# Patient Record
Sex: FEMALE | Race: WHITE | NOT HISPANIC OR LATINO | Employment: FULL TIME | URBAN - METROPOLITAN AREA
[De-identification: names, ages, dates, MRNs, and addresses within clinical notes are randomized per-mention and may not be internally consistent; named-entity substitution may affect disease eponyms.]

---

## 2018-05-31 ENCOUNTER — PRENATAL OFFICE VISIT (OUTPATIENT)
Dept: NURSING | Facility: CLINIC | Age: 18
End: 2018-05-31
Payer: COMMERCIAL

## 2018-05-31 VITALS
SYSTOLIC BLOOD PRESSURE: 112 MMHG | HEIGHT: 66 IN | WEIGHT: 177 LBS | BODY MASS INDEX: 28.45 KG/M2 | HEART RATE: 78 BPM | DIASTOLIC BLOOD PRESSURE: 68 MMHG

## 2018-05-31 DIAGNOSIS — O09.90 HIGH-RISK PREGNANCY, UNSPECIFIED TRIMESTER: Primary | ICD-10-CM

## 2018-05-31 DIAGNOSIS — N91.2 ABSENCE OF MENSTRUATION: ICD-10-CM

## 2018-05-31 LAB
ABO + RH BLD: NORMAL
ABO + RH BLD: NORMAL
ALBUMIN UR-MCNC: NEGATIVE MG/DL
APPEARANCE UR: CLEAR
BACTERIA #/AREA URNS HPF: ABNORMAL /HPF
BETA HCG QUAL IFA URINE: POSITIVE
BILIRUB UR QL STRIP: NEGATIVE
BLD GP AB SCN SERPL QL: NORMAL
BLOOD BANK CMNT PATIENT-IMP: NORMAL
COLOR UR AUTO: YELLOW
ERYTHROCYTE [DISTWIDTH] IN BLOOD BY AUTOMATED COUNT: 14.9 % (ref 10–15)
GLUCOSE UR STRIP-MCNC: NEGATIVE MG/DL
HCT VFR BLD AUTO: 37.3 % (ref 35–47)
HGB BLD-MCNC: 12.6 G/DL (ref 11.7–15.7)
HGB UR QL STRIP: NEGATIVE
KETONES UR STRIP-MCNC: 40 MG/DL
LEUKOCYTE ESTERASE UR QL STRIP: ABNORMAL
MCH RBC QN AUTO: 26 PG (ref 26.5–33)
MCHC RBC AUTO-ENTMCNC: 33.8 G/DL (ref 31.5–36.5)
MCV RBC AUTO: 77 FL (ref 78–100)
NITRATE UR QL: NEGATIVE
NON-SQ EPI CELLS #/AREA URNS LPF: ABNORMAL /LPF
PH UR STRIP: 6 PH (ref 5–7)
PLATELET # BLD AUTO: 220 10E9/L (ref 150–450)
RBC # BLD AUTO: 4.85 10E12/L (ref 3.8–5.2)
RBC #/AREA URNS AUTO: ABNORMAL /HPF
SOURCE: ABNORMAL
SP GR UR STRIP: 1.02 (ref 1–1.03)
SPECIMEN EXP DATE BLD: NORMAL
UROBILINOGEN UR STRIP-ACNC: 0.2 EU/DL (ref 0.2–1)
WBC # BLD AUTO: 7.1 10E9/L (ref 4–11)
WBC #/AREA URNS AUTO: ABNORMAL /HPF

## 2018-05-31 PROCEDURE — 87086 URINE CULTURE/COLONY COUNT: CPT | Performed by: OBSTETRICS & GYNECOLOGY

## 2018-05-31 PROCEDURE — 81001 URINALYSIS AUTO W/SCOPE: CPT | Performed by: OBSTETRICS & GYNECOLOGY

## 2018-05-31 PROCEDURE — 86780 TREPONEMA PALLIDUM: CPT | Performed by: OBSTETRICS & GYNECOLOGY

## 2018-05-31 PROCEDURE — 84703 CHORIONIC GONADOTROPIN ASSAY: CPT | Performed by: FAMILY MEDICINE

## 2018-05-31 PROCEDURE — 87186 SC STD MICRODIL/AGAR DIL: CPT | Performed by: OBSTETRICS & GYNECOLOGY

## 2018-05-31 PROCEDURE — 87389 HIV-1 AG W/HIV-1&-2 AB AG IA: CPT | Performed by: OBSTETRICS & GYNECOLOGY

## 2018-05-31 PROCEDURE — 87340 HEPATITIS B SURFACE AG IA: CPT | Performed by: OBSTETRICS & GYNECOLOGY

## 2018-05-31 PROCEDURE — 87088 URINE BACTERIA CULTURE: CPT | Performed by: OBSTETRICS & GYNECOLOGY

## 2018-05-31 PROCEDURE — 86787 VARICELLA-ZOSTER ANTIBODY: CPT | Performed by: OBSTETRICS & GYNECOLOGY

## 2018-05-31 PROCEDURE — 86901 BLOOD TYPING SEROLOGIC RH(D): CPT | Performed by: OBSTETRICS & GYNECOLOGY

## 2018-05-31 PROCEDURE — 36415 COLL VENOUS BLD VENIPUNCTURE: CPT | Performed by: OBSTETRICS & GYNECOLOGY

## 2018-05-31 PROCEDURE — 99207 ZZC NO CHARGE NURSE ONLY: CPT

## 2018-05-31 PROCEDURE — 85027 COMPLETE CBC AUTOMATED: CPT | Performed by: OBSTETRICS & GYNECOLOGY

## 2018-05-31 PROCEDURE — 86762 RUBELLA ANTIBODY: CPT | Performed by: OBSTETRICS & GYNECOLOGY

## 2018-05-31 PROCEDURE — 86900 BLOOD TYPING SEROLOGIC ABO: CPT | Performed by: OBSTETRICS & GYNECOLOGY

## 2018-05-31 PROCEDURE — 86850 RBC ANTIBODY SCREEN: CPT | Performed by: OBSTETRICS & GYNECOLOGY

## 2018-05-31 RX ORDER — PRENATAL VIT/IRON FUM/FOLIC AC 27MG-0.8MG
1 TABLET ORAL DAILY
COMMUNITY
End: 2022-01-19

## 2018-05-31 NOTE — PROGRESS NOTES
Prenatal OB Questionnaire  Past Medical History  Diabetes   No  Hypertension   No  Heart Disease, mitral valve prolapse, or rheumatic fever?   No  An autoimmune disorder such as Lupus or Rheumatoid Arthritis?   No  Kidney Disease or Urinary Tract Infection?   No  Epilepsy, seizures or spells?   No  Migraine headaches?   No  A stroke or loss of function or sensation?   No  Any other neurological problems?   No  Have you ever been treated for depression?  Yes off medication   Are you having problems with crying spells or loss of self-esteem?   No  Have you ever required psychiatric care?   No  Have you ever hepatitis, liver disease or jaundice?   as a baby new born jaundice  Have you ever been treated for blood clots in your veins, deep venous thrombosis, inflammation in the veins, thrombosis, phlebitis, pulmonary embolism or varicosities?   No  Have you had excessive bleeding after surgery or dental work?   No  Do you bleed more than other women after a cut or scratch?   No  Do you have a history of anemia?   Yes in HS was on iron  Have you ever been treated for thyroid problems or taken thyroid medication?  No  Do you have any other endocrine problems?  No  Have you ever been in a major accident or suffered serious trauma?   No  Within the last year, has anyone hit slapped, kicked or otherwise hurt you?  No  In the last year, has anyone forced you to have sex when you didn't want to?  No  Have you ever had a blood transfusion?   No  Would you refuse a blood transfusion if a doctor judged it to be medically necessary?   No  If you answered yes, would you rather die than have a blood transfusion?   No  If you answered yes, is this for Jehovah's witness reasons?   No  Does anyone in your home smoke?   Yes father of baby brothers and mother  Do you use tobacco products?  No  Do you drink beer, wine, hard liquor?  No  Do you use any of the following: marijuana, speed, cocaine, heroine, hallucinogens, or other drugs?  No  Is your  blood type Rh negative?   ?  Have you ever had abnormal antibodies in your blood?   No  Have you ever had asthma?   Yes  Not  On meds in a long time  Have you ever had tuberculosis?   No  Do you have any allergies to drugs or over-the-counter medications?   Yes    Allergies as of 5/31/2018:    Allergies as of 05/31/2018 - Jonathan as Reviewed 05/31/2018   Allergen Reaction Noted     Iodine  05/31/2018       Do you have any breast problems?   No  Have you ever ?   No  Have you had any gynecological surgical procedures such as cervical conization, a LEEP procedure, laser treatment, cryosurgery of the cervix, or a dilation and curettage, etc?  No  Have you had any other surgical procedures?  No  Have you been hospitalized for a nonsurgical reason excluding normal delivery?   No  Have you ever had any anesthetic complications?   No  Have you ever had an abnormal pap smear?   No  Do you have a history of abnormalities of the uterus?   No  Did it take you more than one year to become pregnant?   No  Have you ever been evaluated or treated for infertility?   No  Is there a history of medical problems in your family, which you feel might adversely affect your health or pregnancy?   No  Do you have any other problems we have not asked you about which you feel may be important to this pregnancy?  No    Symptoms since Last Menstrual Period  Do you have any of the following:    *abdominal pain  occasional center like a cramp  *blood in stool or urine  No  *chest pain  No  *shortness of breath  No  *coughing or vomiting up blood No  *heart racing or skipping beats  No  *nausea and vomiting  No  *pain with urination  No  *vaginal discharge or bleeding  No  Current medications are:  Current Outpatient Prescriptions   Medication Sig Dispense Refill     Prenatal Vit-Fe Fumarate-FA (PRENATAL MULTIVITAMIN PLUS IRON) 27-0.8 MG TABS per tablet Take 1 tablet by mouth daily       albuterol (ALBUTEROL) 108 (90 BASE) MCG/ACT inhaler  Inhale 2 puffs into the lungs every 6 hours       albuterol (PROAIR HFA, PROVENTIL HFA, VENTOLIN HFA) 108 (90 BASE) MCG/ACT inhaler Inhale 2 puffs into the lungs every 6 hours as needed for shortness of breath / dyspnea or wheezing (Patient not taking: Reported on 5/31/2018) 2 Inhaler 2     lisdexamfetamine (VYVANSE) 30 MG capsule Take 2 capsules by mouth every morning. (Patient not taking: Reported on 5/31/2018) 30 capsule      melatonin 3 MG tablet Take  by mouth nightly as needed.       predniSONE (DELTASONE) 20 MG tablet Take 1 tablet (20 mg) by mouth daily (Patient not taking: Reported on 5/31/2018) 5 tablet 0     sertraline (ZOLOFT) 25 MG tablet Take 25 mg by mouth daily.         Genetic Screening  At the time of birth, will you be 35 years old or older?  No  Has the patient, baby s father, or anyone in either family had:  Thalassemia (Italian, Greek, Mediterranean, or  background only) and an MCV result less than 80?  No  Neural tube defect such as meningomyelocele, spina bifida or anencephaly?  No  Congenital heart defect?  No  Down s syndrome?  No  Gavino-Sach s disease (Quaker, Cajun, French-Danish)?  No  Sickle cell disease or trait (Samira)?  No  Hemophilia or other inherited problems of blood coagulation? No  Muscular dystrophy?  No  Cystic Fibrosis?  No  Carly s chorea?  No  Mental retardation/autism? No   If yes, was the person tested for fragile X?  No  Any other inherited genetic or chromosomal disorder?  No  Maternal metabolic disorder (e.g. insulin-dependent diabetes, PKU)? No  A child with birth defects not listed above?  Fob has pulmonary stenosis as child  Recurrent pregnancy loss or a stillbirth?  No  Has the patient had any medications/street drugs/alcohol since her last menstrual period? No  Does the patient or baby s father have any other genetic risks?  No  Infection History  Do you object to being tested for Hepatitis B? No  Do you object to being tested for HIV? No  Do you feel  that you are at high risk for coming in contact with the AIDS virus?  No  Have you ever been treated for tuberculosis?  No  Have you ever received the BCG vaccine for tuberculosis?  No  Have you ever had a positive skin test for tuberculosis? No  Do you live with someone who has tuberculosis?  N/A  Have you ever been exposed to tuberculosis?  No  Do you have genital herpes?  No  Does your partner have genital herpes?  No  Have you had a rash or viral illness since your last period?  No  Have you ever had Gonorrhea, Chlamydia, Syphilis, venereal warts, trichomoniasis, pelvic inflammatory disease or any other sexually transmitted disease?  No  Do you know if you are a genital group B streptococcus carrier? No  You have not had chicken pox/varicella  No  Have you been vaccinated against chicken pox?  Yes  Have you had any other infectious disease? No        Early ultrasound screening tool:    Does patient have irregular periods?  No  Did patient use hormonal birth control in the three months prior to positive urine pregnancy test? No  Is the patient breastfeeding?  No  Is the patient 10 weeks or greater at time of education visit?  No    No ultrasound indicated    Patient presents to clinic today prenatal education. This is the patient's 1 pregnancy. She has had 0 miscarriage(s), 0 (s), 0 term birth(s) and 0  birth(s). She has 0 living child(karina).   This was a planned pregnancy.  Reviewed answers to patient's Prenatal OB Questionnaire.     Medical, surgical, family and ObGyn history updated as appropriate. Reviewed current medications and allergies. Patient {is taking prenatal vitamins.     Discussed all of the options within [place] for her prenatal care including Dr. Soto and Dr. Valenzuela at [facility], midwives at [facility] and OB/GYN-Dr. Galloway and Dr. Loza. Next visit scheduled with  Dr Galloway.     Reviewed and discussed OB 1st Trimester Plans/Education  and also  summarized in detail handouts and brochures included in OB Prenatal Folder that patient is able to keep and bring home with her.    Discussed all of the blood tests with pt who agrees to have all including HIV. Pt aware that results will be discussed at next office visit with MD unless abnl results are indicated and phone call will be made.    Anuja Noonan,Clinic Rn  [place]

## 2018-05-31 NOTE — MR AVS SNAPSHOT
"              After Visit Summary   5/31/2018    Kamala Sacnhez    MRN: [id number]           Patient Information     Date Of Birth          2000        Visit Information        Provider Department      5/31/2018 10:30 AM NE RN [facility]        Today's Diagnoses     High-risk pregnancy, unspecified trimester    -  1    Absence of menstruation           Follow-ups after your visit        Your next 10 appointments already scheduled     Jun 19, 2018  4:00 PM CDT   New Prenatal with Karla Galloway MD   [facility]    [address] [place] [id number] [phone]              Who to contact     If you have questions or need follow up information about today's clinic visit or your schedule please contact [facility] directly at [phone].  Normal or non-critical lab and imaging results will be communicated to you by MyChart, letter or phone within 4 business days after the clinic has received the results. If you do not hear from us within 7 days, please contact the clinic through MyChart or phone. If you have a critical or abnormal lab result, we will notify you by phone as soon as possible.  Submit refill requests through [employer] or call your pharmacy and they will forward the refill request to us. Please allow 3 business days for your refill to be completed.          Additional Information About Your Visit        MyChart Information     [employer] lets you send messages to your doctor, view your test results, renew your prescriptions, schedule appointments and more. To sign up, go to [email or web address] . Click on \"Log in\" on the left side of the screen, which will take you to the Welcome page. Then click on \"Sign up Now\" on the right side of the page.     You will be asked to enter the access code listed below, as well as some personal information. Please follow the directions to create your " "username and password.     Your access code is: [email or web address]  Expires: 2018 11:20 AM     Your access code will  in 90 days. If you need help or a new code, please call your [facility] or [phone].        Care EveryWhere ID     This is your Care EveryWhere ID. This could be used by other organizations to access your [place] medical records  [id number]        Your Vitals Were     Pulse Height Last Period BMI (Body Mass Index)          78 5' 5.55\" (1.665 m) 2018 28.96 kg/m2         Blood Pressure from Last 3 Encounters:   18 112/68   14 96/60   13 94/62    Weight from Last 3 Encounters:   18 177 lb (80.3 kg)   14 124 lb (56.2 kg) (59 %)*   13 123 lb (55.8 kg) (63 %)*     * Growth percentiles are based on CDC 2-20 Years data.              We Performed the Following     ABO/RH Type and Screen     Beta HCG Qual, Urine - [facility] and [place] ([id number])     CBC with Platelets     Hepatitis B surface antigen     HIV Antigen Antibody Combo     Rubella Antibody IgG Quantitative     Treponema Abs w Reflex to RPR and Titer     UA  Macroscopoic with Reflex to Micro     Urine Culture Aerobic Bacterial     Varicella Zoster Antibody IGM        Primary Care Provider Office Phone # Fax #    [facility] [phone]       [address] [facility] [address]        Equal Access to Services     NIKKI GUIDO AH: Hadii aad ku hadasho Soomaali, waaxda luqadaha, qaybta kaalmada adeegyada, waxay maurain hayallison bhardwaj. So [facility] [phone].    ATENCIÓN: Si habla michael, tiene a wolff disposición servicios gratuitos de asistencia lingüística. Llame al [phone].    We comply with applicable federal civil rights laws and [place] laws. We do not discriminate on the basis of race, color, national origin, age, disability, sex, sexual orientation, or gender identity.            Thank you!     Thank you for choosing [facility]  for your care. Our goal is always to provide you with excellent care. Hearing back from our patients is one way we can continue to improve our services. Please take a few minutes to complete the written survey that you may receive in the mail after your visit with us. Thank you!             Your Updated Medication List - Protect others around you: Learn how to safely use, store and throw away your medicines at www.disposemymeds.org.          This list is accurate as of 5/31/18 11:20 AM.  Always use your most recent med list.                   Brand Name Dispense Instructions for use Diagnosis    lisdexamfetamine 30 MG capsule    VYVANSE    30 capsule    Take 2 capsules by mouth every morning.    ADHD (attention deficit hyperactivity disorder)       melatonin 3 MG tablet      Take  by mouth nightly as needed.        predniSONE 20 MG tablet    DELTASONE    5 tablet    Take 1 tablet (20 mg) by mouth daily    Asthma exacerbation       prenatal multivitamin plus iron 27-0.8 MG Tabs per tablet      Take 1 tablet by mouth daily        * PROAIR  (90 Base) MCG/ACT Inhaler   Generic drug:  albuterol      Inhale 2 puffs into the lungs every 6 hours        * albuterol 108 (90 Base) MCG/ACT Inhaler    PROAIR HFA/PROVENTIL HFA/VENTOLIN HFA    2 Inhaler    Inhale 2 puffs into the lungs every 6 hours as needed for shortness of breath / dyspnea or wheezing    Intermittent asthma, Asthma exacerbation       sertraline 25 MG tablet    ZOLOFT     Take 25 mg by mouth daily.        * Notice:  This list has 2 medication(s) that are the same as other medications prescribed for you. Read the directions carefully, and ask your doctor or other care provider to review them with you.

## 2018-06-01 LAB
HBV SURFACE AG SERPL QL IA: NONREACTIVE
HIV 1+2 AB+HIV1 P24 AG SERPL QL IA: NONREACTIVE
RUBV IGG SERPL IA-ACNC: 24 IU/ML
T PALLIDUM AB SER QL: NONREACTIVE
VZV IGG SER QL IA: 0.8 AI (ref 0–0.8)

## 2018-06-02 LAB
BACTERIA SPEC CULT: ABNORMAL
SPECIMEN SOURCE: ABNORMAL

## 2018-06-13 DIAGNOSIS — R30.0 DYSURIA: Primary | ICD-10-CM

## 2018-06-13 RX ORDER — AMPICILLIN TRIHYDRATE 500 MG
500 CAPSULE ORAL 4 TIMES DAILY
Qty: 28 CAPSULE | Refills: 0 | Status: SHIPPED | OUTPATIENT
Start: 2018-06-13 | End: 2018-06-20

## 2018-06-15 ENCOUNTER — TELEPHONE (OUTPATIENT)
Dept: OBGYN | Facility: CLINIC | Age: 18
End: 2018-06-15

## 2018-06-15 NOTE — TELEPHONE ENCOUNTER
Reason for call:  Symptom   Symptom or request: nausea, vomiting    Duration (how long have symptoms been present): 3 days  Have you been treated for this before? No    Additional comments: n/a    Phone number to reach patient:  Cell number on file:    Telephone Information:   Mobile [phone]       Best Time:  n/a    Can we leave a detailed message on this number?  YES

## 2018-06-15 NOTE — TELEPHONE ENCOUNTER
Pt has gotten sick twice at work in the last few days. She thinks it's due to being really hot in her workplace.  Recommended drinking plenty of cool fluids throughout the day and taking breaks as needed.  She has prenatal visit 6/19 with Dr. Galloway and the problem can be addressed at that time.  Pt is agreeable to the plan. Fadia Mojica RN

## 2018-06-19 ENCOUNTER — PRENATAL OFFICE VISIT (OUTPATIENT)
Dept: OBGYN | Facility: CLINIC | Age: 18
End: 2018-06-19
Payer: COMMERCIAL

## 2018-06-19 VITALS
WEIGHT: 168.6 LBS | HEART RATE: 91 BPM | DIASTOLIC BLOOD PRESSURE: 64 MMHG | OXYGEN SATURATION: 99 % | TEMPERATURE: 99.4 F | SYSTOLIC BLOOD PRESSURE: 112 MMHG | HEIGHT: 66 IN | BODY MASS INDEX: 27.1 KG/M2

## 2018-06-19 DIAGNOSIS — Z11.3 SCREEN FOR STD (SEXUALLY TRANSMITTED DISEASE): ICD-10-CM

## 2018-06-19 DIAGNOSIS — N92.6 IRREGULAR MENSES: ICD-10-CM

## 2018-06-19 DIAGNOSIS — Z34.01 NORMAL FIRST PREGNANCY CONFIRMED, CURRENTLY IN FIRST TRIMESTER: Primary | ICD-10-CM

## 2018-06-19 PROCEDURE — 99207 ZZC FIRST OB VISIT: CPT | Performed by: OBSTETRICS & GYNECOLOGY

## 2018-06-19 PROCEDURE — 87591 N.GONORRHOEAE DNA AMP PROB: CPT | Performed by: OBSTETRICS & GYNECOLOGY

## 2018-06-19 PROCEDURE — 87491 CHLMYD TRACH DNA AMP PROBE: CPT | Performed by: OBSTETRICS & GYNECOLOGY

## 2018-06-19 NOTE — PROGRESS NOTES
"SUBJECTIVE:   Kamala Harris is a  18 year old female   @ 12w0d by LMP who presents for a new Ob visit.   Here with her , Chip.  Minimal symptoms.  No bleeding or spotting.   Treating with ampicillin for ecoli UTI at nurse OB visit.   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   Working conditions are poor at the [employer] and she has considered leaving.    Patient reports born with a hole in her heart.  Spontaneously resolved.    Has h/o depression no longer on antidepressant.  Feeling ok.    PHQ( and NERIS 7 were high today.  Patient feels related to unhappy at her job.         POBHx:  none    Patient Active Problem List   Diagnosis     Presbyopia     ADHD (attention deficit hyperactivity disorder)     Other ankle sprain and strain     Health Care Home     Mild major depression (H)     Intermittent asthma       No past medical history on file.    Past Surgical History:   Procedure Laterality Date     CARDIAC CATHERIZATION  4 years    Told by cardiology that tiny defect would close on own, no FU     CREATE EARDRUM OPENING,LOCAL ANESTH  4 years    PETs        Current Outpatient Prescriptions   Medication     ampicillin (PRINCIPEN) 500 MG capsule     Ferrous Sulfate (IRON SUPPLEMENT PO)     Prenatal Vit-Fe Fumarate-FA (PRENATAL MULTIVITAMIN PLUS IRON) 27-0.8 MG TABS per tablet     sertraline (ZOLOFT) 25 MG tablet     albuterol (PROAIR HFA, PROVENTIL HFA, VENTOLIN HFA) 108 (90 BASE) MCG/ACT inhaler     lisdexamfetamine (VYVANSE) 30 MG capsule     [DISCONTINUED] albuterol (ALBUTEROL) 108 (90 BASE) MCG/ACT inhaler     No current facility-administered medications for this visit.        Allergies   Allergen Reactions     Iodine      Rash itching           EXAM:  /64 (BP Location: Right arm, Patient Position: Sitting, Cuff Size: Adult Regular)  Pulse 91  Temp 99.4  F (37.4  C) (Oral)  Ht 5' 5.55\" (1.665 m)  Wt 168 lb 9.6 oz (76.5 kg)  LMP 2018  SpO2 99%  BMI 27.59 kg/m2   "   GENERAL: WDWN WF in NAD  HEENT: no abnormalities  NECK: without thyromegaly or adenopathy  CHEST: clear to auscultation  CV: RRR without murmur  BREASTS: no palpable masses or adenopathy, no asymmetry or skin dimpling  ABDOMEN: S, NT, no palpable masses or hepatosplenomegaly  MUSCULOSKELETAL: no obvious abnormalities.  NEUROLOGICAL: normal strength, sensation, mental status  PSYCH: normal affect, appropriate  PELVIC: EG - normal adult female.  BUS - within normal limits.  Vagina - well rugated, no discharge.  Cervix - no lesions, no CMT.  Uterus - 10-12 wk  size and nontender.  Adnexae - no masses or tenderness.  RV - deferred.    BEDSIDE ABDOMINAL U/S: done to confirm dates and viability.  There is a single live intrauterine pregnancy noted with normal gestational sac.  There is normal  cardiac activity and fetal movement noted.  CRL = unable to measure d/t fetal position            ASSESSMENT/ PLAN:  IUP @ 12w0d by LMP normal first trimester    UTI in first trimester      Discussed routine prenatal care and first trimester screen testing.    She declined  the first trimester screen.      Patient was counselled on healthy lifestyle habits and all questions answered.    New Ob labs reviewed today.    Return to Clinic in 4 weeks or sooner if problems arise.    Karla Galloway MD

## 2018-06-19 NOTE — PROGRESS NOTES
"Chief Complaint   Patient presents with     Prenatal Care     12+0.       Initial /64 (BP Location: Right arm, Patient Position: Sitting, Cuff Size: Adult Regular)  Pulse 91  Temp 99.4  F (37.4  C) (Oral)  Ht 5' 5.55\" (1.665 m)  Wt 168 lb 9.6 oz (76.5 kg)  LMP 2018  SpO2 99%  BMI 27.59 kg/m2 Estimated body mass index is 27.59 kg/(m^2) as calculated from the following:    Height as of this encounter: 5' 5.55\" (1.665 m).    Weight as of this encounter: 168 lb 9.6 oz (76.5 kg).  BP completed using cuff size: regular        The following  Due: Nessa ()      The following patient reported/Care Every where data was sent to:  P ABSTRACT QUALITY INITIATIVES [03422]  n/a      patient has appointment for today and orders have been placed              "

## 2018-06-19 NOTE — MR AVS SNAPSHOT
After Visit Summary   6/19/2018    Kamala Harris    MRN: [id number]           Patient Information     Date Of Birth          2000        Visit Information        Provider Department      6/19/2018 4:00 PM Karla Galloway MD [facility]        Today's Diagnoses     Normal first pregnancy confirmed, currently in first trimester    -  1    Screen for STD (sexually transmitted disease)        Irregular menses           Follow-ups after your visit        Your next 10 appointments already scheduled     Jul 17, 2018  3:30 PM CDT   ESTABLISHED PRENATAL with Karla Galloway MD   [facility]    [address] [place] [address] [phone]              Who to contact     If you have questions or need follow up information about today's clinic visit or your schedule please contact [facility] directly at [phone].  Normal or non-critical lab and imaging results will be communicated to you by MyChart, letter or phone within 4 business days after the clinic has received the results. If you do not hear from us within 7 days, please contact the clinic through Orbeushart or phone. If you have a critical or abnormal lab result, we will notify you by phone as soon as possible.  Submit refill requests through Moprise or call your pharmacy and they will forward the refill request to us. Please allow 3 business days for your refill to be completed.          Additional Information About Your Visit        Orbeushart Information     Moprise gives you secure access to your electronic health record. If you see a primary care provider, you can also send messages to your care team and make appointments. If you have questions, please call your primary care clinic.  If you do not have a primary care provider, please call [phone] and they will assist you.        Care EveryWhere ID     This is your  "Care EveryWhere ID. This could be used by other organizations to access your [facility] medical records  [id number]        Your Vitals Were     Pulse Temperature Height Last Period Pulse Oximetry BMI (Body Mass Index)    91 99.4  F (37.4  C) (Oral) 5' 5.55\" (1.665 m) 03/27/2018 99% 27.59 kg/m2       Blood Pressure from Last 3 Encounters:   06/19/18 112/64   05/31/18 112/68   04/01/14 96/60    Weight from Last 3 Encounters:   06/19/18 168 lb 9.6 oz (76.5 kg)   05/31/18 177 lb (80.3 kg)   04/01/14 124 lb (56.2 kg) (59 %)*     * Growth percentiles are based on [facility] 2-20 Years data.              We Performed the Following     CHLAMYDIA TRACHOMATIS PCR     NEISSERIA GONORRHOEA PCR     US OB Limited One Or More Fetuses          Today's Medication Changes          These changes are accurate as of 6/19/18  6:48 PM.  If you have any questions, ask your nurse or doctor.               These medicines have changed or have updated prescriptions.        Dose/Directions    albuterol 108 (90 Base) MCG/ACT Inhaler   Commonly known as:  PROAIR HFA/PROVENTIL HFA/VENTOLIN HFA   This may have changed:  Another medication with the same name was removed. Continue taking this medication, and follow the directions you see here.   Used for:  Intermittent asthma, Asthma exacerbation   Changed by:  Karla Galloway MD        Dose:  2 puff   Inhale 2 puffs into the lungs every 6 hours as needed for shortness of breath / dyspnea or wheezing   Quantity:  2 Inhaler   Refills:  2         Stop taking these medicines if you haven't already. Please contact your care team if you have questions.     melatonin 3 MG tablet   Stopped by:  Karla Galloway MD           predniSONE 20 MG tablet   Commonly known as:  DELTASONE   Stopped by:  Karla Galloway MD                    Primary Care Provider Office Phone # Fax #    [facility] [phone]       [address] [facility] [address]      "   Equal Access to Services     [facility] : Hadii aad ku hadnovaruchi Sandraali, waaxda luqadaha, qaybta kaalmastefano harmon, larissa bhardwaj. So [facility] [phone].    ATENCIÓN: Si habla español, tiene a wolff disposición servicios gratuitos de asistencia lingüística. Tanyaame al [phone].    We comply with applicable federal civil rights laws and [place] laws. We do not discriminate on the basis of race, color, national origin, age, disability, sex, sexual orientation, or gender identity.            Thank you!     Thank you for choosing [facility]  for your care. Our goal is always to provide you with excellent care. Hearing back from our patients is one way we can continue to improve our services. Please take a few minutes to complete the written survey that you may receive in the mail after your visit with us. Thank you!             Your Updated Medication List - Protect others around you: Learn how to safely use, store and throw away your medicines at www.disposemymeds.org.          This list is accurate as of 6/19/18  6:48 PM.  Always use your most recent med list.                   Brand Name Dispense Instructions for use Diagnosis    albuterol 108 (90 Base) MCG/ACT Inhaler    PROAIR HFA/PROVENTIL HFA/VENTOLIN HFA    2 Inhaler    Inhale 2 puffs into the lungs every 6 hours as needed for shortness of breath / dyspnea or wheezing    Intermittent asthma, Asthma exacerbation       ampicillin 500 MG capsule    PRINCIPEN    28 capsule    Take 1 capsule (500 mg) by mouth 4 times daily for 7 days    Dysuria       IRON SUPPLEMENT PO           lisdexamfetamine 30 MG capsule    VYVANSE    30 capsule    Take 2 capsules by mouth every morning.    ADHD (attention deficit hyperactivity disorder)       prenatal multivitamin plus iron 27-0.8 MG Tabs per tablet      Take 1 tablet by mouth daily        sertraline 25 MG tablet    ZOLOFT     Take 25 mg by mouth daily.

## 2018-06-19 NOTE — LETTER
[facility]  [address] [address]  [phone]      June 19, 2018      Kamala Harris  [address] [address]              To Whom It May Concern:    Kamala Harris is currently pregnant.  She is seeing me for her prenatal care.   Her due date is January 1, 2019.  Due to pregnancy, she will need to restrict lifting to no greater than 10 lbs.  She should also be allowed to sit every 2 hours as needed.         Sincerely,          Karla Galloway MD

## 2018-06-20 ASSESSMENT — ANXIETY QUESTIONNAIRES
6. BECOMING EASILY ANNOYED OR IRRITABLE: NEARLY EVERY DAY
1. FEELING NERVOUS, ANXIOUS, OR ON EDGE: SEVERAL DAYS
7. FEELING AFRAID AS IF SOMETHING AWFUL MIGHT HAPPEN: NOT AT ALL
3. WORRYING TOO MUCH ABOUT DIFFERENT THINGS: MORE THAN HALF THE DAYS
2. NOT BEING ABLE TO STOP OR CONTROL WORRYING: SEVERAL DAYS
GAD7 TOTAL SCORE: 12
5. BEING SO RESTLESS THAT IT IS HARD TO SIT STILL: MORE THAN HALF THE DAYS

## 2018-06-20 ASSESSMENT — PATIENT HEALTH QUESTIONNAIRE - PHQ9: 5. POOR APPETITE OR OVEREATING: NEARLY EVERY DAY

## 2018-06-21 LAB
C TRACH DNA SPEC QL NAA+PROBE: NEGATIVE
N GONORRHOEA DNA SPEC QL NAA+PROBE: NEGATIVE
SPECIMEN SOURCE: NORMAL
SPECIMEN SOURCE: NORMAL

## 2018-06-21 ASSESSMENT — ANXIETY QUESTIONNAIRES: GAD7 TOTAL SCORE: 12

## 2018-06-21 ASSESSMENT — PATIENT HEALTH QUESTIONNAIRE - PHQ9: SUM OF ALL RESPONSES TO PHQ QUESTIONS 1-9: 11

## 2018-07-17 ENCOUNTER — PRENATAL OFFICE VISIT (OUTPATIENT)
Dept: OBGYN | Facility: CLINIC | Age: 18
End: 2018-07-17
Payer: COMMERCIAL

## 2018-07-17 VITALS
BODY MASS INDEX: 26.74 KG/M2 | SYSTOLIC BLOOD PRESSURE: 92 MMHG | TEMPERATURE: 99 F | DIASTOLIC BLOOD PRESSURE: 64 MMHG | WEIGHT: 166.4 LBS | HEIGHT: 66 IN

## 2018-07-17 DIAGNOSIS — O99.412 MATERNAL CONGENITAL HEART DISEASE IN SECOND TRIMESTER, ANTEPARTUM: ICD-10-CM

## 2018-07-17 DIAGNOSIS — R30.0 DYSURIA: ICD-10-CM

## 2018-07-17 DIAGNOSIS — Q24.9 MATERNAL CONGENITAL HEART DISEASE IN SECOND TRIMESTER, ANTEPARTUM: ICD-10-CM

## 2018-07-17 DIAGNOSIS — Z34.02 NORMAL FIRST PREGNANCY CONFIRMED, CURRENTLY IN SECOND TRIMESTER: Primary | ICD-10-CM

## 2018-07-17 LAB
ALBUMIN UR-MCNC: NEGATIVE MG/DL
APPEARANCE UR: CLEAR
BILIRUB UR QL STRIP: NEGATIVE
COLOR UR AUTO: YELLOW
GLUCOSE UR STRIP-MCNC: NEGATIVE MG/DL
HGB UR QL STRIP: NEGATIVE
KETONES UR STRIP-MCNC: NEGATIVE MG/DL
LEUKOCYTE ESTERASE UR QL STRIP: NEGATIVE
NITRATE UR QL: NEGATIVE
PH UR STRIP: 7 PH (ref 5–7)
SOURCE: NORMAL
SP GR UR STRIP: 1.02 (ref 1–1.03)
UROBILINOGEN UR STRIP-ACNC: 0.2 EU/DL (ref 0.2–1)

## 2018-07-17 PROCEDURE — 99207 ZZC PRENATAL VISIT: CPT | Performed by: OBSTETRICS & GYNECOLOGY

## 2018-07-17 PROCEDURE — 81003 URINALYSIS AUTO W/O SCOPE: CPT | Performed by: OBSTETRICS & GYNECOLOGY

## 2018-07-17 NOTE — PROGRESS NOTES
16w0d  Feeling better. Started to feel FM.  More bloody noses. Reports that her Nausea is better but still very tired.  Reviewed her weight today.  Still at - 16 lbs.      Stopped working at the [employer] and now just at [employer].   About 5-7 hrs a day. Has flexibility there.  Mood is stable. She is off her ADHD medicine and Zoloft.  She feels like she is doing okay at this point in time.   discussed US survey and Quad screen.  She declined a quad screen today.  F/u in 4 wks.  RR

## 2018-07-17 NOTE — MR AVS SNAPSHOT
After Visit Summary   7/17/2018    Kamala Harris    MRN: [id number]           Patient Information     Date Of Birth          2000        Visit Information        Provider Department      7/17/2018 3:30 PM Birgit Montesinos MD [facility]        Today's Diagnoses     Normal first pregnancy confirmed, currently in second trimester    -  1    Maternal congenital heart disease in second trimester, antepartum        Dysuria           Follow-ups after your visit        Additional Services     MAT FETAL MED CTR REFERRAL-PREGNANCY       Body mass index is 27.23 kg/(m^2).    >> Patient may proceed with recommendations for further testing as directed by the Maternal Fetal Medicine Specialist >>    >> If requesting Fetal Echo: MFM will determine appropriate location for exam due to indication.    >> If requesting Lung Maturity Amnio:  If results indicate fetal lung maturity, induction or C/S is recommended within 36 hours.  Please schedule accordingly.     Dear Patient:   Please be aware that coverage of these services is subject to the terms and limitations of your health insurance plan.  Call member services at your health plan with any benefit or coverage questions.      Please bring the following to your appointment:    >>  Any x-rays, CTs or MRIs which have been performed.  Contact the facility where they were done to arrange for  prior to your scheduled appointment.  Any new CT, MRI or other procedures ordered by your specialist must be performed at a [facility] facility or coordinated by your clinic's referral office.  >>  List of current medications   >>  This referral request   >>  Any documents/labs given to you for this referral                  Your next 10 appointments already scheduled     Aug 03, 2018 11:00 AM CDT   Genetic Counseling with UR GEN COUNSELOR 1   [facility] Maternal Fetal Medicine [facility] ([facility])     [address]  [facility] [address]   [phone]            Aug 03, 2018 11:45 AM CDT   MFM US COMP with [id number]   [facility] Ultrasound - [place] ([facility])    [address]  [facility] [address] [phone]           Wear comfortable clothes and leave your valuables at home.            Aug 03, 2018 12:15 PM CDT   Radiology MD with UR MFM MD   [facility] Maternal Fetal [facility] - [facility])    [address]  [facility] [address]   [phone]           Please arrive at the time given for your first appointment. This visit is used internally to schedule the physician's time during your ultrasound.            Aug 14, 2018  9:30 AM CDT   ESTABLISHED PRENATAL with Birgit [facility]inos MD   [facility]    [address] [place] [address] [phone]              Future tests that were ordered for you today     Open Future Orders        Priority Expected Expires Ordered    [facility] Genetic Counseling Routine  7/19/2019 7/18/2018    [facility] US Comprehensive Single Routine  5/18/2019 7/18/2018            Who to contact     If you have questions or need follow up information about today's clinic visit or your schedule please contact [facility] directly at [phone].  Normal or non-critical lab and imaging results will be communicated to you by MyChart, letter or phone within 4 business days after the clinic has received the results. If you do not hear from us within 7 days, please contact the clinic through MyChart or phone. If you have a critical or abnormal lab result, we will notify you by phone as soon as possible.  Submit refill requests through Spling or call your pharmacy and they will forward the refill request to us. Please allow 3 business days for your refill to be completed.          Additional  "Information About Your Visit        MyChart Information     Coolio gives you secure access to your electronic health record. If you see a primary care provider, you can also send messages to your care team and make appointments. If you have questions, please call your primary care clinic.  If you do not have a primary care provider, please call [phone] and they will assist you.        Care EveryWhere ID     This is your Care EveryWhere ID. This could be used by other organizations to access your [place] medical records  [id number]        Your Vitals Were     Temperature Height Last Period BMI (Body Mass Index)          99  F (37.2  C) (Oral) 5' 5.55\" (1.665 m) 03/27/2018 27.23 kg/m2         Blood Pressure from Last 3 Encounters:   07/17/18 92/64   06/19/18 112/64   05/31/18 112/68    Weight from Last 3 Encounters:   07/17/18 166 lb 6.4 oz (75.5 kg)   06/19/18 168 lb 9.6 oz (76.5 kg)   05/31/18 177 lb (80.3 kg)              We Performed the Following     MAT FETAL MED CTR REFERRAL-PREGNANCY     UA reflex to Microscopic and Culture        Primary Care Provider Office Phone # Fax #    [facility] [phone]       [address] [place] [address]        Equal Access to Services     NIKKI GUIDO : Hadii madhu ku hadasho Soomaali, waaxda luqadaha, qaybta kaalmada adeegyada, larissa bhardwaj. So [facility] [phone].    ATENCIÓN: Si habla español, tiene a wolff disposición servicios gratuitos de asistencia lingüística. Llame al [phone].    We comply with applicable federal civil rights laws and [place] laws. We do not discriminate on the basis of race, color, national origin, age, disability, sex, sexual orientation, or gender identity.            Thank you!     Thank you for choosing [facility]  for your care. Our goal is always to provide you with excellent care. Hearing back from our patients is one way we can continue to " improve our services. Please take a few minutes to complete the written survey that you may receive in the mail after your visit with us. Thank you!             Your Updated Medication List - Protect others around you: Learn how to safely use, store and throw away your medicines at www.disposemymeds.org.          This list is accurate as of 7/17/18 11:59 PM.  Always use your most recent med list.                   Brand Name Dispense Instructions for use Diagnosis    albuterol 108 (90 Base) MCG/ACT Inhaler    PROAIR HFA/PROVENTIL HFA/VENTOLIN HFA    2 Inhaler    Inhale 2 puffs into the lungs every 6 hours as needed for shortness of breath / dyspnea or wheezing    Intermittent asthma, Asthma exacerbation       IRON SUPPLEMENT PO           lisdexamfetamine 30 MG capsule    VYVANSE    30 capsule    Take 2 capsules by mouth every morning.    ADHD (attention deficit hyperactivity disorder)       prenatal multivitamin plus iron 27-0.8 MG Tabs per tablet      Take 1 tablet by mouth daily        sertraline 25 MG tablet    ZOLOFT     Take 25 mg by mouth daily.

## 2018-08-01 ENCOUNTER — PRE VISIT (OUTPATIENT)
Dept: MATERNAL FETAL MEDICINE | Facility: CLINIC | Age: 18
End: 2018-08-01

## 2018-08-03 ENCOUNTER — HOSPITAL ENCOUNTER (OUTPATIENT)
Dept: ULTRASOUND IMAGING | Facility: CLINIC | Age: 18
Discharge: HOME | End: 2018-08-03
Attending: OBSTETRICS & GYNECOLOGY | Admitting: OBSTETRICS & GYNECOLOGY
Payer: COMMERCIAL

## 2018-08-03 ENCOUNTER — OFFICE VISIT (OUTPATIENT)
Dept: MATERNAL FETAL MEDICINE | Facility: CLINIC | Age: 18
End: 2018-08-03
Attending: OBSTETRICS & GYNECOLOGY
Payer: COMMERCIAL

## 2018-08-03 DIAGNOSIS — O26.90 PREGNANCY RELATED CONDITION, ANTEPARTUM: ICD-10-CM

## 2018-08-03 DIAGNOSIS — Z34.02 SUPERVISION OF NORMAL FIRST PREGNANCY IN SECOND TRIMESTER: ICD-10-CM

## 2018-08-03 DIAGNOSIS — Z82.79 FAMILY HISTORY OF CONGENITAL HEART DISEASE: Primary | ICD-10-CM

## 2018-08-03 DIAGNOSIS — Z82.79 FAMILY HISTORY OF CONGENITAL HEART DEFECT: Primary | ICD-10-CM

## 2018-08-03 PROCEDURE — 76805 OB US >/= 14 WKS SNGL FETUS: CPT

## 2018-08-03 PROCEDURE — 76811 OB US DETAILED SNGL FETUS: CPT

## 2018-08-03 PROCEDURE — 40000072 ZZH STATISTIC GENETIC COUNSELING, < 16 MIN: Mod: ZF | Performed by: GENETIC COUNSELOR, MS

## 2018-08-03 NOTE — MR AVS SNAPSHOT
After Visit Summary   8/3/2018    Kamala Harris    MRN: [id number]           Patient Information     Date Of Birth          2000        Visit Information        Provider Department      8/3/2018 12:15 PM Dionicio Herrera MD [facility] Maternal Fetal Medicine - [place]        Today's Diagnoses     Family history of congenital heart disease    -  1       Follow-ups after your visit        Your next 10 appointments already scheduled     Aug 14, 2018  9:30 AM CDT   ESTABLISHED PRENATAL with Birgit Montesinos MD   [facility]    [address] [place] [address]   [phone]            Aug 31, 2018 10:15 AM CDT   MFM READ SCREEN FETAL EHCO Li with [id number]   [facility] Maternal Fetal Medicine Ultrasound - [facility])    [address]  [facility] [address] [phone]            Aug 31, 2018 11:00 AM CDT   M US COMP with URMFMUSR1   [facility] Maternal Fetal Medicine Ultrasound - [facility])    [address]  [facility] [address] [phone]           Wear comfortable clothes and leave your valuables at home.            Aug 31, 2018 11:30 AM CDT   Radiology MD with UR CHARLIE PERSON   [facility] Maternal Fetal Medicine - [facility])    [address]  [facility] [address]   [phone]           Please arrive at the time given for your first appointment. This visit is used internally to schedule the physician's time during your ultrasound.              Future tests that were ordered for you today     Open Future Orders        Priority Expected Expires Ordered    MFM US Comprehensive Single Routine  6/3/2019 8/3/2018    MFM Read Screen Fetal Echo Single Routine  6/3/2019 8/3/2018    Maternal Fetal OB Complete 2/3 Tri Sngle Routine  5/18/2019 7/18/2018             Who to contact     If you have questions or need follow up information about today's clinic visit or your schedule please contact [facility] directly at [phone].  Normal or non-critical lab and imaging results will be communicated to you by MyChart, letter or phone within 4 business days after the clinic has received the results. If you do not hear from us within 7 days, please contact the clinic through MyChart or phone. If you have a critical or abnormal lab result, we will notify you by phone as soon as possible.  Submit refill requests through [facility] or call your pharmacy and they will forward the refill request to us. Please allow 3 business days for your refill to be completed.          Additional Information About Your Visit        [email or web address]-Go [email or web address] Information     [facility] gives you secure access to your electronic health record. If you see a primary care provider, you can also send messages to your care team and make appointments. If you have questions, please call your primary care clinic.  If you do not have a primary care provider, please call [phone] and they will assist you.        Care EveryWhere ID     This is your Care EveryWhere ID. This could be used by other organizations to access your [place] medical records  [id number]        Your Vitals Were     Last Period                   03/27/2018            Blood Pressure from Last 3 Encounters:   07/17/18 92/64   06/19/18 112/64   05/31/18 112/68    Weight from Last 3 Encounters:   07/17/18 75.5 kg (166 lb 6.4 oz)   06/19/18 76.5 kg (168 lb 9.6 oz)   05/31/18 80.3 kg (177 lb)               Primary Care Provider Office Phone # Fax #    [facility] [phone]       [address] [place] [address]        Equal Access to Services     NIKKI GUIDO : Paulino Baker, matt an, larissa presley. So  [facility] [phone].    ATENCIÓN: Si juan rodriguez, tiene a wolff disposición servicios gratuitos de asistencia lingüística. Kristen olsen [phone].    We comply with applicable federal civil rights laws and [place] laws. We do not discriminate on the basis of race, color, national origin, age, disability, sex, sexual orientation, or gender identity.            Thank you!     Thank you for choosing [facility]  for your care. Our goal is always to provide you with excellent care. Hearing back from our patients is one way we can continue to improve our services. Please take a few minutes to complete the written survey that you may receive in the mail after your visit with us. Thank you!             Your Updated Medication List - Protect others around you: Learn how to safely use, store and throw away your medicines at www.disposemymeds.org.          This list is accurate as of 8/3/18  1:19 PM.  Always use your most recent med list.                   Brand Name Dispense Instructions for use Diagnosis    albuterol 108 (90 Base) MCG/ACT Inhaler    PROAIR HFA/PROVENTIL HFA/VENTOLIN HFA    2 Inhaler    Inhale 2 puffs into the lungs every 6 hours as needed for shortness of breath / dyspnea or wheezing    Intermittent asthma, Asthma exacerbation       IRON SUPPLEMENT PO           lisdexamfetamine 30 MG capsule    VYVANSE    30 capsule    Take 2 capsules by mouth every morning.    ADHD (attention deficit hyperactivity disorder)       prenatal multivitamin plus iron 27-0.8 MG Tabs per tablet      Take 1 tablet by mouth daily        sertraline 25 MG tablet    ZOLOFT     Take 25 mg by mouth daily.

## 2018-08-03 NOTE — PROGRESS NOTES
"Please see \"Imaging\" tab under \"Chart Review\" for details of today's US at the [facility].    Dionicio Herrera MD  Maternal-Fetal Medicine      "

## 2018-08-03 NOTE — PROGRESS NOTES
"[facility]  Genetic Counseling Consult    Patient:  Kamala Harris YOB: 2000   Date of Service:  18      Kamala Harris was seen at the [facility] for genetic consultation as part of her appointment for comprehensive ultrasound.  The indication for genetic counseling is a family history of congenital cardiac defect. Kamala was accompanied by her partner, Chip, her father, and Chip's mother.        Impression/Plan:   1. Kamala has not had serum screening in this pregnancy.     2. Kamala had a comprehensive (level II) ultrasound today.  Please see the ultrasound report for further details.    3. The patient declines genetic amniocentesis and maternal serum screening today.    Pregnancy History:   /Parity:    Age at Delivery: 18 year old  RAISA: 2019, Alternate RAISA Entry  Gestational Age: 18w3d    No significant complications or exposures were reported in the current pregnancy.    Medical History:   Kamala s reported medical history is not expected to impact pregnancy management or risks to fetal development.       Family History:   A three-generation pedigree was obtained, and is scanned under the  Media  tab.   The following significant findings were reported by Kamala:    Kamala was born with a \"hole in the heart\" that spontaneously closed after birth    Kamala's paternal aunts and uncles have a significant history of skin melanoma       The father of the pregnancy, Chip, 20, has pulmonary stenosis. He also has anxiety and depression    Chip's full brother also has pulmonary stenosis, anxiety, and depression     Chip's mother had breast cancer at 41 and cervical cancer in her 30s. She had a total hysterectomy due to the cervical cancer.     Chip's maternal uncle  suddenly around 3 months of age. Chip's mother said the cause had been called SIDS     Chip's mother " "adopted her niece due to her sister having significant substance abuse    Chip's father  at 28 from suicide       We discussed that congenital heart defects are common, occurring in 5 to 10 per 1000 live births. One type of congenital heart defect commonly referred to as a   hole in the heart  is a patent foramen ovale, however there are many different abnormalities that can be referred to as a \"hole in the heart\". The specific recurrence risk for first degree relatives differs according to the type of congenital heart defect and if there is an associated genetic syndrome present. In general, studies show that the overall risk contributed by a positive family history of a congenital heart defect in 1st degree relatives for the same defect is 8.15% whereas the recurrence risk for a different heart defect is 2.68%. For pulmonary stenosis a 2% recurrence risk was provided for the chance of an affected parent having a child with the same heart defect. However, due to the addition of Chip's brother also having pulmonary stenosis the recurrence risk is likely higher for this pregnancy and could be up to 50% if there is an underlying genetic condition.     We discussed that recurrence risks for a hole in the heart that spontaneously closed is more difficult to estimate. We discussed the family history of heart defects for a while since Kamala voiced this as her main concern in the pregnancy. We discussed the likely recommendation of a fetal echocardiogram which is a specialized ultrasound to assess for heart structure and function. We discussed that the doctor would be reviewing this information and providing a recommendation when they meet after the ultrasound.     There is a significant family history of mental illness in both Kamala and Chip's family including anxiety and depression. Mental illness is inherited in a multifactorial fashion, meaning many factors are involved in the development of this " condition. These factors usually include both genetic and environmental aspects and a combination of these aspects lead to the condition. While difficult to provide specific numbers, it is more likely for individuals with a family history to also have mental illness, compared to the general population without a significant family history.     Otherwise, the reported family history is negative for multiple miscarriages, stillbirths, birth defects, mental retardation, known genetic conditions, and consanguinity.       Carrier Screening:   The patient reports that she and the father of the pregnancy have  ancestry:      Cystic fibrosis is an autosomal recessive genetic condition that occurs with increased frequency in individuals of  ancestry and carrier screening for this condition is available.  In addition,  screening in the [facility] includes cystic fibrosis.      Expanded carrier screening for mutations in a large panel of genes associated with autosomal recessive conditions including cystic fibrosis, spinal muscular atrophy, and others, is now available.      Carrier screening was not discussed today.       Risk Assessment for Chromosome Conditions:   We explained that the risk for fetal chromosome abnormalities increases with maternal age. We discussed specific features of common chromosome abnormalities, including Down syndrome, trisomy 13, trisomy 18, and sex chromosome trisomies.      - At age 18 at midtrimester, the risk to have a baby with Down syndrome is 1 in 1176.    - At age 18 at midtrimester, the risk to have a baby with any chromosome abnormality is 1 in 588.       Kamala did not have maternal serum screening earlier in pregnancy.         Testing Options:   We discussed the following options:   Quad screen:     Maternal plasma AFP, hCG, estriol, and inhibin measurement between 15-24 weeks gestation    Provides risk assessment for fetal Down syndrome, trisomy 18, and  neural tube defects     Genetic Amniocentesis    Invasive procedure typically performed in the second trimester by which amniotic fluid is obtained for the purpose of chromosome analysis and/or other prenatal genetic analysis    Diagnostic results; >99% sensitivity for fetal chromosome abnormalities    AFAFP measurement tests for open neural tube defects       Comprehensive (Level II) ultrasound: Detailed ultrasound performed between 18-22 weeks gestation to screen for major birth defects and markers for aneuploidy.      We reviewed the benefits and limitations of this testing.  Screening tests provide a risk assessment specific to the pregnancy for certain fetal chromosome abnormalities, but cannot definitively diagnose or exclude a fetal chromosome abnormality.  Follow-up genetic counseling and consideration of diagnostic testing is recommended with any abnormal screening result.     Diagnostic tests carry inherent risks- including risk of miscarriage- that require careful consideration.  These tests can detect fetal chromosome abnormalities with greater than 99% certainty.  Results can be compromised by maternal cell contamination or mosaicism, and are limited by the resolution of cytogenetic G-banding technology.  There is no screening nor diagnostic test that can detect all forms of birth defects or mental disability.    It was a pleasure to be involved with Kamala lezama care. Face-to-face time of the meeting was 45 minutes.    Marguerite Lugo MS   Genetic Counselor Intern  [facility]   [facility]  [email or web address]   Office: [phone]   Pager: [phone] Fax: [phone]      Patient seen, evaluated and discussed with the Genetic Counseling Intern. I have verified the content of the note, which accurately reflects my assessment of the patient and the plan of care.  Supervising Genetic Counselor  Amanda Colon MS, [facility]  Maternal Fetal  [facility]  Phone: [phone]  Email: [email or web address]

## 2018-08-03 NOTE — MR AVS SNAPSHOT
After Visit Summary   8/3/2018    Kamala Harris    MRN: [id number]           Patient Information     Date Of Birth          2000        Visit Information        Provider Department      8/3/2018 11:00 AM UR GEN COUNSELOR 1 eal Maternal Fetal Medicine [facility]        Today's Diagnoses     Family history of congenital heart defect    -  1    Pregnancy related condition, antepartum        Supervision of normal first pregnancy in second trimester           Follow-ups after your visit        Your next 10 appointments already scheduled     Aug 14, 2018  9:30 AM CDT   ESTABLISHED PREN[facility]    [address] [place] [id number]   [phone]            Aug 31, 2018 10:15 AM CDT   M READ SCREEN FETAL EHCO Li with [id number]   [facility])    [address]  [facility] [address] [phone]            Aug 31, 2018 11:00 AM CDT   [facility] US COMP with [id number]   e[facility] Maternal Fetal Medicine Ultrasound - [facility])    [address]  [facility] [id number] [phone]           Wear comfortable clothes and leave your valuables at home.            Aug 31, 2018 11:30 AM CDT   Radiology MD with UR CHARLIE PERSON   [facility] M[facility] Fetal Medicine - [facility])    [address]  [facility] [address]   [phone]           Please arrive at the time given for your first appointment. This visit is used internally to schedule the physician's time during your ultrasound.              Future tests that were ordered for you today     Open Future Orders        Priority Expected Expires Ordered    MFM US Comprehensive Single Routine  6/3/2019 8/3/2018    MFM Read Screen Fetal Echo  Single Routine  6/3/2019 8/3/2018    Maternal Fetal OB Complete 2/3 Tri Sngle Routine  5/18/2019 7/18/2018            Who to contact     If you have questions or need follow up information about today's clinic visit or your schedule please contact [facility] directly at [phone].  Normal or non-critical lab and imaging results will be communicated to you by MyChart, letter or phone within 4 business days after the clinic has received the results. If you do not hear from us within 7 days, please contact the clinic through [employer] or phone. If you have a critical or abnormal lab result, we will notify you by phone as soon as possible.  Submit refill requests through "[employer]" or call your pharmacy and they will forward the refill request to us. Please allow 3 business days for your refill to be completed.          Additional Information About Your Visit        [employer] Information     "[employer]" gives you secure access to your electronic health record. If you see a primary care provider, you can also send messages to your care team and make appointments. If you have questions, please call your primary care clinic.  If you do not have a primary care provider, please call [phone] and they will assist you.        Care EveryWhere ID     This is your Care EveryWhere ID. This could be used by other organizations to access your [place] medical records  [id number]        Your Vitals Were     Last Period                   03/27/2018            Blood Pressure from Last 3 Encounters:   07/17/18 92/64   06/19/18 112/64   05/31/18 112/68    Weight from Last 3 Encounters:   07/17/18 75.5 kg (166 lb 6.4 oz)   06/19/18 76.5 kg (168 lb 9.6 oz)   05/31/18 80.3 kg (177 lb)              We Performed the Following     [facility] Genetic Counseling        Primary Care Provider Office Phone # Fax #    [place] [facility] [phone] [address] [facility] [address]         Equal Access to Services     [facility] : Hadii aad ku hadnovaruchi Yulissaevy, wapatriciada tomloyha, qacecilemt riveraeduardolarissa luna. So [facility] [phone].    ATENCIÓN: Si habla español, tiene a wolff disposición servicios gratuitos de asistencia lingüística. Tanyaame al [phone].    We comply with applicable federal civil rights laws and [place] laws. We do not discriminate on the basis of race, color, national origin, age, disability, sex, sexual orientation, or gender identity.            Thank you!     Thank you for choosing [facility]  for your care. Our goal is always to provide you with excellent care. Hearing back from our patients is one way we can continue to improve our services. Please take a few minutes to complete the written survey that you may receive in the mail after your visit with us. Thank you!             Your Updated Medication List - Protect others around you: Learn how to safely use, store and throw away your medicines at www.disposemymeds.org.          This list is accurate as of 8/3/18  3:35 PM.  Always use your most recent med list.                   Brand Name Dispense Instructions for use Diagnosis    albuterol 108 (90 Base) MCG/ACT Inhaler    PROAIR HFA/PROVENTIL HFA/VENTOLIN HFA    2 Inhaler    Inhale 2 puffs into the lungs every 6 hours as needed for shortness of breath / dyspnea or wheezing    Intermittent asthma, Asthma exacerbation       IRON SUPPLEMENT PO           lisdexamfetamine 30 MG capsule    VYVANSE    30 capsule    Take 2 capsules by mouth every morning.    ADHD (attention deficit hyperactivity disorder)       prenatal multivitamin plus iron 27-0.8 MG Tabs per tablet      Take 1 tablet by mouth daily        sertraline 25 MG tablet    ZOLOFT     Take 25 mg by mouth daily.

## 2018-08-14 ENCOUNTER — PRENATAL OFFICE VISIT (OUTPATIENT)
Dept: OBGYN | Facility: CLINIC | Age: 18
End: 2018-08-14
Payer: COMMERCIAL

## 2018-08-14 VITALS
SYSTOLIC BLOOD PRESSURE: 103 MMHG | DIASTOLIC BLOOD PRESSURE: 67 MMHG | HEIGHT: 66 IN | OXYGEN SATURATION: 98 % | WEIGHT: 173.2 LBS | TEMPERATURE: 98.4 F | BODY MASS INDEX: 27.83 KG/M2 | HEART RATE: 92 BPM

## 2018-08-14 DIAGNOSIS — Z34.02 NORMAL FIRST PREGNANCY CONFIRMED, CURRENTLY IN SECOND TRIMESTER: Primary | ICD-10-CM

## 2018-08-14 PROCEDURE — 99207 ZZC PRENATAL VISIT: CPT | Performed by: OBSTETRICS & GYNECOLOGY

## 2018-08-14 NOTE — PROGRESS NOTES
18w5d  Tired but otherwise fine. Still mild nausea. Baby moving a lot.   Had MFM US which redated her pregnancy to 1/10/19, baby girl.    Has noted bilateral milky breast discharge.    Has f/u US for CPC and fetal ECHO on 8/31.    Declined quad screen.  RR

## 2018-08-14 NOTE — MR AVS SNAPSHOT
After Visit Summary   8/14/2018    Kamala Harris    MRN: [id number]           Patient Information     Date Of Birth          2000        Visit Information        Provider Department      8/14/2018 9:30 AM Birgit Montesinos MD [facility]        Today's Diagnoses     Normal first pregnancy confirmed, currently in second trimester    -  1       Follow-ups after your visit        Your next 10 appointments already scheduled     Aug 31, 2018 10:15 AM CDT   HIRAM READ SCREEN FETAL EHCO Li with [id number]   [facility])    [address]  [facility] [address] [phone]            Aug 31, 2018 11:00 AM CDT   CHARLIE US COMP with [id number]   [facility] Maternal Fetal Medicine Ultrasound - [facility])    [address]  [facility] [id number] [phone]           Wear comfortable clothes and leave your valuables at home.            Aug 31, 2018 11:30 AM CDT   Radiology MD with UR CHARLIE PERSON   [facility])    [address]  [facility] [address]   [phone]           Please arrive at the time given for your first appointment. This visit is used internally to schedule the physician's time during your ultrasound.              Who to contact     If you have questions or need follow up information about today's clinic visit or your schedule please contact [facility] directly at [phone].  Normal or non-critical lab and imaging results will be communicated to you by MyChart, letter or phone within 4 business days after the clinic has received the results. If you do not hear from us within 7 days, please contact the clinic through MyChart or phone. If you have a critical or abnormal lab result, we will notify you by  "phone as soon as possible.  Submit refill requests through [employer] or call your pharmacy and they will forward the refill request to us. Please allow 3 business days for your refill to be completed.          Additional Information About Your Visit        [employer] Information     [employer] gives you secure access to your electronic health record. If you see a primary care provider, you can also send messages to your care team and make appointments. If you have questions, please call your primary care clinic.  If you do not have a primary care provider, please call [phone] and they will assist you.        Care EveryWhere ID     This is your Care EveryWhere ID. This could be used by other organizations to access your [facility] medical records  [id number]        Your Vitals Were     Pulse Temperature Height Last Period Pulse Oximetry BMI (Body Mass Index)    92 98.4  F (36.9  C) (Oral) 5' 5.55\" (1.665 m) 03/27/2018 98% 28.34 kg/m2       Blood Pressure from Last 3 Encounters:   08/14/18 103/67   07/17/18 92/64   06/19/18 112/64    Weight from Last 3 Encounters:   08/14/18 173 lb 3.2 oz (78.6 kg)   07/17/18 166 lb 6.4 oz (75.5 kg)   06/19/18 168 lb 9.6 oz (76.5 kg)              Today, you had the following     No orders found for display       Primary Care Provider Office Phone # Fax #    [place] [facility] [phone]       [address] [place] [address]        Equal Access to Services     NIKKI GUIDO AH: Hadii aad ku hadasho Soomaali, waaxda luqadaha, qaybta kaalmada adeegyada, larissa bhardwaj. So [facility] [phone].    ATENCIÓN: Si habla español, tiene a wolff disposición servicios gratuitos de asistencia lingüística. Llame al [phone].    We comply with applicable federal civil rights laws and [place] laws. We do not discriminate on the basis of race, color, national origin, age, disability, sex, sexual orientation, or gender identity.          "   Thank you!     Thank you for choosing [facility]  for your care. Our goal is always to provide you with excellent care. Hearing back from our patients is one way we can continue to improve our services. Please take a few minutes to complete the written survey that you may receive in the mail after your visit with us. Thank you!             Your Updated Medication List - Protect others around you: Learn how to safely use, store and throw away your medicines at www.disposemymeds.org.          This list is accurate as of 8/14/18 10:07 AM.  Always use your most recent med list.                   Brand Name Dispense Instructions for use Diagnosis    albuterol 108 (90 Base) MCG/ACT inhaler    PROAIR HFA/PROVENTIL HFA/VENTOLIN HFA    2 Inhaler    Inhale 2 puffs into the lungs every 6 hours as needed for shortness of breath / dyspnea or wheezing    Intermittent asthma, Asthma exacerbation       IRON SUPPLEMENT PO           lisdexamfetamine 30 MG capsule    VYVANSE    30 capsule    Take 2 capsules by mouth every morning.    ADHD (attention deficit hyperactivity disorder)       prenatal multivitamin plus iron 27-0.8 MG Tabs per tablet      Take 1 tablet by mouth daily        sertraline 25 MG tablet    ZOLOFT     Take 25 mg by mouth daily.

## 2018-08-31 ENCOUNTER — HOSPITAL ENCOUNTER (OUTPATIENT)
Dept: ULTRASOUND IMAGING | Facility: CLINIC | Age: 18
End: 2018-08-31
Attending: OBSTETRICS & GYNECOLOGY
Payer: COMMERCIAL

## 2018-08-31 ENCOUNTER — OFFICE VISIT (OUTPATIENT)
Dept: MATERNAL FETAL MEDICINE | Facility: CLINIC | Age: 18
End: 2018-08-31
Attending: OBSTETRICS & GYNECOLOGY
Payer: COMMERCIAL

## 2018-08-31 ENCOUNTER — HOSPITAL ENCOUNTER (OUTPATIENT)
Dept: ULTRASOUND IMAGING | Facility: CLINIC | Age: 18
Discharge: HOME | End: 2018-08-31
Attending: OBSTETRICS & GYNECOLOGY | Admitting: OBSTETRICS & GYNECOLOGY
Payer: COMMERCIAL

## 2018-08-31 DIAGNOSIS — Z82.79 FAMILY HISTORY OF CONGENITAL HEART DISEASE: ICD-10-CM

## 2018-08-31 DIAGNOSIS — O35.DXX0 ECHOGENIC FOCUS OF BOWEL OF FETUS AFFECTING ANTEPARTUM CARE OF MOTHER, SINGLE OR UNSPECIFIED FETUS: Primary | ICD-10-CM

## 2018-08-31 PROCEDURE — 76827 ECHO EXAM OF FETAL HEART: CPT

## 2018-08-31 PROCEDURE — 76811 OB US DETAILED SNGL FETUS: CPT

## 2018-08-31 NOTE — MR AVS SNAPSHOT
After Visit Summary   8/31/2018    Kamala Harris    MRN: [id number]           Patient Information     Date Of Birth          2000        Visit Information        Provider Department      8/31/2018 11:30 AM Mindy Pereira DO [facility] Maternal Fetal Medicine - [place]        Today's Diagnoses     Echogenic focus of bowel of fetus affecting antepartum care of mother, single or unspecified fetus    -  1       Follow-ups after your visit        Your next 10 appointments already scheduled     Sep 12, 2018 11:30 AM CDT   ESTABLISHED PRENATAL with Birgit Montesinos MD   [facility] ([facility])    [address] [place] [address]   [phone]            Oct 19, 2018 11:00 AM CDT   MFM US COMPRE SINGLE F/U with [id number]   [facility] Maternal Fetal Medicine Ultrasound - [place] ([facility])    [address]  [facility] [address] [phone]           Wear comfortable clothes and leave your valuables at home.            Oct 19, 2018 11:30 AM CDT   Radiology MD with UR CHARLIE PERSON   [facility])    [address]  [facility] [id number] [phone]           Please arrive at the time given for your first appointment. This visit is used internally to schedule the physician's time during your ultrasound.              Who to contact     If you have questions or need follow up information about today's clinic visit or your schedule please contact [facility] MATERNAL FETAL MEDICINE - [place] directly at [phone].  Normal or non-critical lab and imaging results will be communicated to you by MyChart, letter or phone within 4 business days after the clinic has received the results. If you do not hear from us within 7 days, please contact the clinic through MyChart or phone. If you have a critical or abnormal  lab result, we will notify you by phone as soon as possible.  Submit refill requests through SiTime or call your pharmacy and they will forward the refill request to us. Please allow 3 business days for your refill to be completed.          Additional Information About Your Visit        PROLOR Biotechhart Information     SiTime gives you secure access to your electronic health record. If you see a primary care provider, you can also send messages to your care team and make appointments. If you have questions, please call your primary care clinic.  If you do not have a primary care provider, please call [phone] and they will assist you.        Care EveryWhere ID     This is your Care EveryWhere ID. This could be used by other organizations to access your [place] medical records  [id number]        Your Vitals Were     Last Period                   03/27/2018            Blood Pressure from Last 3 Encounters:   08/14/18 103/67   07/17/18 92/64   06/19/18 112/64    Weight from Last 3 Encounters:   08/14/18 78.6 kg (173 lb 3.2 oz)   07/17/18 75.5 kg (166 lb 6.4 oz)   06/19/18 76.5 kg (168 lb 9.6 oz)               Primary Care Provider Office Phone # Fax #    [facility] [phone]       [address] [place] [address]        Equal Access to Services     NIKKI GUIDO : Hadii madhu ku hadasho Soomaali, waaxda luqadaha, qaybta kaalmada adeegyada, waxsevero rodriguez hayallison bhardwaj. So [facility] [phone].    ATENCIÓN: Si habla español, tiene a wolff disposición servicios gratuitos de asistencia lingüística. Llame al [phone].    We comply with applicable federal civil rights laws and [place] laws. We do not discriminate on the basis of race, color, national origin, age, disability, sex, sexual orientation, or gender identity.            Thank you!     Thank you for choosing [facility]  for your care. Our goal is always to provide you with  excellent care. Hearing back from our patients is one way we can continue to improve our services. Please take a few minutes to complete the written survey that you may receive in the mail after your visit with us. Thank you!             Your Updated Medication List - Protect others around you: Learn how to safely use, store and throw away your medicines at www.disposemymeds.org.          This list is accurate as of 8/31/18 11:59 PM.  Always use your most recent med list.                   Brand Name Dispense Instructions for use Diagnosis    albuterol 108 (90 Base) MCG/ACT inhaler    PROAIR HFA/PROVENTIL HFA/VENTOLIN HFA    2 Inhaler    Inhale 2 puffs into the lungs every 6 hours as needed for shortness of breath / dyspnea or wheezing    Intermittent asthma, Asthma exacerbation       IRON SUPPLEMENT PO           lisdexamfetamine 30 MG capsule    VYVANSE    30 capsule    Take 2 capsules by mouth every morning.    ADHD (attention deficit hyperactivity disorder)       prenatal multivitamin plus iron 27-0.8 MG Tabs per tablet      Take 1 tablet by mouth daily        sertraline 25 MG tablet    ZOLOFT     Take 25 mg by mouth daily.

## 2018-09-04 NOTE — PROGRESS NOTES
"Please see \"Imaging\" tab under \"Chart Review\" for details of today's US.    Mindy Pereira, DO    "

## 2018-09-27 ENCOUNTER — PRENATAL OFFICE VISIT (OUTPATIENT)
Dept: OBGYN | Facility: CLINIC | Age: 18
End: 2018-09-27
Payer: COMMERCIAL

## 2018-09-27 VITALS
HEIGHT: 66 IN | TEMPERATURE: 98.1 F | SYSTOLIC BLOOD PRESSURE: 110 MMHG | WEIGHT: 182.4 LBS | BODY MASS INDEX: 29.32 KG/M2 | OXYGEN SATURATION: 100 % | DIASTOLIC BLOOD PRESSURE: 63 MMHG | HEART RATE: 108 BPM

## 2018-09-27 DIAGNOSIS — Z23 NEED FOR PROPHYLACTIC VACCINATION AND INOCULATION AGAINST INFLUENZA: Primary | ICD-10-CM

## 2018-09-27 DIAGNOSIS — Z34.02 NORMAL FIRST PREGNANCY CONFIRMED, CURRENTLY IN SECOND TRIMESTER: ICD-10-CM

## 2018-09-27 LAB — HGB BLD-MCNC: 9.7 G/DL (ref 11.7–15.7)

## 2018-09-27 PROCEDURE — 86780 TREPONEMA PALLIDUM: CPT | Performed by: OBSTETRICS & GYNECOLOGY

## 2018-09-27 PROCEDURE — 99207 ZZC PRENATAL VISIT: CPT | Performed by: OBSTETRICS & GYNECOLOGY

## 2018-09-27 PROCEDURE — 36415 COLL VENOUS BLD VENIPUNCTURE: CPT | Performed by: OBSTETRICS & GYNECOLOGY

## 2018-09-27 PROCEDURE — 90471 IMMUNIZATION ADMIN: CPT | Performed by: OBSTETRICS & GYNECOLOGY

## 2018-09-27 PROCEDURE — 82950 GLUCOSE TEST: CPT | Performed by: OBSTETRICS & GYNECOLOGY

## 2018-09-27 PROCEDURE — 00000218 ZZHCL STATISTIC OBHBG - HEMOGLOBIN: Performed by: OBSTETRICS & GYNECOLOGY

## 2018-09-27 PROCEDURE — 90686 IIV4 VACC NO PRSV 0.5 ML IM: CPT | Performed by: OBSTETRICS & GYNECOLOGY

## 2018-09-27 NOTE — PROGRESS NOTES
Doing well.  Born with a hole in her heart. States she had follow up as a child and the hole was closed. Fetal echo was normal.  Lots of fetal movement.  Flu shot today. TDaP next visit.  GCT today    Childbirth classes? NO  Plan on breastfeeding? Yes: will talk to insurance.  Birthcontrol? condoms  Sex on ultrasound? girl  Circumsion? NA  Peds doc? Maybe [place]

## 2018-09-27 NOTE — PROGRESS NOTES

## 2018-09-27 NOTE — MR AVS SNAPSHOT
After Visit Summary   9/27/2018    Kamala Harris    MRN: [id number]           Patient Information     Date Of Birth          2000        Visit Information        Provider Department      9/27/2018 11:45 AM Annabel Loza MD [facility]        Today's Diagnoses     Need for prophylactic vaccination and inoculation against influenza    -  1    Normal first pregnancy confirmed, currently in second trimester           Follow-ups after your visit        Your next 10 appointments already scheduled     Oct 19, 2018 11:00 AM CDT   MFM US COMPRE SINGLE F/U with [id number]   [facility] - [facility])    [address]  [facility] [address] [phone]           Wear comfortable clothes and leave your valuables at home.            Oct 19, 2018 11:30 AM CDT   Radiology MD with UR CHARLIE PERSON   [facility] - [facility])    [address]  [facility] [address]   [phone]           Please arrive at the time given for your first appointment. This visit is used internally to schedule the physician's time during your ultrasound.              Who to contact     If you have questions or need follow up information about today's clinic visit or your schedule please contact [facility] directly at [phone].  Normal or non-critical lab and imaging results will be communicated to you by MyChart, letter or phone within 4 business days after the clinic has received the results. If you do not hear from us within 7 days, please contact the clinic through MyChart or phone. If you have a critical or abnormal lab result, we will notify you by phone as soon as possible.  Submit refill requests through Anturis or call your pharmacy and they will forward the refill request to us. Please allow 3 business days for  "your refill to be completed.          Additional Information About Your Visit        MyChart Information     SelStor gives you secure access to your electronic health record. If you see a primary care provider, you can also send messages to your care team and make appointments. If you have questions, please call your primary care clinic.  If you do not have a primary care provider, please call [phone] and they will assist you.        Care EveryWhere ID     This is your Care EveryWhere ID. This could be used by other organizations to access your [place] medical records  [id number]        Your Vitals Were     Pulse Temperature Height Last Period Pulse Oximetry BMI (Body Mass Index)    108 98.1  F (36.7  C) (Oral) 5' 5.55\" (1.665 m) 03/27/2018 100% 29.85 kg/m2       Blood Pressure from Last 3 Encounters:   09/27/18 110/63   08/14/18 103/67   07/17/18 92/64    Weight from Last 3 Encounters:   09/27/18 182 lb 6.4 oz (82.7 kg)   08/14/18 173 lb 3.2 oz (78.6 kg)   07/17/18 166 lb 6.4 oz (75.5 kg)              We Performed the Following     FLU VACCINE, SPLIT VIRUS, IM (QUADRIVALENT) [32588]- >3 YRS     Glucose tolerance gest screen 1 hour     OB hemoglobin     Treponema Abs w Reflex to RPR and Titer     Vaccine Administration, Initial [62998]        Primary Care Provider Office Phone # Fax #    [facility] [phone]       [address] [place] [address]        Equal Access to Services     NIKKI GUIDO : Hadesme castellon Soevy, waaxda luqadaha, qaybta kaalmada larissa harmon idiamie bhardwaj. So [facility] [phone].    ATENCIÓN: Si habla español, tiene a owlff disposición servicios gratuitos de asistencia lingüística. Llame al [phone].    We comply with applicable federal civil rights laws and [place] laws. We do not discriminate on the basis of race, color, national origin, age, disability, sex, sexual orientation, or gender identity.       "      Thank you!     Thank you for choosing [facility]  for your care. Our goal is always to provide you with excellent care. Hearing back from our patients is one way we can continue to improve our services. Please take a few minutes to complete the written survey that you may receive in the mail after your visit with us. Thank you!             Your Updated Medication List - Protect others around you: Learn how to safely use, store and throw away your medicines at www.disposemymeds.org.          This list is accurate as of 9/27/18  1:14 PM.  Always use your most recent med list.                   Brand Name Dispense Instructions for use Diagnosis    albuterol 108 (90 Base) MCG/ACT inhaler    PROAIR HFA/PROVENTIL HFA/VENTOLIN HFA    2 Inhaler    Inhale 2 puffs into the lungs every 6 hours as needed for shortness of breath / dyspnea or wheezing    Intermittent asthma, Asthma exacerbation       IRON SUPPLEMENT PO           lisdexamfetamine 30 MG capsule    VYVANSE    30 capsule    Take 2 capsules by mouth every morning.    ADHD (attention deficit hyperactivity disorder)       prenatal multivitamin plus iron 27-0.8 MG Tabs per tablet      Take 1 tablet by mouth daily        sertraline 25 MG tablet    ZOLOFT     Take 25 mg by mouth daily.

## 2018-09-28 LAB — GLUCOSE 1H P 50 G GLC PO SERPL-MCNC: 89 MG/DL (ref 60–129)

## 2018-09-28 ASSESSMENT — PATIENT HEALTH QUESTIONNAIRE - PHQ9: SUM OF ALL RESPONSES TO PHQ QUESTIONS 1-9: 9

## 2018-09-29 LAB — T PALLIDUM AB SER QL: NONREACTIVE

## 2018-10-19 ENCOUNTER — OFFICE VISIT (OUTPATIENT)
Dept: MATERNAL FETAL MEDICINE | Facility: CLINIC | Age: 18
End: 2018-10-19
Attending: OBSTETRICS & GYNECOLOGY
Payer: COMMERCIAL

## 2018-10-19 ENCOUNTER — HOSPITAL ENCOUNTER (OUTPATIENT)
Dept: ULTRASOUND IMAGING | Facility: CLINIC | Age: 18
Discharge: HOME | End: 2018-10-19
Attending: OBSTETRICS & GYNECOLOGY | Admitting: OBSTETRICS & GYNECOLOGY
Payer: COMMERCIAL

## 2018-10-19 DIAGNOSIS — O35.DXX0 ECHOGENIC FOCUS OF BOWEL OF FETUS AFFECTING ANTEPARTUM CARE OF MOTHER, SINGLE OR UNSPECIFIED FETUS: Primary | ICD-10-CM

## 2018-10-19 DIAGNOSIS — O35.DXX0 ECHOGENIC FOCUS OF BOWEL OF FETUS AFFECTING ANTEPARTUM CARE OF MOTHER, SINGLE OR UNSPECIFIED FETUS: ICD-10-CM

## 2018-10-19 PROCEDURE — 76816 OB US FOLLOW-UP PER FETUS: CPT

## 2018-10-19 NOTE — PROGRESS NOTES
"Please see \"Imaging\" tab under \"Chart Review\" for details of today's US at the [facility].    Dionicio Herrera MD  Maternal-Fetal Medicine      "

## 2018-10-19 NOTE — MR AVS SNAPSHOT
After Visit Summary   10/19/2018    Kamala Harris    MRN: [id number]           Patient Information     Date Of Birth          2000        Visit Information        Provider Department      10/19/2018 11:30 AM Dionicio Herrera MD [place] Maternal Fetal Medicine [facility]        Today's Diagnoses     Echogenic focus of bowel of fetus affecting antepartum care of mother, single or unspecified fetus    -  1       Follow-ups after your visit        Your next 10 appointments already scheduled     Oct 25, 2018 11:30 AM CDT   ESTABLISHED PRENATAL with Birgit Montesinos MD   [facility] ([facility])    [address] [place] [address] [phone]              Who to contact     If you have questions or need follow up information about today's clinic visit or your schedule please contact [facility] MATERNAL FETAL MEDICINE [facility] directly at [phone].  Normal or non-critical lab and imaging results will be communicated to you by Playspacehart, letter or phone within 4 business days after the clinic has received the results. If you do not hear from us within 7 days, please contact the clinic through Playspacehart or phone. If you have a critical or abnormal lab result, we will notify you by phone as soon as possible.  Submit refill requests through Evercam or call your pharmacy and they will forward the refill request to us. Please allow 3 business days for your refill to be completed.          Additional Information About Your Visit        Playspacehart Information     Evercam gives you secure access to your electronic health record. If you see a primary care provider, you can also send messages to your care team and make appointments. If you have questions, please call your primary care clinic.  If you do not have a primary care provider, please call [phone] and they will assist you.        Care EveryWhere ID     This is your Care EveryWhere ID.  This could be used by other organizations to access your [place] medical records  [id number]        Your Vitals Were     Last Period                   03/27/2018            Blood Pressure from Last 3 Encounters:   09/27/18 110/63   08/14/18 103/67   07/17/18 92/64    Weight from Last 3 Encounters:   09/27/18 82.7 kg (182 lb 6.4 oz)   08/14/18 78.6 kg (173 lb 3.2 oz)   07/17/18 75.5 kg (166 lb 6.4 oz)              Today, you had the following     No orders found for display       Primary Care Provider Office Phone # Fax #    [facility] [phone]       [address] [facility] [address]        Equal Access to Services     NIKKI GUIDO : Paulino castellon Soevy, waaxda luqadaha, qaybta kaalmada adeegyada, larissa bhardwaj. So [facility] [place] [phone].    ATENCIÓN: Si habla español, tiene a wolff disposición servicios gratuitos de asistencia lingüística. Llame al [phone].    We comply with applicable federal civil rights laws and [place] laws. We do not discriminate on the basis of race, color, national origin, age, disability, sex, sexual orientation, or gender identity.            Thank you!     Thank you for choosing [facility]  for your care. Our goal is always to provide you with excellent care. Hearing back from our patients is one way we can continue to improve our services. Please take a few minutes to complete the written survey that you may receive in the mail after your visit with us. Thank you!             Your Updated Medication List - Protect others around you: Learn how to safely use, store and throw away your medicines at www.disposemymeds.org.          This list is accurate as of 10/19/18 11:34 AM.  Always use your most recent med list.                   Brand Name Dispense Instructions for use Diagnosis    albuterol 108 (90 Base) MCG/ACT inhaler    PROAIR HFA/PROVENTIL HFA/VENTOLIN HFA    2  Inhaler    Inhale 2 puffs into the lungs every 6 hours as needed for shortness of breath / dyspnea or wheezing    Intermittent asthma, Asthma exacerbation       IRON SUPPLEMENT PO           lisdexamfetamine 30 MG capsule    VYVANSE    30 capsule    Take 2 capsules by mouth every morning.    ADHD (attention deficit hyperactivity disorder)       prenatal multivitamin plus iron 27-0.8 MG Tabs per tablet      Take 1 tablet by mouth daily        sertraline 25 MG tablet    ZOLOFT     Take 25 mg by mouth daily.

## 2018-10-25 ENCOUNTER — PRENATAL OFFICE VISIT (OUTPATIENT)
Dept: OBGYN | Facility: CLINIC | Age: 18
End: 2018-10-25
Payer: COMMERCIAL

## 2018-10-25 VITALS
OXYGEN SATURATION: 100 % | BODY MASS INDEX: 29.8 KG/M2 | HEIGHT: 66 IN | DIASTOLIC BLOOD PRESSURE: 72 MMHG | SYSTOLIC BLOOD PRESSURE: 113 MMHG | WEIGHT: 185.4 LBS | HEART RATE: 108 BPM

## 2018-10-25 DIAGNOSIS — Z34.03 NORMAL FIRST PREGNANCY IN THIRD TRIMESTER: Primary | ICD-10-CM

## 2018-10-25 PROCEDURE — 99207 ZZC PRENATAL VISIT: CPT | Performed by: OBSTETRICS & GYNECOLOGY

## 2018-10-25 NOTE — MR AVS SNAPSHOT
"              After Visit Summary   10/25/2018    Kamala Harris    MRN: [id number]           Patient Information     Date Of Birth          2000        Visit Information        Provider Department      10/25/2018 11:30 AM Birgit Montesinos MD [facility]        Today's Diagnoses     Normal first pregnancy in third trimester    -  1       Follow-ups after your visit        Who to contact     If you have questions or need follow up information about today's clinic visit or your schedule please contact [facility] directly at [phone].  Normal or non-critical lab and imaging results will be communicated to you by Stimulus Technologieshart, letter or phone within 4 business days after the clinic has received the results. If you do not hear from us within 7 days, please contact the clinic through CTS Mediat or phone. If you have a critical or abnormal lab result, we will notify you by phone as soon as possible.  Submit refill requests through Verteego ([employer]) or call your pharmacy and they will forward the refill request to us. Please allow 3 business days for your refill to be completed.          Additional Information About Your Visit        MyChart Information     Verteego (Emer[employer]) gives you secure access to your electronic health record. If you see a primary care provider, you can also send messages to your care team and make appointments. If you have questions, please call your primary care clinic.  If you do not have a primary care provider, please call [phone] and they will assist you.        Care EveryWhere ID     This is your Care EveryWhere ID. This could be used by other organizations to access your [place] medical records  [id number]        Your Vitals Were     Pulse Height Last Period Pulse Oximetry BMI (Body Mass Index)       108 5' 5.55\" (1.665 m) 03/27/2018 100% 30.34 kg/m2        Blood Pressure from Last 3 Encounters:   10/25/18 113/72   09/27/18 110/63   08/14/18 103/67    Weight " from Last 3 Encounters:   10/25/18 185 lb 6.4 oz (84.1 kg)   09/27/18 182 lb 6.4 oz (82.7 kg)   08/14/18 173 lb 3.2 oz (78.6 kg)              Today, you had the following     No orders found for display       Primary Care Provider Office Phone # Fax #    [facility] [phone]       [address] [facility] [address]        Equal Access to Services     NIKKI GUIDO : Hadii aad ku hadasho Soomaali, waaxda luqadaha, qaybta kaalmada adeegyada, waxay idiin hayaan adeeg maggy bhardwaj. So [facility] [phone].    ATENCIÓN: Si habla español, tiene a wolff disposición servicios gratuitos de asistencia lingüística. [facility] [phone].    We comply with applicable federal civil rights laws and [place] laws. We do not discriminate on the basis of race, color, national origin, age, disability, sex, sexual orientation, or gender identity.            Thank you!     Thank you for choosing [facility]  for your care. Our goal is always to provide you with excellent care. Hearing back from our patients is one way we can continue to improve our services. Please take a few minutes to complete the written survey that you may receive in the mail after your visit with us. Thank you!             Your Updated Medication List - Protect others around you: Learn how to safely use, store and throw away your medicines at www.disposemymeds.org.          This list is accurate as of 10/25/18 11:57 AM.  Always use your most recent med list.                   Brand Name Dispense Instructions for use Diagnosis    albuterol 108 (90 Base) MCG/ACT inhaler    PROAIR HFA/PROVENTIL HFA/VENTOLIN HFA    2 Inhaler    Inhale 2 puffs into the lungs every 6 hours as needed for shortness of breath / dyspnea or wheezing    Intermittent asthma, Asthma exacerbation       IRON SUPPLEMENT PO      Take 28 mg by mouth        lisdexamfetamine 30 MG capsule    VYVANSE    30 capsule    Take 2 capsules by mouth  every morning.    ADHD (attention deficit hyperactivity disorder)       prenatal multivitamin plus iron 27-0.8 MG Tabs per tablet      Take 1 tablet by mouth daily        sertraline 25 MG tablet    ZOLOFT     Take 25 mg by mouth daily.

## 2018-10-25 NOTE — PROGRESS NOTES
29w0d  No complaints. Baby is active.  Reviewed weight gain.   Eating healthy.    Plans to hospital tour soon. discussed breast feeding and she will need to get rx from clinic for her breast pump.    Declined tdap until she is done working next month.  RR

## 2018-11-06 ENCOUNTER — PRENATAL OFFICE VISIT (OUTPATIENT)
Dept: OBGYN | Facility: CLINIC | Age: 18
End: 2018-11-06
Payer: COMMERCIAL

## 2018-11-06 VITALS
OXYGEN SATURATION: 96 % | BODY MASS INDEX: 31.12 KG/M2 | WEIGHT: 193.6 LBS | SYSTOLIC BLOOD PRESSURE: 94 MMHG | DIASTOLIC BLOOD PRESSURE: 64 MMHG | HEIGHT: 66 IN | HEART RATE: 121 BPM

## 2018-11-06 DIAGNOSIS — Z34.03 NORMAL FIRST PREGNANCY CONFIRMED, CURRENTLY IN THIRD TRIMESTER: Primary | ICD-10-CM

## 2018-11-06 PROCEDURE — 99207 ZZC PRENATAL VISIT: CPT | Performed by: OBSTETRICS & GYNECOLOGY

## 2018-11-06 RX ORDER — CHOLECALCIFEROL (VITAMIN D3) 50 MCG
2000 TABLET ORAL DAILY
Qty: 100 TABLET | Refills: 3 | Status: SHIPPED | OUTPATIENT
Start: 2018-11-06 | End: 2022-01-19

## 2018-11-06 NOTE — PROGRESS NOTES
30w5d   No complaints.  Baby is moving well.   Has tour next month. Reviewed weight gain today, went up a lot.   discussed diet.   discussed baby doc, birth control.  RR

## 2018-11-06 NOTE — MR AVS SNAPSHOT
After Visit Summary   11/6/2018    Kamala Harris    MRN: [id number]           Patient Information     Date Of Birth          2000        Visit Information        Provider Department      11/6/2018 1:00 PM Birgit Montesinos MD [facility]        Today's Diagnoses     Normal first pregnancy confirmed, currently in third trimester    -  1       Follow-ups after your visit        Your next 10 appointments already scheduled     Nov 20, 2018 10:15 AM CST   ESTABLISHED PRENATAL with Birgit Montesinos MD   [facility] ([address] [address]   [phone]            Dec 04, 2018 10:15 AM CST   ESTABLISHED PRENATAL with Birgit Montesinos MD   [facility] ([address] [address] [phone]            Dec 19, 2018 10:45 AM CST   ESTABLISHED PRENATAL with Birgit Montesinos MD   [facility] ([address] [id number] [phone]              Who to contact     If you have questions or need follow up information about today's clinic visit or your schedule please contact [facility] directly at [phone].  Normal or non-critical lab and imaging results will be communicated to you by MyChart, letter or phone within 4 business days after the clinic has received the results. If you do not hear from us within 7 days, please contact the clinic through MyChart or phone. If you have a critical or abnormal lab result, we will notify you by phone as soon as possible.  Submit refill requests through [employer] or call your pharmacy and they will forward the refill request to us. Please allow 3 business days for your refill to be completed.          Additional Information About Your Visit        Telderihart Information     [employer] gives you  "secure access to your electronic health record. If you see a primary care provider, you can also send messages to your care team and make appointments. If you have questions, please call your primary care clinic.  If you do not have a primary care provider, please call [phone] and they will assist you.        Care EveryWhere ID     This is your Care EveryWhere ID. This could be used by other organizations to access your [place] medical records  [id number]        Your Vitals Were     Pulse Height Last Period Pulse Oximetry BMI (Body Mass Index)       121 5' 5.55\" (1.665 m) 03/27/2018 96% 31.68 kg/m2        Blood Pressure from Last 3 Encounters:   11/06/18 94/64   10/25/18 113/72   09/27/18 110/63    Weight from Last 3 Encounters:   11/06/18 193 lb 9.6 oz (87.8 kg)   10/25/18 185 lb 6.4 oz (84.1 kg)   09/27/18 182 lb 6.4 oz (82.7 kg)              Today, you had the following     No orders found for display         Today's Medication Changes          These changes are accurate as of 11/6/18  1:52 PM.  If you have any questions, ask your nurse or doctor.               Start taking these medicines.        Dose/Directions    vitamin D 2000 units tablet   Used for:  Normal first pregnancy confirmed, currently in third trimester   Started by:  Birgit Montesinos MD        Dose:  2000 Units   Take 2,000 Units by mouth daily   Quantity:  100 tablet   Refills:  3            Where to get your medicines      These medications were sent to [facility]/pharmacy #[id number] - [address] COR[address] OF [address], [facility] [address]     Phone:  [phone]     vitamin D 2000 units tablet                Primary Care Provider Office Phone # Fax #    [facility] [phone]       [address] [facility] [address]        Equal Access to Services     NIKKI GUIDO AH: Paulino Baker, matt an, larissa presley " michael cornejoblack bolaños'aan ah. So [facility] [phone].    ATENCIÓN: Si mandyla michael, tiene a wolff disposición servicios gratuitos de asistencia lingüística. Kristen al [phone].    We comply with applicable federal civil rights laws and [place] laws. We do not discriminate on the basis of race, color, national origin, age, disability, sex, sexual orientation, or gender identity.            Thank you!     Thank you for choosing [facility]  for your care. Our goal is always to provide you with excellent care. Hearing back from our patients is one way we can continue to improve our services. Please take a few minutes to complete the written survey that you may receive in the mail after your visit with us. Thank you!             Your Updated Medication List - Protect others around you: Learn how to safely use, store and throw away your medicines at www.disposemymeds.org.          This list is accurate as of 11/6/18  1:52 PM.  Always use your most recent med list.                   Brand Name Dispense Instructions for use Diagnosis    albuterol 108 (90 Base) MCG/ACT inhaler    PROAIR HFA/PROVENTIL HFA/VENTOLIN HFA    2 Inhaler    Inhale 2 puffs into the lungs every 6 hours as needed for shortness of breath / dyspnea or wheezing    Intermittent asthma, Asthma exacerbation       IRON SUPPLEMENT PO      Take 28 mg by mouth        lisdexamfetamine 30 MG capsule    VYVANSE    30 capsule    Take 2 capsules by mouth every morning.    ADHD (attention deficit hyperactivity disorder)       prenatal multivitamin plus iron 27-0.8 MG Tabs per tablet      Take 1 tablet by mouth daily        sertraline 25 MG tablet    ZOLOFT     Take 25 mg by mouth daily.        vitamin D 2000 units tablet     100 tablet    Take 2,000 Units by mouth daily    Normal first pregnancy confirmed, currently in third trimester

## 2018-11-06 NOTE — Clinical Note
Patient is wondering if you have her pregnancy confirmation paper work that I signed yesterday,. Please just mail it to her and call  Her so she knows its coming   thanks!!

## 2018-11-20 ENCOUNTER — PRENATAL OFFICE VISIT (OUTPATIENT)
Dept: OBGYN | Facility: CLINIC | Age: 18
End: 2018-11-20
Payer: COMMERCIAL

## 2018-11-20 VITALS
WEIGHT: 193 LBS | HEART RATE: 120 BPM | BODY MASS INDEX: 31.02 KG/M2 | TEMPERATURE: 98.2 F | OXYGEN SATURATION: 99 % | SYSTOLIC BLOOD PRESSURE: 101 MMHG | DIASTOLIC BLOOD PRESSURE: 68 MMHG | HEIGHT: 66 IN

## 2018-11-20 DIAGNOSIS — Z34.03 NORMAL FIRST PREGNANCY CONFIRMED, CURRENTLY IN THIRD TRIMESTER: Primary | ICD-10-CM

## 2018-11-20 PROCEDURE — 99207 ZZC PRENATAL VISIT: CPT | Performed by: OBSTETRICS & GYNECOLOGY

## 2018-11-20 NOTE — MR AVS SNAPSHOT
After Visit Summary   11/20/2018    Kamala Harris    MRN: [id number]           Patient Information     Date Of Birth          2000        Visit Information        Provider Department      11/20/2018 10:15 AM Birgit Montesinos MD [facility]        Today's Diagnoses     Normal first pregnancy confirmed, currently in third trimester    -  1       Follow-ups after your visit        Your next 10 appointments already scheduled     Dec 04, 2018 10:15 AM CST   ESTABLISHED PRENATAL with Birgit Montesinos MD   [facility] ([address] [address] [phone]            Dec 19, 2018 10:45 AM CST   ESTABLISHED PRENATAL with Birgit Montesinos MD   [facility] ([address] [phone]              Who to contact     If you have questions or need follow up information about today's clinic visit or your schedule please contact [facility] directly at [phone].  Normal or non-critical lab and imaging results will be communicated to you by Apptimate[employer], letter or phone within 4 business days after the clinic has received the results. If you do not hear from us within 7 days, please contact the clinic through [employer] (formerly [employer]t or phone. If you have a critical or abnormal lab result, we will notify you by phone as soon as possible.  Submit refill requests through [employer] or call your pharmacy and they will forward the refill request to us. Please allow 3 business days for your refill to be completed.          Additional Information About Your Visit        Apptimatehart Information     [employer] gives you secure access to your electronic health record. If you see a primary care provider, you can also send messages to your care team and make appointments. If you have questions, please call your primary care clinic.  If  "you do not have a primary care provider, please call [phone] and they will assist you.        Care EveryWhere ID     This is your Care EveryWhere ID. This could be used by other organizations to access your [place] medical records  [id number]        Your Vitals Were     Pulse Temperature Height Last Period Pulse Oximetry BMI (Body Mass Index)    120 98.2  F (36.8  C) (Oral) 5' 5.55\" (1.665 m) 03/27/2018 99% 31.58 kg/m2       Blood Pressure from Last 3 Encounters:   11/20/18 101/68   11/06/18 94/64   10/25/18 113/72    Weight from Last 3 Encounters:   11/20/18 193 lb (87.5 kg)   11/06/18 193 lb 9.6 oz (87.8 kg)   10/25/18 185 lb 6.4 oz (84.1 kg)              Today, you had the following     No orders found for display       Primary Care Provider Office Phone # Fax #    [facility] [phone]       [address] [place] [address]        Equal Access to Services     NIKKI GUIDO AH: Hadii madhu castellon Soevy, wapatriciada luqadaha, qaybta kaalmada faustino, larissa bhardwaj. So [facility] [phone].    ATENCIÓN: Si habla español, tiene a wolff disposición servicios gratuitos de asistencia lingüística. Llame al [phone].    We comply with applicable federal civil rights laws and [place] laws. We do not discriminate on the basis of race, color, national origin, age, disability, sex, sexual orientation, or gender identity.            Thank you!     Thank you for choosing [facility]  for your care. Our goal is always to provide you with excellent care. Hearing back from our patients is one way we can continue to improve our services. Please take a few minutes to complete the written survey that you may receive in the mail after your visit with us. Thank you!             Your Updated Medication List - Protect others around you: Learn how to safely use, store and throw away your medicines at www.disposemymeds.org.          This list " is accurate as of 11/20/18 11:08 AM.  Always use your most recent med list.                   Brand Name Dispense Instructions for use Diagnosis    albuterol 108 (90 Base) MCG/ACT inhaler    PROAIR HFA/PROVENTIL HFA/VENTOLIN HFA    2 Inhaler    Inhale 2 puffs into the lungs every 6 hours as needed for shortness of breath / dyspnea or wheezing    Intermittent asthma, Asthma exacerbation       IRON SUPPLEMENT PO      Take 28 mg by mouth        lisdexamfetamine 30 MG capsule    VYVANSE    30 capsule    Take 2 capsules by mouth every morning.    ADHD (attention deficit hyperactivity disorder)       prenatal multivitamin plus iron 27-0.8 MG Tabs per tablet      Take 1 tablet by mouth daily        sertraline 25 MG tablet    ZOLOFT     Take 25 mg by mouth daily.        vitamin D3 2000 units tablet    CHOLECALCIFEROL    100 tablet    Take 2,000 Units by mouth daily    Normal first pregnancy confirmed, currently in third trimester

## 2018-11-20 NOTE — PROGRESS NOTES
32w5d  No complaints.  Just getting tired.  Will stop working end of this month.    Baby is moving a lot. Reviewed weight gain today.   discussed pain meds in labor.   Plans to breast feed but will not be taking any CB classes.   discussed benefits of breastfeeding and online support options.   Plans tdap next visit when she is done working.  RR

## 2018-12-04 ENCOUNTER — PRENATAL OFFICE VISIT (OUTPATIENT)
Dept: OBGYN | Facility: CLINIC | Age: 18
End: 2018-12-04
Payer: COMMERCIAL

## 2018-12-04 VITALS
SYSTOLIC BLOOD PRESSURE: 104 MMHG | BODY MASS INDEX: 31.5 KG/M2 | HEIGHT: 66 IN | OXYGEN SATURATION: 98 % | DIASTOLIC BLOOD PRESSURE: 68 MMHG | WEIGHT: 196 LBS | HEART RATE: 118 BPM

## 2018-12-04 DIAGNOSIS — Z23 NEED FOR TDAP VACCINATION: ICD-10-CM

## 2018-12-04 DIAGNOSIS — L98.9 SKIN LESION: ICD-10-CM

## 2018-12-04 DIAGNOSIS — R12 HEARTBURN: ICD-10-CM

## 2018-12-04 DIAGNOSIS — Z34.03 NORMAL FIRST PREGNANCY CONFIRMED, CURRENTLY IN THIRD TRIMESTER: Primary | ICD-10-CM

## 2018-12-04 PROCEDURE — 90471 IMMUNIZATION ADMIN: CPT | Performed by: OBSTETRICS & GYNECOLOGY

## 2018-12-04 PROCEDURE — 90715 TDAP VACCINE 7 YRS/> IM: CPT | Performed by: OBSTETRICS & GYNECOLOGY

## 2018-12-04 PROCEDURE — 99207 ZZC PRENATAL VISIT: CPT | Performed by: OBSTETRICS & GYNECOLOGY

## 2018-12-04 NOTE — PROGRESS NOTES
34w5d  Baby is moving well.  Feeling intermittent ctx's.    Had a lot of nausea yesterday. Feeling a little bit better today.  Her mother in law was also sick yesterday.   rx for breast pump today.  Planning condoms.  Baby is breech today. RR

## 2018-12-19 ENCOUNTER — PRENATAL OFFICE VISIT (OUTPATIENT)
Dept: OBGYN | Facility: CLINIC | Age: 18
End: 2018-12-19
Payer: COMMERCIAL

## 2018-12-19 VITALS
SYSTOLIC BLOOD PRESSURE: 103 MMHG | WEIGHT: 200 LBS | HEIGHT: 66 IN | OXYGEN SATURATION: 95 % | BODY MASS INDEX: 32.14 KG/M2 | HEART RATE: 130 BPM | DIASTOLIC BLOOD PRESSURE: 76 MMHG

## 2018-12-19 DIAGNOSIS — Z34.93 PRENATAL CARE IN THIRD TRIMESTER: Primary | ICD-10-CM

## 2018-12-19 DIAGNOSIS — O99.019 ANEMIA IN PREGNANCY, UNSPECIFIED TRIMESTER: ICD-10-CM

## 2018-12-19 LAB — HGB BLD-MCNC: 9.7 G/DL (ref 11.7–15.7)

## 2018-12-19 PROCEDURE — 99207 ZZC PRENATAL VISIT: CPT | Performed by: OBSTETRICS & GYNECOLOGY

## 2018-12-19 PROCEDURE — 00000218 ZZHCL STATISTIC OBHBG - HEMOGLOBIN: Performed by: OBSTETRICS & GYNECOLOGY

## 2018-12-19 PROCEDURE — 36415 COLL VENOUS BLD VENIPUNCTURE: CPT | Performed by: OBSTETRICS & GYNECOLOGY

## 2018-12-19 PROCEDURE — 82728 ASSAY OF FERRITIN: CPT | Performed by: OBSTETRICS & GYNECOLOGY

## 2018-12-19 PROCEDURE — 87653 STREP B DNA AMP PROBE: CPT | Performed by: OBSTETRICS & GYNECOLOGY

## 2018-12-19 ASSESSMENT — ANXIETY QUESTIONNAIRES
1. FEELING NERVOUS, ANXIOUS, OR ON EDGE: NOT AT ALL
2. NOT BEING ABLE TO STOP OR CONTROL WORRYING: SEVERAL DAYS
3. WORRYING TOO MUCH ABOUT DIFFERENT THINGS: SEVERAL DAYS
6. BECOMING EASILY ANNOYED OR IRRITABLE: SEVERAL DAYS
5. BEING SO RESTLESS THAT IT IS HARD TO SIT STILL: SEVERAL DAYS
GAD7 TOTAL SCORE: 6
IF YOU CHECKED OFF ANY PROBLEMS ON THIS QUESTIONNAIRE, HOW DIFFICULT HAVE THESE PROBLEMS MADE IT FOR YOU TO DO YOUR WORK, TAKE CARE OF THINGS AT HOME, OR GET ALONG WITH OTHER PEOPLE: SOMEWHAT DIFFICULT
7. FEELING AFRAID AS IF SOMETHING AWFUL MIGHT HAPPEN: NOT AT ALL

## 2018-12-19 ASSESSMENT — PATIENT HEALTH QUESTIONNAIRE - PHQ9
5. POOR APPETITE OR OVEREATING: MORE THAN HALF THE DAYS
SUM OF ALL RESPONSES TO PHQ QUESTIONS 1-9: 14

## 2018-12-19 ASSESSMENT — MIFFLIN-ST. JEOR: SCORE: 1686

## 2018-12-19 NOTE — PROGRESS NOTES
"36w6d  feeling uncomfortable and very tired.   Baby was moving a lot yesterday. On bedside US baby is cephalic!!  GBS and hgb done today.  Will add in ferritin.  Cervix favorable.   Patient planning epidural.    PHQ9  14 -- feels tired of \"everyone telling me how to parent\"  RR    "

## 2018-12-20 LAB
FERRITIN SERPL-MCNC: 4 NG/ML (ref 12–150)
GP B STREP DNA SPEC QL NAA+PROBE: NEGATIVE
SPECIMEN SOURCE: NORMAL

## 2018-12-20 ASSESSMENT — ANXIETY QUESTIONNAIRES: GAD7 TOTAL SCORE: 6

## 2018-12-27 ENCOUNTER — PRENATAL OFFICE VISIT (OUTPATIENT)
Dept: OBGYN | Facility: CLINIC | Age: 18
End: 2018-12-27
Payer: COMMERCIAL

## 2018-12-27 VITALS
OXYGEN SATURATION: 98 % | BODY MASS INDEX: 33.04 KG/M2 | DIASTOLIC BLOOD PRESSURE: 63 MMHG | HEIGHT: 66 IN | HEART RATE: 107 BPM | TEMPERATURE: 98.3 F | SYSTOLIC BLOOD PRESSURE: 98 MMHG | WEIGHT: 205.6 LBS

## 2018-12-27 DIAGNOSIS — Z34.02 NORMAL FIRST PREGNANCY CONFIRMED, CURRENTLY IN SECOND TRIMESTER: Primary | ICD-10-CM

## 2018-12-27 DIAGNOSIS — Z23 NEED FOR TDAP VACCINATION: ICD-10-CM

## 2018-12-27 PROCEDURE — 99207 ZZC PRENATAL VISIT: CPT | Performed by: OBSTETRICS & GYNECOLOGY

## 2018-12-27 RX ORDER — ACETAMINOPHEN 325 MG/1
650 TABLET ORAL EVERY 4 HOURS PRN
Status: CANCELLED | OUTPATIENT
Start: 2018-12-27

## 2018-12-27 RX ORDER — OXYTOCIN/0.9 % SODIUM CHLORIDE 30/500 ML
100-340 PLASTIC BAG, INJECTION (ML) INTRAVENOUS CONTINUOUS PRN
Status: CANCELLED | OUTPATIENT
Start: 2018-12-27

## 2018-12-27 RX ORDER — IBUPROFEN 200 MG
800 TABLET ORAL
Status: CANCELLED | OUTPATIENT
Start: 2018-12-27

## 2018-12-27 RX ORDER — SODIUM CHLORIDE, SODIUM LACTATE, POTASSIUM CHLORIDE, CALCIUM CHLORIDE 600; 310; 30; 20 MG/100ML; MG/100ML; MG/100ML; MG/100ML
INJECTION, SOLUTION INTRAVENOUS CONTINUOUS
Status: CANCELLED | OUTPATIENT
Start: 2018-12-27

## 2018-12-27 RX ORDER — METHYLERGONOVINE MALEATE 0.2 MG/ML
200 INJECTION INTRAVENOUS
Status: CANCELLED | OUTPATIENT
Start: 2018-12-27

## 2018-12-27 RX ORDER — NALOXONE HYDROCHLORIDE 0.4 MG/ML
.1-.4 INJECTION, SOLUTION INTRAMUSCULAR; INTRAVENOUS; SUBCUTANEOUS
Status: CANCELLED | OUTPATIENT
Start: 2018-12-27

## 2018-12-27 RX ORDER — OXYTOCIN 10 [USP'U]/ML
10 INJECTION, SOLUTION INTRAMUSCULAR; INTRAVENOUS
Status: CANCELLED | OUTPATIENT
Start: 2018-12-27

## 2018-12-27 RX ORDER — ONDANSETRON 2 MG/ML
4 INJECTION INTRAMUSCULAR; INTRAVENOUS EVERY 6 HOURS PRN
Status: CANCELLED | OUTPATIENT
Start: 2018-12-27

## 2018-12-27 RX ORDER — OXYCODONE AND ACETAMINOPHEN 5; 325 MG/1; MG/1
1 TABLET ORAL
Status: CANCELLED | OUTPATIENT
Start: 2018-12-27

## 2018-12-27 RX ORDER — CARBOPROST TROMETHAMINE 250 UG/ML
250 INJECTION, SOLUTION INTRAMUSCULAR
Status: CANCELLED | OUTPATIENT
Start: 2018-12-27

## 2018-12-27 ASSESSMENT — MIFFLIN-ST. JEOR: SCORE: 1711.41

## 2018-12-27 NOTE — PROGRESS NOTES
38w0d  No complaints. Baby is moving ok.  Feeling a lot of ctx's.    Saw derm for scalp lesion, considering having it removed after delivery.   GBS: Negative  Hemoglobin   Date Value Ref Range Status   12/19/2018 9.7 (L) 11.7 - 15.7 g/dL Final   ]    Breast pump rx: script done   Labor orders: signed and held  Birth plan: discussed--- will just go with the flow  Length of stay: discussed  Disability paperwork: NA  Resident involvement: discussed and agrees.  Discharge meds done : NO    .

## 2019-01-02 ENCOUNTER — PRENATAL OFFICE VISIT (OUTPATIENT)
Dept: OBGYN | Facility: CLINIC | Age: 19
End: 2019-01-02
Payer: COMMERCIAL

## 2019-01-02 VITALS
TEMPERATURE: 97.5 F | BODY MASS INDEX: 33.37 KG/M2 | OXYGEN SATURATION: 98 % | WEIGHT: 207.6 LBS | HEIGHT: 66 IN | DIASTOLIC BLOOD PRESSURE: 72 MMHG | SYSTOLIC BLOOD PRESSURE: 114 MMHG | HEART RATE: 127 BPM

## 2019-01-02 DIAGNOSIS — Z34.03 ENCOUNTER FOR SUPERVISION OF NORMAL FIRST PREGNANCY, THIRD TRIMESTER: Primary | ICD-10-CM

## 2019-01-02 PROCEDURE — 99207 ZZC PRENATAL VISIT: CPT | Performed by: OBSTETRICS & GYNECOLOGY

## 2019-01-02 ASSESSMENT — MIFFLIN-ST. JEOR: SCORE: 1720.48

## 2019-01-02 NOTE — PROGRESS NOTES
38w6d  Tired and swollen.  Baby is moving ok.   occ ctx's.   Cervix favorable.  Reviewed when to call.  RR

## 2019-01-08 ENCOUNTER — PRENATAL OFFICE VISIT (OUTPATIENT)
Dept: OBGYN | Facility: CLINIC | Age: 19
End: 2019-01-08
Payer: COMMERCIAL

## 2019-01-08 VITALS
HEART RATE: 133 BPM | DIASTOLIC BLOOD PRESSURE: 68 MMHG | TEMPERATURE: 98.3 F | SYSTOLIC BLOOD PRESSURE: 117 MMHG | BODY MASS INDEX: 34.09 KG/M2 | WEIGHT: 208 LBS

## 2019-01-08 DIAGNOSIS — Z34.03 ENCOUNTER FOR SUPERVISION OF NORMAL FIRST PREGNANCY, THIRD TRIMESTER: Primary | ICD-10-CM

## 2019-01-08 PROCEDURE — 99207 ZZC PRENATAL VISIT: CPT | Performed by: OBSTETRICS & GYNECOLOGY

## 2019-01-08 RX ORDER — FENTANYL CITRATE 50 UG/ML
50-100 INJECTION, SOLUTION INTRAMUSCULAR; INTRAVENOUS
Status: CANCELLED | OUTPATIENT
Start: 2019-01-08

## 2019-01-08 RX ORDER — NALOXONE HYDROCHLORIDE 0.4 MG/ML
.1-.4 INJECTION, SOLUTION INTRAMUSCULAR; INTRAVENOUS; SUBCUTANEOUS
Status: CANCELLED | OUTPATIENT
Start: 2019-01-08

## 2019-01-08 RX ORDER — OXYTOCIN 10 [USP'U]/ML
10 INJECTION, SOLUTION INTRAMUSCULAR; INTRAVENOUS
Status: CANCELLED | OUTPATIENT
Start: 2019-01-08

## 2019-01-08 RX ORDER — ONDANSETRON 2 MG/ML
4 INJECTION INTRAMUSCULAR; INTRAVENOUS EVERY 6 HOURS PRN
Status: CANCELLED | OUTPATIENT
Start: 2019-01-08

## 2019-01-08 RX ORDER — IBUPROFEN 200 MG
800 TABLET ORAL
Status: CANCELLED | OUTPATIENT
Start: 2019-01-08

## 2019-01-08 RX ORDER — METHYLERGONOVINE MALEATE 0.2 MG/ML
200 INJECTION INTRAVENOUS
Status: CANCELLED | OUTPATIENT
Start: 2019-01-08

## 2019-01-08 RX ORDER — ACETAMINOPHEN 325 MG/1
650 TABLET ORAL EVERY 4 HOURS PRN
Status: CANCELLED | OUTPATIENT
Start: 2019-01-08

## 2019-01-08 RX ORDER — OXYCODONE AND ACETAMINOPHEN 5; 325 MG/1; MG/1
1 TABLET ORAL
Status: CANCELLED | OUTPATIENT
Start: 2019-01-08

## 2019-01-08 RX ORDER — CARBOPROST TROMETHAMINE 250 UG/ML
250 INJECTION, SOLUTION INTRAMUSCULAR
Status: CANCELLED | OUTPATIENT
Start: 2019-01-08

## 2019-01-08 RX ORDER — SODIUM CHLORIDE, SODIUM LACTATE, POTASSIUM CHLORIDE, CALCIUM CHLORIDE 600; 310; 30; 20 MG/100ML; MG/100ML; MG/100ML; MG/100ML
INJECTION, SOLUTION INTRAVENOUS CONTINUOUS
Status: CANCELLED | OUTPATIENT
Start: 2019-01-08

## 2019-01-08 RX ORDER — OXYTOCIN/0.9 % SODIUM CHLORIDE 30/500 ML
100-340 PLASTIC BAG, INJECTION (ML) INTRAVENOUS CONTINUOUS PRN
Status: CANCELLED | OUTPATIENT
Start: 2019-01-08

## 2019-01-08 NOTE — PROGRESS NOTES
39w5d  No complaints. Intermittent moderate ctx's.  Normal FM.  No LOF, bloody show or HA.   discussed when to come to labor and delivery. RR

## 2019-01-14 ENCOUNTER — PRENATAL OFFICE VISIT (OUTPATIENT)
Dept: OBGYN | Facility: CLINIC | Age: 19
End: 2019-01-14
Payer: COMMERCIAL

## 2019-01-14 VITALS
BODY MASS INDEX: 33.04 KG/M2 | WEIGHT: 205.6 LBS | HEART RATE: 126 BPM | SYSTOLIC BLOOD PRESSURE: 123 MMHG | DIASTOLIC BLOOD PRESSURE: 74 MMHG | OXYGEN SATURATION: 98 % | HEIGHT: 66 IN

## 2019-01-14 DIAGNOSIS — Z34.03 ENCOUNTER FOR SUPERVISION OF NORMAL FIRST PREGNANCY, THIRD TRIMESTER: Primary | ICD-10-CM

## 2019-01-14 PROCEDURE — 99207 ZZC PRENATAL VISIT: CPT | Performed by: OBSTETRICS & GYNECOLOGY

## 2019-01-14 ASSESSMENT — MIFFLIN-ST. JEOR: SCORE: 1706.41

## 2019-01-15 NOTE — PROGRESS NOTES
40w5d  complains of intermittent ctx's.  Baby is moving ok. Very uncomfortable.   Cervix favorable with yao's score of 9.  Patient requesting induction.   Mazariegos schedule for 1/16/19 at 40w6d. R/B/A discussed.    RR

## 2019-01-16 ENCOUNTER — ANESTHESIA (OUTPATIENT)
Dept: OBGYN | Facility: CLINIC | Age: 19
End: 2019-01-16
Payer: COMMERCIAL

## 2019-01-16 ENCOUNTER — HOSPITAL ENCOUNTER (INPATIENT)
Facility: CLINIC | Age: 19
LOS: 3 days | Discharge: HOME HEALTH | End: 2019-01-19
Attending: OBSTETRICS & GYNECOLOGY | Admitting: OBSTETRICS & GYNECOLOGY
Payer: COMMERCIAL

## 2019-01-16 ENCOUNTER — ANESTHESIA EVENT (OUTPATIENT)
Dept: OBGYN | Facility: CLINIC | Age: 19
End: 2019-01-16
Payer: COMMERCIAL

## 2019-01-16 PROBLEM — Z34.90 PREGNANCY: Status: ACTIVE | Noted: 2019-01-16

## 2019-01-16 LAB
ABO + RH BLD: NORMAL
ABO + RH BLD: NORMAL
BASOPHILS # BLD AUTO: 0 10E9/L (ref 0–0.2)
BASOPHILS NFR BLD AUTO: 0.2 %
BLD GP AB SCN SERPL QL: NORMAL
BLOOD BANK CMNT PATIENT-IMP: NORMAL
DIFFERENTIAL METHOD BLD: ABNORMAL
EOSINOPHIL # BLD AUTO: 0.1 10E9/L (ref 0–0.7)
EOSINOPHIL NFR BLD AUTO: 0.5 %
ERYTHROCYTE [DISTWIDTH] IN BLOOD BY AUTOMATED COUNT: 16.7 % (ref 10–15)
HCT VFR BLD AUTO: 30.3 % (ref 35–47)
HGB BLD-MCNC: 9.3 G/DL (ref 11.7–15.7)
IMM GRANULOCYTES # BLD: 0.3 10E9/L (ref 0–0.4)
IMM GRANULOCYTES NFR BLD: 3 %
LYMPHOCYTES # BLD AUTO: 2.4 10E9/L (ref 0.8–5.3)
LYMPHOCYTES NFR BLD AUTO: 22.8 %
MCH RBC QN AUTO: 21.9 PG (ref 26.5–33)
MCHC RBC AUTO-ENTMCNC: 30.7 G/DL (ref 31.5–36.5)
MCV RBC AUTO: 72 FL (ref 78–100)
MONOCYTES # BLD AUTO: 0.9 10E9/L (ref 0–1.3)
MONOCYTES NFR BLD AUTO: 8.8 %
NEUTROPHILS # BLD AUTO: 6.9 10E9/L (ref 1.6–8.3)
NEUTROPHILS NFR BLD AUTO: 64.7 %
NRBC # BLD AUTO: 0.1 10*3/UL
NRBC BLD AUTO-RTO: 1 /100
PLATELET # BLD AUTO: 183 10E9/L (ref 150–450)
RBC # BLD AUTO: 4.24 10E12/L (ref 3.8–5.2)
SPECIMEN EXP DATE BLD: NORMAL
T PALLIDUM AB SER QL: NONREACTIVE
WBC # BLD AUTO: 10.7 10E9/L (ref 4–11)

## 2019-01-16 PROCEDURE — 25000125 ZZHC RX 250: Performed by: STUDENT IN AN ORGANIZED HEALTH CARE EDUCATION/TRAINING PROGRAM

## 2019-01-16 PROCEDURE — 25000128 H RX IP 250 OP 636: Performed by: OBSTETRICS & GYNECOLOGY

## 2019-01-16 PROCEDURE — 36415 COLL VENOUS BLD VENIPUNCTURE: CPT | Performed by: OBSTETRICS & GYNECOLOGY

## 2019-01-16 PROCEDURE — 59020 FETAL CONTRACT STRESS TEST: CPT | Mod: 26 | Performed by: OBSTETRICS & GYNECOLOGY

## 2019-01-16 PROCEDURE — 59025 FETAL NON-STRESS TEST: CPT | Mod: 26 | Performed by: OBSTETRICS & GYNECOLOGY

## 2019-01-16 PROCEDURE — 86780 TREPONEMA PALLIDUM: CPT | Performed by: OBSTETRICS & GYNECOLOGY

## 2019-01-16 PROCEDURE — 86850 RBC ANTIBODY SCREEN: CPT | Performed by: OBSTETRICS & GYNECOLOGY

## 2019-01-16 PROCEDURE — 86900 BLOOD TYPING SEROLOGIC ABO: CPT | Performed by: OBSTETRICS & GYNECOLOGY

## 2019-01-16 PROCEDURE — 85025 COMPLETE CBC W/AUTO DIFF WBC: CPT | Performed by: OBSTETRICS & GYNECOLOGY

## 2019-01-16 PROCEDURE — 86901 BLOOD TYPING SEROLOGIC RH(D): CPT | Performed by: OBSTETRICS & GYNECOLOGY

## 2019-01-16 PROCEDURE — 12000001 ZZH R&B MED SURG/OB UMMC

## 2019-01-16 PROCEDURE — 25000132 ZZH RX MED GY IP 250 OP 250 PS 637: Performed by: STUDENT IN AN ORGANIZED HEALTH CARE EDUCATION/TRAINING PROGRAM

## 2019-01-16 PROCEDURE — 40000671 ZZH STATISTIC ANESTHESIA CASE

## 2019-01-16 RX ORDER — ONDANSETRON 2 MG/ML
4 INJECTION INTRAMUSCULAR; INTRAVENOUS EVERY 6 HOURS PRN
Status: DISCONTINUED | OUTPATIENT
Start: 2019-01-16 | End: 2019-01-17

## 2019-01-16 RX ORDER — NALOXONE HYDROCHLORIDE 0.4 MG/ML
.1-.4 INJECTION, SOLUTION INTRAMUSCULAR; INTRAVENOUS; SUBCUTANEOUS
Status: DISCONTINUED | OUTPATIENT
Start: 2019-01-16 | End: 2019-01-17

## 2019-01-16 RX ORDER — SODIUM CHLORIDE, SODIUM LACTATE, POTASSIUM CHLORIDE, CALCIUM CHLORIDE 600; 310; 30; 20 MG/100ML; MG/100ML; MG/100ML; MG/100ML
INJECTION, SOLUTION INTRAVENOUS CONTINUOUS
Status: DISCONTINUED | OUTPATIENT
Start: 2019-01-16 | End: 2019-01-17

## 2019-01-16 RX ORDER — OXYCODONE AND ACETAMINOPHEN 5; 325 MG/1; MG/1
1 TABLET ORAL
Status: DISCONTINUED | OUTPATIENT
Start: 2019-01-16 | End: 2019-01-17

## 2019-01-16 RX ORDER — METHYLERGONOVINE MALEATE 0.2 MG/ML
200 INJECTION INTRAVENOUS
Status: COMPLETED | OUTPATIENT
Start: 2019-01-16 | End: 2019-01-17

## 2019-01-16 RX ORDER — OXYTOCIN/0.9 % SODIUM CHLORIDE 30/500 ML
100-340 PLASTIC BAG, INJECTION (ML) INTRAVENOUS CONTINUOUS PRN
Status: DISCONTINUED | OUTPATIENT
Start: 2019-01-16 | End: 2019-01-17

## 2019-01-16 RX ORDER — OXYTOCIN/0.9 % SODIUM CHLORIDE 30/500 ML
1-24 PLASTIC BAG, INJECTION (ML) INTRAVENOUS CONTINUOUS
Status: DISCONTINUED | OUTPATIENT
Start: 2019-01-16 | End: 2019-01-17

## 2019-01-16 RX ORDER — NALBUPHINE HYDROCHLORIDE 10 MG/ML
2.5-5 INJECTION, SOLUTION INTRAMUSCULAR; INTRAVENOUS; SUBCUTANEOUS EVERY 6 HOURS PRN
Status: DISCONTINUED | OUTPATIENT
Start: 2019-01-16 | End: 2019-01-17

## 2019-01-16 RX ORDER — ACETAMINOPHEN 325 MG/1
650 TABLET ORAL EVERY 4 HOURS PRN
Status: DISCONTINUED | OUTPATIENT
Start: 2019-01-16 | End: 2019-01-17

## 2019-01-16 RX ORDER — CARBOPROST TROMETHAMINE 250 UG/ML
250 INJECTION, SOLUTION INTRAMUSCULAR
Status: DISCONTINUED | OUTPATIENT
Start: 2019-01-16 | End: 2019-01-17

## 2019-01-16 RX ORDER — OXYTOCIN 10 [USP'U]/ML
10 INJECTION, SOLUTION INTRAMUSCULAR; INTRAVENOUS
Status: DISCONTINUED | OUTPATIENT
Start: 2019-01-16 | End: 2019-01-17

## 2019-01-16 RX ORDER — LIDOCAINE 40 MG/G
CREAM TOPICAL
Status: DISCONTINUED | OUTPATIENT
Start: 2019-01-16 | End: 2019-01-17

## 2019-01-16 RX ORDER — TERBUTALINE SULFATE 1 MG/ML
0.25 INJECTION, SOLUTION SUBCUTANEOUS
Status: DISCONTINUED | OUTPATIENT
Start: 2019-01-16 | End: 2019-01-17

## 2019-01-16 RX ORDER — EPHEDRINE SULFATE 50 MG/ML
5 INJECTION, SOLUTION INTRAMUSCULAR; INTRAVENOUS; SUBCUTANEOUS
Status: DISCONTINUED | OUTPATIENT
Start: 2019-01-16 | End: 2019-01-17

## 2019-01-16 RX ORDER — SERTRALINE HYDROCHLORIDE 25 MG/1
25 TABLET, FILM COATED ORAL DAILY
Status: DISCONTINUED | OUTPATIENT
Start: 2019-01-16 | End: 2019-01-16

## 2019-01-16 RX ORDER — MISOPROSTOL 200 UG/1
TABLET ORAL
Status: COMPLETED
Start: 2019-01-16 | End: 2019-01-17

## 2019-01-16 RX ORDER — IBUPROFEN 800 MG/1
800 TABLET, FILM COATED ORAL
Status: COMPLETED | OUTPATIENT
Start: 2019-01-16 | End: 2019-01-17

## 2019-01-16 RX ORDER — OXYTOCIN/0.9 % SODIUM CHLORIDE 30/500 ML
100-340 PLASTIC BAG, INJECTION (ML) INTRAVENOUS CONTINUOUS PRN
Status: COMPLETED | OUTPATIENT
Start: 2019-01-16 | End: 2019-01-17

## 2019-01-16 RX ORDER — METHYLERGONOVINE MALEATE 0.2 MG/ML
200 INJECTION INTRAVENOUS
Status: DISCONTINUED | OUTPATIENT
Start: 2019-01-16 | End: 2019-01-17

## 2019-01-16 RX ORDER — IBUPROFEN 800 MG/1
800 TABLET, FILM COATED ORAL
Status: DISCONTINUED | OUTPATIENT
Start: 2019-01-16 | End: 2019-01-17

## 2019-01-16 RX ORDER — FENTANYL CITRATE 50 UG/ML
50-100 INJECTION, SOLUTION INTRAMUSCULAR; INTRAVENOUS
Status: DISCONTINUED | OUTPATIENT
Start: 2019-01-16 | End: 2019-01-17

## 2019-01-16 RX ADMIN — OXYTOCIN-SODIUM CHLORIDE 0.9% IV SOLN 30 UNIT/500ML 1 MILLI-UNITS/MIN: 30-0.9/5 SOLUTION at 09:38

## 2019-01-16 RX ADMIN — FENTANYL CITRATE 100 MCG: 50 INJECTION, SOLUTION INTRAMUSCULAR; INTRAVENOUS at 22:48

## 2019-01-16 RX ADMIN — SODIUM CHLORIDE, POTASSIUM CHLORIDE, SODIUM LACTATE AND CALCIUM CHLORIDE: 600; 310; 30; 20 INJECTION, SOLUTION INTRAVENOUS at 17:04

## 2019-01-16 RX ADMIN — SODIUM CHLORIDE, POTASSIUM CHLORIDE, SODIUM LACTATE AND CALCIUM CHLORIDE: 600; 310; 30; 20 INJECTION, SOLUTION INTRAVENOUS at 09:21

## 2019-01-16 RX ADMIN — RANITIDINE 150 MG: 150 TABLET ORAL at 10:19

## 2019-01-16 NOTE — PROGRESS NOTES
FHT Review    Baseline 145, moderate variability, + acels, no decels  [place]: 4 contractions in 10 minutes.     Plan:    IOL:  - continue pitocin, titrate as able currently running at 4  - GBS negative, no PNC indicated  - Rh positive  - desires an epidurl  - anticipate     Depression:  - no medications  - social work postpartum  - 1 week mood check    Fetal wellbeing:  - category I, reactive    PNC:  - Rh positive, GBS negative, rubella immune, placenta is posterior without previa    Rachelle Lutz MD PhD  Ob/Gyn PGY-3  2019 1:18 PM

## 2019-01-16 NOTE — PROGRESS NOTES
[facility] Labor and Delivery Progress Note    Kamala Harris MRN# [id number]   Age: 19 year old YOB: 2000           Subjective:     No complaints, other than frustrated with mother in law.  Patient only wants  in the room at time of birth and MIL is offended.  offered support to patient and .   No painful ctx's.           Objective:     Patient Vitals for the past 24 hrs:   BP Temp Temp src Pulse Resp   01/16/19 1315 121/56 98.1  F (36.7  C) Oral 120 18   01/16/19 1147 114/62 -- -- 116 18   01/16/19 1140 110/73 98.3  F (36.8  C) Oral 141 18   01/16/19 0938 116/69 -- -- 105 16   01/16/19 0846 121/68 -- -- 111 18   01/16/19 0823 128/75 98.2  F (36.8  C) Oral -- 18        Cervical Exam: 3 / 80% / -2 ,   Bulgy bag noted    Membranes: Intact     Fetal Heart Rate:    Monitor: external US    Variability: minimal (detectable, amplitude less than or equal to 5 bpm)    Baseline Rate: normal range    Fetal Heart Rate Tracing: reassuring, negative NST    toco ctx's q 2-5 min,   Pitocin at 8 mu/min          Assessment/Plan:   Niko on pitocin  Will augment as needed  Fetal status reassuring  AROM when further dilated    Birgit Montesinos MD

## 2019-01-16 NOTE — H&P
L&D History and Physical   2019  Kamala Harris  [id number]      HPI: Kamala Harris is a 19 year old  at 40w6d by 17w1d US who presented for inductions of labor for late term pregnancy and favorable cervix.    Feeling nervous, but feeling a lot of fetal moving. Is having intermittent, irregular contraction, some much stronger than others. Denies vaginal bleeding and leaking of fluid.    Endorses some mild swelling in her legs and heartburn, for which she takes Ranitidine.    She denies headache, vision changes, chest pain, shortness of breath, fever, chills, nausea, vomiting or other systemic complaints.     Her pregnancy has been complicated by:  - Fetal echogenic bowel, resolved  - Maternal congenital heart defect, s/p Fetal echo, wnl  - Depression, stable on no meds  - Intermittent asthma    OBHX:   Obstetric History       T0      L0     SAB0   TAB0   Ectopic0   Multiple0   Live Births0       # Outcome Date GA Lbr Wolf/2nd Weight Sex Delivery Anes PTL Lv   1 Current                   Medical history:  Denies any problems outside of pregnancy except anxiety  Surgical History:  Past Surgical History:   Procedure Laterality Date     CARDIAC CATHERIZATION  4 years    Told by cardiology that tiny defect would close on own, no FU     CARDIAC SURGERY       CREATE EARDRUM OPENING,LOCAL ANESTH  4 years    PETs   no abdominal surgeries    Medications:     No current facility-administered medications on file prior to encounter.   Current Outpatient Medications on File Prior to Encounter:  Cholecalciferol (VITAMIN D) 2000 units tablet Take 2,000 Units by mouth daily   Ferrous Sulfate (IRON SUPPLEMENT PO) Take 28 mg by mouth    Prenatal Vit-Fe Fumarate-FA (PRENATAL MULTIVITAMIN PLUS IRON) 27-0.8 MG TABS per tablet Take 1 tablet by mouth daily   ranitidine (ZANTAC) 150 MG tablet Take 1 tablet (150 mg) by mouth 2 times daily   sertraline (ZOLOFT) 25 MG tablet Take 25 mg by mouth  daily.   not taking zoloft. Taking prenatal, iron and zantac    Allergies   Allergen Reactions     Iodine      Rash itching     no known drug allergy apart from iodine, which causes burning of her skin and irritation    Family History   Problem Relation Age of Onset     Cerebrovascular Disease Maternal Grandfather      Myocardial Infarction Paternal Grandmother      Myocardial Infarction Paternal Grandfather        SocialHX:   Social History     Tobacco Use     Smoking status: Never Smoker     Smokeless tobacco: Never Used   Substance Use Topics     Alcohol use: No     Drug use: No       ROS: 10-point ROS negative except as indicated in HPI.    Physical Exam:  Vitals:    19 0823   BP: 128/75   Resp: 18   Temp: 98.2  F (36.8  C)   TempSrc: Oral     General: alert, oriented female, resting in bed in NAD  CV: regular rate and rhythm  Lungs: nonlabored breathing  Abdomen: soft, gravid, non-tender, EFW 7.5lb.  Extremities: bilateral lower extremities non-tender with trace edema    SVE: 390/-2  Membranes: intact    Presentation: ceph by bsus    FHT: baseline 140, moderate variability, positive accelerations, no decelerations  [place]: q3-4min contractions on external monitoring    Prenatal Labs:   Lab Results   Component Value Date    ABO O 2018    RH Pos 2018    AS Neg 2018    HEPBANG Nonreactive 2018    CHPCRT Negative 2018    GCPCRT Negative 2018    HGB 9.7 (L) 2018       GBS Status:   Lab Results   Component Value Date    GBS Negative 2018       No results found for: PAP    Labs:  CBC, Type and Screen, RPR    Assessment: 19 year old  at 40w6d by 17w1d US, here for IOL for late term pregnancy and favorable cervix. postioer placenta. BMI 33. EFW 8.5.     Pregnancy also complicated by: intermittent asthma and depression    Plan:    Pregnancy  - Admit to labor and delivery for IOL. SVE with favorable Fisher, will start with Pitocin for induction, will start at  1mU. Patient is raul on the monitor, however is not feeling most of them. Will reassess for AROM with good contraction pattern  - GBS neg; antibiotics not indicated  - Rh pos- rhogam not indicated; Rubella immune  - Pt would like epidural further into labor    Fetal well-being  - Category 1 FHT    Patient seen and care plan discussed under supervision of Dr. Montesinos.    Pinky Tamez MD  Obstetrics and Gyncology, PGY-2  January 16, 2019 , 8:21 AM      Physician Attestation   I, Birgit Montesinos, personally examined and evaluated this patient.  I discussed the patient with the resident and care team, and agree with the assessment and plan of care as documented in the note above.      I personally reviewed vital signs, medications, labs and fetal heart tones and toco.    Key findings: patient here with favorable cervix, yao's score of 9 in clinic 2 days ago. Patient very uncomfortable and she is raul spontaneously q 3 min already. Suspect fetal weight ~ 8.9 lbs  or more. FHT's with reactive NST and negative CST.  Will augment her ctx's with pitocin prn.   Birgit Montesinos MD  Date of Service (when I saw the patient): 1/16/19

## 2019-01-16 NOTE — ANESTHESIA PREPROCEDURE EVALUATION
Anesthesia Pre-Procedure Evaluation    Patient: Kamala Harris   MRN:     [id number] Gender:   female   Age:    19 year old :      2000        Preoperative Diagnosis: * No surgery found *        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      Sprain of right wrist     right side muliple times      Uncomplicated asthma     exercise induced      Past Surgical History:   Procedure Laterality Date     CARDIAC CATHERIZATION  4 years    Told by cardiology that tiny defect would close on own, no FU     CARDIAC SURGERY       CREATE EARDRUM OPENING,LOCAL ANESTH  4 years    PETs          Anesthesia Evaluation       history and physical reviewed .      No history of anesthetic complications          ROS/MED HX    ENT/Pulmonary:     (+)asthma , . .    Neurologic:  - neg neurologic ROS     Cardiovascular: Comment: H/o congenital heart defect (unknown kind) was told it resolved         METS/Exercise Tolerance:     Hematologic:         Musculoskeletal:         GI/Hepatic:  - neg GI/hepatic ROS       Renal/Genitourinary:         Endo:         Psychiatric:         Infectious Disease:         Malignancy:         Other:                     JZG FV AN PHYSICAL EXAM    Lab Results   Component Value Date    WBC 10.7 2019    HGB 9.3 (L) 2019    HCT 30.3 (L) 2019     2019    TSH 0.84 2011    T4 0.85 2011    T3 115 2009       Preop Vitals  BP Readings from Last 3 Encounters:   19 121/56   19 123/74   19 117/68    Pulse Readings from Last 3 Encounters:   19 120   19 126   19 133      Resp Readings from Last 3 Encounters:   19 18   13 20   12 16    SpO2 Readings from Last 3 Encounters:   19 98%   19 98%   18 98%      Temp Readings from Last 1 Encounters:   19 36.7  C (98.1  F) (Oral)    Ht Readings from  "Last 1 Encounters:   01/14/19 1.664 m (5' 5.5\")      Wt Readings from Last 1 Encounters:   01/14/19 93.3 kg (205 lb 9.6 oz)    Estimated body mass index is 33.69 kg/m  as calculated from the following:    Height as of 1/14/19: 1.664 m (5' 5.5\").    Weight as of 1/14/19: 93.3 kg (205 lb 9.6 oz).     LDA:  Peripheral IV 01/16/19 Right Hand (Active)   Site Assessment WDL 1/16/2019  9:38 AM   Line Status Infusing 1/16/2019  9:38 AM   Phlebitis Scale 0-->no symptoms 1/16/2019  9:38 AM   Infiltration Scale 0 1/16/2019  9:38 AM   Number of days: 0            JZG FV AN PLAN NO PONV RULE    neg OB ROS                   Adonay Diandra, DO  "

## 2019-01-16 NOTE — PROVIDER NOTIFICATION
"   01/16/19 1341   Provider Notification   Provider Name/Title Dr Montesinos   Method of Notification In Department   Notification Reason Labor Status;Maternal Vital Sign Change   pt is comfortable .  Oxytocin has been increased to 4 mu.   with periods of minimum and moderate variability.  accels present.  x1 variable with immediate return to baseline.  ctxs q 2-3\".  Palpate soft.  Pt is comfortable.  Up ab francisco j.  Maternal tachycardia.  Eating and drinking.  Family is present.    "

## 2019-01-17 PROCEDURE — 25000132 ZZH RX MED GY IP 250 OP 250 PS 637: Performed by: STUDENT IN AN ORGANIZED HEALTH CARE EDUCATION/TRAINING PROGRAM

## 2019-01-17 PROCEDURE — 25000132 ZZH RX MED GY IP 250 OP 250 PS 637

## 2019-01-17 PROCEDURE — 10D17Z9 MANUAL EXTRACTION OF PRODUCTS OF CONCEPTION, RETAINED, VIA NATURAL OR ARTIFICIAL OPENING: ICD-10-PCS | Performed by: OBSTETRICS & GYNECOLOGY

## 2019-01-17 PROCEDURE — 12000001 ZZH R&B MED SURG/OB UMMC

## 2019-01-17 PROCEDURE — 10907ZC DRAINAGE OF AMNIOTIC FLUID, THERAPEUTIC FROM PRODUCTS OF CONCEPTION, VIA NATURAL OR ARTIFICIAL OPENING: ICD-10-PCS | Performed by: OBSTETRICS & GYNECOLOGY

## 2019-01-17 PROCEDURE — 00HU33Z INSERTION OF INFUSION DEVICE INTO SPINAL CANAL, PERCUTANEOUS APPROACH: ICD-10-PCS | Performed by: ANESTHESIOLOGY

## 2019-01-17 PROCEDURE — 25000128 H RX IP 250 OP 636: Performed by: STUDENT IN AN ORGANIZED HEALTH CARE EDUCATION/TRAINING PROGRAM

## 2019-01-17 PROCEDURE — 3E0R3BZ INTRODUCTION OF ANESTHETIC AGENT INTO SPINAL CANAL, PERCUTANEOUS APPROACH: ICD-10-PCS | Performed by: ANESTHESIOLOGY

## 2019-01-17 PROCEDURE — 25000125 ZZHC RX 250: Performed by: OBSTETRICS & GYNECOLOGY

## 2019-01-17 PROCEDURE — 72200001 ZZH LABOR CARE VAGINAL DELIVERY SINGLE

## 2019-01-17 PROCEDURE — 59400 OBSTETRICAL CARE: CPT | Mod: GC | Performed by: OBSTETRICS & GYNECOLOGY

## 2019-01-17 PROCEDURE — 25000132 ZZH RX MED GY IP 250 OP 250 PS 637: Performed by: OBSTETRICS & GYNECOLOGY

## 2019-01-17 PROCEDURE — 25000128 H RX IP 250 OP 636: Performed by: OBSTETRICS & GYNECOLOGY

## 2019-01-17 RX ORDER — MISOPROSTOL 200 UG/1
800 TABLET ORAL
Status: DISCONTINUED | OUTPATIENT
Start: 2019-01-17 | End: 2019-01-19 | Stop reason: HOSPADM

## 2019-01-17 RX ORDER — CARBOPROST TROMETHAMINE 250 UG/ML
250 INJECTION, SOLUTION INTRAMUSCULAR
Status: DISCONTINUED | OUTPATIENT
Start: 2019-01-17 | End: 2019-01-19 | Stop reason: HOSPADM

## 2019-01-17 RX ORDER — OXYTOCIN/0.9 % SODIUM CHLORIDE 30/500 ML
340 PLASTIC BAG, INJECTION (ML) INTRAVENOUS CONTINUOUS PRN
Status: DISCONTINUED | OUTPATIENT
Start: 2019-01-17 | End: 2019-01-19 | Stop reason: HOSPADM

## 2019-01-17 RX ORDER — LANOLIN 100 %
OINTMENT (GRAM) TOPICAL
Status: DISCONTINUED | OUTPATIENT
Start: 2019-01-17 | End: 2019-01-19 | Stop reason: HOSPADM

## 2019-01-17 RX ORDER — ACETAMINOPHEN 325 MG/1
650 TABLET ORAL EVERY 4 HOURS PRN
Status: DISCONTINUED | OUTPATIENT
Start: 2019-01-17 | End: 2019-01-19 | Stop reason: HOSPADM

## 2019-01-17 RX ORDER — CEFAZOLIN SODIUM 1 G/3ML
1 INJECTION, POWDER, FOR SOLUTION INTRAMUSCULAR; INTRAVENOUS ONCE
Status: COMPLETED | OUTPATIENT
Start: 2019-01-17 | End: 2019-01-17

## 2019-01-17 RX ORDER — AMOXICILLIN 250 MG
1 CAPSULE ORAL 2 TIMES DAILY
Status: DISCONTINUED | OUTPATIENT
Start: 2019-01-17 | End: 2019-01-19 | Stop reason: HOSPADM

## 2019-01-17 RX ORDER — IBUPROFEN 800 MG/1
800 TABLET, FILM COATED ORAL EVERY 6 HOURS PRN
Status: DISCONTINUED | OUTPATIENT
Start: 2019-01-17 | End: 2019-01-19 | Stop reason: HOSPADM

## 2019-01-17 RX ORDER — OXYTOCIN/0.9 % SODIUM CHLORIDE 30/500 ML
100 PLASTIC BAG, INJECTION (ML) INTRAVENOUS CONTINUOUS
Status: DISCONTINUED | OUTPATIENT
Start: 2019-01-17 | End: 2019-01-19 | Stop reason: HOSPADM

## 2019-01-17 RX ORDER — BUPIVACAINE HYDROCHLORIDE 2.5 MG/ML
INJECTION, SOLUTION INFILTRATION; PERINEURAL PRN
Status: DISCONTINUED | OUTPATIENT
Start: 2019-01-17 | End: 2019-01-17

## 2019-01-17 RX ORDER — BISACODYL 10 MG
10 SUPPOSITORY, RECTAL RECTAL DAILY PRN
Status: DISCONTINUED | OUTPATIENT
Start: 2019-01-19 | End: 2019-01-19 | Stop reason: HOSPADM

## 2019-01-17 RX ORDER — AMOXICILLIN 250 MG
2 CAPSULE ORAL 2 TIMES DAILY
Status: DISCONTINUED | OUTPATIENT
Start: 2019-01-17 | End: 2019-01-19 | Stop reason: HOSPADM

## 2019-01-17 RX ORDER — NALOXONE HYDROCHLORIDE 0.4 MG/ML
.1-.4 INJECTION, SOLUTION INTRAMUSCULAR; INTRAVENOUS; SUBCUTANEOUS
Status: DISCONTINUED | OUTPATIENT
Start: 2019-01-17 | End: 2019-01-19 | Stop reason: HOSPADM

## 2019-01-17 RX ORDER — HYDROCORTISONE 2.5 %
CREAM (GRAM) TOPICAL 3 TIMES DAILY PRN
Status: DISCONTINUED | OUTPATIENT
Start: 2019-01-17 | End: 2019-01-19 | Stop reason: HOSPADM

## 2019-01-17 RX ORDER — METHYLERGONOVINE MALEATE 0.2 MG/ML
200 INJECTION INTRAVENOUS
Status: DISCONTINUED | OUTPATIENT
Start: 2019-01-17 | End: 2019-01-19 | Stop reason: HOSPADM

## 2019-01-17 RX ORDER — OXYTOCIN 10 [USP'U]/ML
10 INJECTION, SOLUTION INTRAMUSCULAR; INTRAVENOUS
Status: DISCONTINUED | OUTPATIENT
Start: 2019-01-17 | End: 2019-01-19 | Stop reason: HOSPADM

## 2019-01-17 RX ADMIN — RANITIDINE 150 MG: 150 TABLET ORAL at 23:51

## 2019-01-17 RX ADMIN — MISOPROSTOL 400 MCG: 200 TABLET ORAL at 07:20

## 2019-01-17 RX ADMIN — ACETAMINOPHEN 650 MG: 325 TABLET, FILM COATED ORAL at 19:27

## 2019-01-17 RX ADMIN — OXYTOCIN-SODIUM CHLORIDE 0.9% IV SOLN 30 UNIT/500ML 100 ML/HR: 30-0.9/5 SOLUTION at 08:31

## 2019-01-17 RX ADMIN — CEFAZOLIN 1 G: 1 INJECTION, POWDER, FOR SOLUTION INTRAMUSCULAR; INTRAVENOUS at 08:27

## 2019-01-17 RX ADMIN — METHYLERGONOVINE MALEATE 200 MCG: 0.2 INJECTION INTRAVENOUS at 07:20

## 2019-01-17 RX ADMIN — SENNOSIDES AND DOCUSATE SODIUM 1 TABLET: 8.6; 5 TABLET ORAL at 21:31

## 2019-01-17 RX ADMIN — IBUPROFEN 800 MG: 800 TABLET, FILM COATED ORAL at 14:29

## 2019-01-17 RX ADMIN — SODIUM CHLORIDE, POTASSIUM CHLORIDE, SODIUM LACTATE AND CALCIUM CHLORIDE 1000 ML: 600; 310; 30; 20 INJECTION, SOLUTION INTRAVENOUS at 00:25

## 2019-01-17 RX ADMIN — ONDANSETRON 4 MG: 2 INJECTION INTRAMUSCULAR; INTRAVENOUS at 04:55

## 2019-01-17 RX ADMIN — BUPIVACAINE HYDROCHLORIDE 8 ML: 2.5 INJECTION, SOLUTION INFILTRATION; PERINEURAL at 00:45

## 2019-01-17 RX ADMIN — RANITIDINE 150 MG: 150 TABLET ORAL at 04:49

## 2019-01-17 RX ADMIN — ACETAMINOPHEN 650 MG: 325 TABLET, FILM COATED ORAL at 14:28

## 2019-01-17 NOTE — PLAN OF CARE
Labor Shift Note  Data: Niko Q2-4 minutes, palpate moderate. Fetal assessment AGA - see flowsheet .   Vitals:    19 1506 19 1547 19 1827 19 2230   BP: 111/56 120/75 114/60 118/67   Pulse: 113      Resp: 18 16 18 16   Temp: 98.2  F (36.8  C) 98.4  F (36.9  C) 98.2  F (36.8  C) 98  F (36.7  C)   TempSrc: Oral Oral Oral Oral   . I/O this shift:  In: 1893.83 [P.O.:690; I.V.:1203.83]  Out: 200 [Urine:200].  AROM at 2245, moderate amount of clear fluid.  Signs and symptoms of infections absent.  Blood pressures WNL. Signs and symptoms of pre-eclampsia: absent.  Support person Chip and many family members present.  Interventions: Continue uterine/fetal assessment, Vital Signs, and pitocin per order set. Comfort measures include tub bath, birthing ball, and ambulation.  Medicated with Fentanyl x1, pt planning on epidural in active labor.   Plan: Anticipate . Provide labor/coping assistance as needed by patient and support person.  Observe for and notify care provider of indications of progressing labor, need for pain medications,  or signs of fetal/maternal compromise.

## 2019-01-17 NOTE — PROGRESS NOTES
"[facility] Labor and Delivery Progress Note    Kamala Harris MRN# [id number]   Age: 19 year old YOB: 2000           Subjective:      ctx's more painful.  \"I just want to get up and walk around\"          Objective:     Patient Vitals for the past 24 hrs:   BP Temp Temp src Pulse Resp   19 1827 114/60 98.2  F (36.8  C) Oral -- 18   19 1547 120/75 98.4  F (36.9  C) Oral -- 16   19 1506 111/56 98.2  F (36.8  C) Oral 113 18   19 1315 121/56 98.1  F (36.7  C) Oral 120 18   19 1147 114/62 -- -- 116 18   19 1140 110/73 98.3  F (36.8  C) Oral 141 18   19 0938 116/69 -- -- 105 16   19 0846 121/68 -- -- 111 18   19 0823 128/75 98.2  F (36.8  C) Oral -- 18        Cervical Exam: 4 / 90% / -1      Position: Mid    Membranes: Intact     Fetal Heart Rate:    Monitor: external US    Variability: moderate (amplitude range 6 to 25 bpm)    Baseline Rate: normal range    Fetal Heart Rate Tracing: reassuring    [place]: ctx's q 2-4 min   Pitocin at 15 mu/min          Assessment/Plan:    at 40w6d raul with cervical change.   Membranes intact  Continue pitocin  Possible AROM with next check.      Birgit Montesinos MD     "

## 2019-01-17 NOTE — DISCHARGE SUMMARY
[facility] Discharge Summary    Kamala Harris MRN# [id number]   Age: 19 year old YOB: 2000     Date of Admission:  2019  Date of Discharge::  2019  Admitting Physician:  Birgit Montesinos MD  Discharge Physician:  Kaylan Naik MD          Admission Diagnoses:   -IUP at 41w0d  -Fetal echogenic bowel, resolved  -Maternal congenital heart defect, s/p Fetal echo, wnl  -Depression, stable on no meds  -Intermittent asthma          Discharge Diagnosis:   -IUP at 41w0d, now delivered  -Shoulder dystocia          Procedures:   Procedure(s): -  -Epidural anesthesia            Medications Prior to Admission:     Medications Prior to Admission   Medication Sig Dispense Refill Last Dose     Cholecalciferol (VITAMIN D) 2000 units tablet Take 2,000 Units by mouth daily 100 tablet 3 Past Month at Unknown time     Ferrous Sulfate (IRON SUPPLEMENT PO) Take 28 mg by mouth    Past Month at Unknown time     Prenatal Vit-Fe Fumarate-FA (PRENATAL MULTIVITAMIN PLUS IRON) 27-0.8 MG TABS per tablet Take 1 tablet by mouth daily   Past Month at Unknown time     sertraline (ZOLOFT) 25 MG tablet Take 25 mg by mouth daily.   More than a month at Unknown time     [DISCONTINUED] ranitidine (ZANTAC) 150 MG tablet Take 1 tablet (150 mg) by mouth 2 times daily 60 tablet 1 1/15/2019 at Unknown time             Discharge Medications:        Review of your medicines      START taking      Dose / Directions   acetaminophen 325 MG tablet  Commonly known as:  TYLENOL      Dose:  650 mg  Take 2 tablets (650 mg) by mouth every 4 hours as needed for mild pain or fever (greater than or equal to 38  C /100.4  F (oral) or 38.5  C/ 101.4  F (core).)  Quantity:  100 tablet  Refills:  0     ibuprofen 200 MG capsule  Commonly known as:  ADVIL/MOTRIN      Dose:  200-600 mg  Take 1-3 capsules (200-600 mg) by mouth every 4 hours as needed for fever  Quantity:  50 capsule  Refills:  0     SENNA-docusate sodium 8.6-50 MG  tablet  Commonly known as:  SENNA S      Dose:  1 tablet  Take 1 tablet by mouth At Bedtime  Quantity:  30 tablet  Refills:  0        CONTINUE these medicines which have NOT CHANGED      Dose / Directions   IRON SUPPLEMENT PO      Dose:  28 mg  Take 28 mg by mouth  Refills:  0     prenatal multivitamin w/iron 27-0.8 MG tablet      Dose:  1 tablet  Take 1 tablet by mouth daily  Refills:  0     sertraline 25 MG tablet  Commonly known as:  ZOLOFT      Dose:  25 mg  Take 25 mg by mouth daily.  Refills:  0     vitamin D3 2000 units tablet  Commonly known as:  CHOLECALCIFEROL  Used for:  Normal first pregnancy confirmed, currently in third trimester      Dose:  2000 Units  Take 2,000 Units by mouth daily  Quantity:  100 tablet  Refills:  3        STOP taking    ranitidine 150 MG tablet  Commonly known as:  ZANTAC              Where to get your medicines      These medications were sent to [facility] [place], [place] - [address]  [address] [facility] [address], [facility] [address]    Phone:  [phone]     ibuprofen 200 MG capsule    SENNA-docusate sodium 8.6-50 MG tablet     Some of these will need a paper prescription and others can be bought over the counter. Ask your nurse if you have questions.    Bring a paper prescription for each of these medications    acetaminophen 325 MG tablet                 Consultations:   Anesthesia          Brief Admission History   Kamala Harrsi is a 19 year old  at 40w6d by 17w1d US who presented for inductions of labor for late term pregnancy and favorable cervix. Feeling nervous, but feeling a lot of fetal moving. Is having intermittent, irregular contraction, some much stronger than others. Denies vaginal bleeding and leaking of fluid. Endorses some mild swelling in her legs and heartburn, for which she takes Ranitidine.  She denies headache, vision changes, chest pain, shortness of breath, fever, chills, nausea, vomiting or other systemic complaints.             Brief Intrapartum Course:   Kamala Harris is a 19 year old  at 40w06d who presented to L&D for elective IOL at late term. Pregnancy was complicated by: h/o maternal congenital cardiac defect and depression. GBS negaitive.     Patient's SVE on admission was 3/90/-2. She was started on Pitocin followed by AROM at 2245 for labor augmentation. The patient progressed to complete and pushed for approximately 90 minutes. FHT was Cat I for the labor course. The patient was noted to have a prolonged crown to the chin. The labia were reduced around the head. Head was noted to be in the direct OA position. Head delivery was at 0708 Head was restituted to the SABINO position. The shoulders did not deliver with downward traction. Shoulder dystocia was diagnosed and announced to the room. Extra help was called for as well as the NICU. Patient was placed in Miguel position and suprapubic pressure was applied. The shoulders did not deliver with downward traction with these maneuvers. Attempt was subsequently made to grasp the posterior arm (which was the right arm), however was unable to be delivered. Dr. Montesinos then attempted to deliver the posterior arm, again without success. At this time, Dr. Gusman entered the room and cut a superficial, 2 cm median episiotomy. Attempt was then made to rotate the anterior shoulder clockwise, then counterclockwise without movement. The anterior shoulder was then able to be rotated in a clockwise fashion approximately 90 degrees. Along with Miguel, suprapubic pressure, and downward traction after rotation, the anterior shoulder delivered followed by the posterior shoulder and the rest of the infant's body. Viable female infant delivered at 0711 on 19. Total shoulder dystocia time was 3.5 minutes. No nuchal cord noted. Cord was immediately cut and clamped and infant was handed off to awaiting NICU staff. Apgars were 3, 5, 6, and 8 at 1, 5, 10 and 15 minutes.  Cord gases were collected and arterial pH 7.23, base excess 5. Weight 5000g. Routine cord blood was obtained. IV pitocin was started for active management of the third stage. Attempt was made to deliver the placenta with gentle downward traction without success. Due to brisk uterine bleeding, decision was made to manually deliver the placenta. Trailing membranes were removed with gentle traction using ring forceps. Due to continued bleeding and concern for retained membranes, manual sweep x2 was performed with moderate amount of clot, but no membranes or placenta noted. Patient was given 400 mcg buccal misoprostol and 0.2 mg IM Methergine for bleeding.  Fundus was subsequently noted to be firm on fundal massage. Placenta was examined and noted to be intact with a three vessel cord. Patient sustained no perineal lacerations. The superficial episiotomy was repaired in the standard fashion using 3-0 Vicryl.   Upon final inspection, there was good hemostasis. Instrument and sharp count was correct. EBL: 1000 mL           Hospital Course:   The patient's hospital course was unremarkable.  On discharge, her pain was well controlled. Vaginal bleeding is similar to peak menstrual flow.  Voiding without difficulty.  Ambulating well and tolerating a normal diet.  No fever.  Breastfeeding well.  Infant is stable.  Had a BM prior to discharge. She was discharged on post-partum day #2.    Post-partum hemoglobin: 7.6, discharged with PO iron  Contraception: condoms  Rh positive, Rhogam not indicated  Rubella immune, MMR not indicated          Discharge Instructions and Follow-Up:   Discharge diet: Regular   Discharge activity: Pelvic rest for 6 weeks including no sexual intercourse, tampons, or douching.    Discharge follow-up: Follow up with your primary OB for a routine postpartum visit in 6 weeks           Discharge Disposition:   Discharged to home in stable condition      # Discharge Pain Plan:   - Patient currently has NO  PAIN and is not being prescribed pain medications on discharge.      Pedro Aleman MD, MPH  OBGYN PGY-1  1/19/2019 8:42 AM    I, Kaylan Naik MD, personally saw and evaluated this patient.  I discussed the patient with the resident and care team, and agree with the assessment and plan of care as documented in the resident's note of 01/19/19.  I personally reviewed vital signs, medications, lab, and exam.     Key Findings:  Meeting goals for discharge.  Abdomen non-tender, fundus firm.       PLAN:  Discharge home.     Kaylan Naik MD   Date of Service (when I saw the patient) 01/19/19   '

## 2019-01-17 NOTE — PROGRESS NOTES
Anesthesia Brief Note    Patient reports improvement in length and severity of contractions following epidural placement, satisfied with pain relief.    Tim Graham MD  CA-2/PGY-3

## 2019-01-17 NOTE — PROGRESS NOTES
Vaginal Delivery Note   of viable Female with Cynthia Goddard, Yamel, and Naseem in attendance. NICU team and Nursery RN Lacie Escobar RN present.  Head delivered at 0708, shoulder dystocia diagnosed at 0709. Called for assistance and NICU at 0709. Dystocia management in progress, see MD delivery note for details on specific sequence of events and maneuvers.  APGAR at 1 minute:  3, APGAR at 5 minutes:  5, APGAR at 10 minutes:  6 and APGAR at 15 minutes:  8.  Placenta delivered manually followed by manual sweep x2. Pitocin 340 mu/min. Cytotec, and methergine given for hemostasis. 2cm left mediolateral episiotomy with repair, pacheco cares provided. EBL 1000mL per Dr. Montesinos and Dr. Goddard. Mother in stable condition, infant taken to NICU.

## 2019-01-17 NOTE — PLAN OF CARE
Data: Kamala Harris transferred to [id number] via wheelchair at 1030. Baby in NICU .  Action: Receiving unit notified of transfer: Yes. Patient and family notified of room change. Report given to RN at 1030. Belongings sent to receiving unit. Accompanied by Registered Nurse. Oriented patient to surroundings. Call light within reach. I  Response: Patient tolerated transfer and is stable after a brief visit to NICU.

## 2019-01-17 NOTE — PROGRESS NOTES
Labor Progress Note    Subjective:  Patient feeling more pressure, requesting to be checked    Objective:  Vitals:    19 1547 19 1827 19 2230 19 2330   BP: 120/75 114/60 118/67    Pulse:       Resp:     Temp: 98.4  F (36.9  C) 98.2  F (36.8  C) 98  F (36.7  C) 98.1  F (36.7  C)   TempSrc: Oral Oral Oral Oral      SVE: 4.5/100/0, continuing to leak clear fluid    FHT: Baseline 140, moderate variability, accels present, no decels  [place]: q2 min ctx    A/P:  Kamala Harris is a 19 year old  at 40w6d admitted for elective IOL.    Labor:   - Currently receiving Pitocin augmentation, currently at 20 nerissa-units/min. S/p AROM for clear fluid. GBS negative. Requesting epidural now.     FWB:   Category I, reactive. Continue EFM and toco. Cephalic BSUS. EFW 7.5#    PNC:   Rh pos, rubella immune, placenta posterior    Kristyn Goddard MD  Ob/Gyn PGY-2

## 2019-01-17 NOTE — PLAN OF CARE
Problem: Goal Outcome Summary   Data: Kamala Wheeler transferred to 71 via wheelchair at 1030.  Action: Report received at bedside. Patient and family oriented to surroundings. Call light within reach.  Response: Patient tolerated transfer and is stable.

## 2019-01-17 NOTE — ANESTHESIA PROCEDURE NOTES
Epidural Procedure Note    Staff:     Anesthesiologist:  Jadiel Mariscal DO    Resident/CRNA:  Tim Graham MD    Procedure performed by resident/CRNA in the presence of a teaching physician    Location: OB     Procedure start time:  1/17/2019 12:35 AM     Procedure end time:  1/17/2019 12:45 AM   Pre-procedure checklist:   patient identified, IV checked, site marked, risks and benefits discussed, informed consent, monitors and equipment checked, pre-op evaluation, at physician/surgeon's request and post-op pain management      Correct Patient: Yes      Correct Position: Yes      Correct Site: Yes      Correct Procedure: Yes      Correct Laterality:  Yes    Site Marked:  Yes  Procedure:     Procedure:  Epidural catheter    Position:  Sitting    Sterile Prep: chloraprep, mask, sterile gloves and patient draped      Insertion site:  L3-4    Local skin infiltration:  1% lidocaine    amount (mL):  3    Approach:  Midline    Needle gauge (G):  17    Needle Length (in):  3.5    Block Needle Type:  Touhy    Injection Technique:  LORT saline    GINGER at (cm):  5    Attempts:  1    Redirects:  0    Catheter gauge (G):  19    Catheter threaded easily: Yes      Threaded to cm at skin:  9    Threaded in epidural space (cm):  4    Paresthesias:  No    Aspiration negative for Heme or CSF: Yes

## 2019-01-17 NOTE — PROGRESS NOTES
Patient complete at 0530 and pushing.   Pitocin at 20 mu/min.  FHT's reassuring.   Expect .    Birgit Montesinos MD

## 2019-01-17 NOTE — PROGRESS NOTES
Labor Progress Note    Subjective:  Patient just got out of tub, coping well with ctx. Requesting Fentanyl to help her rest.     Objective:  Vitals:    19 1315 19 1506 19 1547 19 1827   BP: 121/56 111/56 120/75 114/60   Pulse: 120 113     Resp: 18 18 16 18   Temp: 98.1  F (36.7  C) 98.2  F (36.8  C) 98.4  F (36.9  C) 98.2  F (36.8  C)   TempSrc: Oral Oral Oral Oral      SVE: /-1, AROM performed for clear fluid    FHT: Baseline 150, moderate variability, accels present, no decels  [place]: q2-4 min ctx    A/P:  Kamala Harris is a 19 year old  at 40w6d admitted for elective IOL.    Labor:   - Currently receiving Pitocin augmentation, currently at 20 nerissa-units/min. AROM performed for clear fluid. GBS negative. Desires fentanyl for pain now.     FWB:   Category I, reactive. Continue EFM and toco. Cephalic BSUS. EFW 7.5#    PNC:   Rh pos, rubella immune, placenta posterior    Kristyn Goddard MD  Ob/Gyn PGY-2  19 10:48 PM

## 2019-01-17 NOTE — PLAN OF CARE
Epidural requested by patient. Fluid bolus started and Dr. Graham notified. Consent completed prior to procedure. Patient positioned and time out performed at 0041. Following placement, patient reports good relief.

## 2019-01-17 NOTE — L&D DELIVERY NOTE
OB Vaginal Delivery Note    Kamala Harris MRN# [id number]   Age: 19 year old YOB: 2000       GA: 41w0d  GP:   Labor Complications: None   EBL: 1000 mL  Delivery Type: Vaginal, Spontaneous   ROM to Delivery Time: 8h 26m  [place] Weight:      1 Minute 5 Minute 10 Minute   Apgar Totals: 3    5    6            Delivery Details:  Delivery Note:  Kamala Harris is a 19 year old  at 40w06d who presented to L&D for elective IOL at late term. Patient was raul spontaneously q 3 min on arrival.  Pregnancy was complicated by: h/o maternal congenital cardiac defect and depression. GBS negaitive.    Patient's SVE on admission was 3/90/-2. She was started on Pitocin followed by AROM at 2245 for labor augmentation. The patient progressed along a normal labor curve to complete and pushed for approximately 90 minutes. FHT was Cat I for the labor course. The patient was noted to have a prolonged crown to the chin. The labia were reduced around the head until delivery to the level of the neck.  No further descent occurred after head delivered, despite continued maternal pushing efforts.  Head was noted to be in the direct OA position. Head delivery was at 0708.  Head was restituted to the SABINO position. The shoulders did not deliver with downward traction. Shoulder dystocia was diagnosed with immediate call for NICU and additional nurses.  Patient was placed in Miguel position and suprapubic pressure was applied. The shoulders did not deliver with downward traction with these maneuvers. Attempt was subsequently made to grasp the posterior arm (which was the right arm), however was unable to be delivered as it was fully extended. Dr. Montesinos then attempted to deliver the posterior arm, again without success. At this time, Dr. Gusman entered the room and cut a superficial, 2 cm median episiotomy. Attempt was then made to rotate the anterior shoulder clockwise, then counterclockwise  without movement. Ultimately the anterior shoulder was rotated in a clockwise fashion approximately 90 degrees. Along with Miguel, suprapubic pressure, and downward traction after rotation, the anterior shoulder delivered followed by the posterior shoulder and the rest of the infant's body. Viable female infant delivered at 0711 on 01/17/19. Total shoulder dystocia time was 3.5 minutes. No nuchal cord noted. Cord was immediately cut and clamped and infant was handed off to awaiting NICU staff.   Apgars were 3, 5, 6, and 8 at 1, 5, 10 and 15 minutes. Cord gases were collected with arterial pH 7.23 with base excess of 5. Weight 5000g. Routine cord blood was obtained. IV pitocin was started for active management of the third stage. Attempt was made to deliver the placenta with gentle downward traction without success. Due to brisk uterine bleeding, decision was made to manually deliver the placenta. Trailing membranes were removed with gentle traction using ring forceps. Due to continued bleeding and concern for retained membranes, manual sweep x2 was performed with moderate amount of clot, but no membranes or placenta noted. Patient was given 400 mcg buccal misoprostol and 0.2 mg IM Methergine for bleeding.  Fundus was subsequently noted to be firm on fundal massage. Placenta was examined and noted to be intact with a three vessel cord. Patient sustained no perineal lacerations. The superficial episiotomy was repaired in the standard fashion using 3-0 Vicryl.   Upon final inspection, there was good hemostasis. Instrument and sharp count was correct. EBL: 1000 mL    Dr. Montesinos and Dr. Gusman were present for delivery      Physician Attestation   I was present for this vaginal delivery and repair, which was complicated by macrosomia and shoulder dystocia.  I have reviewed and edited the above note and agree with the details as described above.    Mom is stable.  Baby is in NICU and also stable, pink and moving  all extremities at 1.5 hours post delivery.     Birgit Montesinos MD   2019          Labor Event Times    Labor onset date:  19 Onset time:  10:45 PM   Dilation complete date:  19 Complete time:   5:28 AM   Start pushing date/time:  2019 0533      Labor Length    1st Stage (hrs):  6 (min):  43   2nd Stage (hrs):  1 (min):  43   3rd Stage (hrs):  0 (min):  10      Labor Events     labor?:  No   steroids:  None  Labor Type:  Induction  Predominate monitoring during 1st stage:  continuous electronic fetal monitoring     Antibiotics received during labor?:  No     Rupture date/time: 19   Rupture type:  Artificial Rupture of Membranes  Fluid color:  Clear  Fluid odor:  Normal     Induction:  Oxytocin  Induction date/time:     Cervical ripening date/time:     Indications for induction:  Post-term Gestation     1:1 continuous labor support provided by?:  none Labor partogram used?:  no      Delivery/Placenta Date and Time    Delivery Date:  19 Delivery Time:   7:11 AM   Placenta Date/Time:  2019  7:21 AM  Oxytocin given at the time of delivery:  after delivery of baby     Vaginal Counts     Initial count performed by 2 team members:   Two Team Members   CHRIS Montesinos MD       Needles Suture [place] Sponges Instruments   Initial counts 2 0 5    Added to count 0 1 0    Final counts 2 1 5    Placed during labor Accounted for at the end of labor   No NA   No NA   No NA    Final count performed by 2 team members:   Two Team Members   CHRIS Montesinos MD      Final count correct?:  Yes     Apgars    Living status:  Living   1 Minute 5 Minute 10 Minute 15 Minute 20 Minute   Skin color: 0  0  1  1     Heart rate: 2  2  2  2     Reflex irritability: 0  2  2  2     Muscle tone: 0  0  0  1     Respiratory effort: 1  1  1  2     Total: 3  5  6  8     Apgars assigned by:  ASYA HEBERT NNP-BC     [place] Resuscitation    Methods:  [place] Puff, Oxygen,  NCPAP, Oximetry  [place] Care at Delivery:  Called STAT to this vaginal delivery for shoulder dystocia on this term infant. Infant was delivered without tone or grimace. Umbilical cord was clamped and cut immediately. Infant was placed on pre-warmed warmer, dried and stimulated. Mask PPV (PIP 25, PEEP 5, FiO2 30%) started immediately. Infant with intermittent respiratory effort by 1 minute of age. Continued PPV until 2 minutes of age and transitioned to mask CPAP +5. FiO2 incrementally decreased to 21%. Mask CPAP continued until 10 minutes of age. Infant continued to have hypotonia. Continued to observe infant on room air. Tachypnea noted with intermittent mild retractions. Decision to transfer infant to the NICU due to decreased perfusion and tone. Parents updated. Infant briefly placed in mother's arms. Infant transferred to the NICU for critical care management. Gross physical exam notable for LGA infant and bruising throughout face and torso.     ASYA See, NNP-BC 2019 8:13 AM           Measurements    Weight:  11 lb 0.4 oz       Labor Events and Shoulder Dystocia    Fetal Tracing Prior to Delivery:  Category 1  Shoulder dystocia present?:  Pos  Anterior shoulder:  left Time body delivered:  0711   Time head delivered:  0708 Help called by:  Amelia   Time recognized:  2019 0708    Time help called:  2019 0708    NICU arrived:  2019 0710    Additional staff arrived:  2019 0709    Gentle attempt at traction, assisted by maternal expulsive forces?:  Yes       First Maneuver:  Miguel maneuver, Suprapubic pressure    Time performed:  2019 0708    Performed by:  Kristyn Goddard       Second Maneuver:    Second maneuver:  Delivery of the posterior arm  Time performed:  2019 0709    Performed by:  Birgit Harris       Third Maneuver  Third maneuver:  Galindo maneuver   Time performed:  2019 0710    Performed by:  Kelley Lilly  (Maternal) (Provider to Complete) ([id number])    Episiotomy:  Median  Indications for episiotomy:  Shoulder Dystocia  Perineal lacerations:  None    Vaginal laceration?:  No    Cervical laceration?:  No       Blood Loss  Mother: Kamala Harris #[id number]   Start of Mother's Information    IO Blood Loss  01/16/19 2245 - 01/17/19 0820    None           End of Mother's Information  Mother: Kamala Harris #[id number]         Delivery - Provider to Complete ([id number])    Delivering clinician:  Birgit Montesinos MD  Attempted Delivery Types (Choose all that apply):  Spontaneous Vaginal Delivery  Delivery Type (Choose the 1 that will go to the Birth History):  Vaginal, Spontaneous   Other personnel:   Provider Role   Kristyn Goddard MD Resident         Placenta    Immediate Cord Clamping:  Done  Date/Time:  1/17/2019  7:21 AM  Removal:  Manual Removal  Disposition:  Hospital disposal     Anesthesia    Method:  Epidural  Cervical dilation at placement:  4-7          Presentation and Position    Presentation:  Vertex  Position:  Right Occiput Anterior           Kristyn Goddard MD

## 2019-01-17 NOTE — PROVIDER NOTIFICATION
01/17/19 0240   Provider Notification   Provider Name/Title Dr. Goddard   Method of Notification At Bedside   Dr. Goddard at bedside for SVE and to evaluate FHT variability. 5.5/100/+1. Upon repositioning, variability moderate and accelerations present. Per Dr. Goddard, ok to increase pitocin if contractions continue to space out.

## 2019-01-17 NOTE — PROVIDER NOTIFICATION
01/17/19 0215   Provider Notification   Provider Name/Title Dr. Goddard   Method of Notification Phone   Dr. Goddard called to discuss uterine tachysystole. Bolus given and repositioned. Orders received to decrease pitocin to 16.

## 2019-01-18 LAB — HGB BLD-MCNC: 7.6 G/DL (ref 11.7–15.7)

## 2019-01-18 PROCEDURE — 25000132 ZZH RX MED GY IP 250 OP 250 PS 637: Performed by: STUDENT IN AN ORGANIZED HEALTH CARE EDUCATION/TRAINING PROGRAM

## 2019-01-18 PROCEDURE — 36415 COLL VENOUS BLD VENIPUNCTURE: CPT | Performed by: STUDENT IN AN ORGANIZED HEALTH CARE EDUCATION/TRAINING PROGRAM

## 2019-01-18 PROCEDURE — 85018 HEMOGLOBIN: CPT | Performed by: STUDENT IN AN ORGANIZED HEALTH CARE EDUCATION/TRAINING PROGRAM

## 2019-01-18 PROCEDURE — 12000001 ZZH R&B MED SURG/OB UMMC

## 2019-01-18 RX ADMIN — SENNOSIDES AND DOCUSATE SODIUM 2 TABLET: 8.6; 5 TABLET ORAL at 20:53

## 2019-01-18 RX ADMIN — RANITIDINE 150 MG: 150 TABLET ORAL at 20:53

## 2019-01-18 RX ADMIN — IBUPROFEN 800 MG: 800 TABLET, FILM COATED ORAL at 10:48

## 2019-01-18 RX ADMIN — IBUPROFEN 800 MG: 800 TABLET, FILM COATED ORAL at 20:53

## 2019-01-18 RX ADMIN — RANITIDINE 150 MG: 150 TABLET ORAL at 10:48

## 2019-01-18 RX ADMIN — SENNOSIDES AND DOCUSATE SODIUM 1 TABLET: 8.6; 5 TABLET ORAL at 10:48

## 2019-01-18 NOTE — PLAN OF CARE
Pt stable this shift with postpartum checks. Pt is breastfeeding with full assist and encouragement to continue the feeding. Pt was very tired tonight and after latch was iiated she would become somewhat distracted and need redirection. Baby was requiring assistance to stat awake for feedings. After baby was assisted with latch Baby would sustain latch. Pt did have a good amount of colostrum that was great in bringing baby's glucoses up. Pt is having good pain relief and tokk ibuprofen only once during night. Will continue to monitor for changes and assist as needed.

## 2019-01-18 NOTE — PROVIDER NOTIFICATION
Notified Dr Goddard that Pt wanted her Zantac reordered.     01/17/19 8355   Provider Notification   Provider Name/Title Dr Goddard   Method of Notification Electronic Page   Request Evaluate-Remote   Notification Reason Medication Request  (pt wanted her zantac reordered.)

## 2019-01-18 NOTE — PROGRESS NOTES
Post Partum Progress Note  PPD#1    Subjective:  She is resting comfortably in bed this morning.  No complaints. Pain is minimal in her perineum. She is tolerating PO intake. Lochia present and about the same as menses.  She is voiding without difficulty. She has passed flatus and has not had a BM. She is ambulating without dizziness or difficulty though only short distances.  She denies headache, changes in vision, nausea/vomiting, chest pain, shortness of breath, RUQ pain, or worsening edema.  Breastfeeding is going well. She is planning to use condoms for contraception postpartum.     Objective:  Vitals:    19 0900 19 1000 19 1100 19 1545   BP: 138/71  120/88 133/80   Pulse:   77 66   Resp:   16 16   Temp:  100.6  F (38.1  C) 99.1  F (37.3  C) 98.5  F (36.9  C)   TempSrc:  Oral Oral Oral   SpO2: 99%          General: NAD, resting comfortably  CV: Regular rate, well perfused.   Pulm: Normal respiratory effort.  Abd: Soft, non-tender, non-distended. Fundus is firm and at the umbilicus.    Ext: 1+ lower extremity edema bilaterally. No calf tenderness.    Assessment/Plan:  Kamala Harris is a 19 year old  female who is PPD#1 s/p  complicated by 3.5minute shoulder dystocia.    - PNC: Rh positive. Rubella immune. No intervention indicated.  - Pain: controlled on oral medications  - Heme: Hgb 9.3>EBL 1000ml>AM Hgb pending.  Patient has acute blood loss anemia, asymptomatic . Will discharge home with iron.  - GI: continue anti-emetics and stool softeners as needed.  - : Voiding spontaneously.  - Mood: History of depression, not on meds. Mood is stable.  - Infant: Stable in mom's room  - Feeding: Plans on breastfeeding.  - BC: Plans on condoms    Discharge to home on PPD#2    Marielle Mann MD  PGY-1  Ob/Gyn    Patient seen and examined by me, agree with above resident note. Overall doing ok.  Still struggling a little with breastfeeding, baby falling asleep easily and hard  to get to latch for long periods.  Discussed.  Plan to work with LC today.  Cont routine cares.  discharge later today or in am    TANNER LUNA MD

## 2019-01-18 NOTE — PLAN OF CARE
VSS.Afebrile. Up ad francisco j. Voiding and had a BM this morning. C/o some pain and medicated with relief. Needs assistance in breast feeding and is reluctant to at times, wanting to hand express and finger feed baby instead. Baby is hard to console at times and will go from calm to several minutes of crying. Latching can be hard during these times. Also baby does bite down often until she gets a smooth rhythm and pt states this is painful. Will continue to help with breast feeding. Attentive to baby's needs. Lots of family/friends visiting. FOB present as well. Will continue to monitor.

## 2019-01-18 NOTE — PLAN OF CARE
VSS and assessments WDL. Pain well managed with oral meds. Up ad francisco j, voiding freely. Independent with self and infant cares. Pumping every 2-3 hours, supplementing infant with 7-10 ml of breastmilk after feedings. No concerns, continue with current POC.

## 2019-01-18 NOTE — LACTATION NOTE
This note was copied from a baby's chart.  Consult for LGA infant, help with latch and positioning. Vaginal delivery @ 41 weeks with 1000 mL EBL and shoulder dystocia, infant to NICU for initial observation, then transfer back to [facility].     Maternal history of ADHD, depression (no meds currently), intermittent asthma. Kamala reports bilateral breast growth during pregnancy (had to get new bras) and breasts leaking milk for weeks.     Breast exam soft, symmetrical, areola larger but easily compress, nipples intact and everted bilaterally.   Oral exam of infant: normal arch and very strong suck. Initially she kept tongue behind gumline, disorganized attempts to suck, all biting. Unable to visualize under tongue but palpate limited length beyond lingual frenulum & sides of tongue lift slightly with cry, rest stays flat against bottom of mouth. Kamala reports feeling mostly gumline, biting when Amelia is sucking at breast. MD arrived at end of LC visit, discussed signs consistent with ankyloglossia & will continue to monitor.    Infant quick to cry, takes time to console. Moving both arms normally, bruising present along face and torso, crying less if keep hands off of head while latching but still fussy. Kamala reports most success in football hold on right side, encourage try cross cradle or laid back on left for similar positioning. Took several attempts and comforting but did achieve sustained feeding for about 7 minutes on left and about 5 on right, laid back worked on both sides. Assisted Kamala to pump afterwards, show hands on pumping and she elicit about 4 mL which grandma spoon fed to Amelia.     Education provided about anatomy of breast and infant mouth, importance of and ways to get and maintain deep latch, positioning and using pillows/blankets for support, feeding on cue, supply and demand, benefits of skin to skin. Reviewed feeding log, how to tell if getting enough, breastfeeding resource list and  add in [employer] (encourage this in [facility]!) and [employer]. Gave support and encouragement, answered questions.       Plan: feed on cue, no longer than 3 hours between feedings (LGA) with goal of 8 to 12 times in 24 hours. Hand express after each feeding and pump as able, at least 4-6 times per day until supply established. Kamala has PHN from [facility] that comes to her house for continued support postpartum.

## 2019-01-19 VITALS
DIASTOLIC BLOOD PRESSURE: 63 MMHG | OXYGEN SATURATION: 99 % | TEMPERATURE: 98.3 F | SYSTOLIC BLOOD PRESSURE: 121 MMHG | HEART RATE: 97 BPM | RESPIRATION RATE: 16 BRPM

## 2019-01-19 PROCEDURE — 25000132 ZZH RX MED GY IP 250 OP 250 PS 637: Performed by: STUDENT IN AN ORGANIZED HEALTH CARE EDUCATION/TRAINING PROGRAM

## 2019-01-19 RX ORDER — SENNA AND DOCUSATE SODIUM 50; 8.6 MG/1; MG/1
1 TABLET, FILM COATED ORAL AT BEDTIME
Qty: 30 TABLET | Refills: 0 | Status: SHIPPED | OUTPATIENT
Start: 2019-01-19 | End: 2019-03-05

## 2019-01-19 RX ORDER — OMEGA-3 FATTY ACIDS/FISH OIL 300-1000MG
200-600 CAPSULE ORAL EVERY 4 HOURS PRN
Qty: 50 CAPSULE | Refills: 0 | Status: SHIPPED | OUTPATIENT
Start: 2019-01-19 | End: 2019-03-05

## 2019-01-19 RX ORDER — ACETAMINOPHEN 325 MG/1
650 TABLET ORAL EVERY 4 HOURS PRN
Qty: 100 TABLET | Refills: 0 | Status: SHIPPED | OUTPATIENT
Start: 2019-01-19 | End: 2019-02-18

## 2019-01-19 RX ADMIN — IBUPROFEN 800 MG: 800 TABLET, FILM COATED ORAL at 10:41

## 2019-01-19 RX ADMIN — SENNOSIDES AND DOCUSATE SODIUM 1 TABLET: 8.6; 5 TABLET ORAL at 10:40

## 2019-01-19 RX ADMIN — IBUPROFEN 800 MG: 800 TABLET, FILM COATED ORAL at 03:04

## 2019-01-19 RX ADMIN — RANITIDINE 150 MG: 150 TABLET ORAL at 10:41

## 2019-01-19 NOTE — PLAN OF CARE
VSS. Afebrile. Up ad francisco j. Voiding and had BM yesterday. Breast feeding independently today. Pumping and hand expressing a good amount and supplementing for baby. C/o minimal pain and medicated with relief. Attentive to baby's needs. FOB here and supportive. Discharge instructions and meds reviewed and given. Breast pump given as well. Abdominal binder on. Pt discharged but will board with baby as baby has high bili and will need to stay another day.

## 2019-01-19 NOTE — PROGRESS NOTES
Post Partum Progress Note  PPD#2    Subjective:  She is resting comfortably in bed this morning.  No complaints. She is tolerating PO intake. Lochia present and about the same as menses.  She is voiding without difficulty. She has passed flatus and has had a BM. She is ambulating without dizziness or difficulty though only short distances.  She denies headache, changes in vision, nausea/vomiting, chest pain, shortness of breath, RUQ pain, or worsening edema.  Breastfeeding is going well. She is planning to use condoms for contraception postpartum.     Objective:  Vitals:    19 0928 19 1500 19 2300 19 0755   BP: 101/62 116/61 105/62 121/63   Pulse: 84 94 77 97   Resp: 16 16 17 16   Temp: 98  F (36.7  C) 98.2  F (36.8  C) 98.2  F (36.8  C) 98.3  F (36.8  C)   TempSrc: Oral Oral Oral Oral   SpO2:           General: NAD, resting comfortably  CV: Regular rate, well perfused.   Pulm: Normal respiratory effort.  Abd: Soft, non-tender, non-distended. Fundus is firm and at the umbilicus.    Ext: 1+ lower extremity edema bilaterally. No calf tenderness.    Assessment/Plan:  Kamala Harris is a 19 year old  female who is PPD#2 s/p  complicated by 3.5 minute shoulder dystocia.    - PNC: Rh positive. Rubella immune. No intervention indicated.  - Pain: controlled on oral medications  - Heme: Hgb 9.3>EBL 1000ml (pit, miso, meth, sweep w/Ancef)>7.6.  Patient has acute blood loss anemia, asymptomatic . Will discharge home with iron.  - GI: continue anti-emetics and stool softeners as needed.  - : Voiding spontaneously.  - Mood: History of depression, not on meds. Mood is stable.  - Infant: Stable in mom's room  - Feeding: Plans on breastfeeding.  - BC: Plans on condoms    Discharge to home on PPD#2    Pedro Aleman MD, MPH  OBGYN PGY-1  2019 6:36 AM    I, Kaylan Naik MD, personally saw and evaluated this patient.  I discussed the patient with the resident and care team, and agree with  the assessment and plan of care as documented in the resident's note of 01/19/19.  I personally reviewed vital signs, medications, lab, and exam.     Key Findings:  Meeting goals for discharge.  Abdomen non-tender, fundus firm.  Hgb 7.6.      PLAN:  Discharge home.  Continue PNVs, high iron foods.      Kaylan Naik MD   Date of Service (when I saw the patient) 01/19/19   '

## 2019-01-19 NOTE — LACTATION NOTE
This note was copied from a baby's chart.  Follow up visit, mom to discharge today but baby waiting for bili recheck at 1600 to decide whether home today or not. Bilirubin in high intermediate risk zone for 3 checks between 39 and 50 hours. 11 pounds at birth, weight loss 4.2% on first day, 6.8% at 53 hours of age, now 10# 4.4 oz. Wet diapers beyond expected for age, no stool on second day of life but several on first and already two this morning, improving overall.  Per mom and RN, infant breastfeeding well. Mom reports no pain with latch but sometimes if she goes a long time it starts to get a little sore.     Encouraged using both pillows and rolled blankets for support under Kamala's arms to help keep Amelia from sliding off as she eats, also to use chair more than bed to practice for home and may get better arm support there to help hold her up. Remind mom that if pain despite good latch, if any nipple damage or Amelia losing weight more than expected to have her frenulum evaluated again for ankyloglossia, possible need for intervention.     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 Talked about medication history, Kamala reports she has not taken ADHD meds in awhile and doesn't plan to going forward, at least not while breastfeeding. Encouraged her to ask provider if she did want to start taking them, to discuss options considered safe while breastfeeding.     Due to bili levels and LGA infant, encouraged Kamala to continue pumping after most feedings and hand express at minimum when she can't pump, keep giving Amelia all she expresses. Reinforce evidence of success with diapers and weight loss overall WNL, infant more satisfied between. Family plans follow up at [facility], will use [place] or [facility] options for  LC as needed.

## 2019-01-19 NOTE — PLAN OF CARE
Data: Vital signs and postpartum checks WDL  Patient eating and drinking normally. Patient able to empty bladder independently and is up ambulating.  Patient performing self cares and is able to care for infant. Breastfeeding on demand.  Action: Patient medicated during the shift for pain with  Ibuprofen with relief after 1 hour. Patient education done see education record.  Response: Positive attachment behaviors observed with infant. Support persons  present.    Plan: Continue with the plan of care

## 2019-01-19 NOTE — PROVIDER NOTIFICATION
HGB 7.6 at noon from 9.3. Pt denies dizziness and about to ambulate. Dr. Lutz text paged and made aware. Called back, no orders at this time.

## 2019-01-20 ENCOUNTER — DOCUMENTATION ONLY (OUTPATIENT)
Dept: CARE COORDINATION | Facility: CLINIC | Age: 19
End: 2019-01-20

## 2019-01-20 NOTE — PROGRESS NOTES
[facility] will be sharing updates with you on Maternal Child Health Referral requests for home care services.  This is for care coordination purposes and alert you to referral status.  We received the referral for  Kamala Harris; MRN [id number] and want to update you:    [facility] is unable to see patient for postpartum/  assessment and education due to patient insurance not contracted with [facility] for this service.  BCBS OUT STATE.  Patient advised to contact their insurance provider to determine if service is covered through another homecare agency.  Offered option of private pay nurse assessment and education for mom or baby at service rate of 150.00 per visit or 180.00 for both.  Provided call back information if private pay visit is requested.  LEFT VOICEMAIL AND TEXT.    Sincerely JESSICA Peña  [phone]

## 2019-02-06 DIAGNOSIS — R12 HEARTBURN: ICD-10-CM

## 2019-02-06 NOTE — TELEPHONE ENCOUNTER
Pending Prescriptions:                       Disp   Refills    ranitidine (ZANTAC) 150 MG tablet         60 tab*1            Sig: Take 1 tablet (150 mg) by mouth 2 times daily    Rx sent.  Shwetha Yo

## 2019-03-05 ENCOUNTER — PRENATAL OFFICE VISIT (OUTPATIENT)
Dept: OBGYN | Facility: CLINIC | Age: 19
End: 2019-03-05
Payer: COMMERCIAL

## 2019-03-05 VITALS
TEMPERATURE: 98.4 F | SYSTOLIC BLOOD PRESSURE: 110 MMHG | DIASTOLIC BLOOD PRESSURE: 69 MMHG | OXYGEN SATURATION: 98 % | HEART RATE: 97 BPM | BODY MASS INDEX: 27.89 KG/M2 | WEIGHT: 170.2 LBS

## 2019-03-05 DIAGNOSIS — Z12.4 SCREENING FOR CERVICAL CANCER: ICD-10-CM

## 2019-03-05 DIAGNOSIS — Z30.017 NEXPLANON INSERTION: ICD-10-CM

## 2019-03-05 PROBLEM — Z34.03 ENCOUNTER FOR SUPERVISION OF NORMAL FIRST PREGNANCY, THIRD TRIMESTER: Status: RESOLVED | Noted: 2018-07-17 | Resolved: 2019-03-05

## 2019-03-05 PROBLEM — Z23 NEED FOR TDAP VACCINATION: Status: RESOLVED | Noted: 2018-12-04 | Resolved: 2019-03-05

## 2019-03-05 PROBLEM — Z34.90 PREGNANCY: Status: RESOLVED | Noted: 2019-01-16 | Resolved: 2019-03-05

## 2019-03-05 LAB — HCG UR QL: NEGATIVE

## 2019-03-05 PROCEDURE — 11981 INSERTION DRUG DLVR IMPLANT: CPT | Performed by: OBSTETRICS & GYNECOLOGY

## 2019-03-05 PROCEDURE — G0145 SCR C/V CYTO,THINLAYER,RESCR: HCPCS | Performed by: OBSTETRICS & GYNECOLOGY

## 2019-03-05 PROCEDURE — 81025 URINE PREGNANCY TEST: CPT | Performed by: OBSTETRICS & GYNECOLOGY

## 2019-03-05 PROCEDURE — 99207 ZZC POST PARTUM EXAM: CPT | Performed by: OBSTETRICS & GYNECOLOGY

## 2019-03-05 ASSESSMENT — MIFFLIN-ST. JEOR: SCORE: 1545.83

## 2019-03-05 ASSESSMENT — PATIENT HEALTH QUESTIONNAIRE - PHQ9: SUM OF ALL RESPONSES TO PHQ QUESTIONS 1-9: 8

## 2019-03-05 NOTE — PATIENT INSTRUCTIONS
What Nexplanon Users May Expect    For appropiate patients, Nexplanon is well tolerated and has a low early-removal rate.    Insertion site complications, such as prolonged pain or infection, are rare. Removal is occasionally difficult, and rarely requires a surgical procedure in the operating room.    Menstrual changes are common with Nexplanon. Bleeding may become more or less frequent or heavy, or absent. The bleeding pattern after the first three months is predictive of future bleeding, but the pattern may change at any time. Average bleeding is 18 days over 3 months. Over 50% of women experience rare over absent bleeding over the two year period, while 25% experience frequent or prolonged bleeding.    In clinical studies, users gained 3.7 pounds over two years. It is unknown what portion of this weight gain is related to Nexplanon    Women with a history of depressed mood may have worsening on Nexplanon, and may need to have the device removed.    Return to baseline ovulation patterns is seen 7-14 days after removal of Nexplanon.    Rarely, headaches and acne have also let to device removal.    Nexplanon may be less effective in women weight more than 130% of their ideal body weight.    Nexplanon does not protect against HIV or STDs.    Please call [facility] at [phone] if you have questions or concerns.    For more complete information:  [email or web address]

## 2019-03-05 NOTE — PROGRESS NOTES
"Chief Complaint   Patient presents with     Postpartum Care       Initial /69 (BP Location: Right arm, Patient Position: Sitting, Cuff Size: Adult Regular)   Pulse 97   Temp 98.4  F (36.9  C) (Oral)   Ht (P) 1.664 m (5' 5.5\")   Wt 77.2 kg (170 lb 3.2 oz)   LMP 2018   SpO2 98%   Breastfeeding? Yes   BMI (P) 27.89 kg/m   Estimated body mass index is 27.89 kg/m  (pended) as calculated from the following:    Height as of this encounter: (P) 1.664 m (5' 5.5\").    Weight as of this encounter: 77.2 kg (170 lb 3.2 oz).  BP completed using cuff size: large    Questioned patient about current smoking habits.  Pt. has never smoked.          The following HM Due: pap smear      The following patient reported/Care Every where data was sent to:  P ABSTRACT QUALITY INITIATIVES [41874]  n/a      patient has appointment for today and orders have been placed        NEXPLANON   LOT: [id number]   EXP: 2021   NDC: 3848-6659-61    SUBJECTIVE:  Kamala Harris is a 19 year old female  here for a postpartum visit.  She had a  on 19  delivering a healthy baby girl weighing 11 lbs 4 oz at term.    Here with baby, FOB, mom and sister    delivery complications:  Yes, shoulder dystocia, no long term sequelae   breast feeding:  Yes, exclusively  bladder problems:  No  bowel problems/hemorrhoids:  No  episiotomy/laceration/incision healed? Yes  vaginal flow:  None  [place]:  No  contraception:  nexplanon  emotional adjustment:  doing well, happy and tired  back to work:  No       OBJECTIVE:  Blood pressure 110/69, pulse 97, temperature 98.4  F (36.9  C), temperature source Oral, height (P) 1.664 m (5' 5.5\"), weight 77.2 kg (170 lb 3.2 oz), last menstrual period 2018, SpO2 98 %, currently breastfeeding.   General - pleasant female in no acute distress.  Breast - no nodularity, asymmetry or nipple discharge bilaterally.  Abdomen - soft, nontender, nondistended, no hepatosplenomegaly.  Pelvic " - EG: normal adult female, BUS: within normal limits, Vagina: well rugated, no discharge, Cervix: no lesions or CMT, Uterus: firm, normal sized and nontender, Adnexae: no masses or tenderness.  Rectovaginal - deferred.    ASSESSMENT:  normal postpartum exam  Encounter Diagnoses   Name Primary?     Routine postpartum follow-up Yes     Screening for cervical cancer      Nexplanon insertion         PLAN:  Discussed normal exam today   May resume normal activities without restrictions  Pap smear was  done today  Reviewed normal expectations for breast feeding and seconday amenorrhea.    The patient will use nexplanon for birth control. Full counseling was provided, and all questions answered.  Return to clinic in one year for an annual, when due for a pap smear or PRN.      CC: Consultation for contraceptive advice and management     Currently considering nexplanon for reliable non estrogen reversible contraception.     Currently  and just delivered first baby.   Patient's last menstrual period was Patient's last menstrual period was 03/27/2018.     right  is her dominant hand         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      Sprain of right wrist     right side muliple times      Uncomplicated asthma     exercise induced        Past Surgical History:   Procedure Laterality Date     CARDIAC CATHERIZATION  2 years    Told by cardiology that tiny defect would close on own, no FU     CARDIAC SURGERY       CREATE EARDRUM OPENING,LOCAL ANESTH  4 years    PETs       Social History     Tobacco Use     Smoking status: Never Smoker     Smokeless tobacco: Never Used   Substance Use Topics     Alcohol use: No     Drug use: No       Current Outpatient Medications   Medication Sig Dispense Refill     Cholecalciferol (VITAMIN D) 2000 units tablet Take 2,000 Units by mouth daily 100 tablet 3     Ferrous Sulfate (IRON  "SUPPLEMENT PO) Take 28 mg by mouth        ibuprofen (ADVIL/MOTRIN) 200 MG capsule Take 1-3 capsules (200-600 mg) by mouth every 4 hours as needed for fever 50 capsule 0     Prenatal Vit-Fe Fumarate-FA (PRENATAL MULTIVITAMIN PLUS IRON) 27-0.8 MG TABS per tablet Take 1 tablet by mouth daily       sertraline (ZOLOFT) 25 MG tablet Take 25 mg by mouth daily.          Allergies   Allergen Reactions     Iodine      Rash itching             EXAM:    /69 (BP Location: Right arm, Patient Position: Sitting, Cuff Size: Adult Regular)   Pulse 97   Temp 98.4  F (36.9  C) (Oral)   Ht (P) 1.664 m (5' 5.5\")   Wt 77.2 kg (170 lb 3.2 oz)   LMP 03/27/2018   SpO2 98%   Breastfeeding? Yes   BMI (P) 27.89 kg/m      General - pleasant female in no acute distress.  Neurological - alert and oriented X 3  Psychiatric - normal mood and affect  Right inner arm normal.    ASSESSMENT:  Contraceptive advice    PLAN:   Desires nexplanon.  we spent over 10 min of today's visit discussing contraceptive options and specific R/B/SE of nexplanon.  We specifically reviewed the risk of bleeding.   A complete discussion of the risks and benefits of nexplanon use and the details of the insertion procedure was held with the patient. All questions were answered. A consent form was signed.           PROCEDURE:    Preprocedure medications: 1% plain lidocaine, 4 ml  Nexplanon Lot # see nursing notes  The patient was placed in the supine position with her left (non-dominant) arm flexed at the elbow, externally rotated, and placed with her wrist parallel to her ear.  The insertion site was marked with a sterile marker. The area was cleaned with alcohol swabs and anesthetized with 4 ml of 1% lidocaine without epinephrine along the planned insertion tunnel. Betadine swabs were used to prep the area of insertion.  The Nexplanon applicator was removed from its blister. The needle shield was removed, maintaining the applicator upright. The white implant " was visualized in the needle tip. Counter-traction was applied to the skin at the needle insertion site.  The tip of the needle was inserted at the site, beveled side up, at a 30-degree angle. The applicator was then lowered to a horizontal position. While lifting the skin with the tip of the needle, the needle was inserted to its full length. The slider was unlocked and moved fully back to the stop.   The 4 cm lise was palpated under the skin. The patient also palpated the lise.  Steri strips were placed    A bandage was applied with sterile gauze. The patient was instructed to remove the bangage in 24 hours and replace with a band-aid.  The patient tolerated the procedures well and there were no complications.     The user card was filled out and given to the patient to keep.  The Patient Chart Label was completed and sent for scanning.       A/P:  Contraception management   successful nexplanon insertion   We discussed signs/symptoms of infection and when to call.  We again reviewed potential side effects including irregular bleeding.  She is to call with any questions.  Otherwise, RTC prn.      FOLLOW-UP:  She was asked to follow up for any problems.   The patient was asked to contact the clinic for any fever/chills/insertion site pain or heavy bleeding.      Birgit Montesinos MD

## 2019-03-08 LAB
COPATH REPORT: NORMAL
PAP: NORMAL

## 2019-04-01 ENCOUNTER — TELEPHONE (OUTPATIENT)
Dept: OBGYN | Facility: CLINIC | Age: 19
End: 2019-04-01

## 2019-04-01 NOTE — TELEPHONE ENCOUNTER
This could be her first period, even though she is still breast feeding. If it has only been a few days, she should wait it out and see if it stops after a week.  We can't do regular oral contraceptive pills if she is breast feeding.  Would be limited to oral progesterone.    So pls find out how long she has been bleeding.   Thanks.

## 2019-04-01 NOTE — TELEPHONE ENCOUNTER
Patient calling regarding vaginal bleeding on Nexplanon. Having to change pad every 3-4 hours - not soaking. Is breast feeding. Having some cramping. Did discuss abnormal bleeding can happen after Nexplanon insertion. Patient stated that bleeding is bothersome to her, especially at work. Would it be okay for her to so an OCP in addition to Nexplanon to help with bleeding? Pharmacy teed up. Please send something if able.   Shwetha Yo

## 2019-04-02 NOTE — TELEPHONE ENCOUNTER
Patient stated that she had bleeding since 3/18 - and consistently been changing 3-4 pads per day since then. Had not gotten better since yesterday. Please advise.  Shwetha Yo

## 2019-04-08 NOTE — TELEPHONE ENCOUNTER
I have not had a chance to call her. Please call her back and if she is still bleeding then will need to make an appt. Might need to come to RD to get in sooner. RR

## 2019-04-08 NOTE — TELEPHONE ENCOUNTER
Pt called back. Pt is not bleeding any more. Pt stopped bleeding 4/5, but bled for 19 days continuous.  Discussed with patient to schedule appt if bleeding does start again. Pt stated understanding, but wanted to know if she needed to do any f/u in the meantime. Routing to RR. Please advise. Thanks.   Carli Morales, RN-BSN

## 2019-04-12 NOTE — TELEPHONE ENCOUNTER
Patient stopped by clinic on 4/9 to get an appt that day but schedule was overbooked.  She made an appt for May.  She is no longer bleeding. RR

## 2019-05-14 ENCOUNTER — OFFICE VISIT (OUTPATIENT)
Dept: OBGYN | Facility: CLINIC | Age: 19
End: 2019-05-14
Payer: COMMERCIAL

## 2019-05-14 VITALS
DIASTOLIC BLOOD PRESSURE: 64 MMHG | SYSTOLIC BLOOD PRESSURE: 113 MMHG | HEART RATE: 96 BPM | TEMPERATURE: 98.3 F | HEIGHT: 66 IN | WEIGHT: 176 LBS | OXYGEN SATURATION: 97 % | BODY MASS INDEX: 28.28 KG/M2

## 2019-05-14 DIAGNOSIS — Z97.5 BREAKTHROUGH BLEEDING ON NEXPLANON: Primary | ICD-10-CM

## 2019-05-14 DIAGNOSIS — N92.1 BREAKTHROUGH BLEEDING ON NEXPLANON: Primary | ICD-10-CM

## 2019-05-14 PROCEDURE — 99213 OFFICE O/P EST LOW 20 MIN: CPT | Performed by: OBSTETRICS & GYNECOLOGY

## 2019-05-14 ASSESSMENT — MIFFLIN-ST. JEOR: SCORE: 1572.14

## 2019-07-24 ENCOUNTER — OFFICE VISIT (OUTPATIENT)
Dept: FAMILY MEDICINE | Facility: CLINIC | Age: 19
End: 2019-07-24
Payer: COMMERCIAL

## 2019-07-24 VITALS
RESPIRATION RATE: 22 BRPM | SYSTOLIC BLOOD PRESSURE: 110 MMHG | TEMPERATURE: 98.5 F | BODY MASS INDEX: 30.28 KG/M2 | DIASTOLIC BLOOD PRESSURE: 68 MMHG | OXYGEN SATURATION: 99 % | WEIGHT: 184.8 LBS | HEART RATE: 98 BPM

## 2019-07-24 DIAGNOSIS — F90.9 ATTENTION DEFICIT HYPERACTIVITY DISORDER (ADHD), UNSPECIFIED ADHD TYPE: Primary | ICD-10-CM

## 2019-07-24 PROCEDURE — 99202 OFFICE O/P NEW SF 15 MIN: CPT | Performed by: FAMILY MEDICINE

## 2019-07-24 PROCEDURE — 93000 ELECTROCARDIOGRAM COMPLETE: CPT | Performed by: FAMILY MEDICINE

## 2019-07-24 RX ORDER — HYDROXYZINE HYDROCHLORIDE 10 MG/1
10 TABLET, FILM COATED ORAL
COMMUNITY
End: 2022-01-19

## 2019-07-24 RX ORDER — ESCITALOPRAM OXALATE 10 MG/1
10 TABLET ORAL DAILY
COMMUNITY
End: 2020-05-12

## 2019-07-24 ASSESSMENT — PAIN SCALES - GENERAL: PAINLEVEL: NO PAIN (0)

## 2019-07-24 NOTE — PROGRESS NOTES
Subjective     Kamala Harris is a 19 year old female who presents to clinic today for the following health issues:    HPI   Patient was referred to have EKG done before starting a medication.  Patient goes to [facility] and is considering starting stimulants for ADHD.  She was previously established at this clinic, but has not been seen by a PCP here since  so required an OV for the EKG to be done.      Patient Active Problem List   Diagnosis     Presbyopia     ADHD (attention deficit hyperactivity disorder)     Other ankle sprain and strain     Health Care Home     Mild major depression (H)     Intermittent asthma     Maternal congenital heart disease in second trimester, antepartum      (normal spontaneous vaginal delivery)     Childhood hyperkinetic syndrome     Past Surgical History:   Procedure Laterality Date     CARDIAC CATHERIZATION  2 years    Told by cardiology that tiny defect would close on own, no FU     CARDIAC SURGERY       CREATE EARDRUM OPENING,LOCAL ANESTH  2 years    PETs       Social History     Tobacco Use     Smoking status: Never Smoker     Smokeless tobacco: Never Used   Substance Use Topics     Alcohol use: No     Family History   Problem Relation Age of Onset     Cerebrovascular Disease Maternal Grandfather      Myocardial Infarction Paternal Grandmother      Myocardial Infarction Paternal Grandfather            Reviewed and updated as needed this visit by Provider         Review of Systems   ROS COMP: Constitutional, HEENT, cardiovascular, pulmonary, gi and gu systems are negative, except as otherwise noted.      Objective    /68 (BP Location: Right arm, Patient Position: Chair, Cuff Size: Adult Regular)   Pulse 98   Temp 98.5  F (36.9  C) (Oral)   Resp 22   Wt 83.8 kg (184 lb 12.8 oz)   SpO2 99%   BMI 30.28 kg/m    Body mass index is 30.28 kg/m .  Physical Exam   GENERAL: healthy, alert and no distress    Diagnostic Test Results:  EKG - unremarkable         Assessment & Plan     1. Attention deficit hyperactivity disorder (ADHD), unspecified ADHD type  - EKG WNL.  Copy sent home with pt to give to her psychiatrist   - OK for RN visit for EKG in the future if ordered by her psychiatrist  - EKG 12-lead complete w/read - Clinics       Radha Mcleod,   [facility]

## 2019-10-02 ENCOUNTER — HEALTH MAINTENANCE LETTER (OUTPATIENT)
Age: 19
End: 2019-10-02

## 2019-10-11 ENCOUNTER — OFFICE VISIT (OUTPATIENT)
Dept: FAMILY MEDICINE | Facility: CLINIC | Age: 19
End: 2019-10-11
Payer: COMMERCIAL

## 2019-10-11 VITALS
SYSTOLIC BLOOD PRESSURE: 111 MMHG | HEART RATE: 100 BPM | HEIGHT: 65 IN | DIASTOLIC BLOOD PRESSURE: 76 MMHG | WEIGHT: 186 LBS | BODY MASS INDEX: 30.99 KG/M2 | OXYGEN SATURATION: 100 % | TEMPERATURE: 98 F

## 2019-10-11 DIAGNOSIS — J45.20 MILD INTERMITTENT ASTHMA WITHOUT COMPLICATION: ICD-10-CM

## 2019-10-11 DIAGNOSIS — F90.9 ATTENTION DEFICIT HYPERACTIVITY DISORDER (ADHD), UNSPECIFIED ADHD TYPE: ICD-10-CM

## 2019-10-11 DIAGNOSIS — Z00.00 ROUTINE GENERAL MEDICAL EXAMINATION AT A HEALTH CARE FACILITY: Primary | ICD-10-CM

## 2019-10-11 DIAGNOSIS — Z23 NEED FOR PROPHYLACTIC VACCINATION AND INOCULATION AGAINST INFLUENZA: ICD-10-CM

## 2019-10-11 DIAGNOSIS — R11.0 NAUSEA: ICD-10-CM

## 2019-10-11 DIAGNOSIS — F32.0 MILD MAJOR DEPRESSION (H): ICD-10-CM

## 2019-10-11 DIAGNOSIS — Z11.3 SCREEN FOR STD (SEXUALLY TRANSMITTED DISEASE): ICD-10-CM

## 2019-10-11 LAB
HCG UR QL: NEGATIVE
HGB BLD-MCNC: 12.3 G/DL (ref 11.7–15.7)

## 2019-10-11 PROCEDURE — 90686 IIV4 VACC NO PRSV 0.5 ML IM: CPT | Performed by: NURSE PRACTITIONER

## 2019-10-11 PROCEDURE — 36415 COLL VENOUS BLD VENIPUNCTURE: CPT | Performed by: NURSE PRACTITIONER

## 2019-10-11 PROCEDURE — 87591 N.GONORRHOEAE DNA AMP PROB: CPT | Performed by: NURSE PRACTITIONER

## 2019-10-11 PROCEDURE — 81025 URINE PREGNANCY TEST: CPT | Performed by: NURSE PRACTITIONER

## 2019-10-11 PROCEDURE — 90471 IMMUNIZATION ADMIN: CPT | Performed by: NURSE PRACTITIONER

## 2019-10-11 PROCEDURE — 99213 OFFICE O/P EST LOW 20 MIN: CPT | Mod: 25 | Performed by: NURSE PRACTITIONER

## 2019-10-11 PROCEDURE — 85018 HEMOGLOBIN: CPT | Performed by: NURSE PRACTITIONER

## 2019-10-11 PROCEDURE — 87491 CHLMYD TRACH DNA AMP PROBE: CPT | Performed by: NURSE PRACTITIONER

## 2019-10-11 PROCEDURE — 99395 PREV VISIT EST AGE 18-39: CPT | Mod: 25 | Performed by: NURSE PRACTITIONER

## 2019-10-11 ASSESSMENT — ENCOUNTER SYMPTOMS
FEVER: 0
FREQUENCY: 0
PARESTHESIAS: 0
NERVOUS/ANXIOUS: 0
HEARTBURN: 0
HEMATOCHEZIA: 0
HEADACHES: 0
DIZZINESS: 0
PALPITATIONS: 0
EYE PAIN: 0
CONSTIPATION: 0
WEAKNESS: 0
JOINT SWELLING: 0
ARTHRALGIAS: 0
DYSURIA: 0
DIARRHEA: 1
COUGH: 0
BREAST MASS: 0
SORE THROAT: 0
CHILLS: 0
NAUSEA: 1
SHORTNESS OF BREATH: 0
HEMATURIA: 0
MYALGIAS: 0
ABDOMINAL PAIN: 0

## 2019-10-11 ASSESSMENT — ANXIETY QUESTIONNAIRES
6. BECOMING EASILY ANNOYED OR IRRITABLE: MORE THAN HALF THE DAYS
2. NOT BEING ABLE TO STOP OR CONTROL WORRYING: NEARLY EVERY DAY
5. BEING SO RESTLESS THAT IT IS HARD TO SIT STILL: MORE THAN HALF THE DAYS
GAD7 TOTAL SCORE: 16
IF YOU CHECKED OFF ANY PROBLEMS ON THIS QUESTIONNAIRE, HOW DIFFICULT HAVE THESE PROBLEMS MADE IT FOR YOU TO DO YOUR WORK, TAKE CARE OF THINGS AT HOME, OR GET ALONG WITH OTHER PEOPLE: SOMEWHAT DIFFICULT
3. WORRYING TOO MUCH ABOUT DIFFERENT THINGS: NEARLY EVERY DAY
1. FEELING NERVOUS, ANXIOUS, OR ON EDGE: MORE THAN HALF THE DAYS
7. FEELING AFRAID AS IF SOMETHING AWFUL MIGHT HAPPEN: SEVERAL DAYS

## 2019-10-11 ASSESSMENT — PATIENT HEALTH QUESTIONNAIRE - PHQ9
SUM OF ALL RESPONSES TO PHQ QUESTIONS 1-9: 12
5. POOR APPETITE OR OVEREATING: NEARLY EVERY DAY

## 2019-10-11 ASSESSMENT — MIFFLIN-ST. JEOR: SCORE: 1609.57

## 2019-10-11 NOTE — PROGRESS NOTES
SUBJECTIVE:   CC: Kamala Harris is an 19 year old woman who presents for preventive health visit.     Healthy Habits:     Getting at least 3 servings of Calcium per day:  NO    Bi-annual eye exam:  Yes    Dental care twice a year:  NO    Sleep apnea or symptoms of sleep apnea:  None    Diet:  Regular (no restrictions)    Frequency of exercise:  1 day/week    Duration of exercise:  30-45 minutes    Taking medications regularly:  Yes    Medication side effects:  Other    PHQ-2 Total Score: 2    Additional concerns today:  Yes      Has the nexplanon -has been feeling nausea for about 1 week with diarrhea/loose stools up to 3 daily. She is no longer BF. No other symptoms.   Has been getting periods on nexplanon- sometimes she bleeds for 2 weeks straight. LMP- 09/09/2019 and she hasn't gotten her period.   Says she is 9 months postpartum, she kind of wishes she is pregnant because she would like to have another baby.    Followed by psychiatry for ADHD and depression.  She is on Lexapro 10 mg and hydroxyzine 10 mg at bedtime.    History of iron deficiency anemia takes iron supplements on occasion.    Asthma Follow-Up    Was ACT completed today?    Yes    ACT Total Scores 4/1/2014   ACT TOTAL SCORE 16   ASTHMA ER VISITS 0 = None   ASTHMA HOSPITALIZATIONS 0 = None       How many days per week do you miss taking your asthma controller medication?  I do not have an asthma controller medication    Please describe any recent triggers for your asthma: .being more active with her child     Have you had any Emergency Room Visits, Urgent Care Visits, or Hospital Admissions since your last office visit?  No        Today's PHQ-2 Score:   PHQ-2 ( 1999 Pfizer) 10/11/2019   Q1: Little interest or pleasure in doing things 1   Q2: Feeling down, depressed or hopeless 1   PHQ-2 Score 2   Q1: Little interest or pleasure in doing things Several days   Q2: Feeling down, depressed or hopeless Several days   PHQ-2 Score 2       Abuse:  Current or Past(Physical, Sexual or Emotional)- No  Do you feel safe in your environment? Yes    Social History     Tobacco Use     Smoking status: Never Smoker     Smokeless tobacco: Never Used   Substance Use Topics     Alcohol use: No     If you drink alcohol do you typically have >3 drinks per day or >7 drinks per week? No    Alcohol Use 10/11/2019   Prescreen: >3 drinks/day or >7 drinks/week? No   No flowsheet data found.    Reviewed orders with patient.  Reviewed health maintenance and updated orders accordingly - Yes  Lab work is in process    Mammogram not appropriate for this patient based on age.    Pertinent mammograms are reviewed under the imaging tab.  History of abnormal Pap smear: NO - age 21-29 PAP every 3 years recommended  PAP / HPV 3/5/2019   PAP NIL     Reviewed and updated as needed this visit by clinical staff  Tobacco  Allergies  Meds         Reviewed and updated as needed this visit by Provider            Review of Systems   Constitutional: Negative for chills and fever.   HENT: Negative for congestion, ear pain, hearing loss and sore throat.    Eyes: Negative for pain and visual disturbance.   Respiratory: Negative for cough and shortness of breath.    Cardiovascular: Negative for chest pain, palpitations and peripheral edema.   Gastrointestinal: Positive for diarrhea and nausea. Negative for abdominal pain, constipation, heartburn and hematochezia.   Breasts:  Negative for tenderness, breast mass and discharge.   Genitourinary: Positive for pelvic pain and vaginal discharge. Negative for dysuria, frequency, genital sores, hematuria, urgency and vaginal bleeding.   Musculoskeletal: Negative for arthralgias, joint swelling and myalgias.   Skin: Negative for rash.   Neurological: Negative for dizziness, weakness, headaches and paresthesias.   Psychiatric/Behavioral: Negative for mood changes. The patient is not nervous/anxious.           OBJECTIVE:   /76   Pulse 100   Temp 98  F  "(36.7  C) (Oral)   Ht 1.651 m (5' 5\")   Wt 84.4 kg (186 lb)   SpO2 100%   Breastfeeding? No   BMI 30.95 kg/m    Physical Exam  GENERAL: healthy, alert and no distress  EYES: Eyes grossly normal to inspection, PERRL and conjunctivae and sclerae normal  HENT: ear canals and TM's normal, nose and mouth without ulcers or lesions  NECK: no adenopathy, no asymmetry, masses, or scars and thyroid normal to palpation  RESP: lungs clear to auscultation - no rales, rhonchi or wheezes  BREAST: normal without masses, tenderness or nipple discharge and no palpable axillary masses or adenopathy  CV: regular rate and rhythm, normal S1 S2, no S3 or S4, no murmur, click or rub, no peripheral edema and peripheral pulses strong  ABDOMEN: soft, nontender, no hepatosplenomegaly, no masses and bowel sounds normal   (female): normal female external genitalia, normal urethral meatus, vaginal mucosa pink, moist, well rugated, and normal cervix/adnexa/uterus without masses or discharge  MS: no gross musculoskeletal defects noted, no edema  SKIN: no suspicious lesions or rashes  NEURO: Normal strength and tone, mentation intact and speech normal  PSYCH: mentation appears normal, affect normal/bright    Diagnostic Test Results:  Labs reviewed in Epic  No results found for this or any previous visit (from the past 24 hour(s)).    ASSESSMENT/PLAN:   1. Routine general medical examination at a health care facility  Preventive visit today with acute and chronic conditions addressed below  - CHLAMYDIA TRACHOMATIS PCR  - Hemoglobin    2. Nausea  With missed. And diarrhea need to rule out pregnancy hCG is negative she does have Nexplanon in place.  Likely viral in origin discussed bland diet and hydration rest  - HCG Qual, Urine ([id number])  - OFFICE/OUTPT VISIT,EST,LEVL IV    3. Need for prophylactic vaccination and inoculation against influenza  Today  - INFLUENZA VACCINE IM > 6 MONTHS VALENT IIV4 [54905]  - Vaccine Administration, Initial " "[[id number]]    4. Mild major depression (H)  Stable followed by psychiatry  - OFFICE/OUTPT VISIT,JANIYA MIJARES IV    5. Attention deficit hyperactivity disorder (ADHD), unspecified ADHD type  Uncontrolled will be going on stimulants ordered by psychiatry  - OFFICE/OUTPT VISIT,EST,LEVL RIVERA    6. Mild intermittent asthma without complication  Stable act today 23  - OFFICE/OUTPT VISIT,EST,LEVL IV    COUNSELING:  Reviewed preventive health counseling, as reflected in patient instructions       Regular exercise       Healthy diet/nutrition       Vision screening       Contraception    Estimated body mass index is 30.95 kg/m  as calculated from the following:    Height as of this encounter: 1.651 m (5' 5\").    Weight as of this encounter: 84.4 kg (186 lb).    Weight management plan: Discussed healthy diet and exercise guidelines     reports that she has never smoked. She has never used smokeless tobacco.      Counseling Resources:  ATP IV Guidelines  Pooled Cohorts Equation Calculator  Breast Cancer Risk Calculator  FRAX Risk Assessment  ICSI Preventive Guidelines  Dietary Guidelines for Americans, 2010  USDA's MyPlate  ASA Prophylaxis  Lung CA Screening    ASYA Murray [facility]  "

## 2019-10-12 ASSESSMENT — ANXIETY QUESTIONNAIRES: GAD7 TOTAL SCORE: 16

## 2019-10-12 ASSESSMENT — ASTHMA QUESTIONNAIRES: ACT_TOTALSCORE: 23

## 2020-03-20 ENCOUNTER — HOSPITAL ENCOUNTER (EMERGENCY)
Facility: CLINIC | Age: 20
Discharge: HOME | End: 2020-03-20
Attending: EMERGENCY MEDICINE | Admitting: EMERGENCY MEDICINE
Payer: COMMERCIAL

## 2020-03-20 ENCOUNTER — APPOINTMENT (OUTPATIENT)
Dept: GENERAL RADIOLOGY | Facility: CLINIC | Age: 20
End: 2020-03-20
Attending: EMERGENCY MEDICINE
Payer: COMMERCIAL

## 2020-03-20 ENCOUNTER — NURSE TRIAGE (OUTPATIENT)
Dept: NURSING | Facility: CLINIC | Age: 20
End: 2020-03-20

## 2020-03-20 VITALS
SYSTOLIC BLOOD PRESSURE: 116 MMHG | RESPIRATION RATE: 18 BRPM | DIASTOLIC BLOOD PRESSURE: 76 MMHG | TEMPERATURE: 99.2 F | OXYGEN SATURATION: 100 % | HEART RATE: 110 BPM

## 2020-03-20 DIAGNOSIS — R07.81 PLEURITIC CHEST PAIN: ICD-10-CM

## 2020-03-20 DIAGNOSIS — R05.9 COUGH: ICD-10-CM

## 2020-03-20 LAB
ANION GAP SERPL CALCULATED.3IONS-SCNC: 6 MMOL/L (ref 3–14)
B-HCG SERPL-ACNC: <1 IU/L (ref 0–5)
BASOPHILS # BLD AUTO: 0 10E9/L (ref 0–0.2)
BASOPHILS NFR BLD AUTO: 0.3 %
BUN SERPL-MCNC: 12 MG/DL (ref 7–30)
CALCIUM SERPL-MCNC: 9.2 MG/DL (ref 8.5–10.1)
CHLORIDE SERPL-SCNC: 108 MMOL/L (ref 94–109)
CO2 SERPL-SCNC: 26 MMOL/L (ref 20–32)
CREAT SERPL-MCNC: 0.66 MG/DL (ref 0.52–1.04)
D DIMER PPP FEU-MCNC: 0.3 UG/ML FEU (ref 0–0.5)
DIFFERENTIAL METHOD BLD: ABNORMAL
EOSINOPHIL # BLD AUTO: 0.1 10E9/L (ref 0–0.7)
EOSINOPHIL NFR BLD AUTO: 1 %
ERYTHROCYTE [DISTWIDTH] IN BLOOD BY AUTOMATED COUNT: 14.9 % (ref 10–15)
GFR SERPL CREATININE-BSD FRML MDRD: >90 ML/MIN/{1.73_M2}
GLUCOSE SERPL-MCNC: 106 MG/DL (ref 70–99)
HCT VFR BLD AUTO: 42.1 % (ref 35–47)
HGB BLD-MCNC: 13.7 G/DL (ref 11.7–15.7)
IMM GRANULOCYTES # BLD: 0 10E9/L (ref 0–0.4)
IMM GRANULOCYTES NFR BLD: 0.4 %
LYMPHOCYTES # BLD AUTO: 2.5 10E9/L (ref 0.8–5.3)
LYMPHOCYTES NFR BLD AUTO: 36.4 %
MCH RBC QN AUTO: 24.6 PG (ref 26.5–33)
MCHC RBC AUTO-ENTMCNC: 32.5 G/DL (ref 31.5–36.5)
MCV RBC AUTO: 75 FL (ref 78–100)
MONOCYTES # BLD AUTO: 0.4 10E9/L (ref 0–1.3)
MONOCYTES NFR BLD AUTO: 5.8 %
NEUTROPHILS # BLD AUTO: 3.9 10E9/L (ref 1.6–8.3)
NEUTROPHILS NFR BLD AUTO: 56.1 %
NRBC # BLD AUTO: 0 10*3/UL
NRBC BLD AUTO-RTO: 0 /100
PLATELET # BLD AUTO: 234 10E9/L (ref 150–450)
POTASSIUM SERPL-SCNC: 4.1 MMOL/L (ref 3.4–5.3)
RBC # BLD AUTO: 5.58 10E12/L (ref 3.8–5.2)
SODIUM SERPL-SCNC: 140 MMOL/L (ref 133–144)
WBC # BLD AUTO: 6.9 10E9/L (ref 4–11)

## 2020-03-20 PROCEDURE — 84702 CHORIONIC GONADOTROPIN TEST: CPT | Performed by: EMERGENCY MEDICINE

## 2020-03-20 PROCEDURE — 85025 COMPLETE CBC W/AUTO DIFF WBC: CPT | Performed by: EMERGENCY MEDICINE

## 2020-03-20 PROCEDURE — 85379 FIBRIN DEGRADATION QUANT: CPT | Performed by: EMERGENCY MEDICINE

## 2020-03-20 PROCEDURE — 99284 EMERGENCY DEPT VISIT MOD MDM: CPT | Mod: 25 | Performed by: EMERGENCY MEDICINE

## 2020-03-20 PROCEDURE — 71045 X-RAY EXAM CHEST 1 VIEW: CPT

## 2020-03-20 PROCEDURE — 80048 BASIC METABOLIC PNL TOTAL CA: CPT | Performed by: EMERGENCY MEDICINE

## 2020-03-20 PROCEDURE — 99283 EMERGENCY DEPT VISIT LOW MDM: CPT | Mod: Z6 | Performed by: EMERGENCY MEDICINE

## 2020-03-20 ASSESSMENT — ENCOUNTER SYMPTOMS
CONFUSION: 0
ABDOMINAL PAIN: 0
NECK STIFFNESS: 0
DIFFICULTY URINATING: 0
HEADACHES: 0
COLOR CHANGE: 0
SHORTNESS OF BREATH: 1
ARTHRALGIAS: 0
FEVER: 0
EYE REDNESS: 0
COUGH: 1

## 2020-03-20 NOTE — LETTER
March 20, 2020      To Whom It May Concern:      Kamala Harris was seen in our Emergency Department today, 03/20/20.   She may return to work on 3/23/20.    Sincerely,        SUNSHINE KEATING MD, MD

## 2020-03-20 NOTE — TELEPHONE ENCOUNTER
She isn't sure which ER they will go to so I advised they notify the staffs, on arrival, that she has shortness of breath and cough. She should then be given a mask and be placed in an area where she won't be around others. She has a baby at home. She works at a [employer] facility where she is around a lot of other people.  Micheline Alexander RN-[facility]      Reason for Disposition    [1] MODERATE difficulty breathing (e.g., speaks in phrases, SOB even at rest, pulse 100-120) AND [2] NEW-onset or WORSE than normal    Additional Information    Negative: [1] Breathing stopped AND [2] hasn't returned    Negative: Choking on something    Negative: Severe difficulty breathing (e.g., struggling for each breath, speaks in single words)    Negative: Bluish (or gray) lips or face now    Negative: Difficult to awaken or acting confused (e.g., disoriented, slurred speech)    Negative: Passed out (i.e., lost consciousness, collapsed and was not responding)    Negative: Wheezing started suddenly after medicine, an allergic food or bee sting    Negative: Stridor    Negative: Slow, shallow and weak breathing    Negative: Sounds like a life-threatening emergency to the triager    Negative: Chest pain    Negative: [1] Wheezing (high pitched whistling sound) AND [2] previous asthma attacks or use of asthma medicines    Negative: [1] Difficulty breathing AND [2] only present when coughing    Negative: [1] Difficulty breathing AND [2] only from stuffy or runny nose    Protocols used: BREATHING DIFFICULTY-A-AH

## 2020-03-20 NOTE — ED TRIAGE NOTES
"Patient has not coughed while in triage. States she does not cough much, but when she does \"it's a lot\"  "

## 2020-03-20 NOTE — ED PROVIDER NOTES
ED Provider Note  [facility]      History     Chief Complaint   Patient presents with     Cough     Shortness of Breath     HPI  Kamala Harris is a 20 year old female who presents to the emergency department for evaluation of cough and pleuritic chest pain.  The patient states that 8:00 last evening, she developed a nonproductive cough.  She also states that she has central chest pain when she takes a deep breath.  She reports some mild dyspnea.  No fever.  No leg pain or swelling.  She is on birth control.  Patient denies any rhinorrhea, sore throat, or other URI symptoms.  No abdominal pain.  No nausea or vomiting.    Past Medical History  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      Sprain of right wrist     right side muliple times      Uncomplicated asthma     exercise induced     Past Surgical History:   Procedure Laterality Date     CARDIAC CATHERIZATION  2 years    Told by cardiology that tiny defect would close on own, no FU     CARDIAC SURGERY       CREATE EARDRUM OPENING,LOCAL ANESTH  4 years    PETs     Cholecalciferol (VITAMIN D) 2000 units tablet  escitalopram (LEXAPRO) 10 MG tablet  Ferrous Sulfate (IRON SUPPLEMENT PO)  hydrOXYzine (ATARAX) 10 MG tablet  Prenatal Vit-Fe Fumarate-FA (PRENATAL MULTIVITAMIN PLUS IRON) 27-0.8 MG TABS per tablet  sertraline (ZOLOFT) 25 MG tablet      Allergies   Allergen Reactions     Iodine      Rash itching       Past medical history, past surgical history, medications, and allergies were reviewed with the patient. Additional pertinent items: None    Family History  Family History   Problem Relation Age of Onset     Cerebrovascular Disease Maternal Grandfather      Myocardial Infarction Paternal Grandmother      Myocardial Infarction Paternal Grandfather      Family history was reviewed with the patient. Additional pertinent items:  None    Social History  Social History     Tobacco Use     Smoking status: Never Smoker     Smokeless tobacco: Never Used   Substance Use Topics     Alcohol use: No     Drug use: No      Social history was reviewed with the patient. Additional pertinent items: None    Review of Systems   Constitutional: Negative for fever.   HENT: Negative for congestion.    Eyes: Negative for redness.   Respiratory: Positive for cough and shortness of breath.    Cardiovascular: Positive for chest pain.   Gastrointestinal: Negative for abdominal pain.   Genitourinary: Negative for difficulty urinating.   Musculoskeletal: Negative for arthralgias and neck stiffness.   Skin: Negative for color change.   Neurological: Negative for headaches.   Psychiatric/Behavioral: Negative for confusion.   All other systems reviewed and are negative.        Physical Exam   BP: 116/76  Pulse: 110  Temp: 99.2  F (37.3  C)  Resp: 18  SpO2: 100 %  Physical Exam  Vitals signs and nursing note reviewed.   Constitutional:       General: She is not in acute distress.     Appearance: She is well-developed. She is not diaphoretic.   HENT:      Head: Normocephalic and atraumatic.      Mouth/Throat:      Pharynx: No oropharyngeal exudate.   Eyes:      General: No scleral icterus.     Pupils: Pupils are equal, round, and reactive to light.   Cardiovascular:      Rate and Rhythm: Tachycardia present.   Pulmonary:      Effort: No respiratory distress.      Breath sounds: Normal breath sounds.   Abdominal:      Tenderness: There is no abdominal tenderness.   Musculoskeletal:         General: No tenderness.      Right lower leg: No edema.      Left lower leg: No edema.   Skin:     General: Skin is warm.      Findings: No rash.   Neurological:      General: No focal deficit present.      Mental Status: She is alert.         ED Course      Procedures          Results for orders placed or performed during the hospital encounter of 03/20/20   XR Chest Port 1 View      Status: None    Narrative    XR CHEST PORT 1 VW 3/20/2020 8:16 AM    HISTORY: cough    COMPARISON: None.      Impression    IMPRESSION: No focal consolidation. No pleural effusion. No  pneumothorax. The cardiac mediastinal silhouette is within normal  limits. The visualized osseous structures are intact.    ULYSSES BAIN MD   CBC with platelets differential     Status: Abnormal   Result Value Ref Range    WBC 6.9 4.0 - 11.0 10e9/L    RBC Count 5.58 (H) 3.8 - 5.2 10e12/L    Hemoglobin 13.7 11.7 - 15.7 g/dL    Hematocrit 42.1 35.0 - 47.0 %    MCV 75 (L) 78 - 100 fl    MCH 24.6 (L) 26.5 - 33.0 pg    MCHC 32.5 31.5 - 36.5 g/dL    RDW 14.9 10.0 - 15.0 %    Platelet Count 234 150 - 450 10e9/L    Diff Method Automated Method     % Neutrophils 56.1 %    % Lymphocytes 36.4 %    % Monocytes 5.8 %    % Eosinophils 1.0 %    % Basophils 0.3 %    % Immature Granulocytes 0.4 %    Nucleated RBCs 0 0 /100    Absolute Neutrophil 3.9 1.6 - 8.3 10e9/L    Absolute Lymphocytes 2.5 0.8 - 5.3 10e9/L    Absolute Monocytes 0.4 0.0 - 1.3 10e9/L    Absolute Eosinophils 0.1 0.0 - 0.7 10e9/L    Absolute Basophils 0.0 0.0 - 0.2 10e9/L    Abs Immature Granulocytes 0.0 0 - 0.4 10e9/L    Absolute Nucleated RBC 0.0    Basic metabolic panel     Status: Abnormal   Result Value Ref Range    Sodium 140 133 - 144 mmol/L    Potassium 4.1 3.4 - 5.3 mmol/L    Chloride 108 94 - 109 mmol/L    Carbon Dioxide 26 20 - 32 mmol/L    Anion Gap 6 3 - 14 mmol/L    Glucose 106 (H) 70 - 99 mg/dL    Urea Nitrogen 12 7 - 30 mg/dL    Creatinine 0.66 0.52 - 1.04 mg/dL    GFR Estimate >90 >60 mL/min/[1.73_m2]    GFR Estimate If Black >90 >60 mL/min/[1.73_m2]    Calcium 9.2 8.5 - 10.1 mg/dL   D dimer quantitative     Status: None   Result Value Ref Range    D Dimer 0.3 0.0 - 0.50 ug/ml FEU   HCG quantitative pregnancy     Status: None   Result Value Ref Range    HCG Quantitative Serum <1 0 - 5 IU/L     Medications - No data to display            Assessments & Plan (with  Medical Decision Making)   20 year old female to the emergency department with 12 hours of cough, mild shortness of breath, and pleuritic chest pain.  She is mildly tachycardic on presentation but otherwise has normal vital signs.  She has not been febrile and is not febrile here in the emergency department.  Cough is been nonproductive.  She has a normal chest radiograph.  D-dimer is negative so do not suspect pulmonary embolism.  Remainder of labs also normal.  Patient appears clinically well and appropriate for discharge to home and outpatient clinical observation.  Will provide her with Covid instructions as a precaution but clinical suspicion is low at this time due to lack of fever.  Symptomatic treatment recommended.  Return if worsening symptoms or other concerns.  Otherwise, primary care follow-up in 1 week as needed.    I have reviewed the nursing notes. I have reviewed the findings, diagnosis, plan and need for follow up with the patient.    Discharge Medication List as of 3/20/2020  8:58 AM          Final diagnoses:   Cough   Pleuritic chest pain       --  SINAN KEATING MD, MD   Emergency Medicine   [facility], [place], EMERGENCY DEPARTMENT  3/20/2020     Sinan Keating MD  03/20/20 [id number]

## 2020-03-20 NOTE — DISCHARGE INSTRUCTIONS
Please use the information at the end of this document to sign up for [facility] [employer] where you can get your results and a message about those results sent to you through the [employer] application. If you do not have mychart we will call you with your results but it may take longer.    Regardless of if you have been tested or not:  Patient who have symptoms (cough, fever, or shortness of breath), need to isolate for 7 days from when symptoms started OR 72 hours after fever resolves (without fever reducing medications) AND improvement of respiratory symptoms (whichever is longer).    Isolate yourself at home (in own room/own bathroom if possible)  Do Not allow any visitors  Do Not go to work or school  Do Not go to [employer],  centers, shopping, or other public places.  Do Not shake hands.  Avoid close and intimate contact with others (hugging, kissing).  Follow CDC recommendations for household cleaning of frequently touched services.     After the initial 7 days, continue to isolate yourself from household members as much as possible. To continue decrease the risk of community spread and exposure, you and any members of your household should limit activities in public for 14 days after starting home isolation.     You can reference the following CDC link for helpful home isolation/care tips:  https://www.cdc.gov/coronavirus/2019-ncov/downloads/10Things.pdf    Protect Others:  Cover Your Mouth and Nose with a mask, disposable tissue or wash cloth to avoid spreading germs to others.  Wash your hands and face frequently with soap and water    Call Back If: Breathing difficulty develops or you become worse.    For more information about COVID19 and options for caring for yourself at home, please visit the CDC website at https://www.cdc.gov/coronavirus/2019-ncov/about/steps-when-sick.html  For more options for care at [facility], please visit our website at  [email or web address]

## 2020-03-20 NOTE — ED AVS SNAPSHOT
[facility], [place], Emergency Department  [address]  [facility] [address]  Phone:  [phone]  Fax:  [phone]                                    Kamala Harris   MRN: [id number]    Department:  [facility], Emergency Department   Date of Visit:  3/20/2020           After Visit Summary Signature Page    I have received my discharge instructions, and my questions have been answered. I have discussed any challenges I see with this plan with the nurse or doctor.    ..........................................................................................................................................  Patient/Patient Representative Signature      ..........................................................................................................................................  Patient Representative Print Name and Relationship to Patient    ..................................................               ................................................  Date                                   Time    ..........................................................................................................................................  Reviewed by Signature/Title    ...................................................              ..............................................  Date                                               Time          22EPIC Rev 08/18

## 2020-03-24 ENCOUNTER — VIRTUAL VISIT (OUTPATIENT)
Dept: FAMILY MEDICINE | Facility: CLINIC | Age: 20
End: 2020-03-24
Payer: COMMERCIAL

## 2020-03-24 DIAGNOSIS — J45.21 MILD INTERMITTENT ASTHMA WITH EXACERBATION: Primary | ICD-10-CM

## 2020-03-24 PROCEDURE — 99441 ZZC PHYSICIAN TELEPHONE EVALUATION 5-10 MIN: CPT | Performed by: FAMILY MEDICINE

## 2020-03-24 RX ORDER — ALBUTEROL SULFATE 90 UG/1
2 AEROSOL, METERED RESPIRATORY (INHALATION) EVERY 4 HOURS PRN
Qty: 1 INHALER | Refills: 11 | Status: SHIPPED | OUTPATIENT
Start: 2020-03-24 | End: 2021-11-18

## 2020-03-24 RX ORDER — PREDNISONE 20 MG/1
40 TABLET ORAL DAILY
Qty: 10 TABLET | Refills: 0 | Status: SHIPPED | OUTPATIENT
Start: 2020-03-24 | End: 2020-05-12

## 2020-03-24 NOTE — PROGRESS NOTES
"Kamala Harris is a 20 year old female who is being evaluated via a billable telephone visit.      The patient has been notified of following:     \"This telephone visit will be conducted via a call between you and your physician/provider. We have found that certain health care needs can be provided without the need for a physical exam.  This service lets us provide the care you need with a short phone conversation.  If a prescription is necessary we can send it directly to your pharmacy.  If lab work is needed we can place an order for that and you can then stop by our lab to have the test done at a later time.    If during the course of the call the physician/provider feels a telephone visit is not appropriate, you will not be charged for this service.\"     Kamala Harris complains of    Chief Complaint   Patient presents with     Cough     went away 3 days after her ER visit     ER F/U     patient did get home today after being at work for 30min.     Asthma     I have reviewed and updated the patient's Past Medical History, Social History, Family History and Medication List.    ALLERGIES  Iodine    Asthma Follow-Up    Was ACT completed today?  No      Do you have a cough?  No    Are you experiencing any wheezing in your chest?  No    Do you have any shortness of breath?  YES     How often are you using a short-acting (rescue) inhaler or nebulizer, such as Albuterol?  Never, doesn't have one    How many days per week do you miss taking your asthma controller medication?  I do not have an asthma controller medication    Please describe any recent triggers for your asthma: upper respiratory infections    Have you had any Emergency Room Visits, Urgent Care Visits, or Hospital Admissions since your last office visit?  Yes.  ER visit on 3/20/20 for cough and pleuritic chest pain.  CXR was WNL.  Labs normal including d-dimer.     Patient notes that her trouble breathing has improved, however she feels like " "she continues to have SOB with exertion.  She states she feels like she's \"wearing a bra that's too tight\" and it hurts to breathe.  She feels like it's hard to breathe in completely.  Her cough went away completely.  She did use a nebulizer that was  in  and felt it was helpful.      Assessment/Plan:  1. Mild intermittent asthma with exacerbation  - Symptoms do not sound consistent with COVID-19  - CXR and labs from the ER were normal  - Patient has a history of mild persistent asthma that normally doesn't bother her much.  This recent increase in SOB sounds like an asthma exacerbation to me  - Will treat with prednisone and albuterol   - albuterol (PROAIR HFA/PROVENTIL HFA/VENTOLIN HFA) 108 (90 Base) MCG/ACT inhaler; Inhale 2 puffs into the lungs every 4 hours as needed for shortness of breath / dyspnea or wheezing  Dispense: 1 Inhaler; Refill: 11  - predniSONE (DELTASONE) 20 MG tablet; Take 2 tablets (40 mg) by mouth daily for 5 days  Dispense: 10 tablet; Refill: 0    Advised pt to call if not improving within 2-3 days     Phone call duration:  8 minutes    Radha Mcleod, DO      "

## 2020-05-12 ENCOUNTER — VIRTUAL VISIT (OUTPATIENT)
Dept: FAMILY MEDICINE | Facility: CLINIC | Age: 20
End: 2020-05-12
Payer: COMMERCIAL

## 2020-05-12 DIAGNOSIS — R04.0 EPISTAXIS: Primary | ICD-10-CM

## 2020-05-12 DIAGNOSIS — J34.89 SINUS PRESSURE: ICD-10-CM

## 2020-05-12 PROCEDURE — 99213 OFFICE O/P EST LOW 20 MIN: CPT | Mod: 95 | Performed by: NURSE PRACTITIONER

## 2020-05-12 NOTE — PROGRESS NOTES
"Kamala Harris is a 20 year old female who is being evaluated via a billable video visit.      The patient has been notified of following:     \"This video visit will be conducted via a call between you and your physician/provider. We have found that certain health care needs can be provided without the need for an in-person physical exam.  This service lets us provide the care you need with a video conversation.  If a prescription is necessary we can send it directly to your pharmacy.  If lab work is needed we can place an order for that and you can then stop by our lab to have the test done at a later time.    Video visits are billed at different rates depending on your insurance coverage.  Please reach out to your insurance provider with any questions.    If during the course of the call the physician/provider feels a video visit is not appropriate, you will not be charged for this service.\"    Patient has given verbal consent for Video visit? Yes    How would you like to obtain your AVS? Kalyani    Patient would like the video invitation sent by: Text to cell phone: [phone]    Will anyone else be joining your video visit? No        Subjective     Kamala Harris is a 20 year old female who presents today via video visit for the following health issues:    HPI  ED/UC Followup:    Facility:  [facility]  Date of visit: 5/9/2020  Reason for visit: bloody nose, bilateral nostrils. Lasted for almost 1/2 hour. Has had nose bleeds in the past but never lasting that long. The nostrils got clogged and then she couldn't breath well so went into the ED.  Current Status: pressure in sinuses   No more bleeding since visit.   Afrin was given for when nosebleeds occur.      Says she has had nosebleeds since she was younger.  Does not have h/o allergies.  She was told her blood counts showed changes consistent with low iron, although her hemoglobin was in the low normal range.    Video Start " Time: 7:46 am    Patient Active Problem List   Diagnosis     Presbyopia     ADHD (attention deficit hyperactivity disorder)     Health Care Home     Mild major depression (H)     Intermittent asthma     Childhood hyperkinetic syndrome     Past Surgical History:   Procedure Laterality Date     CARDIAC CATHERIZATION  2 years    Told by cardiology that tiny defect would close on own, no FU     CARDIAC SURGERY       CREATE EARDRUM OPENING,LOCAL ANESTH  2 years    PETs       Social History     Tobacco Use     Smoking status: Never Smoker     Smokeless tobacco: Never Used   Substance Use Topics     Alcohol use: No     Family History   Problem Relation Age of Onset     Cerebrovascular Disease Maternal Grandfather      Myocardial Infarction Paternal Grandmother      Myocardial Infarction Paternal Grandfather          Current Outpatient Medications   Medication Sig Dispense Refill     Ferrous Sulfate (IRON SUPPLEMENT PO) Take 28 mg by mouth        albuterol (PROAIR HFA/PROVENTIL HFA/VENTOLIN HFA) 108 (90 Base) MCG/ACT inhaler Inhale 2 puffs into the lungs every 4 hours as needed for shortness of breath / dyspnea or wheezing (Patient not taking: Reported on 5/12/2020) 1 Inhaler 11     Cholecalciferol (VITAMIN D) 2000 units tablet Take 2,000 Units by mouth daily (Patient not taking: Reported on 5/12/2020) 100 tablet 3     hydrOXYzine (ATARAX) 10 MG tablet Take 10 mg by mouth nightly as needed for itching       Prenatal Vit-Fe Fumarate-FA (PRENATAL MULTIVITAMIN PLUS IRON) 27-0.8 MG TABS per tablet Take 1 tablet by mouth daily       Allergies   Allergen Reactions     Iodine      Rash itching       BP Readings from Last 3 Encounters:   03/20/20 116/76   10/11/19 111/76   07/24/19 110/68    Wt Readings from Last 3 Encounters:   10/11/19 84.4 kg (186 lb)   07/24/19 83.8 kg (184 lb 12.8 oz)   05/14/19 79.8 kg (176 lb)                    Reviewed and updated as needed this visit by Provider  Tobacco  Allergies  Meds  Problems   "Med Hx  Surg Hx  Fam Hx         Review of Systems   Constitutional, HEENT, cardiovascular, pulmonary, gi and gu systems are negative, except as otherwise noted.      Objective    There were no vitals taken for this visit.  Estimated body mass index is 30.95 kg/m  as calculated from the following:    Height as of 10/11/19: 1.651 m (5' 5\").    Weight as of 10/11/19: 84.4 kg (186 lb).  Physical Exam     GENERAL: Healthy, alert and no distress  PSYCH: Mentation appears normal, affect normal/bright, judgement and insight intact, normal speech and appearance well-groomed.      Diagnostic Test Results:  Labs reviewed in Epic        Assessment & Plan     1. Epistaxis  Recommend starting saline nasal spray twice daily.  Use Afrin as needed.  Referral to ENT for consult given and told patient okay to wait for COVID-19 pandemic to settle down before pursuing.  - OTOLARYNGOLOGY REFERRAL  - CBC with platelets; Future    2. Sinus pressure  Recommend saline nasal spray twice daily, decongestant.       BMI:   Estimated body mass index is 30.95 kg/m  as calculated from the following:    Height as of 10/11/19: 1.651 m (5' 5\").    Weight as of 10/11/19: 84.4 kg (186 lb).         Return in about 5 months (around 10/12/2020) for Physical Exam.    Rachel Candelaria NP  [facility]      Video-Visit Details    Type of service:  Video Visit    Video End Time: 8 am    Originating Location (pt. Location): Home    Distant Location (provider location):  [facility]     Platform used for Video Visit: Doximity    Return in about 5 months (around 10/12/2020) for Physical Exam.       Rachel Candelaria NP        "

## 2020-05-22 ENCOUNTER — NURSE TRIAGE (OUTPATIENT)
Dept: NURSING | Facility: CLINIC | Age: 20
End: 2020-05-22

## 2020-05-22 NOTE — TELEPHONE ENCOUNTER
"    Additional Information    Negative: Sounds like a life-threatening emergency to the triager    Negative: Followed a knee injury    Negative: Leg pain is main symptom    Negative: Knee swelling is main symptom    Negative: [1] Swollen joint AND [2] fever    Negative: [1] Red area or streak AND [2] fever    Negative: Patient sounds very sick or weak to the triager    Negative: [1] SEVERE pain (e.g., excruciating, unable to walk) AND [2] not improved after 2 hours of pain medicine    Negative: [1] Can't move swollen joint at all AND [2] no fever    Negative: [1] Thigh or calf pain AND [2] only 1 side AND [3] present > 1 hour    Negative: [1] Thigh, calf, or ankle swelling AND [2] only 1 side    Negative: [1] Looks infected (spreading redness, pus) AND [2] large red area (> 2 in. or 5 cm)    Negative: [1] Very swollen joint AND [2] no fever    Negative: Blistering rash in area of pain  (i.e., dermatomal distribution or \"band\" or \"stripe\")    Negative: Looks like a boil, infected sore, or deep ulcer    Negative: [1] Redness of the skin AND [2] no fever    [1] MODERATE pain (e.g., interferes with normal activities, limping) AND [2] present > 3 days    Answer Assessment - Initial Assessment Questions  1. LOCATION and RADIATION: \"Where is the pain located?\"       Right knee pain, right on top  2. QUALITY: \"What does the pain feel like?\"  (e.g., sharp, dull, aching, burning)      aching  3. SEVERITY: \"How bad is the pain?\" \"What does it keep you from doing?\"   (Scale 1-10; or mild, moderate, severe)    -  MILD (1-3): doesn't interfere with normal activities     -  MODERATE (4-7): interferes with normal activities (e.g., work or school) or awakens from sleep, limping     -  SEVERE (8-10): excruciating pain, unable to do any normal activities, unable to walk      moderate  4. ONSET: \"When did the pain start?\" \"Does it come and go, or is it there all the time?\"      A week ago  5. RECURRENT: \"Have you had this pain before?\" " "If so, ask: \"When, and what happened then?\"      Yes has had problems with knees poppoing  6. SETTING: \"Has there been any recent work, exercise or other activity that involved that part of the body?\"       ?twisted at work  7. AGGRAVATING FACTORS: \"What makes the knee pain worse?\" (e.g., walking, climbing stairs, running)      Walking increases pain  8. ASSOCIATED SYMPTOMS: \"Is there any swelling or redness of the knee?\"      no  9. OTHER SYMPTOMS: \"Do you have any other symptoms?\" (e.g., chest pain, difficulty breathing, fever, calf pain)      no  10. PREGNANCY: \"Is there any chance you are pregnant?\" \"When was your last menstrual period?\"        no    Protocols used: KNEE PAIN-A-AH      "

## 2020-06-02 ENCOUNTER — TELEPHONE (OUTPATIENT)
Dept: FAMILY MEDICINE | Facility: CLINIC | Age: 20
End: 2020-06-02

## 2020-06-02 NOTE — TELEPHONE ENCOUNTER
Panel Management Review      Patient has the following on her problem list:     Depression / Dysthymia review    Measure:  Needs PHQ-9 score of 4 or less during index window.  Administer PHQ-9 and if score is 5 or more, send encounter to provider for next steps.    6 month window range: 2-6/9    PHQ-9 SCORE 12/19/2018 3/5/2019 10/11/2019   PHQ-9 Total Score 14 8 12       If PHQ-9 recheck is 5 or more, route to provider for next steps.    Patient is due for:  PHQ9      Composite cancer screening  Chart review shows that this patient is due/due soon for the following None  Summary:    Patient is due/failing the following:   PHQ9    Action needed:   Patient needs to do PHQ9.    Type of outreach:    Sent [email or web address] message.    Questions for provider review:    None                                                                                                                                    Katie Concepcion MA on 6/2/2020 at 8:52 AM         Chart routed to Care Team .

## 2020-06-02 NOTE — LETTER
June 17, 2020    Kamala Harris  [address]  [place] [address]      Dear Kamala,    We care about your health and have reviewed your health plan. Please take moment to answer the enclosed questionnaire at your earliest convenience and mail it back to us using the self addressed stamped envelope. If you prefer to talk with a member of your care team, we can complete this over the phone, just call when you have a few minutes.    This is important feedback for your care team to assess your symptoms and treatment plan. We really appreciate your attention to this. If you are unable to complete it at this time, we will try to contact you again in the near future.    We also recommend attention to these items:   - Depression    Please call us at [phone], or use [employer], to address these recommendations.    Thank you for trusting [facility]. We appreciate the opportunity to serve you and look forward to supporting your healthcare needs in the future.    Healthy Regards,    Your Care Team

## 2020-06-17 NOTE — TELEPHONE ENCOUNTER
Panel Management Review  Summary:    Type of outreach:    Sent letter.    Encounter routed to No Action Needed.                                                                                                                               Katie Concepcion MA on 6/17/2020 at 8:53 AM

## 2020-12-03 ENCOUNTER — OFFICE VISIT (OUTPATIENT)
Dept: FAMILY MEDICINE | Facility: CLINIC | Age: 20
End: 2020-12-03
Payer: COMMERCIAL

## 2020-12-03 VITALS
DIASTOLIC BLOOD PRESSURE: 68 MMHG | WEIGHT: 203.4 LBS | OXYGEN SATURATION: 98 % | BODY MASS INDEX: 33.89 KG/M2 | HEART RATE: 114 BPM | TEMPERATURE: 98.2 F | SYSTOLIC BLOOD PRESSURE: 110 MMHG | HEIGHT: 65 IN

## 2020-12-03 DIAGNOSIS — R10.13 EPIGASTRIC PAIN: Primary | ICD-10-CM

## 2020-12-03 DIAGNOSIS — R11.0 NAUSEA: ICD-10-CM

## 2020-12-03 DIAGNOSIS — K21.00 GASTROESOPHAGEAL REFLUX DISEASE WITH ESOPHAGITIS WITHOUT HEMORRHAGE: ICD-10-CM

## 2020-12-03 PROCEDURE — 99213 OFFICE O/P EST LOW 20 MIN: CPT | Performed by: PHYSICIAN ASSISTANT

## 2020-12-03 ASSESSMENT — MIFFLIN-ST. JEOR: SCORE: 1690.99

## 2020-12-03 NOTE — PROGRESS NOTES
Subjective     Kamala Harris is a 20 year old female who presents to clinic today for the following health issues:    HPI         GERD/Heartburn  Onset/Duration: Ongoing for three years    Description: nausea, heartburn, and dry heaving. Can wake up 3 am with heartburn and unable to go back to sleep. Coughing can trigger dry heaving.   Intensity: mild, moderate, depends on certain foods and time of day.   Progression of Symptoms: worsening  Accompanying Signs & Symptoms:  Does it feel like food gets stuck or trouble swallowing: no  Nausea: YES  Vomiting (bloody?): no  Abdominal Pain: no  Black-Tarry stools: no  Bloody stools: no  History:  Previous similar episodes: YES  Previous ulcers: no  Precipitating factors:   Caffeine use: YES  Alcohol use: YES  NSAID/Aspirin use: no, ibupofen.   Tobacco use: no  Worse with spicy foods, alcohol and tomato based foods.  Alleviating factors: laying on left side, taking antacids, pepto bismal  Therapies tried and outcome:             Lifestyle changes: None            Medications: antacids and pepto bismal helps take the edge off.     HPI  Kamala presents with abdominal pain and heartburn for past 3 years. This began during her pregnancy and never resolved afterwards. She took medication (doesn't remember which) during her pregnancy which helped her symptoms, but stopped afterwards. She has nausea with some dry heaving. She had 1 episode of small volume regurgitation, but no vomiting. Episodes of pain/burning last 30 min-4 hours. Pain is worse after eating. There have been multiple episodes of heartburn and nausea that have woken her up in the middle of the night. No changes in bowel movements, no bloody in stool, and no urinary symptoms. She has tried Tums and Pepto with minimal relief.     Patient Active Problem List   Diagnosis     Presbyopia     ADHD (attention deficit hyperactivity disorder)     Health Care Home     Mild major depression (H)     Intermittent asthma      Childhood hyperkinetic syndrome      Current Outpatient Medications   Medication     Ferrous Sulfate (IRON SUPPLEMENT PO)     omeprazole (PRILOSEC) 20 MG DR capsule     albuterol (PROAIR HFA/PROVENTIL HFA/VENTOLIN HFA) 108 (90 Base) MCG/ACT inhaler     Cholecalciferol (VITAMIN D) 2000 units tablet     hydrOXYzine (ATARAX) 10 MG tablet     Prenatal Vit-Fe Fumarate-FA (PRENATAL MULTIVITAMIN PLUS IRON) 27-0.8 MG TABS per tablet     Current Facility-Administered Medications   Medication     etonogestrel (IMPLANON/NEXPLANON) subdermal implant 68 mg          Review of Systems   Constitutional, HEENT, cardiovascular, pulmonary, gi and gu systems are negative, except as otherwise noted.      Objective    There were no vitals taken for this visit.  There is no height or weight on file to calculate BMI.  Physical Exam   GENERAL: alert, no acute distress.  ABDOMEN: no visible masses or bulges. Normoactive bowel sounds. No tenderness with light or deep palpation. No rebound tenderness. No guarding. Dull to percussion.   HEART: normal rate and rhythm. No murmurs heard.  LUNGS: no wheezes or crackles.      Assessment:   GERD  - Prilosec   - Encouraged to avoid acidic and processed foods    Gallbladder pathology considered. If no relief with treatment/diet changes, consider ultrasound.         CRISTAL Palomo, PAFatmataC

## 2020-12-29 ENCOUNTER — NURSE TRIAGE (OUTPATIENT)
Dept: NURSING | Facility: CLINIC | Age: 20
End: 2020-12-29

## 2020-12-30 NOTE — TELEPHONE ENCOUNTER
"Nosebleed x 30 min. Using \"nasal clamp\" to attempt to stop bleeding but taking it off every few minutes. Not on anticoagulants. No dizziness. Advised home care.     Advised pt on correct technique to stop nosebleed. Advised to try 2 attempts of this continuous pressure and if bleeding still does not stop, call back. Pt voiced understanding and agreement.       Additional Information    Negative: Fainted or too weak to stand following large blood loss    Negative: Sounds like a life-threatening emergency to the triager    Negative: Nosebleed followed a nose injury    Negative: [1] Bleeding present > 30 minutes AND [2] using correct method of direct pressure    Negative: [1] Bleeding now AND [2] second call after being instructed in correct technique of direct pressure    Negative: Dizziness or lightheadedness    Negative: Pale skin (pallor) of new onset or worsening    Negative: [1] Has nasal packing (inserted by health care provider to control bleeding) AND [2] new rash    Negative: [1] Has nasal packing AND [2] now bleeding around the packing (Exception: few drops or ooze)    Negative: Patient sounds very sick or weak to the triager    Negative: [1] Bleeding recurs 3 or more times in 24 hours AND [2] direct pressure applied correctly    Negative: [1] Skin bruises or bleeding gums AND [2] not caused by an injury    Negative: [1] Large amount of blood has been lost (e.g., 1 cup or 240 ml) AND [2] bleeding now controlled (stopped)    Negative: [1] Has nasal packing AND [2] fever > 100.5 F (38.1 C)    Negative: Taking Coumadin (warfarin) or other strong blood thinner, or known bleeding disorder (e.g., thrombocytopenia)    Negative: Has nasal packing (inserted by health care provider to control bleeding)    Negative: Hard-to-stop nosebleeds are a chronic symptom (recurrent or ongoing AND present > 4 weeks)    Negative: Easy bleeding present in other family members    Negative: [1] Mild-moderate nosebleed AND [2] " bleeding stopped now    Negative: [1] Nosebleed AND [2] bleeding present < 30 minutes AND [3] using correct technique per guideline    [1] Nosebleed AND [2] bleeding now BUT [3] not using correct technique    Protocols used: NOSEBLEED-A-AH

## 2021-01-15 ENCOUNTER — HEALTH MAINTENANCE LETTER (OUTPATIENT)
Age: 21
End: 2021-01-15

## 2021-02-22 ENCOUNTER — OFFICE VISIT (OUTPATIENT)
Dept: FAMILY MEDICINE | Facility: CLINIC | Age: 21
End: 2021-02-22
Payer: COMMERCIAL

## 2021-02-22 VITALS
WEIGHT: 212.6 LBS | HEART RATE: 101 BPM | BODY MASS INDEX: 35.42 KG/M2 | OXYGEN SATURATION: 98 % | SYSTOLIC BLOOD PRESSURE: 100 MMHG | HEIGHT: 65 IN | RESPIRATION RATE: 25 BRPM | DIASTOLIC BLOOD PRESSURE: 70 MMHG | TEMPERATURE: 97.7 F

## 2021-02-22 DIAGNOSIS — J01.00 ACUTE NON-RECURRENT MAXILLARY SINUSITIS: Primary | ICD-10-CM

## 2021-02-22 PROCEDURE — 99213 OFFICE O/P EST LOW 20 MIN: CPT | Performed by: FAMILY MEDICINE

## 2021-02-22 RX ORDER — AZITHROMYCIN 250 MG/1
TABLET, FILM COATED ORAL
Qty: 6 TABLET | Refills: 0 | Status: SHIPPED | OUTPATIENT
Start: 2021-02-22 | End: 2021-02-27

## 2021-02-22 ASSESSMENT — ASTHMA QUESTIONNAIRES
QUESTION_2 LAST FOUR WEEKS HOW OFTEN HAVE YOU HAD SHORTNESS OF BREATH: ONCE OR TWICE A WEEK
QUESTION_1 LAST FOUR WEEKS HOW MUCH OF THE TIME DID YOUR ASTHMA KEEP YOU FROM GETTING AS MUCH DONE AT WORK, SCHOOL OR AT HOME: A LITTLE OF THE TIME
QUESTION_3 LAST FOUR WEEKS HOW OFTEN DID YOUR ASTHMA SYMPTOMS (WHEEZING, COUGHING, SHORTNESS OF BREATH, CHEST TIGHTNESS OR PAIN) WAKE YOU UP AT NIGHT OR EARLIER THAN USUAL IN THE MORNING: NOT AT ALL
QUESTION_4 LAST FOUR WEEKS HOW OFTEN HAVE YOU USED YOUR RESCUE INHALER OR NEBULIZER MEDICATION (SUCH AS ALBUTEROL): ONCE A WEEK OR LESS
EMERGENCY_ROOM_LAST_YEAR_TOTAL: ONE
QUESTION_5 LAST FOUR WEEKS HOW WOULD YOU RATE YOUR ASTHMA CONTROL: SOMEWHAT CONTROLLED
ACT_TOTALSCORE: 20

## 2021-02-22 ASSESSMENT — MIFFLIN-ST. JEOR: SCORE: 1725.84

## 2021-02-22 ASSESSMENT — PAIN SCALES - GENERAL: PAINLEVEL: NO PAIN (0)

## 2021-02-22 ASSESSMENT — PATIENT HEALTH QUESTIONNAIRE - PHQ9: SUM OF ALL RESPONSES TO PHQ QUESTIONS 1-9: 15

## 2021-02-22 NOTE — PROGRESS NOTES
"    Assessment & Plan     Acute non-recurrent maxillary sinusitis  Negative for Rapid strep at [facility]  - azithromycin (ZITHROMAX) 250 MG tablet; Take 2 tablets (500 mg) by mouth daily for 1 day, THEN 1 tablet (250 mg) daily for 4 days.     BMI:   Estimated body mass index is 35 kg/m  as calculated from the following:    Height as of this encounter: 1.66 m (5' 5.35\").    Weight as of this encounter: 96.4 kg (212 lb 9.6 oz).       Work on weight loss  Regular exercise    No follow-ups on file.    Ej Schaeffer MD  [facility]    Hayden Wray is a 21 year old who presents for the following health issues   Sinus congestion, postnasal drainage.  No fever, no cough.  She has a negative rapid strep at the main clinic.  Acute Illness  Acute illness concerns: sinus problem  Onset/Duration: yesterday  Symptoms:  Fever: no  Chills/Sweats: no  Headache (location?): YES  Sinus Pressure: YES  Conjunctivitis:  no  Ear Pain: no  Rhinorrhea: no  Congestion: YES  Sore Throat: YES  Cough: YES-productive of brown sputum  Wheeze: no  Decreased Appetite: no  Nausea: yes  Vomiting: no  Diarrhea: no  Dysuria/Freq.: no  Dysuria or Hematuria: no  Fatigue/Achiness: no  Sick/Strep Exposure: YES- daughter and little sister  Therapies tried and outcome: sudafed PE; slightly helpful      Review of Systems   Constitutional, HEENT, cardiovascular, pulmonary, gi and gu systems are negative, except as otherwise noted.      Objective    Pulse 101   Temp 97.7  F (36.5  C) (Oral)   Resp 25   Ht 1.66 m (5' 5.35\")   Wt 96.4 kg (212 lb 9.6 oz)   SpO2 98%   Breastfeeding No   BMI 35.00 kg/m    Body mass index is 35 kg/m .  Physical Exam   GENERAL: healthy, alert and no distress  HENT: ear canals and TM's normal, nose and mouth without ulcers or lesions  RESP: lungs clear to auscultation - no rales, rhonchi or wheezes  CV: regular rate and rhythm, normal S1 S2, no S3 or S4, no murmur, click or rub, no " peripheral edema and peripheral pulses strong  ABDOMEN: soft, nontender, no hepatosplenomegaly, no masses and bowel sounds normal  PSYCH: mentation appears normal, affect normal/bright      Ej Schaeffer MD

## 2021-02-23 ASSESSMENT — ASTHMA QUESTIONNAIRES: ACT_TOTALSCORE: 20

## 2021-02-27 ENCOUNTER — NURSE TRIAGE (OUTPATIENT)
Dept: NURSING | Facility: CLINIC | Age: 21
End: 2021-02-27

## 2021-02-28 NOTE — TELEPHONE ENCOUNTER
"Dizziness began around 1pm today. In the past she has fallen over because of it. She sits down quickly to avoid falling over today. \"I take iron (1 bid). Should I take another iron?\" I have been drinking propel and water, but I am still dizzy. I have had such low iron that it causes dizzy spells in the past and I was told I was borderline anemic. She had had a really bad bloody nose. Did not require transfusion. She has not had any bloody nose or menses. No bleeding currently. She states she feels very hot, temperature checked during this call = AX 97.1. She states she is drinking fluids: she has had 2 propel and one regular water, 16 oz each. She states she can only walk about 6 feet before she becomes very dizzy and has to stop, etc.     Triaged to a disposition of Go to ED now. Discussed option of calling 911 if transport assistance is needed.     Christine Swain RN Triage Nurse Advisor 7:47 PM 2/27/2021    Reason for Disposition    SEVERE dizziness (e.g., unable to stand, requires support to walk, feels like passing out now)    Additional Information    Negative: Severe difficulty breathing (e.g., struggling for each breath, speaks in single words)    Negative: [1] Difficulty breathing or swallowing AND [2] started suddenly after medicine, an allergic food or bee sting    Negative: Shock suspected (e.g., cold/pale/clammy skin, too weak to stand, low BP, rapid pulse)    Negative: Difficult to awaken or acting confused (e.g., disoriented, slurred speech)    Negative: [1] Weakness (i.e., paralysis, loss of muscle strength) of the face, arm or leg on one side of the body AND [2] sudden onset AND [3] present now    Negative: [1] Numbness (i.e., loss of sensation) of the face, arm or leg on one side of the body AND [2] sudden onset AND [3] present now    Negative: [1] Loss of speech or garbled speech AND [2] sudden onset AND [3] present now    Negative: Overdose (accidental or intentional) of medications    Negative: " "[1] Fainted > 15 minutes ago AND [2] still feels too weak or dizzy to stand    Negative: Heart beating < 50 beats per minute OR > 140 beats per minute    Negative: Sounds like a life-threatening emergency to the triager    Negative: Chest pain    Negative: Rectal bleeding, bloody stool, or tarry-black stool    Negative: [1] Vomiting AND [2] contains red blood or black (\"coffee ground\") material    Negative: Vomiting is main symptom    Negative: Diarrhea is main symptom    Negative: Headache is main symptom    Negative: Patient states that he/she is having an anxiety/panic attack    Negative: Dizziness from low blood sugar (i.e., < 60 mg/dl or 3.5 mmol/l)    Negative: Dizziness is described as a spinning sensation (i.e., vertigo)    Negative: Heat exhaustion suspected (i.e., dehydration from heat exposure)    Negative: Difficulty breathing    Protocols used: DIZZINESS - [id number]  COVID 19 Nurse Triage Plan/Patient Instructions    Please be aware that novel coronavirus (COVID-19) may be circulating in the community. If you develop symptoms such as fever, cough, or SOB or if you have concerns about the presence of another infection including coronavirus (COVID-19), please contact your health care provider or visit [email or web address].     Disposition/Instructions    ED Visit recommended. Follow protocol based instructions.     Bring Your Own Device:  Please also bring your smart device(s) (smart phones, tablets, laptops) and their charging cables for your personal use and to communicate with your care team during your visit.    Thank you for taking steps to prevent the spread of this virus.  o Limit your contact with others.  o Wear a simple mask to cover your cough.  o Wash your hands well and often.    Resources    [facility]: About COVID-19: [email or web address]    CDC: What to Do If You're Sick: www.cdc.gov/coronavirus/2019-ncov/about/steps-when-sick.html    CDC: Ending Home " Isolation: www.cdc.gov/coronavirus/2019-ncov/hcp/disposition-in-home-patients.html     CDC: Caring for Someone: www.cdc.gov/coronavirus/2019-ncov/if-you-are-sick/care-for-someone.html     [facility]: Interim Guidance for Hospital Discharge to Home: www.[facility] access hospital.[place]./diseases/coronavirus/hcp/hospdischarge.pdf    [facility] clinical trials (COVID-19 research studies): clinicalaffairs.[facility]-clinical-trials     Below are the COVID-19 hotlines at the [place] [facility] ([facility]). Interpreters are available.   o For health questions: Call 269-666-9318 or 1-768.611.3546 (7 a.m. to 7 p.m.)  o For questions about schools and childcare: Call 217-439-9726 or 1-540.152.6977 (7 a.m. to 7 p.m.)

## 2021-03-04 ENCOUNTER — TRANSFERRED RECORDS (OUTPATIENT)
Dept: HEALTH INFORMATION MANAGEMENT | Facility: CLINIC | Age: 21
End: 2021-03-04

## 2021-04-29 ENCOUNTER — NURSE TRIAGE (OUTPATIENT)
Dept: NURSING | Facility: CLINIC | Age: 21
End: 2021-04-29

## 2021-04-29 NOTE — TELEPHONE ENCOUNTER
"Pt reports \"recently diagnosed with Carlos-Parkinsons White, waves of dizziness no matter what position I am in for past hour\". Pt reports this is an ongoing issue and she is being treated for Carlos-Parkinsons White through [facility].     Advised pt to call her [facility] PERSON and ask to speak to on call provider now.    Pt verbalizes understanding and agrees to plan.     Reason for Disposition    [1] Follow-up call from patient regarding patient's clinical status AND [2] information urgent    Protocols used: PCP CALL - NO TRIAGE-A-AH      "

## 2021-05-05 ENCOUNTER — OFFICE VISIT (OUTPATIENT)
Dept: FAMILY MEDICINE | Facility: CLINIC | Age: 21
End: 2021-05-05
Payer: COMMERCIAL

## 2021-05-05 VITALS
HEART RATE: 101 BPM | WEIGHT: 218.8 LBS | SYSTOLIC BLOOD PRESSURE: 104 MMHG | BODY MASS INDEX: 36.02 KG/M2 | TEMPERATURE: 98 F | OXYGEN SATURATION: 98 % | DIASTOLIC BLOOD PRESSURE: 78 MMHG

## 2021-05-05 DIAGNOSIS — R42 SPELL OF DIZZINESS: Primary | ICD-10-CM

## 2021-05-05 DIAGNOSIS — R51.9 INTRACTABLE HEADACHE, UNSPECIFIED CHRONICITY PATTERN, UNSPECIFIED HEADACHE TYPE: ICD-10-CM

## 2021-05-05 PROCEDURE — 99214 OFFICE O/P EST MOD 30 MIN: CPT | Performed by: FAMILY MEDICINE

## 2021-05-05 ASSESSMENT — PAIN SCALES - GENERAL: PAINLEVEL: NO PAIN (0)

## 2021-05-05 NOTE — LETTER
May 5, 2021      Kamala Harris  [address]   [place] [address]        To Whom It May Concern:    Kamlaa Harris  was seen on 05/05/2021.  Please excuse her  From 05/05/2021until 05/10/2021 due to illness.        Sincerely,        Ej Schaeffer MD

## 2021-05-05 NOTE — PROGRESS NOTES
"    Assessment & Plan     Spell of dizziness  Chronic, been worsening in the past few weeks where she has used episode could last for hours.  She has no spinning, she reports she gets headache with these dizzy spells sometimes nosebleed.  Previous cardiac work-up was negative.  Reviewed her work-up which include Holter monitor, EKG, 2D echo, stress test.  Labs work as well.  Advised patient to increase fluid intake.  Will obtain a head CT scan.  Discussed with patient if that comes back normal then she may benefit from seeing neurology.  And possibly do an EEG.  - CT Head w/o Contrast; Future    Intractable headache, unspecified chronicity pattern, unspecified headache type  - CT Head w/o Contrast; Future    Review of external notes as documented elsewhere in note    BMI:   Estimated body mass index is 36.02 kg/m  as calculated from the following:    Height as of 2/22/21: 1.66 m (5' 5.35\").    Weight as of this encounter: 99.2 kg (218 lb 12.8 oz).   Weight management plan: Discussed healthy diet and exercise guidelines    See Patient Instructions    Return in about 2 weeks (around 5/19/2021) for Follow up.    Ej Schaeffer MD  [facility]    Hayden Wray is a 21 year old who presents for the following health issues[facility], cardiologist said to see PCP for dizziness   Patient reports been having these dizzy spells for years, worsening in the past few weeks.  Where she feels dizzy, lightheaded symptoms lasting for hours.  She had headache afterward.  No history of migraine headache.  She has no spinning spells.  She has no chest pain no palpitation.  She has no recent sickness with fever, cough, sore throat.  She had recent cardiac work-up which included EKG, Holter monitor, 2D echo, stress test, and blood work all came back normal.  She does follow-up with cardiology.    She denies chest pain, palpitation, she does not smoke, drink " alcohol.    Patient reports when she was younger almost 10 years ago she was seen by neurology she had an EEG which was normal.    ED/UC Followup:    [facility]ility[facility]   Date of visit: 4/29/2021  Reason for visit: Lightheaded  Current Status: Still dizzy has been missing work      Review of Systems   Constitutional, HEENT, cardiovascular, pulmonary, GI, , musculoskeletal, neuro, skin, endocrine and psych systems are negative, except as otherwise noted.      Objective    /78 (BP Location: Right arm, Patient Position: Sitting, Cuff Size: Adult Large)   Pulse 101   Temp 98  F (36.7  C) (Oral)   Wt 99.2 kg (218 lb 12.8 oz)   SpO2 98%   BMI 36.02 kg/m    Body mass index is 36.02 kg/m .  Physical Exam   GENERAL: healthy, alert and no distress  EYES: Eyes grossly normal to inspection, PERRL and conjunctivae and sclerae normal  HENT: ear canals and TM's normal, nose and mouth without ulcers or lesions  NECK: no adenopathy, no asymmetry, masses, or scars and thyroid normal to palpation  RESP: lungs clear to auscultation - no rales, rhonchi or wheezes  CV: regular rate and rhythm, normal S1 S2, no S3 or S4, no murmur, click or rub, no peripheral edema and peripheral pulses strong  MS: no gross musculoskeletal defects noted, no edema  NEURO: Normal strength and tone, mentation intact and speech normal  PSYCH: mentation appears normal, affect normal/bright    Orders Placed This Encounter   Procedures     CT Head w/o Contrast       Ej Schaeffer MD

## 2021-05-07 ENCOUNTER — TELEPHONE (OUTPATIENT)
Dept: FAMILY MEDICINE | Facility: CLINIC | Age: 21
End: 2021-05-07

## 2021-05-07 ENCOUNTER — ANCILLARY PROCEDURE (OUTPATIENT)
Dept: CT IMAGING | Facility: CLINIC | Age: 21
End: 2021-05-07
Attending: FAMILY MEDICINE
Payer: COMMERCIAL

## 2021-05-07 DIAGNOSIS — R42 SPELL OF DIZZINESS: ICD-10-CM

## 2021-05-07 DIAGNOSIS — G43.809 OTHER MIGRAINE WITHOUT STATUS MIGRAINOSUS, NOT INTRACTABLE: Primary | ICD-10-CM

## 2021-05-07 DIAGNOSIS — R51.9 INTRACTABLE HEADACHE, UNSPECIFIED CHRONICITY PATTERN, UNSPECIFIED HEADACHE TYPE: ICD-10-CM

## 2021-05-07 PROCEDURE — 70450 CT HEAD/BRAIN W/O DYE: CPT | Mod: TC | Performed by: RADIOLOGY

## 2021-05-07 RX ORDER — SUMATRIPTAN 25 MG/1
25 TABLET, FILM COATED ORAL
Qty: 9 TABLET | Refills: 3 | Status: SHIPPED | OUTPATIENT
Start: 2021-05-07 | End: 2021-05-11

## 2021-05-07 NOTE — TELEPHONE ENCOUNTER
"Routing to [place] provider pool- see phone message    Spoke with pt she saw Dr. Schaeffer two days ago on 5/5/21 for Headach/dizzy spells.    Per note \"Advised patient to increase fluid intake.  Will obtain a head CT scan.  Discussed with patient if that comes back normal then she may benefit from seeing neurology.  And possibly do an EEG.\"    CT scan complete showing \"No evidence of acute intracranial hemorrhage, mass, or herniation.\"    Neurology referral pending.    Pt also questioning if she could get medication to help control her headaches.    Notified pt that Dr. Schaeffer is gone for the day, but will route to another provider in clinic to see if they could prescribe a mediation and sign neuro referral    Ely Garvey RN        "

## 2021-05-07 NOTE — TELEPHONE ENCOUNTER
I have sent in a prescription for Imitrex for this patient's headaches.  I have done a referral for neurology    Rosalee Jerome DO

## 2021-05-07 NOTE — TELEPHONE ENCOUNTER
Reason for Call:  Medication or medication refill:     Do you use a [facility]?  Name of the pharmacy and phone number for the current request: [address], [facility] [address]     Name of the medication requested: something to help with her migraines, she's had a headache for 5 days, also nauseated    Other request:     Can we leave a detailed message on this number? YES    Phone number patient can be reached at: Home number on file [phone] (home)    Best Time: anytime    Call taken on 5/7/2021 at 2:49 PM by Brianna Leonard

## 2021-05-08 ENCOUNTER — NURSE TRIAGE (OUTPATIENT)
Dept: NURSING | Facility: CLINIC | Age: 21
End: 2021-05-08

## 2021-05-08 ENCOUNTER — OFFICE VISIT (OUTPATIENT)
Dept: URGENT CARE | Facility: URGENT CARE | Age: 21
End: 2021-05-08
Payer: COMMERCIAL

## 2021-05-08 ENCOUNTER — HOSPITAL ENCOUNTER (EMERGENCY)
Facility: CLINIC | Age: 21
Discharge: HOME | End: 2021-05-09
Attending: EMERGENCY MEDICINE | Admitting: EMERGENCY MEDICINE
Payer: COMMERCIAL

## 2021-05-08 VITALS
SYSTOLIC BLOOD PRESSURE: 115 MMHG | DIASTOLIC BLOOD PRESSURE: 79 MMHG | HEART RATE: 99 BPM | RESPIRATION RATE: 16 BRPM | TEMPERATURE: 97.9 F | BODY MASS INDEX: 36.71 KG/M2 | WEIGHT: 223 LBS | OXYGEN SATURATION: 98 %

## 2021-05-08 DIAGNOSIS — I45.6 WPW (WOLFF-PARKINSON-WHITE SYNDROME): ICD-10-CM

## 2021-05-08 DIAGNOSIS — H93.13 TINNITUS OF BOTH EARS: ICD-10-CM

## 2021-05-08 DIAGNOSIS — G43.909 MIGRAINE WITHOUT STATUS MIGRAINOSUS, NOT INTRACTABLE, UNSPECIFIED MIGRAINE TYPE: ICD-10-CM

## 2021-05-08 DIAGNOSIS — R11.0 NAUSEA: ICD-10-CM

## 2021-05-08 DIAGNOSIS — R51.9 ACUTE INTRACTABLE HEADACHE, UNSPECIFIED HEADACHE TYPE: Primary | ICD-10-CM

## 2021-05-08 LAB — INTERPRETATION ECG - MUSE: NORMAL

## 2021-05-08 PROCEDURE — 96365 THER/PROPH/DIAG IV INF INIT: CPT | Performed by: EMERGENCY MEDICINE

## 2021-05-08 PROCEDURE — 258N000003 HC RX IP 258 OP 636

## 2021-05-08 PROCEDURE — 258N000003 HC RX IP 258 OP 636: Performed by: EMERGENCY MEDICINE

## 2021-05-08 PROCEDURE — 99284 EMERGENCY DEPT VISIT MOD MDM: CPT | Mod: 25 | Performed by: EMERGENCY MEDICINE

## 2021-05-08 PROCEDURE — 96361 HYDRATE IV INFUSION ADD-ON: CPT | Performed by: EMERGENCY MEDICINE

## 2021-05-08 PROCEDURE — 93010 ELECTROCARDIOGRAM REPORT: CPT | Performed by: EMERGENCY MEDICINE

## 2021-05-08 PROCEDURE — 99214 OFFICE O/P EST MOD 30 MIN: CPT | Performed by: PHYSICIAN ASSISTANT

## 2021-05-08 PROCEDURE — 250N000011 HC RX IP 250 OP 636: Performed by: EMERGENCY MEDICINE

## 2021-05-08 PROCEDURE — 96375 TX/PRO/DX INJ NEW DRUG ADDON: CPT | Performed by: EMERGENCY MEDICINE

## 2021-05-08 PROCEDURE — 99285 EMERGENCY DEPT VISIT HI MDM: CPT | Mod: 25 | Performed by: EMERGENCY MEDICINE

## 2021-05-08 RX ORDER — DEXAMETHASONE SODIUM PHOSPHATE 10 MG/ML
10 INJECTION, SOLUTION INTRAMUSCULAR; INTRAVENOUS ONCE
Status: COMPLETED | OUTPATIENT
Start: 2021-05-08 | End: 2021-05-08

## 2021-05-08 RX ORDER — HYDROMORPHONE HYDROCHLORIDE 1 MG/ML
0.5 INJECTION, SOLUTION INTRAMUSCULAR; INTRAVENOUS; SUBCUTANEOUS
Status: DISCONTINUED | OUTPATIENT
Start: 2021-05-08 | End: 2021-05-09 | Stop reason: HOSPADM

## 2021-05-08 RX ORDER — MAGNESIUM SULFATE HEPTAHYDRATE 40 MG/ML
2 INJECTION, SOLUTION INTRAVENOUS ONCE
Status: COMPLETED | OUTPATIENT
Start: 2021-05-08 | End: 2021-05-08

## 2021-05-08 RX ORDER — KETOROLAC TROMETHAMINE 30 MG/ML
30 INJECTION, SOLUTION INTRAMUSCULAR; INTRAVENOUS ONCE
Status: COMPLETED | OUTPATIENT
Start: 2021-05-08 | End: 2021-05-08

## 2021-05-08 RX ORDER — DIPHENHYDRAMINE HYDROCHLORIDE 50 MG/ML
25 INJECTION INTRAMUSCULAR; INTRAVENOUS ONCE
Status: COMPLETED | OUTPATIENT
Start: 2021-05-08 | End: 2021-05-08

## 2021-05-08 RX ORDER — SODIUM CHLORIDE 9 MG/ML
INJECTION, SOLUTION INTRAVENOUS
Status: COMPLETED
Start: 2021-05-08 | End: 2021-05-08

## 2021-05-08 RX ADMIN — MAGNESIUM SULFATE 2 G: 2 INJECTION INTRAVENOUS at 22:07

## 2021-05-08 RX ADMIN — SODIUM CHLORIDE 1000 ML: 9 INJECTION, SOLUTION INTRAVENOUS at 19:51

## 2021-05-08 RX ADMIN — HYDROMORPHONE HYDROCHLORIDE 0.5 MG: 1 INJECTION, SOLUTION INTRAMUSCULAR; INTRAVENOUS; SUBCUTANEOUS at 22:34

## 2021-05-08 RX ADMIN — KETOROLAC TROMETHAMINE 30 MG: 30 INJECTION, SOLUTION INTRAMUSCULAR; INTRAVENOUS at 20:32

## 2021-05-08 RX ADMIN — PROMETHAZINE HYDROCHLORIDE 12.5 MG: 25 INJECTION INTRAMUSCULAR; INTRAVENOUS at 20:39

## 2021-05-08 RX ADMIN — DIPHENHYDRAMINE HYDROCHLORIDE 25 MG: 50 INJECTION, SOLUTION INTRAMUSCULAR; INTRAVENOUS at 20:32

## 2021-05-08 RX ADMIN — SODIUM CHLORIDE 50 ML: 9 INJECTION, SOLUTION INTRAVENOUS at 20:39

## 2021-05-08 RX ADMIN — DEXAMETHASONE SODIUM PHOSPHATE 10 MG: 10 INJECTION, SOLUTION INTRAMUSCULAR; INTRAVENOUS at 22:07

## 2021-05-08 ASSESSMENT — ENCOUNTER SYMPTOMS
SHORTNESS OF BREATH: 0
RHINORRHEA: 0
COUGH: 0
PALPITATIONS: 0
SORE THROAT: 0
FEVER: 0
HEADACHES: 1
CHILLS: 0
DIZZINESS: 1
PHOTOPHOBIA: 1
NAUSEA: 1
DIARRHEA: 0
ABDOMINAL PAIN: 0
VOMITING: 0

## 2021-05-08 ASSESSMENT — PAIN SCALES - GENERAL: PAINLEVEL: MILD PAIN (3)

## 2021-05-08 NOTE — TELEPHONE ENCOUNTER
Triage Call:    Patient is calling regarding her medication prescribed yesterday for migraines.  She has take 5 doses of imitrex and also took tylenol with it and nothing is working.    This headache has been going on 6 days now.  Rates pain 3-4/10 right now and it has been getting worse.    She is also reporting ringing in her ears, light sensitivity.     Pt was advised of protocol recommendation/disposition of seen in the next 24 hours.     Bridgette Matos RN on 5/8/2021 at 1:28 PM        COVID 19 Nurse Triage Plan/Patient Instructions    Please be aware that novel coronavirus (COVID-19) may be circulating in the community. If you develop symptoms such as fever, cough, or SOB or if you have concerns about the presence of another infection including coronavirus (COVID-19), please contact your health care provider or visit [email or web address].     Disposition/Instructions    In-Person Visit with provider recommended. Reference Visit Selection Guide.    Thank you for taking steps to prevent the spread of this virus.  o Limit your contact with others.  o Wear a simple mask to cover your cough.  o Wash your hands well and often.    Resources    [facility]: About COVID-19: [email or web address]    CDC: What to Do If You're Sick: www.cdc.gov/coronavirus/2019-ncov/about/steps-when-sick.html    CDC: Ending Home Isolation: www.cdc.gov/coronavirus/2019-ncov/hcp/disposition-in-home-patients.html     CDC: Caring for Someone: www.cdc.gov/coronavirus/2019-ncov/if-you-are-sick/care-for-someone.html     [facility]: Interim Guidance for Hospital Discharge to Home: www.health.[facility].[place].us/diseases/coronavirus/hcp/hospdischarge.pdf    [facility] clinical trials (COVID-19 research studies): clinicalaffairs.[facility]/umn-clinical-trials     Below are the COVID-19 hotlines at the [facility] ([facility]). Interpreters are available.   o For health questions: Call 185-483-4414 or 1-559.730.8305 (7 a.m. to 7  "p.m.)  o For questions about schools and childcare: Call 516-493-7237 or 1-435.873.6331 (7 a.m. to 7 p.m.)                   Reason for Disposition    [1] MODERATE headache (e.g., interferes with normal activities) AND [2] present > 24 hours AND [3] unexplained  (Exceptions: analgesics not tried, typical migraine, or headache part of viral illness)    Additional Information    Negative: Difficult to awaken or acting confused (e.g., disoriented, slurred speech)    Negative: [1] Weakness of the face, arm or leg on one side of the body AND [2] new onset    Negative: [1] Numbness of the face, arm or leg on one side of the body AND [2] new onset    Negative: [1] Loss of speech or garbled speech AND [2] new onset    Negative: Passed out (i.e., lost consciousness, collapsed and was not responding)    Negative: Sounds like a life-threatening emergency to the triager    Negative: Followed a head injury    Negative: Pregnant    Negative: Postpartum (from 0 to 6 weeks after delivery)    Negative: Traumatic Brain Injury (TBI) is suspected    Negative: Unable to walk, or can only walk with assistance (e.g., requires support)    Negative: Stiff neck (can't touch chin to chest)    Negative: Severe pain in one eye    Negative: [1] Other family members (or roommates) with headaches AND [2] possibility of carbon monoxide exposure    Negative: [1] SEVERE headache (e.g., excruciating) AND [2] \"worst headache\" of life    Negative: [1] SEVERE headache AND [2] sudden-onset (i.e., reaching maximum intensity within seconds)    Negative: [1] SEVERE headache AND [2] fever    Negative: Loss of vision or double vision (Exception: same as prior migraines)    Negative: [1] Fever > 100.0 F (37.8 C) AND [2] diabetes mellitus or weak immune system (e.g., HIV positive, cancer chemo, splenectomy, organ transplant, chronic steroids)    Negative: Patient sounds very sick or weak to the triager    Negative: [1] SEVERE headache (e.g., excruciating) AND " [2] not improved after 2 hours of pain medicine    Negative: [1] Vomiting AND [2] 2 or more times (Exception: similar to previous migraines)    Negative: Fever > 104 F (40 C)    Protocols used: HEADACHE-A-AH

## 2021-05-08 NOTE — PROGRESS NOTES
SUBJECTIVE:   Kamala Harris is a 21 year old female seen in [facility] today with concerns of a headache for 1 week.  Rates it a constant 3-4 out of 10.  Pain in bilateral forehead and temporal area.  She has tried 5 doses of Imitrex and Tylenol without help.  No vomiting, some nausea.  Now some ringing in both ears which is new.  She now has a neurology referral.  She had a negative head/brain CT yesterday.  She has a history of WPW and is scheduled for cardiac ablation in the future.  No current dizziness but did have several weeks of dizziness recently.  She has a history of TMJ but insists that this is not related as it has not flared up.  She has tried muscle relaxants and steroids in the past.  She is here for some relief of her headache.    REVIEW OF SYSTEMS :   GENERAL: No change in weight, sleep or appetite.  Normal energy.  No fever or chills  RESP: No coughing, wheezing or shortness of breath  GI: No  vomiting,  heartburn, abdominal pain, diarrhea, constipation or change in bowel habits  : No urinary frequency or dysuria, bladder or kidney problems  Neurologic: No numbness, tingling, weakness, problems with balance or coordination    OBJECTIVE:  Blood pressure 115/79, pulse 99, temperature 97.9  F (36.6  C), temperature source Tympanic, resp. rate 16, weight 101.2 kg (223 lb), last menstrual period 04/19/2021, SpO2 98 %, not currently breastfeeding.    General: No apparent distress, alert and oriented.  HEENT:  EOMI, PERRL, sclera and conjunctiva normal.   Neck:  supple, no lymphadenopathy.  Nontender with full pain-free range of motion.  Chest:  Lungs clear to auscultation bilaterally.  Cardiovascular: regular rate and rhythm, no murmurs.  Musculoskeletal: no gross deformities, normal muscle tone  Psychological:  Affect and mentation normal.  Speech fluent.  Judgement and insight intact.  Neurological-negative pronator drift.  No tongue deviation.  No slurred speech.  Smile symmetric.   Gait is  normal.  TMJs nontender but with opening and closing of her mouth there is quite extensive clicking and popping.      ASSESSMENT:      ICD-10-CM    1. Acute intractable headache, unspecified headache type  R51.9    2. Tinnitus of both ears  H93.13    3. Nausea  R11.0    4. WPW (Mahogany-Parkinson-White syndrome)  I45.6        PLAN: Headache now for 1 week that is constant.  Had a negative head CT yesterday.  Has referral to see neurology in the future.  Imitrex not has not helped.  We are limited in our  treatment for headaches here as we have no IV therapy available.  I recommend she go to the ER for possibly IV treatment for their headache cocktail.  I feel there is possibility that this could be from TMJ with the bilateral frontal/temporal headache and ringing in her ears, but she is adamant that she has not had any flare of the TMJ.  Brianna Goodman PA-C

## 2021-05-08 NOTE — ED PROVIDER NOTES
ED Provider Note  [facility]      History     Chief Complaint   Patient presents with     Headache     On day six of migraine, seen for dizziness about 8 days ago and later developed headache; sent here by Urgent Care; has intermittent nausea, photophobia; took 3 doses of Imitrex today and did not help at all     The history is provided by the patient and medical records.     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  Patient is scheduled to undergo a cardiac ablation and she recently had a 2 week at home event monitor.  Patient was seen at [facility] Emergency Department on 4/29/2021 for dizziness.  Patient has a long history of intermittent dizziness which she reports has been ongoing for years.  Patient reports that she will have dizziness spells anywhere from multiple times a day to once every couple of months.  Patient states that these dizzy spells are either room spinning symptoms or feeling like the floor is moving underneath her.  At [facility], labs were done and unremarkable.  Patient's EKG again showed evidence of WPW pattern.  Cardiology was consulted and patient was given 1 L of IV fluids.  Patient was feeling improved on reevaluation.  Patient's symptoms were thought to be consistent with orthostatic hypotension in the context of this WPW pattern.  Patient was discharged with instructions to follow-up with her care team.  Patient saw her PCP on 5/5/2021 for these ongoing spells of dizziness.  Patient had a CT head done yesterday that did not show any acute intracranial pathology.    Patient presents to the Emergency Department today for evaluation of a migraine in the context of ongoing intermittent dizzy spells.  Patient states that 6 days ago she developed  a migraine that has been constant since.  She reports that her migraine pain has been waxing and waning over the last 6 days.  She reports that the pain is in her bilateral frontal/parietal regions and is associated with photophobia, phonophobia, nausea and bilateral tinnitus.  Patient denies any episodes of vomiting.  She denies any associated vision changes, hearing loss and denies any sensitivity to smells.  Patient denies any recent head injuries or traumas.  Patient denies any recent fevers, chills, runny nose, congestion, sore throat, cough, chest pain, shortness of breath, palpitations, abdominal pain or diarrhea.  Patient reports that she was prescribed Imitrex at [facility], she has been taking this as prescribed, but has not had improvement of her migraine.  Patient reports that she has also been trying Tylenol and NSAIDs at home without improvement.  Patient denies any aura symptoms prior to the onset of her migraine.  Patient reports that she has had similar headaches in the past, but they have never lasted this long.  Patient denies chance of pregnancy.  She reports that her last menstrual period was on 4/19/2021.  She denies having headaches associated with her menstrual periods in the past.  Patient denies any drug or alcohol use.  She reports that she has not had caffeine in 3 months.  Of note, patient reports that she is supposed to follow-up with neurology for her ongoing dizziness symptoms, but she has not yet scheduled this appointment.    EXAM: CT SCAN OF THE HEAD WITHOUT CONTRAST   5/7/2021 11:08 AM   HISTORY: Spell of dizziness. Intractable headache.  COMPARISON: None.                                                        IMPRESSION:   No evidence of acute intracranial hemorrhage, mass, or  herniation.    Past Medical History  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      Sprain of  right wrist     right side muliple times      Uncomplicated asthma     exercise induced     Past Surgical History:   Procedure Laterality Date     CARDIAC CATHERIZATION  4 years    Told by cardiology that tiny defect would close on own, no FU     CARDIAC SURGERY       CREATE EARDRUM OPENING,LOCAL ANESTH  4 years    PETs     albuterol (PROAIR HFA/PROVENTIL HFA/VENTOLIN HFA) 108 (90 Base) MCG/ACT inhaler  Cholecalciferol (VITAMIN D) 2000 units tablet  Ferrous Sulfate (IRON SUPPLEMENT PO)  hydrOXYzine (ATARAX) 10 MG tablet  omeprazole (PRILOSEC) 20 MG DR capsule  Prenatal Vit-Fe Fumarate-FA (PRENATAL MULTIVITAMIN PLUS IRON) 27-0.8 MG TABS per tablet  SUMAtriptan (IMITREX) 25 MG tablet      Allergies   Allergen Reactions     Iodine Rash     Rash itching       Family History  Family History   Problem Relation Age of Onset     Cerebrovascular Disease Maternal Grandfather      Myocardial Infarction Paternal Grandmother      Myocardial Infarction Paternal Grandfather      Social History   Social History     Tobacco Use     Smoking status: Never Smoker     Smokeless tobacco: Never Used   Substance Use Topics     Alcohol use: Not Currently     Drug use: No      Past medical history, past surgical history, medications, allergies, family history, and social history were reviewed with the patient. No additional pertinent items.       Review of Systems   Constitutional: Negative for chills and fever.   HENT: Positive for tinnitus (bilateral). Negative for congestion, hearing loss, rhinorrhea and sore throat.         Positive for phonophobia   Eyes: Positive for photophobia. Negative for visual disturbance.   Respiratory: Negative for cough and shortness of breath.    Cardiovascular: Negative for chest pain and palpitations.   Gastrointestinal: Positive for nausea (resolved). Negative for abdominal pain, diarrhea and vomiting.   Neurological: Positive for dizziness (intermittent, chronic) and headaches. Negative for speech  difficulty, weakness, light-headedness and numbness.   Psychiatric/Behavioral: Negative for dysphoric mood.   All other systems reviewed and are negative.    A complete review of systems was performed with pertinent positives and negatives noted in the HPI, and all other systems negative.    Physical Exam     ED Triage Vitals [05/08/21 1657]   Enc Vitals Group      /64      Pulse 99      Resp 20      Temp 98.3  F (36.8  C)      Temp src Oral      SpO2 98 %     Physical Exam  Vitals signs and nursing note reviewed.   Constitutional:       General: She is not in acute distress.     Appearance: She is well-developed. She is not diaphoretic.      Comments: Alert, cooperative, lying in dark room.  Wearing sunglasses.    HENT:      Head: Normocephalic and atraumatic.   Eyes:      Conjunctiva/sclera: Conjunctivae normal.      Pupils: Pupils are equal, round, and reactive to light.   Cardiovascular:      Rate and Rhythm: Normal rate and regular rhythm.      Heart sounds: Normal heart sounds.   Pulmonary:      Effort: Pulmonary effort is normal. No respiratory distress.      Breath sounds: Normal breath sounds. No wheezing or rales.   Abdominal:      General: Bowel sounds are normal. There is no distension.      Palpations: Abdomen is soft.      Tenderness: There is no abdominal tenderness.      Comments: Obese, soft, nontender   Skin:     General: Skin is warm and dry.   Neurological:      General: No focal deficit present.      Mental Status: She is alert and oriented to person, place, and time.      GCS: GCS eye subscore is 4. GCS verbal subscore is 5. GCS motor subscore is 6.      Cranial Nerves: Cranial nerves are intact.      Sensory: Sensation is intact.      Motor: Motor function is intact.      Coordination: Coordination is intact.         ED Course      Procedures     7:16 PM  The patient was seen and examined by Miriam Garduno MD in Room ED20.                EKG Interpretation:      Interpreted by Miriam  JEFFY Garduno MD  Time reviewed: 2030  Symptoms at time of EKG: None   Rhythm: normal sinus   Rate: Normal  Axis: Normal  Ectopy: none  Conduction: delta wave consistent with WPW  ST Segments/ T Waves: No acute ischemic changes  Q Waves: none  Comparison to prior: No old EKG available    Clinical Impression: no acute pathology but WPW pattern is evident        Results for orders placed or performed during the hospital encounter of 05/08/21   EKG 12 lead     Status: None   Result Value Ref Range    Interpretation ECG Click View Image link to view waveform and result      Medications   promethazine (PHENERGAN) 12.5 mg in sodium chloride 0.9 % 50 mL intermittent infusion (12.5 mg Intravenous Given 5/8/21 2039)   HYDROmorphone (PF) (DILAUDID) injection 0.5 mg (0.5 mg Intravenous Given 5/8/21 2234)   0.9% sodium chloride BOLUS (0 mLs Intravenous Stopped 5/8/21 2035)   ketorolac (TORADOL) injection 30 mg (30 mg Intravenous Given 5/8/21 2032)   diphenhydrAMINE (BENADRYL) injection 25 mg (25 mg Intravenous Given 5/8/21 2032)   sodium chloride 0.9 % infusion (0 mLs  Stopped 5/8/21 2122)   dexamethasone PF (DECADRON) injection 10 mg (10 mg Intravenous Given 5/8/21 2207)   magnesium sulfate 2 g in water intermittent infusion (0 g Intravenous Stopped 5/8/21 2317)        Assessments & Plan (with Medical Decision Making)   Patient presents to the ED today with migraine headache ongoing x 6 days.  This is consistent with prior headaches she has had although it has lasted for a longer duration.  She was previously advised to follow-up with a headache specialist and is yet to do this.  She had a head CT done just yesterday which did not show any acute pathology.    I believe it is reasonable to treat this like a typical migraine.  I did explain to the patient that given symptoms going on for several days, it is unlikely that we are going to be able to completely eliminate her symptoms.  Ideally would be able to reduce the severity of  her symptoms to a tolerable level.  We did establish IV access and we did give the patient IV fluids.  Toradol 30 mg IV was given in addition to Phenergan 12.5 mg IV.  She reports that after these interventions she still persisted in her headache.  She was then given dexamethasone 10 mg IV to try to help prevent rebound headache.  She was also given magnesium 2 g IV.  We did give her 1 small dose of Dilaudid here in the ED as well.  Upon reassessment she was sleeping comfortably.  She reports that although she still feels some pressure in her head, her pain is gone and she is feeling better. Advised that she f/u with her headache specialist as previously recommended. Patient verbalizes understanding.     I have reviewed the nursing notes. I have reviewed the findings, diagnosis, plan and need for follow up with the patient.    Discharge Medication List as of 5/9/2021 12:10 AM          Final diagnoses:   Migraine without status migrainosus, not intractable, unspecified migraine type       --  I, Chad Matos, am serving as a trained medical scribe to document services personally performed by Miriam Garduno MD, based on the provider's statements to me.     IMiriam MD, was physically present and have reviewed and verified the accuracy of this note documented by Chad Matos.    Miriam Garduno MD  [facility] EMERGENCY DEPARTMENT  5/8/2021     Miriam Garduno MD  05/09/21 0035

## 2021-05-08 NOTE — PATIENT INSTRUCTIONS
Headache for 1 week that is constant. Imitrex no help. Normal head CT yesterday. Limited on our migraines treatment here. To ER for possible IV meds to help get pain to subside

## 2021-05-08 NOTE — LETTER
May 9, 2021      To Whom It May Concern:      Kamala Harris was seen in our Emergency Department on 05/08/21.  I expect her condition to improve over the next several days; however, she will be unable to return to work/school until 5/12/21.    Sincerely,        David Mariscal MD, MD

## 2021-05-09 ENCOUNTER — NURSE TRIAGE (OUTPATIENT)
Dept: NURSING | Facility: CLINIC | Age: 21
End: 2021-05-09

## 2021-05-09 VITALS
OXYGEN SATURATION: 95 % | SYSTOLIC BLOOD PRESSURE: 105 MMHG | HEART RATE: 93 BPM | TEMPERATURE: 98.3 F | DIASTOLIC BLOOD PRESSURE: 61 MMHG | RESPIRATION RATE: 16 BRPM

## 2021-05-09 DIAGNOSIS — N40.1 BENIGN PROSTATIC HYPERPLASIA WITH LOWER URINARY TRACT SYMPTOMS, SYMPTOM DETAILS UNSPECIFIED: Primary | ICD-10-CM

## 2021-05-09 RX ORDER — MECLIZINE HYDROCHLORIDE 25 MG/1
25 TABLET ORAL 3 TIMES DAILY PRN
Qty: 30 TABLET | Refills: 0 | Status: SHIPPED | OUTPATIENT
Start: 2021-05-09 | End: 2021-05-11

## 2021-05-09 ASSESSMENT — ENCOUNTER SYMPTOMS
SPEECH DIFFICULTY: 0
WEAKNESS: 0
LIGHT-HEADEDNESS: 0
NUMBNESS: 0
DYSPHORIC MOOD: 0

## 2021-05-09 NOTE — TELEPHONE ENCOUNTER
"Caller states she has had a constant migraine x 6 days. No relief with Imitrex taken at home x 2 days.   First she went to  yesterday, and then was sent to the ER for migraine: discharged @ 12:10pm today.   States she was fine for about 12 hrs after receiving Dilaudid, now the migraine is back. Currently pain=\"2-3\". Constant throbbing. When in the ER=\"3-6\". She is to follow up with her neurologist. Referral was given yesterday. CT of head on Friday. She is to look into preventative medication for headaches. She states her discharge instructions state to take:  Ibuprofen, Tylenol, and fluids.   She took both Ibuprofen OTC 400mg and Tylenol 1000mg @ 4pm. \"It did not help at all.\"   She is not currently dizzy.   She says she is supposed to return to work tomorrow. She would like a note to be off work until she can been by the neurologist.   Seen in [facility] ER.   PCP: Dr Radha Scaheffer @ [facility].     Advised to discuss return to work note with ER charge nurse: phone number given.     Christine Swain RN Triage Nurse Advisor 6:54 PM 5/9/2021  "

## 2021-05-09 NOTE — DISCHARGE INSTRUCTIONS
You have been seen in the emergency department today for what appears to be a migraine headache. We recommended that you drink plenty of fluids to keep hydrated. You can use Tylenol or Motrin as needed for headaches. If you notice that you are using these medicines on a daily basis, you should definitely cut back as daily use of these medications can cause a different type of headache. It is very important that you follow-up with your neurology clinic headache specialist to discuss this with them and discuss medications that you might be able to take on a daily basis to prevent headaches.

## 2021-05-10 NOTE — ED NOTES
5/9/21 1900    Patient called the ER requesting a work note.  Patient's chart was reviewed and I discussed this with the patient myself.  Patient states she did get better after her ER treatment but her symptoms gradually returned since being discharged.  Patient does have neurology follow-up and at this time will be given a work note to be off work from 5/8/2021 until 5/12/2021.    David Mariscal MD, David Jennings MD  05/09/21 1925

## 2021-05-10 NOTE — TELEPHONE ENCOUNTER
Patient calling reporting she was seen at the emergency department for Migraine yesterday. Patient asking if emergency department provider will write her a work note for tomorrow. RN called [facility] ER to ask if provider will write a note. Caller states the provider might write a note depending on what she was seen for. RN [facility]  and advised to request to be transferred to emergency department.     Informed patient can go back to the emergency department to request the work note.     Sean Norton RN  [facility] Nurse Advisors

## 2021-05-11 ENCOUNTER — OFFICE VISIT (OUTPATIENT)
Dept: FAMILY MEDICINE | Facility: CLINIC | Age: 21
End: 2021-05-11
Payer: COMMERCIAL

## 2021-05-11 VITALS
HEART RATE: 98 BPM | BODY MASS INDEX: 37.32 KG/M2 | WEIGHT: 224 LBS | SYSTOLIC BLOOD PRESSURE: 99 MMHG | HEIGHT: 65 IN | DIASTOLIC BLOOD PRESSURE: 70 MMHG | TEMPERATURE: 98.4 F

## 2021-05-11 DIAGNOSIS — G43.911 INTRACTABLE MIGRAINE WITH STATUS MIGRAINOSUS, UNSPECIFIED MIGRAINE TYPE: Primary | ICD-10-CM

## 2021-05-11 PROCEDURE — 99213 OFFICE O/P EST LOW 20 MIN: CPT | Performed by: FAMILY MEDICINE

## 2021-05-11 RX ORDER — RIZATRIPTAN BENZOATE 10 MG/1
10 TABLET ORAL
Qty: 18 TABLET | Refills: 0 | Status: SHIPPED | OUTPATIENT
Start: 2021-05-11 | End: 2022-01-19

## 2021-05-11 RX ORDER — TOPIRAMATE 25 MG/1
TABLET, FILM COATED ORAL
Qty: 60 TABLET | Refills: 1 | Status: SHIPPED | OUTPATIENT
Start: 2021-05-11 | End: 2021-05-17

## 2021-05-11 ASSESSMENT — MIFFLIN-ST. JEOR: SCORE: 1775.9

## 2021-05-11 NOTE — PROGRESS NOTES
Assessment & Plan     Intractable migraine with status migrainosus, unspecified migraine type  Patient comes as follow-up visit she was seen in the ER on May,5, 21 for headache.  Previous head CT scan was normal. Labs were normal  Reviewed ER visit note and labs  Given Imitrex is not helping  She is scheduled to see a neurologist on June 17, 2021    - topiramate (TOPAMAX) 25 MG tablet; One tab at bedtime for one wk, then 2 tab qhs  - rizatriptan (MAXALT) 10 MG tablet; Take 1 tablet (10 mg) by mouth at onset of headache for migraine May repeat in 2 hours. Max 3 tablets/24 hours.       Work on weight loss  Regular exercise    No follow-ups on file.    Ej Schaeffer MD  [facility] KHAIANKIT Wray is a 21 year old who presents for the following health issues   Been having daily headache.  Mostly frontal, throbbing pain.    she had normal head CT scan.  In the past she was on Imitrex is not helping, been taking ibuprofen and Tylenol is not helping.  Will up with Neurology 06/17/2021    ED/UC Followup:  Facility:  [facility] Emergency Department  Date of visit: 5/8/2021  Reason for visit: headache  Current Status: patient stated that she is not feeling better after ER visit. Complains of sensitivities to lights and sounds        Review of Systems   Constitutional, HEENT, cardiovascular, pulmonary, GI, , musculoskeletal, neuro, skin, endocrine and psych systems are negative, except as otherwise noted.      Objective    LMP 04/19/2021 (Approximate)   There is no height or weight on file to calculate BMI.  Physical Exam   GENERAL: healthy, alert and no distress  NEURO: Normal strength and tone, mentation intact and speech normal  PSYCH: mentation appears normal, affect normal/bright    Recent Results (from the past 744 hour(s))   CT Head w/o Contrast    Narrative    CT SCAN OF THE HEAD WITHOUT CONTRAST   5/7/2021 11:08 AM     HISTORY: Spell of dizziness. Intractable  headache.    TECHNIQUE:  Axial images of the head and coronal reformations without  IV contrast material. Radiation dose for this scan was reduced using  automated exposure control, adjustment of the mA and/or kV according  to patient size, or iterative reconstruction technique.    COMPARISON: None.    FINDINGS: There is no evidence of intracranial hemorrhage, mass, acute  infarct or anomaly. The ventricles are normal in size, shape and  configuration. The brain parenchyma and subarachnoid spaces are  normal.     The visualized portions of the sinuses and mastoids appear normal. The  bony calvarium and bones of the skull base appear intact.       Impression    IMPRESSION:   No evidence of acute intracranial hemorrhage, mass, or  herniation.      MD Ej TORRE MD

## 2021-05-12 ENCOUNTER — TELEPHONE (OUTPATIENT)
Dept: FAMILY MEDICINE | Facility: CLINIC | Age: 21
End: 2021-05-12

## 2021-05-12 ASSESSMENT — ASTHMA QUESTIONNAIRES: ACT_TOTALSCORE: 24

## 2021-05-12 NOTE — TELEPHONE ENCOUNTER
Routing to PCP    Patients continues with no improvement in migraines.  Medications prescribed yesterday not helping  Currently pain 6/10    Took 1 Topamax last night before bed    Had 2 doses of Maxalt yesterday, 1 today.      Saw Dr. Schaeffer 5-11    Intractable migraine with status migrainosus, unspecified migraine type  Patient comes as follow-up visit she was seen in the ER on May,5, 21 for headache.  Previous head CT scan was normal. Labs were normal  Reviewed ER visit note and labs  Given Imitrex is not helping  She is scheduled to see a neurologist on June 17, 2021     - topiramate (TOPAMAX) 25 MG tablet; One tab at bedtime for one wk, then 2 tab qhs  - rizatriptan (MAXALT) 10 MG tablet; Take 1 tablet (10 mg) by mouth at onset of headache for migraine May repeat in 2 hours. Max 3 tablets/24 hours.         RN received phone call from patient saying she is not getting any relief with the medications she was given for her migraines yesterday.  Currently 6/10.  Denies any nausea at this time.  Having some light sensitivity.      Sandro Jimenez, RN, BSN, PHN  [facility]

## 2021-05-13 ENCOUNTER — HOSPITAL ENCOUNTER (EMERGENCY)
Facility: CLINIC | Age: 21
Discharge: HOME | End: 2021-05-13
Attending: EMERGENCY MEDICINE | Admitting: EMERGENCY MEDICINE
Payer: COMMERCIAL

## 2021-05-13 VITALS
OXYGEN SATURATION: 99 % | TEMPERATURE: 98 F | SYSTOLIC BLOOD PRESSURE: 124 MMHG | DIASTOLIC BLOOD PRESSURE: 61 MMHG | HEART RATE: 109 BPM | RESPIRATION RATE: 14 BRPM

## 2021-05-13 DIAGNOSIS — G43.001 MIGRAINE WITHOUT AURA AND WITH STATUS MIGRAINOSUS, NOT INTRACTABLE: ICD-10-CM

## 2021-05-13 PROCEDURE — 96375 TX/PRO/DX INJ NEW DRUG ADDON: CPT | Performed by: EMERGENCY MEDICINE

## 2021-05-13 PROCEDURE — 96374 THER/PROPH/DIAG INJ IV PUSH: CPT | Performed by: EMERGENCY MEDICINE

## 2021-05-13 PROCEDURE — 96361 HYDRATE IV INFUSION ADD-ON: CPT | Performed by: EMERGENCY MEDICINE

## 2021-05-13 PROCEDURE — 99284 EMERGENCY DEPT VISIT MOD MDM: CPT | Performed by: EMERGENCY MEDICINE

## 2021-05-13 PROCEDURE — 258N000003 HC RX IP 258 OP 636: Performed by: EMERGENCY MEDICINE

## 2021-05-13 PROCEDURE — 250N000011 HC RX IP 250 OP 636: Performed by: EMERGENCY MEDICINE

## 2021-05-13 PROCEDURE — 99284 EMERGENCY DEPT VISIT MOD MDM: CPT | Mod: 25 | Performed by: EMERGENCY MEDICINE

## 2021-05-13 RX ORDER — KETOROLAC TROMETHAMINE 30 MG/ML
30 INJECTION, SOLUTION INTRAMUSCULAR; INTRAVENOUS ONCE
Status: COMPLETED | OUTPATIENT
Start: 2021-05-13 | End: 2021-05-13

## 2021-05-13 RX ADMIN — PROCHLORPERAZINE EDISYLATE 10 MG: 5 INJECTION INTRAMUSCULAR; INTRAVENOUS at 13:49

## 2021-05-13 RX ADMIN — SODIUM CHLORIDE 1000 ML: 9 INJECTION, SOLUTION INTRAVENOUS at 13:48

## 2021-05-13 RX ADMIN — KETOROLAC TROMETHAMINE 30 MG: 30 INJECTION, SOLUTION INTRAMUSCULAR; INTRAVENOUS at 13:49

## 2021-05-13 ASSESSMENT — ENCOUNTER SYMPTOMS
APPETITE CHANGE: 0
SHORTNESS OF BREATH: 0
FEVER: 0
CHILLS: 0
VOMITING: 0
COLOR CHANGE: 0
ARTHRALGIAS: 0
NAUSEA: 1
ABDOMINAL PAIN: 0
HEADACHES: 1
WEAKNESS: 0
PHOTOPHOBIA: 1
DIFFICULTY URINATING: 0
EYE REDNESS: 0
NECK STIFFNESS: 0
NUMBNESS: 0
CONFUSION: 0
UNEXPECTED WEIGHT CHANGE: 0

## 2021-05-13 NOTE — ED TRIAGE NOTES
PT states she is here for a migraine. Pt says she was treated here on Saturday for this and was feeling good for 12 hours, then pain came back. PT was seen at clinic and prescribed medication. Says medication didn't work and was told by clinic to come to ER for pain management.

## 2021-05-13 NOTE — ED PROVIDER NOTES
"    [facility] EMERGENCY DEPARTMENT ([facility])    5/13/21     ED 9 1:00 PM  History     Chief Complaint   Patient presents with     Headache     Pt has been dealing with migraine sx since Saturday. Seen here and at clinic for pain, nothing has been helping.      The history is provided by the patient and medical records.     Kamala Harris is a 21 year old female who presents with headache, photophobia for past 2 weeks.  Patient states that symptoms started 2 weeks ago when she was developing \"waves upon waves\" of dizziness.  She states that she was seen in the emergency department, had evaluation that was negative for any acute findings.  She states that she recovered and felt fine for a couple of days after she was discharged and then developed a headache that has been persistent and pounding ever since.  She was then seen in urgent care and then in the emergency department on 5/8 for this headache, was treated with IV fluids, Toradol, Phenergan, dexamethasone, and then 0.5 mg of Dilaudid.  She states that she had brief improvement with this but her headache recurred.  She was seen in clinic 2 days ago, had head CT that was normal and prescribed rizatriptan and Topamax, no improvement with this either.  She states that she has had long lasting headaches but never had a headache like this and so presents to the emergency department. Patient states that the only thing that helped her was Dilaudid.  She denies prior history of migraine headaches.  She describes her headache as frontal, radiates across her eyebrows.  Pain is throbbing in nature, worsens with sound and light. Improves with fever patches but the relief is only temporary.  She endorses nausea but no vomiting.  No fevers or chills. No numbness, tingling, difficulty walking. No unexpected weight loss. No other symptoms. Allergies to iodine. No known drug allergies. Patient is eating and drinking ok. No alcohol or street drug use; patient hasn't " drank in months. No street drug use whatsoever.     Past Medical History  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      Sprain of right wrist     right side muliple times      Uncomplicated asthma     exercise induced     Past Surgical History:   Procedure Laterality Date     CARDIAC CATHERIZATION  4 years    Told by cardiology that tiny defect would close on own, no FU     CARDIAC SURGERY       CREATE EARDRUM OPENING,LOCAL ANESTH  4 years    PETs     rizatriptan (MAXALT) 10 MG tablet  albuterol (PROAIR HFA/PROVENTIL HFA/VENTOLIN HFA) 108 (90 Base) MCG/ACT inhaler  Cholecalciferol (VITAMIN D) 2000 units tablet  Ferrous Sulfate (IRON SUPPLEMENT PO)  hydrOXYzine (ATARAX) 10 MG tablet  omeprazole (PRILOSEC) 20 MG DR capsule  Prenatal Vit-Fe Fumarate-FA (PRENATAL MULTIVITAMIN PLUS IRON) 27-0.8 MG TABS per tablet  topiramate (TOPAMAX) 25 MG tablet      Allergies   Allergen Reactions     Iodine Rash     Rash itching       Family History  Family History   Problem Relation Age of Onset     Migraines Mother      Cerebrovascular Disease Maternal Grandfather      Myocardial Infarction Paternal Grandmother      Myocardial Infarction Paternal Grandfather      Social History   Social History     Tobacco Use     Smoking status: Never Smoker     Smokeless tobacco: Never Used   Substance Use Topics     Alcohol use: Not Currently     Drug use: No      Past medical history, past surgical history, medications, allergies, family history, and social history were reviewed with the patient. No additional pertinent items.       Review of Systems   Constitutional: Negative for appetite change, chills, fever and unexpected weight change.   HENT: Negative for congestion.         Phonophobia   Eyes: Positive for photophobia. Negative for redness.   Respiratory: Negative for shortness of breath.    Cardiovascular: Negative for chest pain.    Gastrointestinal: Positive for nausea. Negative for abdominal pain and vomiting.   Genitourinary: Negative for difficulty urinating.   Musculoskeletal: Negative for arthralgias, gait problem and neck stiffness.   Skin: Negative for color change.   Neurological: Positive for headaches. Negative for weakness and numbness.   Psychiatric/Behavioral: Negative for confusion.     A complete review of systems was performed with pertinent positives and negatives noted in the HPI, and all other systems negative.    Physical Exam   BP: 127/74  Pulse: 113  Temp: 97.8  F (36.6  C)  Resp: 16  SpO2: 99 %  Physical Exam  Constitutional:       General: She is not in acute distress.     Appearance: She is not diaphoretic.   HENT:      Head: Atraumatic.      Mouth/Throat:      Pharynx: No oropharyngeal exudate.   Eyes:      General: No scleral icterus.     Pupils: Pupils are equal, round, and reactive to light.   Cardiovascular:      Heart sounds: Normal heart sounds.   Pulmonary:      Effort: No respiratory distress.      Breath sounds: Normal breath sounds.   Abdominal:      General: Bowel sounds are normal.      Palpations: Abdomen is soft.      Tenderness: There is no abdominal tenderness.   Musculoskeletal:         General: No tenderness.   Skin:     General: Skin is warm.      Findings: No rash.         ED Course      Procedures        The medical record was reviewed and interpreted.  Current labs reviewed and interpreted.  Previous labs reviewed and interpreted.       No results found for any visits on 05/13/21.  Medications   ketorolac (TORADOL) injection 30 mg (30 mg Intravenous Given 5/13/21 1349)   0.9% sodium chloride BOLUS (0 mLs Intravenous Stopped 5/13/21 1703)   prochlorperazine (COMPAZINE) injection 10 mg (10 mg Intravenous Given 5/13/21 1349)        Assessments & Plan (with Medical Decision Making)   21-year-old female who presents with persistent frontal headache.  Differential included tension headache, migraine  headache, sinusitis, intracranial process, meningitis, electrolyte abnormality, medication side effect.  Exam was entirely unremarkable including detailed neurologic exam.  Most recent results reveal unremarkable laboratories and normal head CT.  In the emergency room, patient was treated with IV fluids, Toradol and Compazine and felt improved.  Signs and symptoms consistent with migraine headache.  I recommended discharge with follow-up by primary physician and neurology.    I have reviewed the nursing notes. I have reviewed the findings, diagnosis, plan and need for follow up with the patient.    New Prescriptions    No medications on file       Final diagnoses:   Migraine without aura and with status migrainosus, not intractable   I, Lucia Mariscal, am serving as a trained medical scribe to document services personally performed by Keven Nguyen MD based on the provider's statements to me on May 13, 2021.  This document has been checked and approved by the attending provider.    I, Keven Nguyen MD, was physically present and have reviewed and verified the accuracy of this note documented by Lucia Mariscal, medical scribe.       --  Keven Nguyen MD  [facility] EMERGENCY DEPARTMENT  5/13/2021     Keven Nguyen MD  05/13/21 5022

## 2021-05-13 NOTE — TELEPHONE ENCOUNTER
RN huddled with PCP.     Dr. Ashby recommended that patient return to ED for pain control. She may need to be admitted for pain control. Can consider alternative neurology referral to [facility] or [facility] to see if patient can get in with neurology sooner than scheduled.     Unfortunately, care capacity at clinic is limited, due to not having IV medication or narcotic medication.     Attempt #1 to call patient.     Patient did not answer, RN left voicemail at home number. RN requested patient return call to [facility] at [phone].     RN also sent [employer] message. See message for details.    Ania Loza RN, BSN, PHN  [facility]: [place]

## 2021-05-13 NOTE — TELEPHONE ENCOUNTER
Patient calling again in regards to below note. Still reports ongoing head ache. Pain moderate to severe still.     Patient took tylenol, ibuprofen, Topamax and Maxalt this morning. Pain has not changed.     Confirms nausea. Denies blurry vision or altered balance. CT at 5/8 ED visit normal. Had hospital follow up visit with Dr. Schaeffer (not in clinic today). No follow up recommened at visit.    Patient wondering what to do moving forward?     Routed to PCP to review and advise.     Ania Loza, RN, BSN, PHN  [facility]: [place]

## 2021-05-17 ENCOUNTER — VIRTUAL VISIT (OUTPATIENT)
Dept: FAMILY MEDICINE | Facility: CLINIC | Age: 21
End: 2021-05-17
Payer: COMMERCIAL

## 2021-05-17 DIAGNOSIS — I45.6 WOLFF-PARKINSON-WHITE (WPW) PATTERN SEEN ON ELECTROCARDIOGRAPHY: ICD-10-CM

## 2021-05-17 DIAGNOSIS — G43.911 INTRACTABLE MIGRAINE WITH STATUS MIGRAINOSUS, UNSPECIFIED MIGRAINE TYPE: Primary | ICD-10-CM

## 2021-05-17 PROCEDURE — 99214 OFFICE O/P EST MOD 30 MIN: CPT | Mod: 95 | Performed by: FAMILY MEDICINE

## 2021-05-17 RX ORDER — TOPIRAMATE 50 MG/1
50 TABLET, FILM COATED ORAL AT BEDTIME
Qty: 90 TABLET | Refills: 0 | Status: SHIPPED | OUTPATIENT
Start: 2021-05-17 | End: 2022-01-19

## 2021-05-17 RX ORDER — SUMATRIPTAN 20 MG/1
1 SPRAY NASAL PRN
Qty: 9 EACH | Refills: 1 | Status: SHIPPED | OUTPATIENT
Start: 2021-05-17 | End: 2022-01-19

## 2021-05-17 NOTE — PROGRESS NOTES
Kamala is a 21 year old who is being evaluated via a billable video visit.      How would you like to obtain your AVS? MyChart  If the video visit is dropped, the invitation should be resent by: Text to cell phone: [phone]  Will anyone else be joining your video visit? No    ============================================================================    Assessment & Plan     Intractable migraine with status migrainosus, unspecified migraine type  - Multiple ER/UC visits for migraines recently  - Started on Topamax on 5/11 at 25 mg daily dose.  Advised her to increase to 50 mg daily  - Tried and failed OTC Tylenol, Motrin, Excedrin and Rx Imitrex (oral) and Maxalt for abortive therapy  - Will send Imitrex nasal spray  - Has an appt with [facility] neurology next month.  Referral given to either [facility] or [facility] to see if she can get in with them sooner  - She will need FMLA forms completed.  First day of missed work was 5/7.  Will document the need to be completely out of work until 6/17 so she has time to try higher dose of topamax and get in with neurology.  May need to extend that if headaches continue  - NEUROLOGY ADULT REFERRAL  - SUMAtriptan (IMITREX) 20 MG/ACT nasal spray; Spray 1 spray in nostril as needed for migraine May repeat in 2 hours. Max 2 sprays/24 hours.  - topiramate (TOPAMAX) 50 MG tablet; Take 1 tablet (50 mg) by mouth At Bedtime    Mahogany-Parkinson-White (WPW) pattern seen on electrocardiography  - Working with cardiology on this  - So far normal echo, stress test, Holter  - Patient states they are considering ablation       Return in about 2 weeks (around 5/31/2021) for Headaches.    Radha Ashby,   [facility]    ==================================================================================    Subjective   Kamala is a 21 year old who presents for the following health issues:    HPI     ED/UC Followup:    Facility:  [facility]  Date of  visit: 5/13/21  Reason for visit: headaches  Current Status: headaches went away for 2 days but then came back     Multiple ER and UC visits for migraine headaches in the past month.  Unfortunately HAs continue to come back 1-2 days after she goes to the ER.  Has been avoiding caffeine and alcohol.  Started topiramate on 5/11.  Currently has a low level headache.      Tried: Tylenol, Advil, Excedrin, Imitrex oral, Maxalt.  None of these have worked.  IV meds in the ER work great, but then headaches return.      She has an appt with neurology on 6/17 (virtual visit).    Needs work forms done.  First day she missed work was 5/7 and has been completely off since then.     Of note, EKG done at one of her ER visits earlier this year was suspicious for WPW.  She has followed up with cardiology regarding this issue and they are planning for an ablation.      Review of Systems   Constitutional, HEENT, cardiovascular, pulmonary, gi and gu systems are negative, except as otherwise noted.      Objective       Vitals:  No vitals were obtained today due to virtual visit.    Physical Exam   GENERAL: Healthy, alert and no distress  EYES: Eyes grossly normal to inspection.  No discharge or erythema, or obvious scleral/conjunctival abnormalities.  RESP: No audible wheeze, cough, or visible cyanosis.  No visible retractions or increased work of breathing.    SKIN: Visible skin clear. No significant rash, abnormal pigmentation or lesions.  NEURO: Cranial nerves grossly intact.  Mentation and speech appropriate for age.  PSYCH: Mentation appears normal, affect normal/bright, judgement and insight intact, normal speech and appearance well-groomed.    CT SCAN OF THE HEAD WITHOUT CONTRAST   5/7/2021 11:08 AM      HISTORY: Spell of dizziness. Intractable headache.     TECHNIQUE:  Axial images of the head and coronal reformations without  IV contrast material. Radiation dose for this scan was reduced using  automated exposure control,  adjustment of the mA and/or kV according  to patient size, or iterative reconstruction technique.     COMPARISON: None.     FINDINGS: There is no evidence of intracranial hemorrhage, mass, acute  infarct or anomaly. The ventricles are normal in size, shape and  configuration. The brain parenchyma and subarachnoid spaces are  normal.      The visualized portions of the sinuses and mastoids appear normal. The  bony calvarium and bones of the skull base appear intact.                                                                       IMPRESSION:   No evidence of acute intracranial hemorrhage, mass, or  herniation.          Video-Visit Details    Type of service:  Video Visit    Video Start Time: 11:47 AM  Video End Time:12:03 PM    Originating Location (pt. Location): Home    Distant Location (provider location):  [facility]     Platform used for Video Visit: [email or web address]

## 2021-05-19 ENCOUNTER — TELEPHONE (OUTPATIENT)
Dept: FAMILY MEDICINE | Facility: CLINIC | Age: 21
End: 2021-05-19

## 2021-05-19 NOTE — TELEPHONE ENCOUNTER
Patient was prescribed Imitrex via nasal on 5/17/21 and calling to report this does not work. It actually burns when she inhales it and leaves a nasty taste in her mouth. Wondering if there is an alternative? Patient has a constant headache that has not gone away    [facility]/[address]     Call patient at  ok to , call anytime.    Dolores Leonard/  [place]

## 2021-05-19 NOTE — TELEPHONE ENCOUNTER
Routing to PCP to review and advise    Patient continues currently with constant Migraine. Only thing taking consistently is Topamax        Imitrex nasal spray started 5-17 not working. Guerrier when she inhales it and leaves an awful taste in her mouth    Patient continues currently with constant Migraine. Only thing taking consistently is Topamax    Currently pain 3/10 with no light sensitivity or nausea.    Only thing that gave her relief was when she received IV Dilaudid in the ER.  Worked for about 12 hours.  When given Toradol, Decadron, and Benadryl in ER, patient had side effects.  Became dizzy and did not feel well    Patient did reach out to both [facility] and [facility] and the earliest either of them can see her is August.  She currently has appointment scheduled 6-17.        RN spoke with patient and reviewed all of the above with her.      Sandro Jimenez, RN, BSN, PHN  [facility]

## 2021-05-19 NOTE — TELEPHONE ENCOUNTER
RN spoke with patient and relayed providers message.    RN assisted patient in scheduling appointment with Dr. Schaeffer for tomorrow at 1pm    Sandro Jimenez RN, BSN, PHN  [facility]

## 2021-05-19 NOTE — TELEPHONE ENCOUNTER
If she's able to come in to the clinic we could maybe try a Toradol shot.  Unfortunately I don't have many other options to offer.  Can she get in for an appt tomorrow or with another provider today?

## 2021-05-20 ENCOUNTER — TELEPHONE (OUTPATIENT)
Dept: FAMILY MEDICINE | Facility: CLINIC | Age: 21
End: 2021-05-20

## 2021-05-20 ENCOUNTER — OFFICE VISIT (OUTPATIENT)
Dept: FAMILY MEDICINE | Facility: CLINIC | Age: 21
End: 2021-05-20
Payer: COMMERCIAL

## 2021-05-20 VITALS
WEIGHT: 222.4 LBS | BODY MASS INDEX: 36.73 KG/M2 | HEART RATE: 102 BPM | DIASTOLIC BLOOD PRESSURE: 72 MMHG | TEMPERATURE: 98.8 F | SYSTOLIC BLOOD PRESSURE: 124 MMHG | OXYGEN SATURATION: 99 %

## 2021-05-20 DIAGNOSIS — G43.911 INTRACTABLE MIGRAINE WITH STATUS MIGRAINOSUS, UNSPECIFIED MIGRAINE TYPE: Primary | ICD-10-CM

## 2021-05-20 PROCEDURE — 99213 OFFICE O/P EST LOW 20 MIN: CPT | Mod: 25 | Performed by: FAMILY MEDICINE

## 2021-05-20 PROCEDURE — 96372 THER/PROPH/DIAG INJ SC/IM: CPT | Performed by: FAMILY MEDICINE

## 2021-05-20 RX ORDER — KETOROLAC TROMETHAMINE 30 MG/ML
30 INJECTION, SOLUTION INTRAMUSCULAR; INTRAVENOUS ONCE
Status: COMPLETED | OUTPATIENT
Start: 2021-05-20 | End: 2021-05-20

## 2021-05-20 RX ADMIN — KETOROLAC TROMETHAMINE 30 MG: 30 INJECTION, SOLUTION INTRAMUSCULAR; INTRAVENOUS at 12:59

## 2021-05-20 NOTE — TELEPHONE ENCOUNTER
Sent fax to [place] [employer] atttention Eloisa Armstrong [phone]    Dolores Leonard/  [place]

## 2021-05-20 NOTE — PROGRESS NOTES
Assessment & Plan     Intractable migraine with status migrainosus, unspecified migraine type  Continue to have headache.  Given Toradol 30 mg IM today.  Continue with Topamax 50 mg at bedtime, in addition Imitrex nasal spray as needed.  FMLA form was filled.  Patient to follow-up with neurology on June, 17/2021  - ketorolac (TORADOL) injection 30 mg    No follow-ups on file.    Ej Schaeffer MD  [facility]    Hayden Wray is a 21 year old who presents for the following health issues  Continued to have headache.  She has been taking Imitrex nasal spray.  Topamax at 50 mg at bedtime.  She has been seen a few times in the ER.  Most recent May 13, 2021.  Previously she tried Maxalt, Imitrex orally did not help.  She is scheduled to see neurology on June 17, 2021.  She denies fever, chills denies nausea or vomiting.  Has no diarrhea, no neck pain.  Previous his CT scan was negative.    Patient is here for headaches follow up. She was previously prescribed Imitrex nasal spray but stopped taking it due to it burning when she inhales it and leaves a nasty taste in her mouth. She states nothing she has tried has worked.    Review of Systems    ROS: 10 point ROS neg other than the symptoms noted above in the HPI.      Objective    /72 (BP Location: Right arm, Patient Position: Chair, Cuff Size: Adult Large)   Pulse 102   Temp 98.8  F (37.1  C) (Oral)   Wt 100.9 kg (222 lb 6.4 oz)   SpO2 99%   BMI 36.73 kg/m    Body mass index is 36.73 kg/m .  Physical Exam   GENERAL: healthy, alert and no distress  MS: no gross musculoskeletal defects noted, no edema  PSYCH: mentation appears normal, affect normal/bright    Negative Head CT scan      Ej Schaeffer MD

## 2021-05-20 NOTE — NURSING NOTE
Clinic Administered Medication Documentation      Injectable Medication Documentation    Patient was given Ketorolac Tromethamine (Toradol). Prior to medication administration, verified patients identity using patient s name and date of birth. Please see MAR and medication order for additional information. Patient instructed to remain in clinic for 15 minutes and report any adverse reaction to staff immediately .      Was entire vial of medication used? Yes  Vial/Syringe: Single dose vial  Expiration Date:  12/2021  Was this medication supplied by the patient? No  Zuleika Tolentino CMA on 5/20/2021 at 1:04 PM

## 2021-05-22 ENCOUNTER — NURSE TRIAGE (OUTPATIENT)
Dept: NURSING | Facility: CLINIC | Age: 21
End: 2021-05-22

## 2021-05-22 NOTE — TELEPHONE ENCOUNTER
On many medication for prevention.    For migraines    Has been having dizzy spells. When upped dose  Of topamax   From 25mg - 50mg.  She is wondering if she could be having side effects from the increased dose of Topamax   Having tingling in finger tips, under nose on top lip.  For the past day. Comes and goes.  She also reports she has ;    a heart condition ; Carlos-Parkinson White Syndrome..  No sob, no chest pain, or any heart symptoms.    She reports she also recently had injection of   Sumatriptan two days ago, at [facility]Clinic.    Also having  swelling of right foot,  No pain , no redness , no warmth to foot.  No upper respiratory issues either she reports.    Patient advised to be seen in  for swelling of right  foot.   And to check on side effects of Topamax increase to 50mg.    Patient agreed , and states she will go to   [facility].    Connie Heaton, RN RN  [facility] Triage/refill nurse      Reason for Disposition    [1] Thigh, calf, or ankle swelling AND [2] only 1 side    Protocols used: ANKLE SWELLING-A-

## 2021-05-28 ENCOUNTER — TRANSFERRED RECORDS (OUTPATIENT)
Dept: HEALTH INFORMATION MANAGEMENT | Facility: CLINIC | Age: 21
End: 2021-05-28

## 2021-06-13 ENCOUNTER — PRE VISIT (OUTPATIENT)
Dept: NEUROLOGY | Facility: CLINIC | Age: 21
End: 2021-06-13

## 2021-06-13 NOTE — TELEPHONE ENCOUNTER
FUTURE VISIT INFORMATION      FUTURE VISIT INFORMATION:    Date: 6/17/2021    Time: 730am    Location: [facility]  REFERRAL INFORMATION:    Referring provider:  Radha Ashby     Referring providers clinic:  [facility]     Reason for visit/diagnosis  Migraines     RECORDS REQUESTED FROM:       Clinic name Comments Records Status Imaging Status   Internal JOJO Ashby-5/17/2021    ED Visit-5/8/2021    NIKUNJ Goodman-5/8/2021    Dr. Schaeffer-5/5/2021    CT Head-5/7/2021 Care Everywhere PACS

## 2021-06-17 ENCOUNTER — TELEPHONE (OUTPATIENT)
Dept: NEUROLOGY | Facility: CLINIC | Age: 21
End: 2021-06-17

## 2021-06-17 ENCOUNTER — VIRTUAL VISIT (OUTPATIENT)
Dept: NEUROLOGY | Facility: CLINIC | Age: 21
End: 2021-06-17
Attending: FAMILY MEDICINE
Payer: COMMERCIAL

## 2021-06-17 DIAGNOSIS — G43.009 MIGRAINE WITHOUT AURA AND WITHOUT STATUS MIGRAINOSUS, NOT INTRACTABLE: ICD-10-CM

## 2021-06-17 DIAGNOSIS — G43.109 MIGRAINE WITH AURA AND WITHOUT STATUS MIGRAINOSUS, NOT INTRACTABLE: ICD-10-CM

## 2021-06-17 DIAGNOSIS — G43.709 CHRONIC MIGRAINE WITHOUT AURA WITHOUT STATUS MIGRAINOSUS, NOT INTRACTABLE: Primary | ICD-10-CM

## 2021-06-17 PROCEDURE — 99205 OFFICE O/P NEW HI 60 MIN: CPT | Mod: 95 | Performed by: PSYCHIATRY & NEUROLOGY

## 2021-06-17 RX ORDER — UBROGEPANT 50 MG/1
50 TABLET ORAL
Qty: 10 TABLET | Refills: 3 | Status: SHIPPED | OUTPATIENT
Start: 2021-06-17 | End: 2021-12-08

## 2021-06-17 RX ORDER — NAPROXEN 500 MG/1
500 TABLET ORAL 2 TIMES DAILY PRN
Qty: 28 TABLET | Refills: 3 | Status: SHIPPED | OUTPATIENT
Start: 2021-06-17 | End: 2021-12-08

## 2021-06-17 RX ORDER — TOPIRAMATE 25 MG/1
100 TABLET, FILM COATED ORAL EVERY EVENING
Qty: 120 TABLET | Refills: 3 | Status: SHIPPED | OUTPATIENT
Start: 2021-06-17 | End: 2022-01-19

## 2021-06-17 NOTE — TELEPHONE ENCOUNTER
Prior Authorization Retail Medication Request    Medication/Dose: ubrogepant (UBRELVY) 50 MG tablet  ICD code (if different than what is on RX):  G43.709  Previously Tried and Failed:  OTC medications are not helpful. Ibuprofen/naproxen and Tylenol or Excedrin take a little of the pain away.     She previously took sumatriptan oral and nasal which made her nauseous. Rizatriptan was not effective. She received an injection (Toradol?) which helped for a day.     For prevention, she wears sunglasses and darkens rooms. She is not currently working. She takes topiramate 50 mg at night. She thinks it may be helping.   Rationale:  Chronic migraine    Insurance Name:  [facility]  Insurance ID:  [id number]       Pharmacy Information (if different than what is on RX)  Name:    Phone:

## 2021-06-17 NOTE — LETTER
6/17/2021       RE: Kamala Harris  [address]  [place] [address]     Dear Colleague,    Thank you for referring your patient, Kamala Harris, to the [facility] [place] at . Please see a copy of my visit note below.    Kamala is a 21 year old who is being evaluated via a billable video visit.      How would you like to obtain your AVS? MyChart  If the video visit is dropped, the invitation should be resent by: Send to e-mail at: [email or web address]  Will anyone else be joining your video visit? No      Video Start Time: 7:30 AM  Video-Visit Details    Type of service:  Video Visit    Video End Time:8:21 AM    Originating Location (pt. Location): Home    Distant Location (provider location):  [facility] [place]     Platform used for Video Visit: [facility]   [facility] and [facility]  Virtual Neurology Consult     Kamala Harris MRN# [id number]   YOB: 2000 Age: 21 year old     Requesting physician: Ej Schaeffer MD         Assessment and Recommendations:     Kamala Harris is a 21 year old female who presents for further evaluation headache.    Her headache presentation meets criteria for episodic migraine without aura.  I suspect she has is on a genetic basis.  Her virtual neurologic examination is intact today.  She has had previous head imaging, CT of the head without contrast in May 2021, which was unrevealing for secondary causes of headache.    I did not recommend further evaluation for secondary causes of headache today.  She reports a history of low iron, and iron deficiency anemia can play a role in worsening migraine disease in some patients.    We discussed a symptomatic treatment, focusing both on acute and preventative strategies:  -For acute treatment of mild headache, I recommend approximate 500 mg twice  a day as needed, not to exceed more than 14 days/month to avoid medication overuse.  -For acute treatment of moderate to severe headache, I recommend Ubrelvy 50 mg tablet taken at the onset of headache, with a repeat dose if needed.  We will attempt to get preauthorization for this.  Triptans will be avoided due to previous failure of sumatriptan and rizatriptan, and her history of Mahogany-Parkinson-White arrhythmia, which is currently untreated.  -An antiemetic could be considered in the future, if okay with her cardiologist.     Her headache frequency and severity warrant prevention.  She has started topiramate trial, and currently takes 50 mg nightly.  -I recommend topiramate 50 mg nightly, titrating it by 25 mg each week as needed and tolerated, to a maximum dose of 100 mg at night.  I recommend a 6 to 8-week trial prior to determining effectiveness.  Potential side effects were discussed.  -If topiramate is not tolerated or not effective after an adequate trial, a calcium channel blocker could be considered, if okay with her cardiologist.  With her history of depression and asthma, beta-blockers will be avoided.  With her history of will Parkinson White arrhythmia, tricyclic antidepressants will be avoided.  -A CGRP monoclonal antibody could also be considered.    I spent 60 minutes on patient care, coordination of care, and documentation.    Nina Fleming MD  Neurology            Chief Complaint:     Chief Complaint   Patient presents with     Headache     Video Visit New           History is obtained from the patient and medical record.  Patient was seen via a virtual visit in their home due to the Covid 19 global pandemic.      Kamala Harris is a 21 year old female who has been living with headaches since.    Her headaches last for hours to days, and recently she had an attack lasting weeks. Her headaches are bifrontal and throbbing. They rate 2-6/10.     She has 8-12/30 headache days per month, with  8-12/30 severe headache days per month. This has been the pattern for the past month or so. Prior to this, she had    She has associated dizziness, photophobia, phonophobia, nausea.     She has had lip tingling on one occasion. She denies typical aura. She denies a positional component, but she prefers to lay down and sleep with headache. She denies unilateral autonomic features. She denies fever or other illness (one temp was high, but may have been thermometer).     Triggers include dehydration. Sleep is helpful.    She went to the ER for headache twice in one week for severe pain. She reports about 12 hours of relief the first time and relief the second time. She preferred the treatment with Benadryl.    OTC medications are not helpful. Ibuprofen/naproxen and Tylenol or Excedrin take a little of the pain away.    She previously took sumatriptan oral and nasal which made her nauseous. Rizatriptan was not effective. She received an injection (Toradol?) which helped for a day.    For prevention, she wears sunglasses and darkens rooms. She is not currently working. She takes topiramate 50 mg at night. She thinks it may be helping.     She can have dizziness with standing since middle school. This is lightheadedness or vertigo that lasts seconds but have lasted hours. She has meclizine available. She reports she was previously worked up for seizures (unrevealing) and tried taking iron supplements.             Past Medical History:     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      Sprain of right wrist     right side muliple times      Uncomplicated asthma     exercise induced    WPW arrhythmia           Past Surgical History:     Past Surgical History:   Procedure Laterality Date     CARDIAC CATHERIZATION  4 years    Told by cardiology that tiny defect would close on own, no FU     CARDIAC SURGERY       CREATE  EARDRUM OPENING,LOCAL ANESTH  4 years    PETs             Social History:   She is . She has a 2-year-old.     She works, but is currently on medical leave. She is a  at a [employer].    She denies smoking, alcohol, and drug use. She has about 300 mg caffeine per day or less.          Family History:   There is a family history of migraine in her mother, who has infrequent attacks.   Family History   Problem Relation Age of Onset     Migraines Mother      Cerebrovascular Disease Maternal Grandfather      Myocardial Infarction Paternal Grandmother      Myocardial Infarction Paternal Grandfather      Depression Brother              Allergies:      Allergies   Allergen Reactions     Iodine Rash     Rash itching               Medications:     Current Outpatient Medications:      topiramate (TOPAMAX) 50 MG tablet, Take 1 tablet (50 mg) by mouth At Bedtime, Disp: 90 tablet, Rfl: 0     albuterol (PROAIR HFA/PROVENTIL HFA/VENTOLIN HFA) 108 (90 Base) MCG/ACT inhaler, Inhale 2 puffs into the lungs every 4 hours as needed for shortness of breath / dyspnea or wheezing (Patient not taking: Reported on 6/17/2021), Disp: 1 Inhaler, Rfl: 11     Aspirin-Acetaminophen-Caffeine (EXCEDRIN PO), Take by mouth as needed, Disp: , Rfl:      Cholecalciferol (VITAMIN D) 2000 units tablet, Take 2,000 Units by mouth daily (Patient not taking: Reported on 5/12/2020), Disp: 100 tablet, Rfl: 3     Ferrous Sulfate (IRON SUPPLEMENT PO), Take 28 mg by mouth , Disp: , Rfl:      hydrOXYzine (ATARAX) 10 MG tablet, Take 10 mg by mouth nightly as needed for itching, Disp: , Rfl:      omeprazole (PRILOSEC) 20 MG DR capsule, Take 1 capsule (20 mg) by mouth daily (Patient not taking: Reported on 6/17/2021), Disp: 90 capsule, Rfl: 1     Prenatal Vit-Fe Fumarate-FA (PRENATAL MULTIVITAMIN PLUS IRON) 27-0.8 MG TABS per tablet, Take 1 tablet by mouth daily, Disp: , Rfl:      rizatriptan (MAXALT) 10 MG tablet, Take 1 tablet (10 mg) by  mouth at onset of headache for migraine May repeat in 2 hours. Max 3 tablets/24 hours. (Patient not taking: Reported on 5/17/2021), Disp: 18 tablet, Rfl: 0     SUMAtriptan (IMITREX) 20 MG/ACT nasal spray, Spray 1 spray in nostril as needed for migraine May repeat in 2 hours. Max 2 sprays/24 hours. (Patient not taking: Reported on 5/20/2021), Disp: 9 each, Rfl: 1    Current Facility-Administered Medications:      etonogestrel (IMPLANON/NEXPLANON) subdermal implant 68 mg, 1 each, Subdermal, Once, Birgit Montesinos MD          Physical Exam:   There were no vitals taken for this visit.   Physical Exam:   General: NAD  Neurologic:   Mental Status Exam: Alert, awake and oriented to situation. No dysarthria. Speech of normal fluency.   Cranial Nerves: Pupils equal, EOMs intact, no nystagmus, facial movements symmetric, hearing intact to conversation, tongue midline and fully mobile. No tongue atrophy or fasciculations.    Motor: No drift in upper extremities. Able to stand from a seated position without use of arms. No tremors or abnormal movements noted.   Coordination: With arms outstretched, able to touch nose using index finger accurately bilaterally.  Normal finger tapping bilaterally.     Gait: Normal stance and casual gait.    Neck: Normal range of motion with lateral head movements and neck flexion.  Eyes: No conjunctival injection, no scleral icterus.           Data:     CT head wo contrast (5/7/2021):   No evidence of acute intracranial hemorrhage, mass, or  herniation.      Again, thank you for allowing me to participate in the care of your patient.      Sincerely,    Nina Fleming MD

## 2021-06-17 NOTE — TELEPHONE ENCOUNTER
I called pt to let her know Dr. Fleming wrote a return to work letter. I emailed the letter to her. She will bring in to work.     Lorena NICHOLSON

## 2021-06-17 NOTE — PROGRESS NOTES
Kamala is a 21 year old who is being evaluated via a billable video visit.      How would you like to obtain your AVS? MyChart  If the video visit is dropped, the invitation should be resent by: Send to e-mail at: [email or web address]  Will anyone else be joining your video visit? No      Video Start Time: 7:30 AM  Video-Visit Details    Type of service:  Video Visit    Video End Time:8:21 AM    Originating Location (pt. Location): Home    Distant Location (provider location):  [facility] [place]     Platform used for Video Visit: [employer]    [facility]  Virtual Neurology Consult     Kamala Harris MRN# [id number]   YOB: 2000 Age: 21 year old     Requesting physician: Ej Schaeffer MD         Assessment and Recommendations:     Kamala Harris is a 21 year old female who presents for further evaluation headache.    Her headache presentation meets criteria for episodic migraine without aura.  I suspect she has is on a genetic basis.  Her virtual neurologic examination is intact today.  She has had previous head imaging, CT of the head without contrast in May 2021, which was unrevealing for secondary causes of headache.    I did not recommend further evaluation for secondary causes of headache today.  She reports a history of low iron, and iron deficiency anemia can play a role in worsening migraine disease in some patients.    We discussed a symptomatic treatment, focusing both on acute and preventative strategies:  -For acute treatment of mild headache, I recommend approximate 500 mg twice a day as needed, not to exceed more than 14 days/month to avoid medication overuse.  -For acute treatment of moderate to severe headache, I recommend Ubrelvy 50 mg tablet taken at the onset of headache, with a repeat dose if needed.  We will attempt to get preauthorization for this.  Triptans will be avoided due to previous  failure of sumatriptan and rizatriptan, and her history of Mahogany-Parkinson-White arrhythmia, which is currently untreated.  -An antiemetic could be considered in the future, if okay with her cardiologist.     Her headache frequency and severity warrant prevention.  She has started topiramate trial, and currently takes 50 mg nightly.  -I recommend topiramate 50 mg nightly, titrating it by 25 mg each week as needed and tolerated, to a maximum dose of 100 mg at night.  I recommend a 6 to 8-week trial prior to determining effectiveness.  Potential side effects were discussed.  -If topiramate is not tolerated or not effective after an adequate trial, a calcium channel blocker could be considered, if okay with her cardiologist.  With her history of depression and asthma, beta-blockers will be avoided.  With her history of will Parkinson White arrhythmia, tricyclic antidepressants will be avoided.  -A CGRP monoclonal antibody could also be considered.    I spent 60 minutes on patient care, coordination of care, and documentation.    Nina Fleming MD  Neurology            Chief Complaint:     Chief Complaint   Patient presents with     Headache     Video Visit New           History is obtained from the patient and medical record.  Patient was seen via a virtual visit in their home due to the Covid 19 global pandemic.      Kamala Harris is a 21 year old female who has been living with headaches since.    Her headaches last for hours to days, and recently she had an attack lasting weeks. Her headaches are bifrontal and throbbing. They rate 2-6/10.     She has 8-12/30 headache days per month, with 8-12/30 severe headache days per month. This has been the pattern for the past month or so. Prior to this, she had    She has associated dizziness, photophobia, phonophobia, nausea.     She has had lip tingling on one occasion. She denies typical aura. She denies a positional component, but she prefers to lay down and sleep with  headache. She denies unilateral autonomic features. She denies fever or other illness (one temp was high, but may have been thermometer).     Triggers include dehydration. Sleep is helpful.    She went to the ER for headache twice in one week for severe pain. She reports about 12 hours of relief the first time and relief the second time. She preferred the treatment with Benadryl.    OTC medications are not helpful. Ibuprofen/naproxen and Tylenol or Excedrin take a little of the pain away.    She previously took sumatriptan oral and nasal which made her nauseous. Rizatriptan was not effective. She received an injection (Toradol?) which helped for a day.    For prevention, she wears sunglasses and darkens rooms. She is not currently working. She takes topiramate 50 mg at night. She thinks it may be helping.     She can have dizziness with standing since middle school. This is lightheadedness or vertigo that lasts seconds but have lasted hours. She has meclizine available. She reports she was previously worked up for seizures (unrevealing) and tried taking iron supplements.             Past Medical History:     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      Sprain of right wrist     right side muliple times      Uncomplicated asthma     exercise induced    WPW arrhythmia           Past Surgical History:     Past Surgical History:   Procedure Laterality Date     CARDIAC CATHERIZATION  4 years    Told by cardiology that tiny defect would close on own, no FU     CARDIAC SURGERY       CREATE EARDRUM OPENING,LOCAL ANESTH  4 years    PETs             Social History:   She is . She has a 2-year-old.     She works, but is currently on medical leave. She is a  at a [employer].    She denies smoking, alcohol, and drug use. She has about 300 mg caffeine per day or less.          Family History:   There  is a family history of migraine in her mother, who has infrequent attacks.   Family History   Problem Relation Age of Onset     Migraines Mother      Cerebrovascular Disease Maternal Grandfather      Myocardial Infarction Paternal Grandmother      Myocardial Infarction Paternal Grandfather      Depression Brother              Allergies:      Allergies   Allergen Reactions     Iodine Rash     Rash itching               Medications:     Current Outpatient Medications:      topiramate (TOPAMAX) 50 MG tablet, Take 1 tablet (50 mg) by mouth At Bedtime, Disp: 90 tablet, Rfl: 0     albuterol (PROAIR HFA/PROVENTIL HFA/VENTOLIN HFA) 108 (90 Base) MCG/ACT inhaler, Inhale 2 puffs into the lungs every 4 hours as needed for shortness of breath / dyspnea or wheezing (Patient not taking: Reported on 6/17/2021), Disp: 1 Inhaler, Rfl: 11     Aspirin-Acetaminophen-Caffeine (EXCEDRIN PO), Take by mouth as needed, Disp: , Rfl:      Cholecalciferol (VITAMIN D) 2000 units tablet, Take 2,000 Units by mouth daily (Patient not taking: Reported on 5/12/2020), Disp: 100 tablet, Rfl: 3     Ferrous Sulfate (IRON SUPPLEMENT PO), Take 28 mg by mouth , Disp: , Rfl:      hydrOXYzine (ATARAX) 10 MG tablet, Take 10 mg by mouth nightly as needed for itching, Disp: , Rfl:      omeprazole (PRILOSEC) 20 MG DR capsule, Take 1 capsule (20 mg) by mouth daily (Patient not taking: Reported on 6/17/2021), Disp: 90 capsule, Rfl: 1     Prenatal Vit-Fe Fumarate-FA (PRENATAL MULTIVITAMIN PLUS IRON) 27-0.8 MG TABS per tablet, Take 1 tablet by mouth daily, Disp: , Rfl:      rizatriptan (MAXALT) 10 MG tablet, Take 1 tablet (10 mg) by mouth at onset of headache for migraine May repeat in 2 hours. Max 3 tablets/24 hours. (Patient not taking: Reported on 5/17/2021), Disp: 18 tablet, Rfl: 0     SUMAtriptan (IMITREX) 20 MG/ACT nasal spray, Spray 1 spray in nostril as needed for migraine May repeat in 2 hours. Max 2 sprays/24 hours. (Patient not taking: Reported on  5/20/2021), Disp: 9 each, Rfl: 1    Current Facility-Administered Medications:      etonogestrel (IMPLANON/NEXPLANON) subdermal implant 68 mg, 1 each, Subdermal, Once, Birgit Montesinos MD          Physical Exam:   There were no vitals taken for this visit.   Physical Exam:   General: NAD  Neurologic:   Mental Status Exam: Alert, awake and oriented to situation. No dysarthria. Speech of normal fluency.   Cranial Nerves: Pupils equal, EOMs intact, no nystagmus, facial movements symmetric, hearing intact to conversation, tongue midline and fully mobile. No tongue atrophy or fasciculations.    Motor: No drift in upper extremities. Able to stand from a seated position without use of arms. No tremors or abnormal movements noted.   Coordination: With arms outstretched, able to touch nose using index finger accurately bilaterally.  Normal finger tapping bilaterally.     Gait: Normal stance and casual gait.    Neck: Normal range of motion with lateral head movements and neck flexion.  Eyes: No conjunctival injection, no scleral icterus.           Data:     CT head wo contrast (5/7/2021):   No evidence of acute intracranial hemorrhage, mass, or  herniation.

## 2021-06-17 NOTE — TELEPHONE ENCOUNTER
Patient was seen today, and is requesting a note to return to work with no restrictions.  Wondering if the provider will write one for her.  Please call patient at  ok to lm, call anytime.     Dolores Leonard/  [place]

## 2021-07-22 ENCOUNTER — MEDICAL CORRESPONDENCE (OUTPATIENT)
Dept: HEALTH INFORMATION MANAGEMENT | Facility: CLINIC | Age: 21
End: 2021-07-22

## 2021-07-28 ENCOUNTER — ALLIED HEALTH/NURSE VISIT (OUTPATIENT)
Dept: NURSING | Facility: CLINIC | Age: 21
End: 2021-07-28
Payer: COMMERCIAL

## 2021-07-28 DIAGNOSIS — Z79.899 HIGH RISK MEDICATION USE: Primary | ICD-10-CM

## 2021-07-28 PROCEDURE — 93000 ELECTROCARDIOGRAM COMPLETE: CPT

## 2021-07-28 PROCEDURE — 99207 PR NO CHARGE NURSE ONLY: CPT

## 2021-07-28 NOTE — PROGRESS NOTES
EKG order from [facility]  Provider: Sarah March  Phone: [phone]  Fax: [phone]    Copy of EKG faxed over to [facility] for them to review.    July Peña MA

## 2021-07-28 NOTE — PROGRESS NOTES
EKG reviewed.  Slightly short CO interval.  WPW (known).  No significant changes from previous EKGs.

## 2021-07-30 ENCOUNTER — LAB (OUTPATIENT)
Dept: LAB | Facility: CLINIC | Age: 21
End: 2021-07-30
Payer: COMMERCIAL

## 2021-07-30 DIAGNOSIS — Z13.21 ENCOUNTER FOR VITAMIN DEFICIENCY SCREENING: ICD-10-CM

## 2021-07-30 DIAGNOSIS — Z79.899 HIGH RISK MEDICATION USE: Primary | ICD-10-CM

## 2021-07-30 DIAGNOSIS — Z79.899 HIGH RISK MEDICATION USE: ICD-10-CM

## 2021-07-30 LAB
BASOPHILS # BLD AUTO: 0 10E3/UL (ref 0–0.2)
BASOPHILS NFR BLD AUTO: 0 %
EOSINOPHIL # BLD AUTO: 0.1 10E3/UL (ref 0–0.7)
EOSINOPHIL NFR BLD AUTO: 2 %
ERYTHROCYTE [DISTWIDTH] IN BLOOD BY AUTOMATED COUNT: 15.2 % (ref 10–15)
HCT VFR BLD AUTO: 38.1 % (ref 35–47)
HGB BLD-MCNC: 12.5 G/DL (ref 11.7–15.7)
LYMPHOCYTES # BLD AUTO: 1.8 10E3/UL (ref 0.8–5.3)
LYMPHOCYTES NFR BLD AUTO: 38 %
MCH RBC QN AUTO: 25.7 PG (ref 26.5–33)
MCHC RBC AUTO-ENTMCNC: 32.8 G/DL (ref 31.5–36.5)
MCV RBC AUTO: 78 FL (ref 78–100)
MONOCYTES # BLD AUTO: 0.4 10E3/UL (ref 0–1.3)
MONOCYTES NFR BLD AUTO: 9 %
NEUTROPHILS # BLD AUTO: 2.4 10E3/UL (ref 1.6–8.3)
NEUTROPHILS NFR BLD AUTO: 51 %
PLATELET # BLD AUTO: 180 10E3/UL (ref 150–450)
PTH-INTACT SERPL-MCNC: 34 PG/ML (ref 18–80)
RBC # BLD AUTO: 4.87 10E6/UL (ref 3.8–5.2)
WBC # BLD AUTO: 4.8 10E3/UL (ref 4–11)

## 2021-07-30 PROCEDURE — 85025 COMPLETE CBC W/AUTO DIFF WBC: CPT

## 2021-07-30 PROCEDURE — 84443 ASSAY THYROID STIM HORMONE: CPT

## 2021-07-30 PROCEDURE — 80076 HEPATIC FUNCTION PANEL: CPT

## 2021-07-30 PROCEDURE — 84439 ASSAY OF FREE THYROXINE: CPT

## 2021-07-30 PROCEDURE — 83970 ASSAY OF PARATHORMONE: CPT

## 2021-07-30 PROCEDURE — 36415 COLL VENOUS BLD VENIPUNCTURE: CPT

## 2021-07-30 PROCEDURE — 82306 VITAMIN D 25 HYDROXY: CPT

## 2021-07-31 LAB
ALBUMIN SERPL-MCNC: 3.8 G/DL (ref 3.4–5)
ALP SERPL-CCNC: 70 U/L (ref 40–150)
ALT SERPL W P-5'-P-CCNC: 35 U/L (ref 0–50)
AST SERPL W P-5'-P-CCNC: 17 U/L (ref 0–45)
BILIRUB DIRECT SERPL-MCNC: 0.1 MG/DL (ref 0–0.2)
BILIRUB SERPL-MCNC: 0.5 MG/DL (ref 0.2–1.3)
PROT SERPL-MCNC: 6.5 G/DL (ref 6.8–8.8)
T4 FREE SERPL-MCNC: 0.96 NG/DL (ref 0.76–1.46)
TSH SERPL DL<=0.005 MIU/L-ACNC: 0.32 MU/L (ref 0.4–4)

## 2021-08-01 LAB — DEPRECATED CALCIDIOL+CALCIFEROL SERPL-MC: 22 UG/L (ref 20–75)

## 2021-08-16 ENCOUNTER — VIRTUAL VISIT (OUTPATIENT)
Dept: NEUROLOGY | Facility: CLINIC | Age: 21
End: 2021-08-16
Payer: COMMERCIAL

## 2021-08-16 DIAGNOSIS — G43.709 CHRONIC MIGRAINE WITHOUT AURA WITHOUT STATUS MIGRAINOSUS, NOT INTRACTABLE: Primary | ICD-10-CM

## 2021-08-16 DIAGNOSIS — G43.009 MIGRAINE WITHOUT AURA AND WITHOUT STATUS MIGRAINOSUS, NOT INTRACTABLE: ICD-10-CM

## 2021-08-16 PROCEDURE — 99214 OFFICE O/P EST MOD 30 MIN: CPT | Mod: 95 | Performed by: PSYCHIATRY & NEUROLOGY

## 2021-08-16 RX ORDER — ESCITALOPRAM OXALATE 10 MG/1
1 TABLET ORAL DAILY
COMMUNITY
Start: 2021-07-22 | End: 2022-01-19

## 2021-08-16 RX ORDER — VERAPAMIL HYDROCHLORIDE 80 MG/1
80 TABLET ORAL 3 TIMES DAILY
Qty: 90 TABLET | Refills: 3 | Status: SHIPPED | OUTPATIENT
Start: 2021-08-16 | End: 2022-01-19

## 2021-08-16 RX ORDER — UBROGEPANT 100 MG/1
100 TABLET ORAL
Qty: 10 TABLET | Refills: 11 | Status: SHIPPED | OUTPATIENT
Start: 2021-08-16 | End: 2022-06-15

## 2021-08-16 RX ORDER — ONDANSETRON 4 MG/1
4 TABLET, FILM COATED ORAL EVERY 8 HOURS PRN
Qty: 20 TABLET | Refills: 11 | Status: SHIPPED | OUTPATIENT
Start: 2021-08-16 | End: 2022-01-19

## 2021-08-16 NOTE — LETTER
8/16/2021      RE: Kamala Harris  [address]  [place] [address]       She was seen for a billable video visit.    [facility]  Neurology Progress Note    Subjective:    Ms. Harris returns for follow-up of episodic migraine without aura and history of iron deficiency anemia.  I last saw her in June 2021.  At that time, we discussed a trial of topiramate for headache prevention and Ubrelvy and naproxen for acute treatment.    Today, she reports that her headache frequency and severity has increased.  She currently has 15 severe headache days per month.  Additionally, she has developed a new symptom associated with her migraine attacks, tingling in her lips.  This lasts minutes and occurs prior to migraine attacks.  She describes this as a warning sign.  This happened stereotypically in the same way with severe migraine attacks.    For acute treatment, Ubrelvy 50 mg is somewhat helpful.  This can make her feel more nauseous, however    For headache prevention, she was able to start topiramate and titrated up to topiramate 100 mg; this has not been helpful. She also has side effects including word finding difficutly, nausea.    Objective:    Vitals: There were no vitals taken for this visit.  General: Cooperative, NAD  Neurologic:  Mental Status: Fully alert, attentive and oriented. Speech clear and fluent.   Cranial Nerves: Facial movements symmetric.   Motor: No abnormal movements.      Assessment/Plan:   Kamala Harris episodic migraine without aura and hx iron deficiency anemia.  Her migraine presentation has changed, and now meets criteria for chronic migraine with sensory aura.  Her sensory symptoms meet criteria for aura and occurring stereotypically in the same way each time.  We discussed changing her symptomatic treatment plan.    We discussed a symptomatic treatment, focusing both on acute and preventative strategies:  -For  acute treatment of mild headache, I recommend naproxen 500 mg twice a day as needed, not to exceed more than 14 days/month to avoid medication overuse.  -For acute treatment of moderate to severe headache, I recommend Ubrelvy at an increased dose of 100 mg tablet taken at the onset of headache, with a repeat dose if needed.  We will attempt to get preauthorization for this.  -For headache related nausea, I recommend ondansetron 4 mg as needed.    Her headache frequency and severity warrant prevention.    Topiramate was not helpful.  -I recommend a trial of verapamil starting at 80 mg daily, titrating up by 80 mg each week, as needed and tolerated, to a maximum dose of 80 mg 3 times per day.  Potential side effects were discussed.  -If verapamil is not tolerated or not effective after an adequate trial, botulinum toxin injections using a chronic migraine protocol every 12 weeks or a CGRP monoclonal antibody will be considered.  -In addition to any of the above, we discussed adding Cefaly antimigraine device to be used 20 minutes nightly for prevention and 60 minutes as needed for acute treatment of migraine.    I spent 30 minutes on patient care and documentation today.    Nina Fleming MD  Neurology         Nnia Fleming MD

## 2021-08-16 NOTE — LETTER
8/16/2021       RE: Kamala Harris  [address]  [place] [address]     Dear Colleague,    Thank you for referring your patient, Kamala Harris, to the [facility] [place] at [facility]. Please see a copy of my visit note below.    She was seen for a billable video visit.    [facility]    [facility] and [facility]  Neurology Progress Note    Subjective:    Ms. Harris returns for follow-up of episodic migraine without aura and history of iron deficiency anemia.  I last saw her in June 2021.  At that time, we discussed a trial of topiramate for headache prevention and Ubrelvy and naproxen for acute treatment.    Today, she reports that her headache frequency and severity has increased.  She currently has 15 severe headache days per month.  Additionally, she has developed a new symptom associated with her migraine attacks, tingling in her lips.  This lasts minutes and occurs prior to migraine attacks.  She describes this as a warning sign.  This happened stereotypically in the same way with severe migraine attacks.    For acute treatment, Ubrelvy 50 mg is somewhat helpful.  This can make her feel more nauseous, however    For headache prevention, she was able to start topiramate and titrated up to topiramate 100 mg; this has not been helpful. She also has side effects including word finding difficutly, nausea.    Objective:    Vitals: There were no vitals taken for this visit.  General: Cooperative, NAD  Neurologic:  Mental Status: Fully alert, attentive and oriented. Speech clear and fluent.   Cranial Nerves: Facial movements symmetric.   Motor: No abnormal movements.      Assessment/Plan:   Kamala Harris episodic migraine without aura and hx iron deficiency anemia.  Her migraine presentation has changed, and now meets criteria for chronic migraine with sensory aura.  Her sensory symptoms  meet criteria for aura and occurring stereotypically in the same way each time.  We discussed changing her symptomatic treatment plan.    We discussed a symptomatic treatment, focusing both on acute and preventative strategies:  -For acute treatment of mild headache, I recommend naproxen 500 mg twice a day as needed, not to exceed more than 14 days/month to avoid medication overuse.  -For acute treatment of moderate to severe headache, I recommend Ubrelvy at an increased dose of 100 mg tablet taken at the onset of headache, with a repeat dose if needed.  We will attempt to get preauthorization for this.  -For headache related nausea, I recommend ondansetron 4 mg as needed.    Her headache frequency and severity warrant prevention.    Topiramate was not helpful.  -I recommend a trial of verapamil starting at 80 mg daily, titrating up by 80 mg each week, as needed and tolerated, to a maximum dose of 80 mg 3 times per day.  Potential side effects were discussed.  -If verapamil is not tolerated or not effective after an adequate trial, botulinum toxin injections using a chronic migraine protocol every 12 weeks or a CGRP monoclonal antibody will be considered.  -In addition to any of the above, we discussed adding Cefaly antimigraine device to be used 20 minutes nightly for prevention and 60 minutes as needed for acute treatment of migraine.    I spent 30 minutes on patient care and documentation today.    Nina Fleming MD  Neurology         Again, thank you for allowing me to participate in the care of your patient.      Sincerely,    Nina Fleming MD

## 2021-08-16 NOTE — PROGRESS NOTES
She was seen for a billable video visit.    [facility]  Neurology Progress Note    Subjective:    Ms. Harris returns for follow-up of episodic migraine without aura and history of iron deficiency anemia.  I last saw her in June 2021.  At that time, we discussed a trial of topiramate for headache prevention and Ubrelvy and naproxen for acute treatment.    Today, she reports that her headache frequency and severity has increased.  She currently has 15 severe headache days per month.  Additionally, she has developed a new symptom associated with her migraine attacks, tingling in her lips.  This lasts minutes and occurs prior to migraine attacks.  She describes this as a warning sign.  This happened stereotypically in the same way with severe migraine attacks.    For acute treatment, Ubrelvy 50 mg is somewhat helpful.  This can make her feel more nauseous, however    For headache prevention, she was able to start topiramate and titrated up to topiramate 100 mg; this has not been helpful. She also has side effects including word finding difficutly, nausea.    Objective:    Vitals: There were no vitals taken for this visit.  General: Cooperative, NAD  Neurologic:  Mental Status: Fully alert, attentive and oriented. Speech clear and fluent.   Cranial Nerves: Facial movements symmetric.   Motor: No abnormal movements.      Assessment/Plan:   Kamala Harris episodic migraine without aura and hx iron deficiency anemia.  Her migraine presentation has changed, and now meets criteria for chronic migraine with sensory aura.  Her sensory symptoms meet criteria for aura and occurring stereotypically in the same way each time.  We discussed changing her symptomatic treatment plan.    We discussed a symptomatic treatment, focusing both on acute and preventative strategies:  -For acute treatment of mild headache, I recommend naproxen 500 mg twice a day as needed, not to  exceed more than 14 days/month to avoid medication overuse.  -For acute treatment of moderate to severe headache, I recommend Ubrelvy at an increased dose of 100 mg tablet taken at the onset of headache, with a repeat dose if needed.  We will attempt to get preauthorization for this.  -For headache related nausea, I recommend ondansetron 4 mg as needed.    Her headache frequency and severity warrant prevention.    Topiramate was not helpful.  -I recommend a trial of verapamil starting at 80 mg daily, titrating up by 80 mg each week, as needed and tolerated, to a maximum dose of 80 mg 3 times per day.  Potential side effects were discussed.  -If verapamil is not tolerated or not effective after an adequate trial, botulinum toxin injections using a chronic migraine protocol every 12 weeks or a CGRP monoclonal antibody will be considered.  -In addition to any of the above, we discussed adding Cefaly antimigraine device to be used 20 minutes nightly for prevention and 60 minutes as needed for acute treatment of migraine.    I spent 30 minutes on patient care and documentation today.    Nina Fleming MD  Neurology

## 2021-09-04 ENCOUNTER — HEALTH MAINTENANCE LETTER (OUTPATIENT)
Age: 21
End: 2021-09-04

## 2021-11-18 DIAGNOSIS — J45.21 MILD INTERMITTENT ASTHMA WITH EXACERBATION: ICD-10-CM

## 2021-11-18 NOTE — TELEPHONE ENCOUNTER
Patient calling for a refill on     albuterol (PROAIR HFA/PROVENTIL HFA/VENTOLIN HFA) 108 (90 Base) MCG/ACT inhaler    [facility]    Pt does have an upcoming physical scheduled with PCP.     Dolores Leonard/  [place]

## 2021-11-19 NOTE — TELEPHONE ENCOUNTER
Routing refill request to provider for review/approval because:  ACT      Pending Prescriptions:                       Disp   Refills    albuterol (PROAIR HFA/PROVENTIL HFA/VENTOL*                Sig: Inhale 2 puffs into the lungs every 4 hours as needed           for shortness of breath / dyspnea or wheezing      Annabel Anderson RN

## 2021-11-22 RX ORDER — ALBUTEROL SULFATE 90 UG/1
2 AEROSOL, METERED RESPIRATORY (INHALATION) EVERY 4 HOURS PRN
Qty: 18 G | Refills: 3 | Status: SHIPPED | OUTPATIENT
Start: 2021-11-22 | End: 2022-01-19

## 2021-11-24 ENCOUNTER — E-VISIT (OUTPATIENT)
Dept: URGENT CARE | Facility: CLINIC | Age: 21
End: 2021-11-24
Payer: COMMERCIAL

## 2021-11-24 DIAGNOSIS — Z71.89 ADVICE GIVEN ABOUT 2019-NCOV INFECTION: Primary | ICD-10-CM

## 2021-11-24 PROCEDURE — 99421 OL DIG E/M SVC 5-10 MIN: CPT | Performed by: PHYSICIAN ASSISTANT

## 2021-11-24 NOTE — PATIENT INSTRUCTIONS
Dear Kamala Harris,    You have written you have tested positive for Covid 19 within the past 90 days. Per the CDC recommendation, we do not retest asymptomatic people within that 90 day period outside of exceptional circumstances.    You do not need to be retested before returning to work or school or other activities.    Your surgeon should not be retesting you before surgeries within the 90 day period of time.    We do retest people who have severe immune compromise. I do not see that you have severe immune compromise.  Why don't we retest: 10 days after symptom begin, people are no longer contagious (or after 20 days, if you have a weak immune system). The tests are frequently positive for up to 90 days long after people are at no risk of giving the virus to other people    After waiting the 10 or 20 days--as long as you're fever-free and feeling better--you may go back to work, school and other activities without having another test.  CDC Guidance can be viewed at: https://www.cdc.gov/coronavirus/2019-ncov/hcp/duration-isolation.html    Your symptoms are likely due to your COVID-19 infection, are due to a virus, and therefore will not respond to an antibiotic.    Mikaela Stephens PA-C

## 2021-12-08 ENCOUNTER — VIRTUAL VISIT (OUTPATIENT)
Dept: NEUROLOGY | Facility: CLINIC | Age: 21
End: 2021-12-08
Payer: COMMERCIAL

## 2021-12-08 DIAGNOSIS — G43.009 MIGRAINE WITHOUT AURA AND WITHOUT STATUS MIGRAINOSUS, NOT INTRACTABLE: ICD-10-CM

## 2021-12-08 PROCEDURE — 99214 OFFICE O/P EST MOD 30 MIN: CPT | Mod: 95 | Performed by: PSYCHIATRY & NEUROLOGY

## 2021-12-08 RX ORDER — NAPROXEN 500 MG/1
500 TABLET ORAL 2 TIMES DAILY PRN
Qty: 28 TABLET | Refills: 11 | Status: SHIPPED | OUTPATIENT
Start: 2021-12-08 | End: 2022-06-15

## 2021-12-08 NOTE — PROGRESS NOTES
Kamala is a 21 year old who is being evaluated via a billable video visit.      How would you like to obtain your AVS? MyChart  If the video visit is dropped, the invitation should be resent by: Text to cell phone: [phone]  Will anyone else be joining your video visit? No      Video Start Time: 7:31 AM  Video-Visit Details    Type of service:  Video Visit    Video End Time:7:43 AM    Originating Location (pt. Location): Home    Distant Location (provider location):  [facility] [place]     Platform used for Video Visit: [employer]    Last Patient-Answered HIT-6 Questionnaire  No flowsheet data found.     MIGRAINE DISABILITY ASSESSMENT (MIDAS)    On how many days in the last 3 months did you miss work or school because of your headaches?  1    How many days in the last 3 months was your productivity at work or school reduced by half or more because of your headaches? (Do not include days you counted in question 1 where you missed work or school.)  3    On how many days in the last 3 months did you not do household work (such as housework, home repairs and maintenance, shopping, caring for children and relatives) because of your headaches?  0    How many days in the last 3 months was your productivity in household work reduced by half or more because of your headaches? (Do not include days you counted in question 3 where you did not do household work).  0    On how many days in the last 3 months did you miss family, social, or lesiure activities because of your headaches?  0    MIDAS Total Score:     On how many days in the last 3 months did you have a headache? (If a headache lasted more than 1 day, count each day.)   10-15    On a scale of 0 - 10, on average how painful were these headaches (where 0 = no pain at all, and 10 = pain as bad as it can be.)  4    Marguerite Amor, Virtual Facilitator, 12/8/2021     [facility] [place]  Neurology  Progress Note    Subjective:    Ms Harris returns for follow-up of episodic migraine.  She experienced blurred vision leading into headache, suggesting she may have visual aura as well.    Ms. Harris reports a reduction in headaches since I last saw her. She has a bigger attack about once a month. If she doesn't drink water and stay well-hydrated, it can occur every other day.     She describes blurred vision with some headaches; this lasted 20 minutes and led into headache.    She is staying hydrated.  Due to the decrease in headaches, she didn't start topiramate.    She is working at a less stressful job.  This is helpful in reducing triggers.    She treats acutely with Ubrelvy 100 mg with naproxen 500 mg and wears sunglasses. She will sleep after work.  She found Ubrelvy 100 mg worked significantly better than 50 mg.    Objective:    Vitals: There were no vitals taken for this visit.  General: Cooperative, NAD  Neurologic:  Mental Status: Fully alert, attentive and oriented. Speech clear and fluent.   Cranial Nerves: Facial movements symmetric.   Motor: No abnormal movements.      Pertinent Investigations:    CT of the head without contrast in May 2021, which was unrevealing for secondary causes of headache.    Assessment/Plan:   Kamala Harris is a 21-year-old woman returns for follow-up of episodic migraine with possible visual aura.  Treating acutely with Ubrelvy 100 mg and naproxen 500 mg, staying away from bright lights and sleep has been helpful.    She experienced a new symptom of blurred vision for 20 minutes leading into a headache attack.  We discussed visual aura, the stereotyped nature and timing, and that I recommend she have an updated eye exam if her blurred vision is occurring outside of this.    If she is able to manage triggers, including reducing stress, staying hydrated, her headache attacks are infrequent.    Her headache presentation is complicated by history of low iron, she reports.  She  also has lightheadedness at times and a history of Mahogany-Parkinson-White.  -I suggested that she discuss these issues with her primary care physician at her next appointment, to see if additional work-up is needed.    For symptomatic management, she will continue naproxen and Ubrelvy.  Prescriptions were updated.  I will plan to see her back in 6 months.    Nina Fleming MD  Neurology

## 2021-12-08 NOTE — LETTER
12/8/2021       RE: Kamala Harris  [address]  [place] [address]     Dear Colleague,    Thank you for referring your patient, Kamala Harris, to the [facility] [place] at [facility]. Please see a copy of my visit note below.    Last Patient-Answered HIT-6 Questionnaire  No flowsheet data found.     MIGRAINE DISABILITY ASSESSMENT (MIDAS)    On how many days in the last 3 months did you miss work or school because of your headaches?  1    How many days in the last 3 months was your productivity at work or school reduced by half or more because of your headaches? (Do not include days you counted in question 1 where you missed work or school.)  3    On how many days in the last 3 months did you not do household work (such as housework, home repairs and maintenance, shopping, caring for children and relatives) because of your headaches?  0    How many days in the last 3 months was your productivity in household work reduced by half or more because of your headaches? (Do not include days you counted in question 3 where you did not do household work).  0    On how many days in the last 3 months did you miss family, social, or lesiure activities because of your headaches?  0    MIDAS Total Score:     On how many days in the last 3 months did you have a headache? (If a headache lasted more than 1 day, count each day.)   10-15    On a scale of 0 - 10, on average how painful were these headaches (where 0 = no pain at all, and 10 = pain as bad as it can be.)  4    Marguerite Amor, Virtual Facilitator, 12/8/2021     [facility]  Neurology Progress Note    Subjective:    Ms Harris returns for follow-up of episodic migraine.  She experienced blurred vision leading into headache, suggesting she may have visual aura as well.    Ms. Harris reports a reduction in headaches since I  last saw her. She has a bigger attack about once a month. If she doesn't drink water and stay well-hydrated, it can occur every other day.     She describes blurred vision with some headaches; this lasted 20 minutes and led into headache.    She is staying hydrated.  Due to the decrease in headaches, she didn't start topiramate.    She is working at a less stressful job.  This is helpful in reducing triggers.    She treats acutely with Ubrelvy 100 mg with naproxen 500 mg and wears sunglasses. She will sleep after work.  She found Ubrelvy 100 mg worked significantly better than 50 mg.    Objective:    Vitals: There were no vitals taken for this visit.  General: Cooperative, NAD  Neurologic:  Mental Status: Fully alert, attentive and oriented. Speech clear and fluent.   Cranial Nerves: Facial movements symmetric.   Motor: No abnormal movements.      Pertinent Investigations:    CT of the head without contrast in May 2021, which was unrevealing for secondary causes of headache.    Assessment/Plan:   Kamala Harris is a 21-year-old woman returns for follow-up of episodic migraine with possible visual aura.  Treating acutely with Ubrelvy 100 mg and naproxen 500 mg, staying away from bright lights and sleep has been helpful.    She experienced a new symptom of blurred vision for 20 minutes leading into a headache attack.  We discussed visual aura, the stereotyped nature and timing, and that I recommend she have an updated eye exam if her blurred vision is occurring outside of this.    If she is able to manage triggers, including reducing stress, staying hydrated, her headache attacks are infrequent.    Her headache presentation is complicated by history of low iron, she reports.  She also has lightheadedness at times and a history of Mahogany-Parkinson-White.  -I suggested that she discuss these issues with her primary care physician at her next appointment, to see if additional work-up is needed.    For symptomatic  management, she will continue naproxen and Ubrelvy.  Prescriptions were updated.  I will plan to see her back in 6 months.    Nina Fleming MD  Neurology         Again, thank you for allowing me to participate in the care of your patient.      Sincerely,    Nina Fleming MD

## 2022-01-19 ENCOUNTER — OFFICE VISIT (OUTPATIENT)
Dept: FAMILY MEDICINE | Facility: CLINIC | Age: 22
End: 2022-01-19
Payer: COMMERCIAL

## 2022-01-19 VITALS
HEART RATE: 107 BPM | SYSTOLIC BLOOD PRESSURE: 100 MMHG | RESPIRATION RATE: 25 BRPM | TEMPERATURE: 98.5 F | OXYGEN SATURATION: 100 % | BODY MASS INDEX: 35.16 KG/M2 | HEIGHT: 65 IN | DIASTOLIC BLOOD PRESSURE: 60 MMHG | WEIGHT: 211 LBS

## 2022-01-19 DIAGNOSIS — J45.21 MILD INTERMITTENT ASTHMA WITH EXACERBATION: ICD-10-CM

## 2022-01-19 DIAGNOSIS — R63.1 EXCESSIVE THIRST: ICD-10-CM

## 2022-01-19 DIAGNOSIS — G43.709 CHRONIC MIGRAINE WITHOUT AURA WITHOUT STATUS MIGRAINOSUS, NOT INTRACTABLE: ICD-10-CM

## 2022-01-19 DIAGNOSIS — Z23 HIGH PRIORITY FOR 2019-NCOV VACCINE: ICD-10-CM

## 2022-01-19 DIAGNOSIS — I45.6 WOLFF-PARKINSON-WHITE (WPW) PATTERN SEEN ON ELECTROCARDIOGRAPHY: ICD-10-CM

## 2022-01-19 DIAGNOSIS — Z12.4 CERVICAL CANCER SCREENING: ICD-10-CM

## 2022-01-19 DIAGNOSIS — E66.811 CLASS 1 OBESITY WITH SERIOUS COMORBIDITY AND BODY MASS INDEX (BMI) OF 34.0 TO 34.9 IN ADULT, UNSPECIFIED OBESITY TYPE: ICD-10-CM

## 2022-01-19 DIAGNOSIS — Z00.00 ROUTINE GENERAL MEDICAL EXAMINATION AT A HEALTH CARE FACILITY: Primary | ICD-10-CM

## 2022-01-19 PROBLEM — G43.109 MIGRAINE WITH AURA AND WITHOUT STATUS MIGRAINOSUS, NOT INTRACTABLE: Status: ACTIVE | Noted: 2021-05-11

## 2022-01-19 LAB
ALBUMIN UR-MCNC: NEGATIVE MG/DL
APPEARANCE UR: CLEAR
BACTERIA #/AREA URNS HPF: ABNORMAL /HPF
BILIRUB UR QL STRIP: NEGATIVE
COLOR UR AUTO: YELLOW
GLUCOSE UR STRIP-MCNC: NEGATIVE MG/DL
HBA1C MFR BLD: 5.5 % (ref 0–5.6)
HGB UR QL STRIP: ABNORMAL
KETONES UR STRIP-MCNC: NEGATIVE MG/DL
LEUKOCYTE ESTERASE UR QL STRIP: NEGATIVE
NITRATE UR QL: NEGATIVE
PH UR STRIP: 7 [PH] (ref 5–7)
RBC #/AREA URNS AUTO: ABNORMAL /HPF
SP GR UR STRIP: 1.02 (ref 1–1.03)
SQUAMOUS #/AREA URNS AUTO: ABNORMAL /LPF
UROBILINOGEN UR STRIP-ACNC: 0.2 E.U./DL
WBC #/AREA URNS AUTO: ABNORMAL /HPF

## 2022-01-19 PROCEDURE — 90686 IIV4 VACC NO PRSV 0.5 ML IM: CPT | Performed by: FAMILY MEDICINE

## 2022-01-19 PROCEDURE — G0145 SCR C/V CYTO,THINLAYER,RESCR: HCPCS | Performed by: FAMILY MEDICINE

## 2022-01-19 PROCEDURE — 91301 COVID-19,PF,MODERNA (18+ YRS PRIMARY SERIES .5ML): CPT | Performed by: FAMILY MEDICINE

## 2022-01-19 PROCEDURE — 80053 COMPREHEN METABOLIC PANEL: CPT | Performed by: FAMILY MEDICINE

## 2022-01-19 PROCEDURE — 83036 HEMOGLOBIN GLYCOSYLATED A1C: CPT | Performed by: FAMILY MEDICINE

## 2022-01-19 PROCEDURE — 0011A COVID-19,PF,MODERNA (18+ YRS PRIMARY SERIES .5ML): CPT | Performed by: FAMILY MEDICINE

## 2022-01-19 PROCEDURE — 81001 URINALYSIS AUTO W/SCOPE: CPT | Performed by: FAMILY MEDICINE

## 2022-01-19 PROCEDURE — 90471 IMMUNIZATION ADMIN: CPT | Performed by: FAMILY MEDICINE

## 2022-01-19 PROCEDURE — 99214 OFFICE O/P EST MOD 30 MIN: CPT | Mod: 25 | Performed by: FAMILY MEDICINE

## 2022-01-19 PROCEDURE — 36415 COLL VENOUS BLD VENIPUNCTURE: CPT | Performed by: FAMILY MEDICINE

## 2022-01-19 PROCEDURE — G0124 SCREEN C/V THIN LAYER BY MD: HCPCS | Performed by: PATHOLOGY

## 2022-01-19 PROCEDURE — 99395 PREV VISIT EST AGE 18-39: CPT | Mod: 25 | Performed by: FAMILY MEDICINE

## 2022-01-19 RX ORDER — ONDANSETRON 4 MG/1
4 TABLET, FILM COATED ORAL EVERY 8 HOURS PRN
Qty: 20 TABLET | Refills: 11 | COMMUNITY
Start: 2022-01-19 | End: 2022-09-19

## 2022-01-19 RX ORDER — ALBUTEROL SULFATE 90 UG/1
2 AEROSOL, METERED RESPIRATORY (INHALATION) EVERY 4 HOURS PRN
Qty: 18 G | Refills: 3 | Status: SHIPPED | OUTPATIENT
Start: 2022-01-19 | End: 2023-08-02

## 2022-01-19 ASSESSMENT — ANXIETY QUESTIONNAIRES
3. WORRYING TOO MUCH ABOUT DIFFERENT THINGS: MORE THAN HALF THE DAYS
GAD7 TOTAL SCORE: 13
7. FEELING AFRAID AS IF SOMETHING AWFUL MIGHT HAPPEN: SEVERAL DAYS
2. NOT BEING ABLE TO STOP OR CONTROL WORRYING: MORE THAN HALF THE DAYS
1. FEELING NERVOUS, ANXIOUS, OR ON EDGE: MORE THAN HALF THE DAYS
5. BEING SO RESTLESS THAT IT IS HARD TO SIT STILL: MORE THAN HALF THE DAYS
IF YOU CHECKED OFF ANY PROBLEMS ON THIS QUESTIONNAIRE, HOW DIFFICULT HAVE THESE PROBLEMS MADE IT FOR YOU TO DO YOUR WORK, TAKE CARE OF THINGS AT HOME, OR GET ALONG WITH OTHER PEOPLE: SOMEWHAT DIFFICULT
6. BECOMING EASILY ANNOYED OR IRRITABLE: MORE THAN HALF THE DAYS

## 2022-01-19 ASSESSMENT — MIFFLIN-ST. JEOR: SCORE: 1711.93

## 2022-01-19 ASSESSMENT — PATIENT HEALTH QUESTIONNAIRE - PHQ9
SUM OF ALL RESPONSES TO PHQ QUESTIONS 1-9: 13
5. POOR APPETITE OR OVEREATING: MORE THAN HALF THE DAYS

## 2022-01-19 ASSESSMENT — ASTHMA QUESTIONNAIRES: ACT_TOTALSCORE: 24

## 2022-01-19 ASSESSMENT — PAIN SCALES - GENERAL: PAINLEVEL: NO PAIN (0)

## 2022-01-19 NOTE — PROGRESS NOTES
SUBJECTIVE:   CC: Kamala Harris is an 22 year old woman who presents for preventive health visit.     Patient has been advised of split billing requirements and indicates understanding: Yes  Healthy Habits:    Getting at least 3 servings of Calcium per day:  NO    Bi-annual eye exam:  Yes    Dental care twice a year:  NO    Sleep apnea or symptoms of sleep apnea:  None    Diet:  Regular (no restrictions)    Frequency of exercise:  1 day/week    Duration of exercise:  15-30 minutes    Taking medications regularly:  Yes    Barriers to taking medications:  Not applicable    Medication side effects:  Not applicable    PHQ-2 Total Score:    Additional concerns today:  Yes (dehydration )    - Patient notes commonly feeling dehydrated (feels thirsty and tired).  She typically gets migraines when she feels this way.  Drinking 3-4 20 oz bottles of water a day and continuing to feel thirsty.  Urine is usually light yellow.    - Currently on Ubrelvy PRN for migraines.  No longer on prevention meds.    - Asthma has been well controlled.  Got COVID in November and required albuterol more, but has recovered.    - Currently has a Nexplanon which will  in March.  She does plan on getting another one.    - Has WPW and sees cardiology annually.  Currently well controlled.    - She would like a flu shot and a COVID shot today.  Was previously postponing her COVID shot due to concerns for myocarditis     Today's PHQ-2 Score:   PHQ-2 (  [employer]) 2022   Q1: Little interest or pleasure in doing things -   Q2: Feeling down, depressed or hopeless -   PHQ-2 Score -   PHQ-2 Total Score (12-17 Years)- Positive if 3 or more points; Administer PHQ-A if positive -   Q1: Little interest or pleasure in doing things Several days   Q2: Feeling down, depressed or hopeless Several days   PHQ-2 Score 2       Abuse: Current or Past (Physical, Sexual or Emotional) - NO  Do you feel safe in your environment? YES        Social History      Tobacco Use     Smoking status: Never Smoker     Smokeless tobacco: Never Used   Substance Use Topics     Alcohol use: Not Currently     If you drink alcohol do you typically have >3 drinks per day or >7 drinks per week? No    No flowsheet data found.    Reviewed orders with patient.  Reviewed health maintenance and updated orders accordingly - Yes  Patient Active Problem List   Diagnosis     Presbyopia     ADHD (attention deficit hyperactivity disorder)     Health Care Home     Mild major depression (H)     Intermittent asthma     Childhood hyperkinetic syndrome     Migraine with aura and without status migrainosus, not intractable     Mahogany-Parkinson-White (WPW) pattern seen on electrocardiography     Class 1 obesity with serious comorbidity and body mass index (BMI) of 34.0 to 34.9 in adult, unspecified obesity type     Past Surgical History:   Procedure Laterality Date     CARDIAC CATHERIZATION  2 years    Told by cardiology that tiny defect would close on own, no FU     CARDIAC SURGERY       CREATE EARDRUM OPENING,LOCAL ANESTH  4 years    PETs       Social History     Tobacco Use     Smoking status: Never Smoker     Smokeless tobacco: Never Used   Substance Use Topics     Alcohol use: Not Currently     Family History   Problem Relation Age of Onset     Migraines Mother      Depression Brother      Cerebrovascular Disease Maternal Grandfather      Myocardial Infarction Paternal Grandmother      Myocardial Infarction Paternal Grandfather            Breast Cancer Screening:        History of abnormal Pap smear: NO - age 21-29 PAP every 3 years recommended  PAP / HPV 3/5/2019   PAP (Historical) NIL     Review of Systems  CONSTITUTIONAL: NEGATIVE for fever, chills, change in weight  INTEGUMENTARU/SKIN: NEGATIVE for worrisome rashes, moles or lesions  EYES: NEGATIVE for vision changes or irritation  ENT: NEGATIVE for ear, mouth and throat problems  RESP: NEGATIVE for significant cough or SOB  BREAST: NEGATIVE for  "masses, tenderness or discharge  CV: NEGATIVE for chest pain, palpitations or peripheral edema  GI: NEGATIVE for nausea, abdominal pain, heartburn, or change in bowel habits  : NEGATIVE for unusual urinary or vaginal symptoms. Periods are regular.  MUSCULOSKELETAL: NEGATIVE for significant arthralgias or myalgia  NEURO: NEGATIVE for weakness, dizziness or paresthesias  PSYCHIATRIC: NEGATIVE for changes in mood or affect     OBJECTIVE:   /60 (BP Location: Right arm)   Pulse 107   Temp 98.5  F (36.9  C) (Oral)   Resp 25   Ht 1.657 m (5' 5.25\")   Wt 95.7 kg (211 lb)   LMP 01/14/2022   SpO2 100%   BMI 34.84 kg/m    Physical Exam  GENERAL: healthy, alert and no distress  EYES: Eyes grossly normal to inspection, PERRL and conjunctivae and sclerae normal  HENT: ear canals and TM's normal, nose and mouth without ulcers or lesions  NECK: no adenopathy, no asymmetry, masses, or scars and thyroid normal to palpation  RESP: lungs clear to auscultation - no rales, rhonchi or wheezes  BREAST: normal without masses, tenderness or nipple discharge and no palpable axillary masses or adenopathy  CV: regular rates and rhythm, normal S1 S2, no S3 or S4 and no murmur, click or rub  ABDOMEN: soft, nontender, without hepatosplenomegaly or masses   (female): normal female external genitalia, normal urethral meatus, vaginal mucosa pink, moist, well rugated, and normal cervix/adnexa/uterus without masses or discharge  MS: no gross musculoskeletal defects noted, no edema  SKIN: no suspicious lesions or rashes  NEURO: Normal strength and tone, mentation intact and speech normal  PSYCH: mentation appears normal, affect normal/bright    ASSESSMENT/PLAN:       ICD-10-CM    1. Routine general medical examination at a health care facility  Z00.00    2. Cervical cancer screening  Z12.4 Pap Screen only - recommended age 21 - 24 years   3. Class 1 obesity with serious comorbidity and body mass index (BMI) of 34.0 to 34.9 in adult, " "unspecified obesity type  E66.9     Z68.34    4. Excessive thirst  R63.1 Comprehensive metabolic panel (BMP + Alb, Alk Phos, ALT, AST, Total. Bili, TP)     Hemoglobin A1c     UA with Microscopic reflex to Culture - lab collect   5. Migraine with aura and without status migrainosus, not intractable  G43.109    6. Mild intermittent asthma with exacerbation  J45.21 albuterol (PROAIR HFA/PROVENTIL HFA/VENTOLIN HFA) 108 (90 Base) MCG/ACT inhaler   7. Chronic migraine without aura without status migrainosus, not intractable  G43.709 ondansetron (ZOFRAN) 4 MG tablet   8. Mahogany-Parkinson-White (WPW) pattern seen on electrocardiography  I45.6    9. High priority for 2019-nCoV vaccine  Z23 COVID-19,PF,MODERNA (18+ Yrs Primary Series .5mL)     - Pap smear completed today  - Flu shot and COVID shot #1 given   - Excessive thirst despite drinking plenty of water.  Suspicious for possible diabetes.  Check labs and urine today.  Consider 24 hr urine if bloodwork is normal  - Migraines are managed by neurology.  Not currently on preventive meds.  Ubrelvy PRN   - Asthma has been well controlled.  Had COVID in November, but no persistent symptoms   - WPW is well controlled.  Managed by cardiology     Patient has been advised of split billing requirements and indicates understanding: Yes    COUNSELING:  Reviewed preventive health counseling, as reflected in patient instructions    Estimated body mass index is 34.84 kg/m  as calculated from the following:    Height as of this encounter: 1.657 m (5' 5.25\").    Weight as of this encounter: 95.7 kg (211 lb).      She reports that she has never smoked. She has never used smokeless tobacco.      Counseling Resources:  ATP IV Guidelines  Pooled Cohorts Equation Calculator  Breast Cancer Risk Calculator  BRCA-Related Cancer Risk Assessment: FHS-7 Tool  FRAX Risk Assessment  ICSI Preventive Guidelines  Dietary Guidelines for Americans, 2010  USDA's MyPlate  ASA Prophylaxis  Lung CA " Screening    Radha Ashby DO  [facility]

## 2022-01-20 LAB
ALBUMIN SERPL-MCNC: 3.9 G/DL (ref 3.4–5)
ALP SERPL-CCNC: 76 U/L (ref 40–150)
ALT SERPL W P-5'-P-CCNC: 33 U/L (ref 0–50)
ANION GAP SERPL CALCULATED.3IONS-SCNC: 5 MMOL/L (ref 3–14)
AST SERPL W P-5'-P-CCNC: 13 U/L (ref 0–45)
BILIRUB SERPL-MCNC: 0.5 MG/DL (ref 0.2–1.3)
BUN SERPL-MCNC: 13 MG/DL (ref 7–30)
CALCIUM SERPL-MCNC: 8.6 MG/DL (ref 8.5–10.1)
CHLORIDE BLD-SCNC: 109 MMOL/L (ref 94–109)
CO2 SERPL-SCNC: 27 MMOL/L (ref 20–32)
CREAT SERPL-MCNC: 0.65 MG/DL (ref 0.52–1.04)
GFR SERPL CREATININE-BSD FRML MDRD: >90 ML/MIN/1.73M2
GLUCOSE BLD-MCNC: 107 MG/DL (ref 70–99)
POTASSIUM BLD-SCNC: 3.6 MMOL/L (ref 3.4–5.3)
PROT SERPL-MCNC: 7.2 G/DL (ref 6.8–8.8)
SODIUM SERPL-SCNC: 141 MMOL/L (ref 133–144)

## 2022-01-20 ASSESSMENT — ANXIETY QUESTIONNAIRES: GAD7 TOTAL SCORE: 13

## 2022-01-25 LAB
BKR LAB AP GYN ADEQUACY: ABNORMAL
BKR LAB AP GYN INTERPRETATION: ABNORMAL
BKR LAB AP HPV REFLEX: ABNORMAL
BKR LAB AP LMP: ABNORMAL
BKR LAB AP PREVIOUS ABNORMAL: ABNORMAL
PATH REPORT.COMMENTS IMP SPEC: ABNORMAL
PATH REPORT.COMMENTS IMP SPEC: ABNORMAL
PATH REPORT.RELEVANT HX SPEC: ABNORMAL

## 2022-01-26 ENCOUNTER — PATIENT OUTREACH (OUTPATIENT)
Dept: FAMILY MEDICINE | Facility: CLINIC | Age: 22
End: 2022-01-26
Payer: COMMERCIAL

## 2022-03-10 ENCOUNTER — OFFICE VISIT (OUTPATIENT)
Dept: OBGYN | Facility: CLINIC | Age: 22
End: 2022-03-10
Payer: COMMERCIAL

## 2022-03-10 VITALS
TEMPERATURE: 97.8 F | SYSTOLIC BLOOD PRESSURE: 139 MMHG | DIASTOLIC BLOOD PRESSURE: 85 MMHG | BODY MASS INDEX: 34.51 KG/M2 | WEIGHT: 209 LBS | HEART RATE: 113 BPM

## 2022-03-10 DIAGNOSIS — Z23 HIGH PRIORITY FOR 2019-NCOV VACCINE: ICD-10-CM

## 2022-03-10 DIAGNOSIS — Z30.46 SURVEILLANCE OF PREVIOUSLY PRESCRIBED IMPLANTABLE SUBDERMAL CONTRACEPTIVE: ICD-10-CM

## 2022-03-10 DIAGNOSIS — R87.611 PAP SMEAR OF CERVIX WITH ASCUS, CANNOT EXCLUDE HGSIL: Primary | ICD-10-CM

## 2022-03-10 LAB — HCG UR QL: NEGATIVE

## 2022-03-10 PROCEDURE — 91301 COVID-19,PF,MODERNA (18+ YRS PRIMARY SERIES .5ML): CPT | Performed by: OBSTETRICS & GYNECOLOGY

## 2022-03-10 PROCEDURE — 57454 BX/CURETT OF CERVIX W/SCOPE: CPT | Performed by: OBSTETRICS & GYNECOLOGY

## 2022-03-10 PROCEDURE — 0012A COVID-19,PF,MODERNA (18+ YRS PRIMARY SERIES .5ML): CPT | Performed by: OBSTETRICS & GYNECOLOGY

## 2022-03-10 PROCEDURE — 81025 URINE PREGNANCY TEST: CPT | Performed by: OBSTETRICS & GYNECOLOGY

## 2022-03-10 PROCEDURE — 11983 REMOVE/INSERT DRUG IMPLANT: CPT | Performed by: OBSTETRICS & GYNECOLOGY

## 2022-03-10 PROCEDURE — 88305 TISSUE EXAM BY PATHOLOGIST: CPT

## 2022-03-10 PROCEDURE — 88342 IMHCHEM/IMCYTCHM 1ST ANTB: CPT

## 2022-03-10 NOTE — PROGRESS NOTES
Kamala Harris is a 22 year old female  who presents for initial colposcopy, referred by Radha Ashby. Pap smear on 2022 showed: ASC-H. The prior pap showed normal.   She also presents for nexplanon remove/replace.       No LMP recorded. (Menstrual status: Birth Control).  UPT today is negative  Patient does not smoke  Type of contraception: nexplanon  Age at first sexual intercourse: 16  Number of sexual partners (lifetime): 30  Past GYN history: No STD history  Prior cervical/vaginal disease: Normal exam without visible pathology.  Prior cervical treatment: no treatment.      PROCEDURE:      Before the procedure, it was ensured that the patient was educated regarding the nature of her findings to date, the implications, and what was to be done. She has been made aware of the role of HPV, the natural history of infection, ways to minimize her future risk, the effect of HPV on the cervix, and treatment options available should they be indicated. The details of the colposcopic procedure were reviewed. All questions were answered before proceeding, and informed consent was therefore obtained.      Speculum placed in vagina and excellent visualization of cervix acheived, cervix swabbed x 3 with acetic acid solution.      FINDINGS:  Cervix: acetowhite area from 11:00 to 1:00, two biopsies taken with tischler forceps  Please refer to images section for details.  SCJ seen?: yes   ECC done?: Yes   Satisfactory examination?: yes      ASSESSMENT: HPV related changes and HOLLIE 1.  PLAN: specimens labelled and sent to Pathology, will base further treatment on Pathology findings, post biopsy instructions given to patient, call to discuss Pathology results and management as indicated by results.       Annabel Loza MD        NEXPLANON REMOVAL/REINSERTION:    Is a pregnancy test required: No.  Was a consent obtained?  Yes    Subjective: Kamala Harris is a 22 year old  presents for Nexplanon.    Patient has  been given the opportunity to ask questions about all forms of birth control, including all options appropriate for Kamala Harris. Discussed that no method of birth control, except abstinence is 100% effective against pregnancy or sexually transmitted infection.     Kamala Harris understands planning removal and reinsertion today    The entire removal and insertion procedure was reviewed with the patient, including care after placement.      /85   Pulse 113   Temp 97.8  F (36.6  C)   Wt 94.8 kg (209 lb)   Breastfeeding No   BMI 34.51 kg/m      PROCEDURE NOTE: -- Nexplanon Removal/Insertion    Technique: On the left arm  Skin prep Hibiclens  Anesthesia 1% lidocaine, without epi  Procedure: Patient was placed supine with left arm exposed.  Implant located by palpitation. Jonh was made 8-10 cm above medial epicondyle and a guiding jonh 4 cm above the first.  Arm was prepped with Hibiclens. Incision point was anesthetized with 5 mL 1% lidocaine.     Small incision (<5mm) was made at distal end of palpable implant, curved hemostat or mosquito forceps was used to isolate the implant and bring it to the incision, the fibrous capsule containing the implant  was incised and the implant was removed intact.    After stretching the skin with thumb and index finger around the insertion site, skin punctured with the tip of the needle inserted at 30 degrees and then lowered to horizontal position. While lifting the skin with the tip of the needle, inserted the needle to its full length. Applicator was then stabilized and the slider was unlocked by pushing it slightly down. Slider moved back completely until it stopped. Applicator was then removed.    Correct placement of the implant was confirmed by palpation in the patient's arm and visualizing the purple top of the obturator.   Bandage and pressure dressing applied to insertion site.    Lot # see MAR  Exp: see MAR    EBL: minimal    Complications:  none    ASSESSMENT:     ICD-10-CM    1. Pap smear of cervix with ASCUS, cannot exclude HGSIL  R87.611 HCG Qual, Urine ([id number])     COLP CERVIX/UPPER VAGINA W BX CERVIX/ENDOCERV CURETT     Surgical Pathology Exam   2. Surveillance of previously prescribed implantable subdermal contraceptive  Z30.46 etonogestrel (NEXPLANON) subdermal implant 68 mg     etonogestrel (NEXPLANON) 68 MG IMPL     REMOVAL AND REINSERTION NEXPLANON   3. High priority for 2019-nCoV vaccine  Z23 COVID-19,PF,MODERNA (18+ Yrs Primary Series .5mL)        PLAN:    Given 's handouts, including when to have Nexplanon removed, list of danger s/sx, side effects and follow up recommended. Encouraged condom use for prevention of STD. Back up contraception advised for 7 days. Advised to call for any fever, for prolonged or severe pain or bleeding, abnormal vaginal dischage. She was advised to use pain medications (ibuprofen) as needed for mild to moderate pain.     Annabel Loza MD

## 2022-03-15 LAB
PATH REPORT.COMMENTS IMP SPEC: NORMAL
PATH REPORT.FINAL DX SPEC: NORMAL
PATH REPORT.GROSS SPEC: NORMAL
PATH REPORT.MICROSCOPIC SPEC OTHER STN: NORMAL
PATH REPORT.RELEVANT HX SPEC: NORMAL
PHOTO IMAGE: NORMAL

## 2022-03-18 ENCOUNTER — PATIENT OUTREACH (OUTPATIENT)
Dept: OBGYN | Facility: CLINIC | Age: 22
End: 2022-03-18
Payer: COMMERCIAL

## 2022-03-24 ENCOUNTER — TELEPHONE (OUTPATIENT)
Dept: FAMILY MEDICINE | Facility: CLINIC | Age: 22
End: 2022-03-24
Payer: COMMERCIAL

## 2022-03-24 NOTE — TELEPHONE ENCOUNTER
Reason for Call:  Other referral    Detailed comments:  Patient wanting to get referral for sleep study has concerns with sleeping and having heart condition.    Phone Number Patient can be reached at: Home number on file [phone] (home)    Best Time:  Anytime       Can we leave a detailed message on this number? YES    Call taken on 3/24/2022 at 9:00 AM by Stacie Billy

## 2022-03-24 NOTE — TELEPHONE ENCOUNTER
Called patient  She is managed by a cardiologist for her heart condition and they have done echos and other testing for her heart recently    She would like to discuss sleep study.  She describes trouble falling asleep and staying asleep and snoring worse now than in the past.  She has agreed for a virtual visit to discuss her sleep concerns.      Annabel Anderson RN

## 2022-04-04 ENCOUNTER — VIRTUAL VISIT (OUTPATIENT)
Dept: FAMILY MEDICINE | Facility: CLINIC | Age: 22
End: 2022-04-04
Payer: COMMERCIAL

## 2022-04-04 DIAGNOSIS — G47.30 SLEEP APNEA, UNSPECIFIED TYPE: Primary | ICD-10-CM

## 2022-04-04 DIAGNOSIS — R63.1 EXCESSIVE THIRST: ICD-10-CM

## 2022-04-04 PROCEDURE — 99214 OFFICE O/P EST MOD 30 MIN: CPT | Mod: 95 | Performed by: FAMILY MEDICINE

## 2022-04-12 ENCOUNTER — LAB (OUTPATIENT)
Dept: LAB | Facility: CLINIC | Age: 22
End: 2022-04-12
Payer: COMMERCIAL

## 2022-04-12 DIAGNOSIS — R63.1 EXCESSIVE THIRST: ICD-10-CM

## 2022-04-12 LAB
BASOPHILS # BLD AUTO: 0 10E3/UL (ref 0–0.2)
BASOPHILS NFR BLD AUTO: 0 %
EOSINOPHIL # BLD AUTO: 0.1 10E3/UL (ref 0–0.7)
EOSINOPHIL NFR BLD AUTO: 1 %
ERYTHROCYTE [DISTWIDTH] IN BLOOD BY AUTOMATED COUNT: 14.9 % (ref 10–15)
ERYTHROCYTE [SEDIMENTATION RATE] IN BLOOD BY WESTERGREN METHOD: 11 MM/HR (ref 0–20)
HBA1C MFR BLD: 5.8 % (ref 0–5.6)
HCT VFR BLD AUTO: 42 % (ref 35–47)
HGB BLD-MCNC: 13.7 G/DL (ref 11.7–15.7)
LYMPHOCYTES # BLD AUTO: 2.2 10E3/UL (ref 0.8–5.3)
LYMPHOCYTES NFR BLD AUTO: 30 %
MCH RBC QN AUTO: 25 PG (ref 26.5–33)
MCHC RBC AUTO-ENTMCNC: 32.6 G/DL (ref 31.5–36.5)
MCV RBC AUTO: 77 FL (ref 78–100)
MONOCYTES # BLD AUTO: 0.5 10E3/UL (ref 0–1.3)
MONOCYTES NFR BLD AUTO: 6 %
NEUTROPHILS # BLD AUTO: 4.5 10E3/UL (ref 1.6–8.3)
NEUTROPHILS NFR BLD AUTO: 62 %
PLATELET # BLD AUTO: 230 10E3/UL (ref 150–450)
RBC # BLD AUTO: 5.47 10E6/UL (ref 3.8–5.2)
WBC # BLD AUTO: 7.3 10E3/UL (ref 4–11)

## 2022-04-12 PROCEDURE — 36415 COLL VENOUS BLD VENIPUNCTURE: CPT

## 2022-04-12 PROCEDURE — 80050 GENERAL HEALTH PANEL: CPT

## 2022-04-12 PROCEDURE — 83036 HEMOGLOBIN GLYCOSYLATED A1C: CPT

## 2022-04-12 PROCEDURE — 85652 RBC SED RATE AUTOMATED: CPT

## 2022-04-13 ENCOUNTER — APPOINTMENT (OUTPATIENT)
Dept: LAB | Facility: CLINIC | Age: 22
End: 2022-04-13
Payer: COMMERCIAL

## 2022-04-13 LAB
ALBUMIN SERPL-MCNC: 4.3 G/DL (ref 3.4–5)
ALBUMIN UR-MCNC: NEGATIVE MG/DL
ALP SERPL-CCNC: 93 U/L (ref 40–150)
ALT SERPL W P-5'-P-CCNC: 32 U/L (ref 0–50)
ANION GAP SERPL CALCULATED.3IONS-SCNC: 5 MMOL/L (ref 3–14)
APPEARANCE UR: CLEAR
AST SERPL W P-5'-P-CCNC: 14 U/L (ref 0–45)
BACTERIA #/AREA URNS HPF: ABNORMAL /HPF
BILIRUB SERPL-MCNC: 0.6 MG/DL (ref 0.2–1.3)
BILIRUB UR QL STRIP: NEGATIVE
BUN SERPL-MCNC: 13 MG/DL (ref 7–30)
CALCIUM SERPL-MCNC: 9.6 MG/DL (ref 8.5–10.1)
CAOX CRY #/AREA URNS HPF: ABNORMAL /HPF
CHLORIDE BLD-SCNC: 108 MMOL/L (ref 94–109)
CO2 SERPL-SCNC: 25 MMOL/L (ref 20–32)
COLOR UR AUTO: YELLOW
CREAT SERPL-MCNC: 0.82 MG/DL (ref 0.52–1.04)
GFR SERPL CREATININE-BSD FRML MDRD: >90 ML/MIN/1.73M2
GLUCOSE BLD-MCNC: 98 MG/DL (ref 70–99)
GLUCOSE UR STRIP-MCNC: NEGATIVE MG/DL
HGB UR QL STRIP: NEGATIVE
KETONES UR STRIP-MCNC: NEGATIVE MG/DL
LEUKOCYTE ESTERASE UR QL STRIP: NEGATIVE
MUCOUS THREADS #/AREA URNS LPF: PRESENT /LPF
NITRATE UR QL: NEGATIVE
PH UR STRIP: 5 [PH] (ref 5–7)
POTASSIUM BLD-SCNC: 3.8 MMOL/L (ref 3.4–5.3)
PROT SERPL-MCNC: 7.7 G/DL (ref 6.8–8.8)
RBC #/AREA URNS AUTO: ABNORMAL /HPF
SODIUM SERPL-SCNC: 138 MMOL/L (ref 133–144)
SP GR UR STRIP: >=1.03 (ref 1–1.03)
SQUAMOUS #/AREA URNS AUTO: ABNORMAL /LPF
TSH SERPL DL<=0.005 MIU/L-ACNC: 0.75 MU/L (ref 0.4–4)
UROBILINOGEN UR STRIP-ACNC: 0.2 E.U./DL
WBC #/AREA URNS AUTO: ABNORMAL /HPF

## 2022-04-13 PROCEDURE — 81001 URINALYSIS AUTO W/SCOPE: CPT

## 2022-04-14 ENCOUNTER — TELEPHONE (OUTPATIENT)
Dept: FAMILY MEDICINE | Facility: CLINIC | Age: 22
End: 2022-04-14
Payer: COMMERCIAL

## 2022-04-14 DIAGNOSIS — G47.30 SLEEP APNEA, UNSPECIFIED TYPE: Primary | ICD-10-CM

## 2022-04-14 NOTE — TELEPHONE ENCOUNTER
Reason for call:  Other   Patient called regarding (reason for call): Please fax order for Adult Sleep Eval & Management  Referral with chart notes to [facility].    Additional comments: [facility]  [address] [address] [place] [address] [address]  [place] [address]    Phone: [phone] fax: [phone]     Phone number to reach patient:  Cell number on file:    Telephone Information:   Mobile [phone]   Best Time:      Can we leave a detailed message on this number?  YES    Travel screening: Not Applicable

## 2022-04-14 NOTE — RESULT ENCOUNTER NOTE
Dear Kamala,     Here are your recent results. Your hemoglobin A1C is a little high.  This is consistent with prediabetes and may be contributing to your symptoms.  We can consider trying a medication called metformin to help with this.  It's also ok to monitor and repeat labs in a few months.      Please let us know if you have any questions or concerns.    Regards,  Radha Ashby, DO

## 2022-04-15 NOTE — TELEPHONE ENCOUNTER
Routing to RN. Please pend new referral to [facility]. See request below.    LOVE Samuel  [facility]

## 2022-04-27 NOTE — TELEPHONE ENCOUNTER
Faxed referral to [facility]     Called patient and informed her.     Dolores Douglas-  Melvin Hernadez

## 2022-04-27 NOTE — TELEPHONE ENCOUNTER
Referral signed for 4/15 is for [place]. Patient is going to [facility] and would need an outside referral placed.     Patient is going to the [place] location  Fax number to fax referral is [phone] ok to adilia Douglas-  Melvin Hernadez

## 2022-04-27 NOTE — TELEPHONE ENCOUNTER
Routing to Dr. Ashby    Sleep referral pending for [facility]     Ely Garvey RN

## 2022-06-15 ENCOUNTER — VIRTUAL VISIT (OUTPATIENT)
Dept: NEUROLOGY | Facility: CLINIC | Age: 22
End: 2022-06-15
Payer: COMMERCIAL

## 2022-06-15 DIAGNOSIS — G43.709 CHRONIC MIGRAINE WITHOUT AURA WITHOUT STATUS MIGRAINOSUS, NOT INTRACTABLE: ICD-10-CM

## 2022-06-15 DIAGNOSIS — G43.009 MIGRAINE WITHOUT AURA AND WITHOUT STATUS MIGRAINOSUS, NOT INTRACTABLE: ICD-10-CM

## 2022-06-15 PROCEDURE — 99213 OFFICE O/P EST LOW 20 MIN: CPT | Mod: 95 | Performed by: PSYCHIATRY & NEUROLOGY

## 2022-06-15 RX ORDER — HYDROCODONE BITARTRATE AND ACETAMINOPHEN 5; 325 MG/1; MG/1
1-2 TABLET ORAL DAILY
COMMUNITY
Start: 2022-06-06 | End: 2022-07-12

## 2022-06-15 RX ORDER — LISDEXAMFETAMINE DIMESYLATE 20 MG/1
20 CAPSULE ORAL DAILY
COMMUNITY
End: 2023-08-02

## 2022-06-15 RX ORDER — NAPROXEN 500 MG/1
500 TABLET ORAL 2 TIMES DAILY PRN
Qty: 28 TABLET | Refills: 11 | Status: SHIPPED | OUTPATIENT
Start: 2022-06-15 | End: 2023-12-14

## 2022-06-15 ASSESSMENT — HEADACHE IMPACT TEST (HIT 6)
HIT6 TOTAL SCORE: 55
HOW OFTEN HAVE YOU FELT FED UP OR IRRITATED BECAUSE OF YOUR HEADACHES: RARELY
WHEN YOU HAVE HEADACHES HOW OFTEN IS THE PAIN SEVERE: SOMETIMES
WHEN YOU HAVE A HEADACHE HOW OFTEN DO YOU WISH YOU COULD LIE DOWN: VERY OFTEN
HOW OFTEN DO HEADACHES LIMIT YOUR DAILY ACTIVITIES: SOMETIMES
WHEN YOU HAVE HEADACHES HOW OFTEN IS THE PAIN SEVERE: SOMETIMES
HOW OFTEN HAVE YOU FELT TOO TIRED TO WORK BECAUSE OF YOUR HEADACHES: RARELY
WHEN YOU HAVE A HEADACHE HOW OFTEN DO YOU WISH YOU COULD LIE DOWN: VERY OFTEN
HOW OFTEN DO HEADACHES LIMIT YOUR DAILY ACTIVITIES: SOMETIMES
HOW OFTEN HAVE YOU FELT FED UP OR IRRITATED BECAUSE OF YOUR HEADACHES: RARELY
HIT6 TOTAL SCORE: 55
HOW OFTEN DID HEADACHS LIMIT CONCENTRATION ON WORK OR DAILY ACTIVITY: RARELY
HOW OFTEN DID HEADACHS LIMIT CONCENTRATION ON WORK OR DAILY ACTIVITY: RARELY
HOW OFTEN HAVE YOU FELT TOO TIRED TO WORK BECAUSE OF YOUR HEADACHES: RARELY

## 2022-06-15 ASSESSMENT — MIGRAINE DISABILITY ASSESSMENT (MIDAS)
HOW OFTEN WERE SOCIAL ACTIVITIES MISSED DUE TO HEADACHES: 0
HOW MANY DAYS IN THE PAST 3 MONTHS HAVE YOU HAD A HEADACHE: 3
HOW MANY DAYS WAS HOUSEWORK PRODUCTIVITY CUT IN HALF DUE TO HEADACHES: 3
TOTAL SCORE: 6
HOW MANY DAYS WAS YOUR PRODUCTIVITY CUT IN HALF BECAUSE OF HEADACHES: 0
ON A SCALE FROM 0-10 ON AVERAGE HOW PAINFUL WERE HEADACHES: 5
HOW MANY DAYS DID YOU NOT DO HOUSEWORK BECAUSE OF HEADACHES: 3
HOW MANY DAYS DID YOU MISS WORK OR SCHOOL BECAUSE OF HEADACHES: 0

## 2022-06-15 NOTE — NURSING NOTE
Chief Complaint   Patient presents with     Video Visit     6month follow up- Migraines         Echo in next week or twoWeekly Taxol with cbc, cmp via port;  MD every two weeks -please schedule 4 Taxol doses and 2 MD appts

## 2022-06-15 NOTE — PROGRESS NOTES
"Kamala is a 22 year old who is being evaluated via a billable video visit.      How would you like to obtain your AVS? MyChart  If the video visit is dropped, the invitation should be resent by: Text to cell phone: [phone]  Will anyone else be joining your video visit? No      Video Start Time: 10:05 AM  Video-Visit Details    Type of service:  Video Visit    Video End Time:10:11 AM    Originating Location (pt. Location): Home    Distant Location (provider location):  [facility] [place]     Platform used for Video Visit: MDSave     [facility]  Neurology Progress Note    Subjective:    Ms. Harris returns for follow-up of episodic migraine with possible visual aura.    She is doing \"quite good\". She is having 1-2 headache days per month. She treats these with Ubrelvy 100 mg, naproxen 500 mg, and drinks fluids.    The Ubrelvy \"works perfectly\".  She denies side effects.    Otherwise, she reports that recent blood work showed that she was not currently anemic.  She has a history of iron deficiency anemia.  She takes an iron supplement occasionally.  Previously, she needed twice a day iron supplement.    Objective:    Vitals: There were no vitals taken for this visit.  General: Cooperative, NAD  Neurologic:  Mental Status: Fully alert, attentive and oriented. Speech clear and fluent.   Cranial Nerves: Facial movements symmetric.   Motor: No abnormal movements.      Pertinent Investigations:    Hgb 13.6  MCV 75    Assessment/Plan:   Kamala Harris is a 22-year-old woman returns for follow-up of episodic migraine with possible visual aura.    This remains well controlled with Ubrelvy 100 mg and naproxen 500 mg as needed, and avoiding triggers.     Her headache presentation is complicated by history of low iron.  She has had lightheadedness at times and a history of Mahogany-Parkinson-White arrhythmia.  -Recent blood work did not " show current anemia, although MCV was mildly low.    -I recommend that she continue naproxen 500 mg twice a day as needed, not to exceed more than 14 days/month to avoid medication overuse.  - For acute treatment of moderate to severe headache, I recommend that she continue Ubrelvy 100 mg at the onset of headache, with a repeat dose in 2 hours if needed.    I will plan to see her back in 1 year, or sooner if needed.    Nina Fleming MD  Neurology

## 2022-06-15 NOTE — LETTER
"6/15/2022       RE: Kamala Harris  [address]  [place] [address]     Dear Colleague,    Thank you for referring your patient, Kamala Harris, to the [facility] [place] at [facility]. Please see a copy of my visit note below.      [facility]    Clinics and Surgery Center  Neurology Progress Note      Subjective:    Ms. Harris returns for follow-up of episodic migraine with possible visual aura.    She is doing \"quite good\". She is having 1-2 headache days per month. She treats these with Ubrelvy 100 mg, naproxen 500 mg, and drinks fluids.    The Ubrelvy \"works perfectly\".  She denies side effects.    Otherwise, she reports that recent blood work showed that she was not currently anemic.  She has a history of iron deficiency anemia.  She takes an iron supplement occasionally.  Previously, she needed twice a day iron supplement.    Objective:    Vitals: There were no vitals taken for this visit.  General: Cooperative, NAD  Neurologic:  Mental Status: Fully alert, attentive and oriented. Speech clear and fluent.   Cranial Nerves: Facial movements symmetric.   Motor: No abnormal movements.      Pertinent Investigations:    Hgb 13.6  MCV 75    Assessment/Plan:   Kamala Harris is a 22-year-old woman returns for follow-up of episodic migraine with possible visual aura.    This remains well controlled with Ubrelvy 100 mg and naproxen 500 mg as needed, and avoiding triggers.     Her headache presentation is complicated by history of low iron.  She has had lightheadedness at times and a history of Mahogany-Parkinson-White arrhythmia.  -Recent blood work did not show current anemia, although MCV was mildly low.    -I recommend that she continue naproxen 500 mg twice a day as needed, not to exceed more than 14 days/month to avoid medication overuse.  - For acute treatment of moderate to severe headache, " I recommend that she continue Ubrelvy 100 mg at the onset of headache, with a repeat dose in 2 hours if needed.    I will plan to see her back in 1 year, or sooner if needed.      Nina Fleming MD  Neurology

## 2022-06-17 ENCOUNTER — TELEPHONE (OUTPATIENT)
Dept: NEUROLOGY | Facility: CLINIC | Age: 22
End: 2022-06-17

## 2022-06-18 ENCOUNTER — TELEPHONE (OUTPATIENT)
Dept: NEUROLOGY | Facility: CLINIC | Age: 22
End: 2022-06-18
Payer: COMMERCIAL

## 2022-06-22 ENCOUNTER — VIRTUAL VISIT (OUTPATIENT)
Dept: SLEEP MEDICINE | Facility: CLINIC | Age: 22
End: 2022-06-22
Attending: FAMILY MEDICINE
Payer: COMMERCIAL

## 2022-06-22 VITALS — HEIGHT: 65 IN | WEIGHT: 219 LBS | BODY MASS INDEX: 36.49 KG/M2

## 2022-06-22 DIAGNOSIS — I45.6 WOLFF-PARKINSON-WHITE (WPW) PATTERN SEEN ON ELECTROCARDIOGRAPHY: ICD-10-CM

## 2022-06-22 DIAGNOSIS — R06.83 SNORING: Primary | ICD-10-CM

## 2022-06-22 DIAGNOSIS — R06.81 WITNESSED EPISODE OF APNEA: ICD-10-CM

## 2022-06-22 DIAGNOSIS — G47.10 HYPERSOMNIA: ICD-10-CM

## 2022-06-22 PROCEDURE — 99204 OFFICE O/P NEW MOD 45 MIN: CPT | Mod: 95 | Performed by: INTERNAL MEDICINE

## 2022-06-22 ASSESSMENT — SLEEP AND FATIGUE QUESTIONNAIRES
HOW LIKELY ARE YOU TO NOD OFF OR FALL ASLEEP WHILE SITTING AND TALKING TO SOMEONE: WOULD NEVER DOZE
HOW LIKELY ARE YOU TO NOD OFF OR FALL ASLEEP WHILE LYING DOWN TO REST IN THE AFTERNOON WHEN CIRCUMSTANCES PERMIT: MODERATE CHANCE OF DOZING
HOW LIKELY ARE YOU TO NOD OFF OR FALL ASLEEP WHILE SITTING INACTIVE IN A PUBLIC PLACE: WOULD NEVER DOZE
HOW LIKELY ARE YOU TO NOD OFF OR FALL ASLEEP WHILE SITTING AND READING: MODERATE CHANCE OF DOZING
HOW LIKELY ARE YOU TO NOD OFF OR FALL ASLEEP IN A CAR, WHILE STOPPED FOR A FEW MINUTES IN TRAFFIC: WOULD NEVER DOZE
HOW LIKELY ARE YOU TO NOD OFF OR FALL ASLEEP WHEN YOU ARE A PASSENGER IN A CAR FOR AN HOUR WITHOUT A BREAK: MODERATE CHANCE OF DOZING
HOW LIKELY ARE YOU TO NOD OFF OR FALL ASLEEP WHILE WATCHING TV: MODERATE CHANCE OF DOZING
HOW LIKELY ARE YOU TO NOD OFF OR FALL ASLEEP WHILE SITTING QUIETLY AFTER LUNCH WITHOUT ALCOHOL: MODERATE CHANCE OF DOZING

## 2022-06-22 ASSESSMENT — PAIN SCALES - GENERAL: PAINLEVEL: NO PAIN (0)

## 2022-06-22 NOTE — PATIENT INSTRUCTIONS
Patient education: What is a sleep study?     What is a sleep study? -- A sleep study is a test that measures how well you sleep and checks for sleep problems. For some sleep studies, you stay overnight in a sleep lab at a hospital or sleep center.     What happens during a sleep study? -- Before you go to sleep, a technician attaches small, sticky patches called  electrodes  to your head, chest, and legs. He or she will also place a small tube beneath your nose and might wrap 1 or 2 belts around your chest.   Each of these items has wires that connect to monitors. The monitors record your movement, brain activity, breathing, and other body functions while you sleep.  If you have a history of trouble falling asleep, your doctor might prescribe a medicine to help you fall asleep in the lab. If you have never taken the medicine before, your doctor might ask you take it on a night before your sleep study to see how it affects you.   Why might my doctor order a sleep study? -- Your doctor will order a sleep study if he or she thinks you have sleep apnea or a different condition that makes you:   ?Have sudden jerking leg movements while you sleep, called  periodic limb movements.    ?Feel very sleepy during the day and fall asleep all of a sudden, called  narcolepsy.    ?Have trouble falling asleep or staying asleep over a long period of time, called  chronic insomnia.    ?Do odd things while you sleep, such as walking.  How should I prepare for a sleep study? -- On the day of your sleep study, you should:   ?Avoid alcohol   ?Avoid drinking coffee, tea, sodas, and other drinks that have caffeine in the afternoon and evening   ?Take all of your regular medicines     The cost of care estimate line is [phone]. They are able to give the patient an estimate of the charges and also an estimate of their insurance coverage/patient responsibility.   After your sleep study is performed, please call us at [phone] or  [phone]  to schedule for a follow up to review the results of the sleep study.    Please bring one tab of low dose melatonin 3 mg or less to the night of the study.    Melatonin intake is completely voluntary.    You may take own melatonin after arrival to sleep center. Do not drive or operate machinery after intake of melatonin.

## 2022-06-22 NOTE — PROGRESS NOTES
Kamala is a 22 year old who is being evaluated via a billable video visit.      How would you like to obtain your AVS? MyChart  If the video visit is dropped, the invitation should be resent by: Text to cell phone: [phone]  Will anyone else be joining your video visit? Chaya  Sky Ashby    Video-Visit Details    Video Start Time: 11:35 AM    Type of service:  Video Visit    Video End Time:11:47 AM    Originating Location (pt. Location): Home    Distant Location (provider location):  [facility]     Platform used for Video Visit: [employer]     Additional 15 minutes on the date of service was spent performing the following:    -Preparing to see the patient  -Obtaining and/or reviewing separately obtained history   -Ordering medications, tests, or procedures   -Documenting clinical information in the electronic or other health record     Thank you for the opportunity to participate in the care of  Kamala Harris.    Assessment and Plan:    In summary Kamala Harris is a 22 year old year old female here for sleep disturbance.  1. Snoring/Witness apnea/Hypersomnia/Mahogany Parkinson White Syndrome   Kamala Harris has high risk for obstructive sleep apnea based on the history of snoring, witness apnea, hypersomnia and a crowded airway. I educated the patient on the underlying pathophysiology of obstructive sleep apnea. We reviewed the risks associated with sleep apnea, including increased cardiovascular risk and overall death. We talked about treatments briefly. I recommend getting an split-night nocturnal polysomnography. The patient should return to the clinic to discuss results and treatment option in a patient-centered approach.    Lab reviewed: Discussed with patient.    History of present illness:    She is a 22 year old female who comes to the [facility] clinic with a chief complaint of snoring currently going on for more than a year.  The patient has also been told by her spouse  that she has significant pauses in her breathing during sleep followed by loud snoring.  Despite adequate hours of sleep she was still feel tired upon awakening.  The patient was recently diagnosed with Mahogany-Parkinson-White syndrome on EKG.  She would like to rule out obstructive sleep apnea because she has a strong family history of obstructive sleep apnea.     Ideal Sleep-Wake Cycle(devoid of societal pressure):    Patient would try to initiate sleep at around 10 PM-midnight with a sleep latency of 20 minutes to a few hours. The patient would have multple awakenings. Final wake up time is around 10-11 AM.    TORSTEN:  TORSTEN Total Score: 22  Total score - [place]: 10 (6/22/2022 11:18 AM)    Patient told to return in one week after the sleep study is interpreted.    Patient Active Problem List   Diagnosis     Presbyopia     ADHD (attention deficit hyperactivity disorder)     Health Care Home     Mild major depression (H)     Intermittent asthma     Childhood hyperkinetic syndrome     Migraine with aura and without status migrainosus, not intractable     Mahogany-Parkinson-White (WPW) pattern seen on electrocardiography     Class 1 obesity with serious comorbidity and body mass index (BMI) of 34.0 to 34.9 in adult, unspecified obesity type     Pap smear of cervix with ASCUS, cannot exclude HGSIL     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      Pap smear of cervix with ASCUS, cannot exclude HGSIL 1/19/2022     Sprain of right wrist     right side muliple times      Uncomplicated asthma     exercise induced     Past Surgical History:   Procedure Laterality Date     BIOPSY       CARDIAC CATHERIZATION  2 years    Told by cardiology that tiny defect would close on own, no FU     CARDIAC SURGERY       CREATE EARDRUM OPENING,LOCAL ANESTH  4 years    PETs     Current Outpatient Medications   Medication Sig Dispense Refill      albuterol (PROAIR HFA/PROVENTIL HFA/VENTOLIN HFA) 108 (90 Base) MCG/ACT inhaler Inhale 2 puffs into the lungs every 4 hours as needed for shortness of breath / dyspnea or wheezing 18 g 3     etonogestrel (NEXPLANON) 68 MG IMPL 1 each (68 mg) by Subdermal route once       HYDROcodone-acetaminophen (NORCO) 5-325 MG tablet Take 1-2 tablets by mouth daily       lisdexamfetamine (VYVANSE) 20 MG capsule Take 20 mg by mouth daily       naproxen (NAPROSYN) 500 MG tablet Take 1 tablet (500 mg) by mouth 2 times daily as needed for moderate pain or headaches 28 tablet 11     ondansetron (ZOFRAN) 4 MG tablet Take 1 tablet (4 mg) by mouth every 8 hours as needed for nausea 20 tablet 11     ubrogepant (UBRELVY) 100 MG tablet Take 1 tablet (100 mg) by mouth at onset of headache (migraine) 10 tablet 11     Aspirin-Acetaminophen-Caffeine (EXCEDRIN PO) Take by mouth as needed (Patient not taking: No sig reported)       Iodine  Social History     Socioeconomic History     Marital status:      Spouse name: Not on file     Number of children: Not on file     Years of education: Not on file     Highest education level: Not on file   Occupational History     Not on file   Tobacco Use     Smoking status: Never Smoker     Smokeless tobacco: Never Used   Substance and Sexual Activity     Alcohol use: Not Currently     Drug use: No     Sexual activity: Yes     Partners: Male     Birth control/protection: Implant     Comment: nexplanon    Other Topics Concern      Service Not Asked     Blood Transfusions Not Asked     Caffeine Concern Yes     Comment: occ     Occupational Exposure Not Asked     Hobby Hazards Not Asked     Sleep Concern Not Asked     Stress Concern Not Asked     Weight Concern Not Asked     Special Diet Not Asked     Back Care Not Asked     Exercise Not Asked     Bike Helmet Not Asked     Seat Belt Yes     Self-Exams Not Asked     Parent/sibling w/ CABG, MI or angioplasty before 65F 55M? No   Social History  Narrative     Not on file     Social Determinants of Health     Financial Resource Strain: Not on file   Food Insecurity: Not on file   Transportation Needs: Not on file   Physical Activity: Not on file   Stress: Not on file   Social Connections: Not on file   Intimate Partner Violence: Not on file   Housing Stability: Not on file     Family History   Problem Relation Age of Onset     Migraines Mother      Sleep Apnea Mother      Substance Abuse Father      Snoring Father      Depression Brother      Anxiety Disorder Brother      Diabetes Maternal Grandmother      Cerebrovascular Disease Maternal Grandfather      Myocardial Infarction Paternal Grandmother      Myocardial Infarction Paternal Grandfather         Physical Exam:  GEN: NAD,   Head: Normocephalic.  EYES: EOMI  ENT: Oropharynx is clear, [place] class 4+ airway.   Psych: normal mood, normal affect  Snore test:(-)     Labs/Studies:     No results found for: PH, PHARTERIAL, PO2, [id number], SAT, PCO2, HCO3, BASEEXCESS, [place], BEB  Lab Results   Component Value Date    TSH 0.75 04/12/2022    TSH 0.32 (L) 07/30/2021     Lab Results   Component Value Date    GLC 98 04/12/2022     (H) 01/19/2022     Lab Results   Component Value Date    HGB 13.7 04/12/2022    HGB 12.5 07/30/2021     Lab Results   Component Value Date    BUN 13 04/12/2022    BUN 13 01/19/2022    CR 0.82 04/12/2022    CR 0.65 01/19/2022     Lab Results   Component Value Date    AST 14 04/12/2022    AST 13 01/19/2022    ALT 32 04/12/2022    ALT 33 01/19/2022    ALKPHOS 93 04/12/2022    ALKPHOS 76 01/19/2022    BILITOTAL 0.6 04/12/2022    BILITOTAL 0.5 01/19/2022     No results found for: UAMP, UBARB, BENZODIAZEUR, UCANN, UCOC, OPIT, UPCP    Recent Labs   Lab Test 04/12/22  1403 01/19/22  1055    141   POTASSIUM 3.8 3.6   CHLORIDE 108 109   CO2 25 27   ANIONGAP 5 5   GLC 98 107*   BUN 13 13   CR 0.82 0.65   MIKO 9.6 8.6       Ferritin   Date Value Ref Range Status   12/19/2018 4 (L) 12 -  150 ng/mL Final       Kyler Viramontes DO  Board Certified in Internal Medicine and Sleep Medicine    (Note created with Dragon voice recognition and unintended spelling errors and word substitutions may occur)

## 2022-06-23 NOTE — NURSING NOTE
Sleep study, return visit for results, and sleep study information packet has been completed by centralized scheduling.       Jenny Holt [facility]

## 2022-07-12 ENCOUNTER — OFFICE VISIT (OUTPATIENT)
Dept: OBGYN | Facility: CLINIC | Age: 22
End: 2022-07-12
Payer: COMMERCIAL

## 2022-07-12 VITALS
SYSTOLIC BLOOD PRESSURE: 114 MMHG | DIASTOLIC BLOOD PRESSURE: 58 MMHG | TEMPERATURE: 98.5 F | WEIGHT: 214.6 LBS | BODY MASS INDEX: 35.71 KG/M2

## 2022-07-12 DIAGNOSIS — N83.201 CYST OF RIGHT OVARY: Primary | ICD-10-CM

## 2022-07-12 DIAGNOSIS — M89.9 DISORDER OF PELVIC GIRDLE: ICD-10-CM

## 2022-07-12 PROCEDURE — 99213 OFFICE O/P EST LOW 20 MIN: CPT | Performed by: OBSTETRICS & GYNECOLOGY

## 2022-07-12 NOTE — PROGRESS NOTES
"  Assessment & Plan     Cyst of right ovary  Discussed pain unlikely originated from ovarian cyst  Reviewed external ultrasound reports and no interval change in cyst from 6/5 to 6/26. This does not support cyst rupture. Size not consistent with torsion.   Reviewed external ER and ob gyn notes from [facility].   Recommended 8 week f/u ultrasound of cyst.   - US Transvaginal Pelvic Non-OB; Future    Disorder of pelvic girdle  Discussed exam findings.   Given myalgia on exam recommend pelvic floor PT.   Discussed how myalgia can contribute to pelvic pain.   - Physical Therapy Referral; Future    Review of external notes as documented elsewhere in note  Review of the result(s) of each unique test - ultrasound  Ordering of each unique test         BMI:   Estimated body mass index is 35.71 kg/m  as calculated from the following:    Height as of 6/22/22: 1.651 m (5' 5\").    Weight as of this encounter: 97.3 kg (214 lb 9.6 oz).   Weight management plan: Discussed healthy diet and exercise guidelines        No follow-ups on file.    Annabel Loza MD  [employer]    Hayden Wray is a 22 year old, presenting for the following health issues:  Consult (Follow up ovarian cyst)      HPI   Presents for f/u ovarian cyst.   Was seen in ER twice, June 5 and June 26.   Since last ER visit pain has been off and on, every other day  Gets really bad quickly, then lasts for 6-8 hours    Vicodin didn't help  Did get her period during the second time she was in the ER. Period lasted 10 days.     Nexplanon replaced 2/2022    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      Pap smear of cervix with ASCUS, cannot exclude HGSIL 1/19/2022     Sprain of right wrist     right side muliple times      Uncomplicated asthma     exercise induced       Past Surgical History:   Procedure Laterality Date     BIOPSY   "     CARDIAC CATHERIZATION  2 years    Told by cardiology that tiny defect would close on own, no FU     CARDIAC SURGERY       CREATE EARDRUM OPENING,LOCAL ANESTH  4 years    PETs       Family History   Problem Relation Age of Onset     Migraines Mother      Sleep Apnea Mother      Substance Abuse Father      Snoring Father      Depression Brother      Anxiety Disorder Brother      Diabetes Maternal Grandmother      Cerebrovascular Disease Maternal Grandfather      Myocardial Infarction Paternal Grandmother      Myocardial Infarction Paternal Grandfather        Social History     Socioeconomic History     Marital status:      Spouse name: Not on file     Number of children: Not on file     Years of education: Not on file     Highest education level: Not on file   Occupational History     Not on file   Tobacco Use     Smoking status: Never Smoker     Smokeless tobacco: Never Used   Substance and Sexual Activity     Alcohol use: Not Currently     Drug use: No     Sexual activity: Yes     Partners: Male     Birth control/protection: Implant     Comment: nexplanon    Other Topics Concern      Service Not Asked     Blood Transfusions Not Asked     Caffeine Concern Yes     Comment: occ     Occupational Exposure Not Asked     Hobby Hazards Not Asked     Sleep Concern Not Asked     Stress Concern Not Asked     Weight Concern Not Asked     Special Diet Not Asked     Back Care Not Asked     Exercise Not Asked     Bike Helmet Not Asked     Seat Belt Yes     Self-Exams Not Asked     Parent/sibling w/ CABG, MI or angioplasty before 65F 55M? No   Social History Narrative     Not on file     Social Determinants of Health     Financial Resource Strain: Not on file   Food Insecurity: Not on file   Transportation Needs: Not on file   Physical Activity: Not on file   Stress: Not on file   Social Connections: Not on file   Intimate Partner Violence: Not on file   Housing Stability: Not on file       Current Outpatient  Medications   Medication     albuterol (PROAIR HFA/PROVENTIL HFA/VENTOLIN HFA) 108 (90 Base) MCG/ACT inhaler     etonogestrel (NEXPLANON) 68 MG IMPL     lisdexamfetamine (VYVANSE) 20 MG capsule     naproxen (NAPROSYN) 500 MG tablet     ondansetron (ZOFRAN) 4 MG tablet     ubrogepant (UBRELVY) 100 MG tablet     Current Facility-Administered Medications   Medication     etonogestrel (IMPLANON/NEXPLANON) subdermal implant 68 mg          Allergies   Allergen Reactions     Iodine Rash     Rash itching           Review of Systems   Constitutional, HEENT, cardiovascular, pulmonary, gi and gu systems are negative, except as otherwise noted.      Objective    /58   Temp 98.5  F (36.9  C) (Oral)   Wt 97.3 kg (214 lb 9.6 oz)   LMP 06/26/2022 (Exact Date)   BMI 35.71 kg/m    Body mass index is 35.71 kg/m .  Physical Exam   GENERAL: healthy, alert and no distress  ABDOMEN: soft, nontender, no hepatosplenomegaly, no masses and bowel sounds normal   (female): normal female external genitalia, normal urethral meatus, vaginal mucosa, normal cervix/adnexa/uterus without masses or discharge  MS: no gross musculoskeletal defects noted, no edema. Does have tenderness to palpation of right obturator internus muscle and right iliac crest muscle insertions.   SKIN: no suspicious lesions or rashes  PSYCH: mentation appears normal, affect normal/bright    Impression    IMPRESSION:     1.  Physiologic follicle measuring up to 2.3 cm in the right ovary is unchanged from 6/5/2022.   2.  Normal sonographic evaluation of the uterus and ovaries.     REPORT SIGNED BY DR. Adonay Ron    Narrative    EXAM:  US PELVIS WITH IVT-NON OB     DATE:  6/26/2022 1:08 PM     CLINICAL DATA:  Pain, concern for ruptured cyst.     ADDITIONAL CLINICAL DATA:  Pain       COMPARISON:  None     TECHNIQUE:  Real-time transabdominal and transvaginal scanning was performed.  Transvaginal imaging was necessary to better visualize the endometrium and ovaries.      FINDINGS:       UTERUS:  Normal.     UTERUS SIZE: 7.5 X 4 X 3.3 cm.     ENDOMETRIUM:  Normal in echotexture.  Endometrium measures 0.3 cm in thickness.     RIGHT OVARY:  There is a right ovarian follicle measuring 2.0 x 1.8 x 2.3 cm which is predominantly anechoic but demonstrate some internal echoes. No internal blood flow within the cyst by color Doppler.  The right ovary measures 3.3 X 2.6 X 2.5 cm.   Right ovary demonstrates normal blood flow.       LEFT OVARY:  Normal in size and echotexture.  The left ovary measures 2.9 X 2.7 X 1.2 cm.   Left ovary demonstrates normal blood flow.       FLUID:   No free fluid.      IMPRESSION:   1.  Retroverted and otherwise unremarkable uterus.   2.  Dominant right ovarian follicle versus ovarian cyst, 2.6 x 1.9 x 2.3 cm. This is not unexpected in a normal menstruating patient.   3.  No abnormal adnexal mass or free pelvic fluid.     REPORT SIGNED BY Alex Moreno M.D.    Narrative    EXAM: US PELVIS WITH IVT-NON OB       DATE: 6/5/2022 12:27 PM     CLINICAL DATA: Pain. Suprapubic and right lower quadrant pain.   ICD 10:     COMPARISON: None.     FINDINGS:  Transabdominal and endovaginal pelvic ultrasound was performed.  The uterus measures 6.8 X 4.6 X 4.2 cm.  Endometrial stripe thickness is 0.8 cm.  No discrete intrauterine mass is seen. Of note, the uterus is retroverted, making it difficult to visualize on the transabdominal ultrasound.     The right and left ovaries measure 3.3 X 2.8 X 2.4 and 2.5 X 2.9 X 1.4 cm respectively.  Normal arteriovenous flow is noted to both ovaries.   Dominant right ovarian follicle versus cyst measures 2.3 x 1.9 x 2.6 cm..  No abnormal adnexal mass is seen.  No free pelvic fluid is noted.              .  ..

## 2022-08-06 ENCOUNTER — HEALTH MAINTENANCE LETTER (OUTPATIENT)
Age: 22
End: 2022-08-06

## 2022-08-19 ENCOUNTER — THERAPY VISIT (OUTPATIENT)
Dept: PHYSICAL THERAPY | Facility: CLINIC | Age: 22
End: 2022-08-19
Attending: OBSTETRICS & GYNECOLOGY
Payer: COMMERCIAL

## 2022-08-19 DIAGNOSIS — R10.2 PELVIC PAIN IN FEMALE: ICD-10-CM

## 2022-08-19 DIAGNOSIS — M89.9 DISORDER OF PELVIC GIRDLE: ICD-10-CM

## 2022-08-19 PROCEDURE — 97140 MANUAL THERAPY 1/> REGIONS: CPT | Mod: GP | Performed by: PHYSICAL THERAPIST

## 2022-08-19 PROCEDURE — 97161 PT EVAL LOW COMPLEX 20 MIN: CPT | Mod: GP | Performed by: PHYSICAL THERAPIST

## 2022-08-19 PROCEDURE — 97110 THERAPEUTIC EXERCISES: CPT | Mod: GP | Performed by: PHYSICAL THERAPIST

## 2022-08-19 NOTE — PROGRESS NOTES
[facility]    OUTPATIENT Physical Therapy ORTHOPEDIC EVALUATION  PLAN OF TREATMENT FOR OUTPATIENT REHABILITATION  (COMPLETE FOR INITIAL CLAIMS ONLY)  Patient's Last Name, First Name, M.I.  YOB: 2000  Kamala Harris    Provider s Name:  [facility]   Medical Record No.  [id number]   Start of Care Date:  08/19/22   Onset Date:       Type:     _X__PT   ___OT Medical Diagnosis:    Encounter Diagnosis   Name Primary?    Disorder of pelvic girdle         Treatment Diagnosis:  Pelvic Pain        Goals:     08/19/22 0700   Pelvic Pain   Previous Functional Level No restrictions   Current Functional Level Level of pain experienced with   Performance Level randomly throughout the day that limits activites during period of pain of up to 9/10   STG Target Performance Decrease pain with   Performance Level daily activities to <5/10   Rationale painfree sitting for personal hygiene;painfree sitting for allow rest from standing;painfree sitting for community transportation;painfree sitting for job requirements in their work place;painfree intercourse   Due Date 09/08/22   LTG Target Performance Decrease pain with   Performance Level Daily activities to 0/10   Rationale painfree sitting for community transportation;painfree sitting for personal hygiene;painfree sitting for allow rest from standing;painfree sitting for job requirements in their work place;painfree intercourse   Due Date 10/18/22       Therapy Frequency:  1x/wk  Predicted Duration of Therapy Intervention:  8 weeks    Shawanda Son, PT                 I CERTIFY THE NEED FOR THESE SERVICES FURNISHED UNDER        THIS PLAN OF TREATMENT AND WHILE UNDER MY CARE     (Physician attestation of this document indicates review and certification of the therapy plan).                     Certification Date From:  08/19/22    Certification Date To:  10/28/22    Referring Provider:  Annabel Loza    Initial Assessment        See Epic Evaluation SOC Date: 08/19/22

## 2022-08-19 NOTE — PROGRESS NOTES
Physical Therapy Initial Evaluation  Subjective:  The history is provided by the patient. No  was used.   Therapist Generated HPI Evaluation  Problem details: Onset of pelvic pain around May. Around that time, also had a right ovarian cyst. Has done heat, meds, etc and nothing has helped. Has had a follow up US and no change in the size of cyst. Thought the pain had to do with the cyst.     Pelvic pain is slightly lower than the cyst on the R. .         Type of problem:  Pelvic dysfunction.    This is a new condition.  Condition occurred with:  Insidious onset.    Site of Pain: R lower abdomen but nothing into vaginal or rectal area.  Pain is described as sharp (can feel like contractions) and is intermittent.  Pain is the same all the time.  Since onset symptoms are unchanged.  Exacerbated by: unsure, just happens randomly.  and relieved by nothing.  Special tests included:  Other (US).    Restrictions due to condition include:  Working in normal job without restrictions.  Barriers include:  None as reported by patient.    Patient Health History         Pain is reported as 4/10 (up to 9/10 at it's worst) on pain scale.  General health as reported by patient is fair.  Pertinent medical history includes: heart problems, mental illness, depression, asthma, migraines/headaches and sleep disorder/apnea (Hole in her heart, Nexplanon).   Red flags:  None as reported by patient.   Other medical allergies details: Iodine.       Current medications:  Anti-depressants and pain medication. Other medications details: Migraine meds.    Current occupation is [employer].   Primary job tasks include:  Prolonged standing, lifting/carrying and pushing/pulling.                                  SUBJECTIVE:    Urination:  Do you leak on the way to the bathroom or with a strong urge to void? No   Do you leak with cough,sneeze, jumping, running?Yes, occasionally   Any other activities that cause leaking? No   Do you have  triggers that make you feel you can't wait to go to the bathroom? No what are they NA.  Type of pad and number used per day? 0  When you leak what is the amount? Small  How long can you delay the need to urinate? As long as she needs to.   How many times do you get up to urinate at night? 0   Can you stop the flow of urine when on the toilet? Yes  Is the volume of urine passed usually: medium. (8sec rule= 250ml with average bladder storing 400-600ml)  Do you feel empty when you are done? Yes  Do you strain to pass urine? No  Do you have a slow or hesitant urinary stream? No  Do you have difficulty initiating the urine stream? No  Is urination painful? Yes, a little bit when pushing  How many bladder infections have you had in last 12 months?0  Fluid intake(one glass is 8oz or one cup) 3-4 glasses/day, 1-2 (Monster, Mountain Dew, Bang) caffinated glasses/day  0 alcohol glasses/day.    Bowel habits:  Frequency of bowel movements? 1-3 times a day  Consistancy of stool? soft formed, [place] Stool Scale 4  Do you ignore the urge to defecate? No  Do you strain to pass stool? No    Aggrevating factors:  Is loss of stool associated with an activity (lifting, coughing, running) or a food)  No  Are there any foods that increase or decrease your symptoms?  No but is wondering if peppers are bothering her  Do you have any food allergies?  No      Sensation:   Can you tell if there is solid, liquid, or gas in the rectum?  Yes  Do you feel the urge to move your bowels?  Yes  Is the urge very strong and difficult to control? NA Or weak/absent? NA  Do you feel the rectum is empty with you finish a bowel movement?   Yes    Do you have abdominal pain?  Yes  Describe the quality of the pain: R lower abdominal  What makes your abdominal pain worse? Unsure  What makes your abdominal pain better? Nothing  Do you ever have pain that wakes you at night? No    Do you have rectal pain, pressure, or burning?  No     Pelvic Pain:  Do you have  any pelvic pain with intercourse, exams, use of tampons? Yes, mainly when it hits a certain spot. Notes the pain with deep and to the R penetration.  Is initial penetration during intercourse painful? No  Is deeper penetration painful? Yes  Do you use lubricant? No What kind? NA  ?  Given birth? Yes Any complications? 11# baby  # of vaginal delieveries? 1  # of C-sections? 0  # of episiotomies? 0.  Are you sexually active?Yes  Have you ever been worried for your physical safety? No  Do you have any depression, anxiety, panic attacks, excessive stress?  Yes  Any abdominal or pelvic surgeries? No  Are you having any regular exercise?No  Have you practiced the PF(kegel) exercises for 4 or more weeks?No  Thyroid checked? No (related to hair loss, flu-like symptoms, wt gain/loss, fatigue, menopause)  Changed diet lately? No    OBSERVATION  Lumbar Posture: Negative  Pelvic symmetry: Negative  Introitus: slight redness and skin irritation noted around labia majora  Trendelenberg Sign:Not Tested: Not indicated     ROM  Single leg standing unilateral hip flexion PSIS:Negative  Standing forward flexion PSIS:Negative  Passive Hip ROM:Negative  CECILIA:Negative  CECILIA with OP:Negative    MUSCLE PERFORMANCE  Active SLR:Negative  Baseline PF tone:slight tension noted in levator ani on R. Tenderness in paraurethral structures and posterior pubic bone on R.  PF Tone with cough: not tested  Valsalva: minimal movement noted  PF Response quality: brisk - normal  PF Power: Center: 3 Stronger on right/left symmetrical. Overall, pelvic floor function is good. Able to contract and relax with good control and coordination.   Endurance: Maximum contraction in seconds: 5  # of endurance contractions before fatigue: 4  Quick contraction repetitions prior to fatigue: 6.  Specificity/accessory muscles: Not Tested, Synergy with abdominals: Not Tested.  Prolapse: Cyctocele/Rectocele/Uterine stgstrstastdstest:st st1st PALPATION: Pain: levator ani and  ischiocavernosus. Tenderness and pain noted through inguinal canal on R as well from superficial to deep inguinal ring. Decreased overall fascial mobility noted as well.      Objective:  System                                 Pelvic Dysfunction Evaluation:        Flexibility:    Tightness present at:Adductors; Iliopsoas and Hamstrings  Tightness not present at:  Piriformis or Gluteals    Abdominal Wall:  Abdominal wall pelvic: Tenderness noted in R lower quadrant as well as congestion noted with decreased fascial mobility. Tenderness and tightness noted along inguinal canal region as well.        Pelvic Clock Exam:  Pelvic clock exam: Tenderness only on the R in superior labial region.  Ischiocavernosis pain:  +  Bulbocavernosis pain:  -  Transverse Perineal:  +  Levator ANI:  -  Perineal Body:  -      External Assessment:    Skin Condition:  Irritation          Muscle Contraction/Perineal Mobility:  Elevation and urogential triangle descent  Internal Assessment:      Contraction/Grade:  Fair squeeze, definite lift (3)                               General     ROS    Assessment/Plan:    Patient is a 22 year old female with pelvic complaints.    Patient has the following significant findings with corresponding treatment plan.                Diagnosis 1:  R pelvic pain  Pain -  manual therapy, self management, education and home program  Decreased ROM/flexibility - manual therapy, therapeutic exercise, therapeutic activity and home program  Decreased strength - therapeutic exercise, therapeutic activities and home program  Impaired muscle performance - biofeedback, neuro re-education and home program  Decreased function - therapeutic activities and home program    Therapy Evaluation Codes:   1) History comprised of:   Personal factors that impact the plan of care:      None.    Comorbidity factors that impact the plan of care are:      Asthma, Depression, Dizziness, Heart problems, Implanted device, Mental illness,  Migraines/headaches and Sleep disorder/apnea.     Medications impacting care: Anti-depressant, Pain and migraine.  2) Examination of Body Systems comprised of:   Body structures and functions that impact the plan of care:      Pelvis.   Activity limitations that impact the plan of care are:      [place] and Pt has pain throughout activities but nothing specific brings it on.  3) Clinical presentation characteristics are:   Stable/Uncomplicated.  4) Decision-Making    Low complexity using standardized patient assessment instrument and/or measureable assessment of functional outcome.  Cumulative Therapy Evaluation is: Low complexity.    Previous and current functional limitations:  (See Goal Flow Sheet for this information)    Short term and Long term goals: (See Goal Flow Sheet for this information)     Communication ability:  Patient appears to be able to clearly communicate and understand verbal and written communication and follow directions correctly.  Treatment Explanation - The following has been discussed with the patient:   RX ordered/plan of care  Anticipated outcomes  Possible risks and side effects  This patient would benefit from PT intervention to resume normal activities.   Rehab potential is good.    Frequency:  1 X week, once daily  Duration:  for 8 weeks  Discharge Plan:  Achieve all LTG.  Independent in home treatment program.  Reach maximal therapeutic benefit.    Please refer to the daily flowsheet for treatment today, total treatment time and time spent performing 1:1 timed codes.

## 2022-08-24 RX ORDER — ESCITALOPRAM OXALATE 10 MG/1
TABLET ORAL
COMMUNITY
Start: 2022-08-18 | End: 2023-08-02

## 2022-08-24 RX ORDER — KETOROLAC TROMETHAMINE 10 MG/1
TABLET, FILM COATED ORAL
COMMUNITY
Start: 2022-06-08 | End: 2022-09-19

## 2022-08-24 RX ORDER — HYDROXYZINE PAMOATE 25 MG/1
CAPSULE ORAL
COMMUNITY
Start: 2022-02-26 | End: 2022-09-19

## 2022-08-24 RX ORDER — IBUPROFEN 600 MG/1
TABLET, FILM COATED ORAL
COMMUNITY
Start: 2022-02-11 | End: 2023-06-26

## 2022-08-24 RX ORDER — ALBUTEROL SULFATE 90 UG/1
2 AEROSOL, METERED RESPIRATORY (INHALATION)
COMMUNITY
Start: 2022-06-25 | End: 2023-08-24

## 2022-08-24 RX ORDER — FLUTICASONE PROPIONATE 50 MCG
SPRAY, SUSPENSION (ML) NASAL
COMMUNITY
Start: 2022-06-25 | End: 2023-08-02

## 2022-08-26 ENCOUNTER — OFFICE VISIT (OUTPATIENT)
Dept: OBGYN | Facility: CLINIC | Age: 22
End: 2022-08-26
Attending: OBSTETRICS & GYNECOLOGY
Payer: COMMERCIAL

## 2022-08-26 ENCOUNTER — ANCILLARY PROCEDURE (OUTPATIENT)
Dept: ULTRASOUND IMAGING | Facility: CLINIC | Age: 22
End: 2022-08-26
Attending: OBSTETRICS & GYNECOLOGY
Payer: COMMERCIAL

## 2022-08-26 ENCOUNTER — LAB (OUTPATIENT)
Dept: LAB | Facility: CLINIC | Age: 22
End: 2022-08-26

## 2022-08-26 VITALS
BODY MASS INDEX: 36.09 KG/M2 | DIASTOLIC BLOOD PRESSURE: 76 MMHG | SYSTOLIC BLOOD PRESSURE: 117 MMHG | WEIGHT: 216.9 LBS | HEART RATE: 98 BPM | OXYGEN SATURATION: 97 %

## 2022-08-26 DIAGNOSIS — R06.81 WITNESSED EPISODE OF APNEA: ICD-10-CM

## 2022-08-26 DIAGNOSIS — M89.9 DISORDER OF PELVIC GIRDLE: ICD-10-CM

## 2022-08-26 DIAGNOSIS — N83.201 CYST OF RIGHT OVARY: Primary | ICD-10-CM

## 2022-08-26 DIAGNOSIS — G47.10 HYPERSOMNIA: ICD-10-CM

## 2022-08-26 DIAGNOSIS — R06.83 SNORING: ICD-10-CM

## 2022-08-26 DIAGNOSIS — N83.201 CYST OF RIGHT OVARY: ICD-10-CM

## 2022-08-26 DIAGNOSIS — I45.6 WOLFF-PARKINSON-WHITE (WPW) PATTERN SEEN ON ELECTROCARDIOGRAPHY: ICD-10-CM

## 2022-08-26 PROCEDURE — U0005 INFEC AGEN DETEC AMPLI PROBE: HCPCS

## 2022-08-26 PROCEDURE — 99212 OFFICE O/P EST SF 10 MIN: CPT | Performed by: OBSTETRICS & GYNECOLOGY

## 2022-08-26 PROCEDURE — U0003 INFECTIOUS AGENT DETECTION BY NUCLEIC ACID (DNA OR RNA); SEVERE ACUTE RESPIRATORY SYNDROME CORONAVIRUS 2 (SARS-COV-2) (CORONAVIRUS DISEASE [COVID-19]), AMPLIFIED PROBE TECHNIQUE, MAKING USE OF HIGH THROUGHPUT TECHNOLOGIES AS DESCRIBED BY CMS-2020-01-R: HCPCS

## 2022-08-26 PROCEDURE — 76830 TRANSVAGINAL US NON-OB: CPT | Performed by: OBSTETRICS & GYNECOLOGY

## 2022-08-26 ASSESSMENT — ASTHMA QUESTIONNAIRES
QUESTION_4 LAST FOUR WEEKS HOW OFTEN HAVE YOU USED YOUR RESCUE INHALER OR NEBULIZER MEDICATION (SUCH AS ALBUTEROL): NOT AT ALL
ACT_TOTALSCORE: 25
ACT_TOTALSCORE: 25
QUESTION_1 LAST FOUR WEEKS HOW MUCH OF THE TIME DID YOUR ASTHMA KEEP YOU FROM GETTING AS MUCH DONE AT WORK, SCHOOL OR AT HOME: NONE OF THE TIME
QUESTION_5 LAST FOUR WEEKS HOW WOULD YOU RATE YOUR ASTHMA CONTROL: COMPLETELY CONTROLLED
QUESTION_2 LAST FOUR WEEKS HOW OFTEN HAVE YOU HAD SHORTNESS OF BREATH: NOT AT ALL
ACUTE_EXACERBATION_TODAY: NO
QUESTION_3 LAST FOUR WEEKS HOW OFTEN DID YOUR ASTHMA SYMPTOMS (WHEEZING, COUGHING, SHORTNESS OF BREATH, CHEST TIGHTNESS OR PAIN) WAKE YOU UP AT NIGHT OR EARLIER THAN USUAL IN THE MORNING: NOT AT ALL

## 2022-08-26 ASSESSMENT — PATIENT HEALTH QUESTIONNAIRE - PHQ9: SUM OF ALL RESPONSES TO PHQ QUESTIONS 1-9: 7

## 2022-08-26 NOTE — PROGRESS NOTES
Assessment & Plan     Cyst of right ovary  Reviewed ultrasound report and images.   Resolve.d     Disorder of pelvic girdle  Continue pelvic floor physical therapy.   I will check in by Kalyani in 2 months.       Review of the result(s) of each unique test - ultrasound             No follow-ups on file.    Annabel Loza MD  [facility] is a 22 year old accompanied by her spouse, presenting for the following health issues:  Follow Up      HPI   Presents for ultrasound follow-up of ovarian cyst  Pain has remained steady.   Has had one physical therapy appointment.     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      Pap smear of cervix with ASCUS, cannot exclude HGSIL 1/19/2022     Sprain of right wrist     right side muliple times      Uncomplicated asthma     exercise induced       Past Surgical History:   Procedure Laterality Date     BIOPSY       CARDIAC CATHERIZATION  2 years    Told by cardiology that tiny defect would close on own, no FU     CARDIAC SURGERY       CREATE EARDRUM OPENING,LOCAL ANESTH  4 years    PETs       Family History   Problem Relation Age of Onset     Migraines Mother      Sleep Apnea Mother      Substance Abuse Father      Snoring Father      Depression Brother      Anxiety Disorder Brother      Diabetes Maternal Grandmother      Cerebrovascular Disease Maternal Grandfather      Myocardial Infarction Paternal Grandmother      Myocardial Infarction Paternal Grandfather        Social History     Socioeconomic History     Marital status:      Spouse name: Not on file     Number of children: Not on file     Years of education: Not on file     Highest education level: Not on file   Occupational History     Not on file   Tobacco Use     Smoking status: Never Smoker     Smokeless tobacco: Never Used   Substance and Sexual Activity      Alcohol use: Not Currently     Drug use: No     Sexual activity: Yes     Partners: Male     Birth control/protection: Implant     Comment: nexplanon    Other Topics Concern      Service Not Asked     Blood Transfusions Not Asked     Caffeine Concern Yes     Comment: occ     Occupational Exposure Not Asked     Hobby Hazards Not Asked     Sleep Concern Not Asked     Stress Concern Not Asked     Weight Concern Not Asked     Special Diet Not Asked     Back Care Not Asked     Exercise Not Asked     Bike Helmet Not Asked     Seat Belt Yes     Self-Exams Not Asked     Parent/sibling w/ CABG, MI or angioplasty before 65F 55M? No   Social History Narrative     Not on file     Social Determinants of Health     Financial Resource Strain: Not on file   Food Insecurity: Not on file   Transportation Needs: Not on file   Physical Activity: Not on file   Stress: Not on file   Social Connections: Not on file   Intimate Partner Violence: Not on file   Housing Stability: Not on file       Current Outpatient Medications   Medication     albuterol (PROAIR HFA/PROVENTIL HFA/VENTOLIN HFA) 108 (90 Base) MCG/ACT inhaler     albuterol (PROAIR HFA/PROVENTIL HFA/VENTOLIN HFA) 108 (90 Base) MCG/ACT inhaler     escitalopram (LEXAPRO) 10 MG tablet     etonogestrel (NEXPLANON) 68 MG IMPL     etonogestrel (NEXPLANON) 68 MG IMPL     fluticasone (FLONASE) 50 MCG/ACT nasal spray     hydrOXYzine (VISTARIL) 25 MG capsule     ibuprofen (ADVIL/MOTRIN) 600 MG tablet     ketorolac (TORADOL) 10 MG tablet     lisdexamfetamine (VYVANSE) 20 MG capsule     naproxen (NAPROSYN) 500 MG tablet     ondansetron (ZOFRAN) 4 MG tablet     ubrogepant (UBRELVY) 100 MG tablet     Current Facility-Administered Medications   Medication     etonogestrel (IMPLANON/NEXPLANON) subdermal implant 68 mg          Allergies   Allergen Reactions     Iodine Rash and Itching     Rash itching           Review of Systems   Constitutional, HEENT, cardiovascular, pulmonary, gi  and gu systems are negative, except as otherwise noted.      Objective    /76   Pulse 98   Wt 98.4 kg (216 lb 14.4 oz)   SpO2 97%   Breastfeeding No   BMI 36.09 kg/m    Body mass index is 36.09 kg/m .  Physical Exam   GENERAL: healthy, alert and no distress  MS: no gross musculoskeletal defects noted, no edema  PSYCH: mentation appears normal, affect normal/flat    No results found for this or any previous visit (from the past 24 hour(s)).                .  ..

## 2022-08-27 LAB — SARS-COV-2 RNA RESP QL NAA+PROBE: NEGATIVE

## 2022-08-30 ENCOUNTER — THERAPY VISIT (OUTPATIENT)
Dept: SLEEP MEDICINE | Facility: CLINIC | Age: 22
End: 2022-08-30
Attending: INTERNAL MEDICINE
Payer: COMMERCIAL

## 2022-08-30 DIAGNOSIS — I45.6 WOLFF-PARKINSON-WHITE (WPW) PATTERN SEEN ON ELECTROCARDIOGRAPHY: ICD-10-CM

## 2022-08-30 DIAGNOSIS — R06.83 SNORING: ICD-10-CM

## 2022-08-30 DIAGNOSIS — R06.81 WITNESSED EPISODE OF APNEA: ICD-10-CM

## 2022-08-30 DIAGNOSIS — G47.10 HYPERSOMNIA: ICD-10-CM

## 2022-08-30 PROCEDURE — 95810 POLYSOM 6/> YRS 4/> PARAM: CPT | Performed by: INTERNAL MEDICINE

## 2022-09-07 LAB — SLPCOMP: NORMAL

## 2022-09-19 ENCOUNTER — OFFICE VISIT (OUTPATIENT)
Dept: SLEEP MEDICINE | Facility: CLINIC | Age: 22
End: 2022-09-19
Payer: COMMERCIAL

## 2022-09-19 VITALS
SYSTOLIC BLOOD PRESSURE: 109 MMHG | BODY MASS INDEX: 35.65 KG/M2 | DIASTOLIC BLOOD PRESSURE: 73 MMHG | HEART RATE: 96 BPM | HEIGHT: 65 IN | OXYGEN SATURATION: 100 % | WEIGHT: 214 LBS

## 2022-09-19 DIAGNOSIS — G47.10 HYPERSOMNIA: ICD-10-CM

## 2022-09-19 DIAGNOSIS — G47.33 OBSTRUCTIVE SLEEP APNEA: Primary | ICD-10-CM

## 2022-09-19 DIAGNOSIS — G47.33 OSA (OBSTRUCTIVE SLEEP APNEA): ICD-10-CM

## 2022-09-19 PROCEDURE — 99213 OFFICE O/P EST LOW 20 MIN: CPT | Performed by: INTERNAL MEDICINE

## 2022-09-19 RX ORDER — MECLIZINE HYDROCHLORIDE 25 MG/1
25 TABLET ORAL 3 TIMES DAILY PRN
COMMUNITY
End: 2023-10-30

## 2022-09-19 ASSESSMENT — SLEEP AND FATIGUE QUESTIONNAIRES
HOW LIKELY ARE YOU TO NOD OFF OR FALL ASLEEP WHILE SITTING INACTIVE IN A PUBLIC PLACE: SLIGHT CHANCE OF DOZING
HOW LIKELY ARE YOU TO NOD OFF OR FALL ASLEEP IN A CAR, WHILE STOPPED FOR A FEW MINUTES IN TRAFFIC: WOULD NEVER DOZE
HOW LIKELY ARE YOU TO NOD OFF OR FALL ASLEEP WHILE SITTING QUIETLY AFTER LUNCH WITHOUT ALCOHOL: SLIGHT CHANCE OF DOZING
HOW LIKELY ARE YOU TO NOD OFF OR FALL ASLEEP WHEN YOU ARE A PASSENGER IN A CAR FOR AN HOUR WITHOUT A BREAK: SLIGHT CHANCE OF DOZING
HOW LIKELY ARE YOU TO NOD OFF OR FALL ASLEEP WHILE SITTING AND TALKING TO SOMEONE: SLIGHT CHANCE OF DOZING
HOW LIKELY ARE YOU TO NOD OFF OR FALL ASLEEP WHILE SITTING AND READING: MODERATE CHANCE OF DOZING
HOW LIKELY ARE YOU TO NOD OFF OR FALL ASLEEP WHILE LYING DOWN TO REST IN THE AFTERNOON WHEN CIRCUMSTANCES PERMIT: MODERATE CHANCE OF DOZING
HOW LIKELY ARE YOU TO NOD OFF OR FALL ASLEEP WHILE WATCHING TV: MODERATE CHANCE OF DOZING

## 2022-09-19 NOTE — PROGRESS NOTES
Additional 10 minutes on the date of service was spent performing the following:    -Preparing to see the patient (eg, review of tests)   -Ordering medications, tests, or procedures   -Documenting clinical information in the electronic or other health record     Thank you for the opportunity to participate in the care of Kamala Harris.     She is a 22 year old  female patient who comes to the sleep medicine clinic for review of sleep study results. The study was completed on 08/30/2022  which showed that the patient had mild obstructive sleep apnea with an apnea hypopnea index of 6.1 events per hour with the lowest O2 Sat of 82%.    Assessment and Plan:  In summary Kamala Harris is a 22 year old year old female here for review of sleep study results.  1. Obstructive Sleep Apnea/Hypersomnia  We had an extensive conversation to review the results of her sleep study and to  her on the importance of treating sleep apnea. We discussed the options of treatment with oral appliance versus CPAP. Patient decided to proceed with CPAP. She will start using the device as soon as she receives it with the intention to use if for the entire night. We discussed some tips to increase PAP tolerance as well as the normal curve of adaptation. CPAP is going to provide improved respiratory function during the night but it can cause some sleep disruption that tends to improve with continuous usage. She should return to the clinic in 7 weeks to review compliance and efficacy monitoring. Since the patient does not have any preference, we will use [employer] as the durable medical equipment company.     TORSTEN:  TORSTEN Total Score: 18  Total score - Nesbit: 10 (9/19/2022  9:02 AM)    Patient Active Problem List   Diagnosis     Presbyopia     Attention deficit hyperactivity disorder (ADHD)     Health Care Home     Mild major depression (H)     Intermittent asthma     Childhood hyperkinetic syndrome     Migraine with aura and  "without status migrainosus, not intractable     [place]-Parkinson-White (WPW) pattern seen on electrocardiography     Class 1 obesity with serious comorbidity and body mass index (BMI) of 34.0 to 34.9 in adult, unspecified obesity type     Pap smear of cervix with ASCUS, cannot exclude HGSIL     Pelvic pain in female     Dizziness     Heavy period       Current Outpatient Medications   Medication Sig Dispense Refill     albuterol (PROAIR HFA/PROVENTIL HFA/VENTOLIN HFA) 108 (90 Base) MCG/ACT inhaler Inhale 2 puffs into the lungs       albuterol (PROAIR HFA/PROVENTIL HFA/VENTOLIN HFA) 108 (90 Base) MCG/ACT inhaler Inhale 2 puffs into the lungs every 4 hours as needed for shortness of breath / dyspnea or wheezing 18 g 3     escitalopram (LEXAPRO) 10 MG tablet        etonogestrel (NEXPLANON) 68 MG IMPL Inject 68 mg Subcutaneous       fluticasone (FLONASE) 50 MCG/ACT nasal spray INSTILL 2 SPRAYS INTO EACH NOSTRIL ONCE DAILY.       ibuprofen (ADVIL/MOTRIN) 600 MG tablet        lisdexamfetamine (VYVANSE) 20 MG capsule Take 20 mg by mouth daily       meclizine (ANTIVERT) 25 MG tablet Take 25 mg by mouth 3 times daily as needed for dizziness       naproxen (NAPROSYN) 500 MG tablet Take 1 tablet (500 mg) by mouth 2 times daily as needed for moderate pain or headaches 28 tablet 11     tiZANidine (ZANAFLEX) 4 MG tablet Take 4 mg by mouth       ubrogepant (UBRELVY) 100 MG tablet Take 1 tablet (100 mg) by mouth at onset of headache (migraine) 10 tablet 11       Allergies   Allergen Reactions     Iodine Rash and Itching     Rash itching       Physical Exam:  /73   Pulse 96   Ht 1.651 m (5' 5\")   Wt 97.1 kg (214 lb)   SpO2 100%   BMI 35.61 kg/m    BMI:Body mass index is 35.61 kg/m .   GEN: NAD,   Head: Normocephalic.  Psych: normal mood, normal affect     Labs/Studies:  - We reviewed the results of the overnight study as described on the HPI.     No results found for: PH, PHARTERIAL, PO2, [id number], SAT, PCO2, HCO3, " BASEEXCESS, [place], BEB  Lab Results   Component Value Date    TSH 0.75 04/12/2022    TSH 0.32 (L) 07/30/2021     Lab Results   Component Value Date    GLC 98 04/12/2022     (H) 01/19/2022     Lab Results   Component Value Date    HGB 13.7 04/12/2022    HGB 12.5 07/30/2021     Lab Results   Component Value Date    BUN 13 04/12/2022    BUN 13 01/19/2022    CR 0.82 04/12/2022    CR 0.65 01/19/2022     Lab Results   Component Value Date    AST 14 04/12/2022    AST 13 01/19/2022    ALT 32 04/12/2022    ALT 33 01/19/2022    ALKPHOS 93 04/12/2022    ALKPHOS 76 01/19/2022    BILITOTAL 0.6 04/12/2022    BILITOTAL 0.5 01/19/2022     No results found for: UAMP, UBARB, BENZODIAZEUR, UCANN, UCOC, OPIT, UPCP    Recent Labs   Lab Test 04/12/22  1403 01/19/22  1055    141   POTASSIUM 3.8 3.6   CHLORIDE 108 109   CO2 25 27   ANIONGAP 5 5   GLC 98 107*   BUN 13 13   CR 0.82 0.65   MIKO 9.6 8.6       Ferritin   Date Value Ref Range Status   12/19/2018 4 (L) 12 - 150 ng/mL Final         Patient verbalized understanding of these issues, agrees with the plan and all questions were answered today. Patient was given an opportuntity to voice any other symptoms or concerns not listed above. Patient did not have any other symptoms or concerns.      Kyler Viramontes DO  Board Certified in Internal Medicine and Sleep Medicine      (Note created with Dragon voice recognition and unintended spelling errors and word substitutions may occur)

## 2022-09-19 NOTE — NURSING NOTE
"Chief Complaint   Patient presents with     Study Results       Initial /73   Pulse 96   Ht 1.651 m (5' 5\")   Wt 97.1 kg (214 lb)   SpO2 100%   BMI 35.61 kg/m   Estimated body mass index is 35.61 kg/m  as calculated from the following:    Height as of this encounter: 1.651 m (5' 5\").    Weight as of this encounter: 97.1 kg (214 lb).    Medication Reconciliation: complete    Bill Anderson CMA on 9/19/2022 at 9:15 AM   "

## 2022-09-19 NOTE — NURSING NOTE
has pended a [employer] message for patient. The message states to follow up up after receiving their CPAP Device.  handed AVS to patient

## 2022-09-19 NOTE — PATIENT INSTRUCTIONS
"     What is sleep apnea?     Sleep apnea is a condition that makes you stop breathing for short periods while you are asleep. There are 2 types of sleep apnea. One is called \"obstructive sleep apnea,\" and the other is called \"central sleep apnea.\"  In obstructive sleep apnea, you stop breathing because your throat narrows or closes (figure 1). In central sleep apnea, you stop breathing because your brain does not send the right signals to your muscles to make you breathe. When people talk about sleep apnea, they are usually referring to obstructive sleep apnea, which is what this article is about.  People with sleep apnea do not know that they stop breathing when they are asleep. But they do sometimes wake up startled or gasping for breath. They also often hear from loved ones that they snore.  What are the symptoms of sleep apnea? -- The main symptoms of sleep apnea are loud snoring, tiredness, and daytime sleepiness. Other symptoms can include:  ?Restless sleep  ?Waking up choking or gasping  ?Morning headaches, dry mouth, or sore throat  ?Waking up often to urinate  ?Waking up feeling unrested or groggy  ?Trouble thinking clearly or remembering things  Some people with sleep apnea don't have symptoms, or they don't know they have them. They might figure that it's normal to be tired or to snore a lot.  Should I see a doctor or nurse? -- Yes. If you think you might have sleep apnea, see your doctor.  Is there a test for sleep apnea? -- Yes. If your doctor or nurse suspects you have sleep apnea, he or she might send you for a \"sleep study.\" Sleep studies can sometimes be done at home, but they are usually done in a sleep lab. For the study, you spend the night in the lab, and you are hooked up to different machines that monitor your heart rate, breathing, and other body functions. The results of the test will tell your doctor or nurse if you have the disorder.  Is there anything I can do on my own to help my sleep " "apnea? -- Yes. Here are some things that might help:  ?Stay off your back when sleeping. (This is not always practical, because people cannot control their position while asleep. Plus, it only helps some people.)  ?Lose weight, if you are overweight  ?Avoid alcohol, because it can make sleep apnea worse  How is sleep apnea treated? -- The most effective treatment for sleep apnea is a device that keeps your airway open while you sleep. Treatment with this device is called \"continuous positive airway pressure,\" or CPAP. People getting CPAP wear a face mask at night that keeps them breathing (figure 2).  If your doctor or nurse recommends a CPAP machine, try to be patient about using it. The mask might seem uncomfortable to wear at first, and the machine might seem noisy, but using the machine can really pay off. People with sleep apnea who use a CPAP machine feel more rested and generally feel better.  There is also another device that you wear in your mouth called an \"oral appliance\" or \"mandibular advancement device.\" It also helps keep your airway open while you sleep.  In rare cases, when nothing else helps, doctors recommend surgery to keep the airway open. Surgery to do this is not always effective, and even when it is, the problem can come back.  Is sleep apnea dangerous? -- It can be. People with sleep apnea do not get good-quality sleep, so they are often tired and not alert. This puts them at risk for car accidents and other types of accidents. Plus, studies show that people with sleep apnea are more likely than others to have high blood pressure, heart attacks, and other serious heart problems. In people with severe sleep apnea, getting treated (for example, with a CPAP machine) can help prevent some of these problems.     GRAPHICS  Airway in a person with sleep apnea    Normally when a person sleeps, the airway remains open, and air can pass from the nose and mouth to the lungs. In a person with sleep " apnea, parts of the throat and mouth drop into the airway and block off the flow of air. This can cause loud snoring and interrupt breathing for short periods.  Graphic 30651 Version 5.0      Continuous positive airway pressure (CPAP) for sleep apnea    The CPAP mask gently blows air into your nose while you sleep. It puts just enough pressure on your airway to keep it from closing. The mask in this picture fits over just the nose. Other CPAP devices have masks that fit over the nose and mouth.        Equipment Instructions    We will process your PAP order and send it to a Durable Medical Equipment (DME) provider.    The medical equipment company should call you within 7 days.  If you have not heard from the company, please contact them to see if they received your order and are planning to call you.    Please call us at [phone] if you are unable to contact the medical equipment company or if they do not have the order.    If you are starting a new PAP machine, please call us after you use it the first night to let us know how it went. This call also helps us know that you received your equipment and that everything is ready. Please use our central phone number [phone]    Contact information for [employer] company:    [place] Tel: [phone]        Your Body mass index is 35.61 kg/m .  Weight management is a personal decision.  If you are interested in exploring weight loss strategies, the following discussion covers the approaches that may be successful. Body mass index (BMI) is one way to tell whether you are at a healthy weight, overweight, or obese. It measures your weight in relation to your height.  A BMI of 18.5 to 24.9 is in the healthy range. A person with a BMI of 25 to 29.9 is considered overweight, and someone with a BMI of 30 or greater is considered obese. More than two-thirds of American adults are considered overweight or obese.  Being overweight or obese increases the risk  for further weight gain. Excess weight may lead to heart disease and diabetes.  Creating and following plans for healthy eating and physical activity may help you improve your health.  Weight control is part of healthy lifestyle and includes exercise, emotional health, and healthy eating habits. Careful eating habits lifelong are the mainstay of weight control. Though there are significant health benefits from weight loss, long-term weight loss with diet alone may be very difficult to achieve- studies show long-term success with dietary management in less than 10% of people. Attaining a healthy weight may be especially difficult to achieve in those with severe obesity. In some cases, medications, devices and surgical management might be considered.  What can you do?  If you are overweight or obese and are interested in methods for weight loss, you should discuss this with your provider.     Consider reducing daily calorie intake by 500 calories.     Keep a food journal.     Avoiding skipping meals, consider cutting portions instead.    Diet combined with exercise helps maintain muscle while optimizing fat loss. Strength training is particularly important for building and maintaining muscle mass. Exercise helps reduce stress, increase energy, and improves fitness. Increasing exercise without diet control, however, may not burn enough calories to loose weight.       Start walking three days a week 10-20 minutes at a time    Work towards walking thirty minutes five days a week     Eventually, increase the speed of your walking for 1-2 minutes at time    In addition, we recommend that you review healthy lifestyles and methods for weight loss available through the National Institutes of Health patient information sites:  http://win.niddk.nih.gov/publications/index.htm    And look into health and wellness programs that may be available through your health insurance provider, employer, local community center, or [employer]  club.    {Weight Management Plan -- Delete if patient has a normal BMI:483410}

## 2022-09-26 PROBLEM — R10.2 PELVIC PAIN IN FEMALE: Status: RESOLVED | Noted: 2022-08-19 | Resolved: 2022-09-26

## 2022-09-26 NOTE — PROGRESS NOTES
DISCHARGE SUMMARY    Kamala Harris was seen 1 times for evaluation and treatment.  Patient did not return for further treatment and current status is unknown.  Due to short treatment duration, no objective or functional changes were made.  Please see goal flow sheet from episode noted date below and initial evaluation for further information.  Patient is discharged from therapy and therapy episode is resolved as of 09/26/22.      Linked Episodes   Type: Episode: Status: Noted: Resolved: Last update: Updated by:   PHYSICAL THERAPY Pelvic pain 8/19/22 Active 8/19/2022 8/19/2022  9:05 AM Shawanda Son, PT      Comments:

## 2022-09-27 ENCOUNTER — DOCUMENTATION ONLY (OUTPATIENT)
Dept: SLEEP MEDICINE | Facility: CLINIC | Age: 22
End: 2022-09-27

## 2022-09-27 DIAGNOSIS — G47.33 OBSTRUCTIVE SLEEP APNEA (ADULT) (PEDIATRIC): Primary | ICD-10-CM

## 2022-09-27 NOTE — PROGRESS NOTES
Patient was offered choice of vendor and chose [facility].  Patient Kamala Harris was set up at [facility]  on September 27, 2022. Patient received a [employer]med Airsense 11 Pressures were set at 5-15 cm H2O.   Patient s ramp is 5 cm H2O for Auto and FLEX/EPR is EPR, 2.  Patient received a [employer] Mask name: Vitera Full Face mask size Medium, heated tubing and heated humidifier.  Patient does not need to meet compliance. Patient has a follow up on 11/11/22 with Dr. Viramontes.    LOGAN MARTÍNEZ

## 2022-09-30 ENCOUNTER — DOCUMENTATION ONLY (OUTPATIENT)
Dept: SLEEP MEDICINE | Facility: CLINIC | Age: 22
End: 2022-09-30

## 2022-09-30 NOTE — PROGRESS NOTES
3 day Sleep therapy management telephone visit    Diagnostic AHI: 6.1  PSG    Confirmed with patient at time of call- Yes Patient is still interested in STM service       Subjective measures:  Patient reports CPAP is going well. She denies any questions or concerns.       Order settings:  CPAP MIN CPAP MAX   5 cm H2O 15 cm H2O       Device settings:  CPAP MIN CPAP MAX EPR RESMED SOFT RESPONSE SETTING   5.0 cm  H20 15.0 cm  H20 TWO OFF       Compliance 66 %    Assessment: Nighty usage most nights over four hours      Action plan: Patient to have 14 day STM visit. Patient has a follow up visit scheduled:   yes within 31-90 days of set up    Replacement device: No  STM ordered by provider: Yes     Total time spent on accessing and  interpreting remote patient PAP therapy data  10 minutes    Total time spent counseling, coaching  and reviewing PAP therapy data with patient  2 minutes    [id number] no

## 2022-10-12 ENCOUNTER — DOCUMENTATION ONLY (OUTPATIENT)
Dept: SLEEP MEDICINE | Facility: CLINIC | Age: 22
End: 2022-10-12
Payer: COMMERCIAL

## 2022-10-12 NOTE — PROGRESS NOTES
14  DAY STM VISIT    Diagnostic AHI: 6.1  PSG    Unable to leave message.     Assessment: Pt not meeting objective benchmarks for compliance       Action plan: pt to have 30 day STM visit.      Device type: Auto-CPAP    PAP settings: CPAP min 5.0 cm  H20       CPAP max 15.0 cm  H20      95th% pressure 9.1 cm  H20        RESMED EPR level Setting: TWO    RESMED Soft response setting:  OFF    Mask type:  Full Face Mask    Objective measures: 14 day rolling measures      Compliance  42 %      Leak  23.04  lpm  last  upload      AHI 1.33   last  upload      Average number of minutes 279      Objective measure goal  Compliance   Goal >70%  Leak   Goal < 24 lpm  AHI  Goal < 5  Usage  Goal >240        Total time spent on accessing and interpreting remote patient PAP therapy data  10 minutes    Total time spent counseling, coaching  and reviewing PAP therapy data with patient  1 minutes    [id number]  [id number]  no (3 day STM)

## 2022-10-14 ENCOUNTER — E-VISIT (OUTPATIENT)
Dept: URGENT CARE | Facility: URGENT CARE | Age: 22
End: 2022-10-14
Payer: COMMERCIAL

## 2022-10-14 DIAGNOSIS — B96.89 ACUTE BACTERIAL SINUSITIS: Primary | ICD-10-CM

## 2022-10-14 DIAGNOSIS — J01.90 ACUTE BACTERIAL SINUSITIS: Primary | ICD-10-CM

## 2022-10-14 PROCEDURE — 99421 OL DIG E/M SVC 5-10 MIN: CPT | Performed by: NURSE PRACTITIONER

## 2022-10-14 NOTE — PATIENT INSTRUCTIONS
You may want to try a nasal lavage (also known as nasal irrigation). You can find over-the-counter products, such as Neti-Pot, at retail locations or make your own at home. Instructions for homemade nasal lavage and more information on the process are available online at http://www.aafp.org/afp/2009/1115/p1121.html.      Sinusitis (Antibiotic Treatment)    The sinuses are air-filled spaces within the bones of the face. They connect to the inside of the nose. Sinusitis is an inflammation of the tissue that lines the sinuses. Sinusitis can occur during a cold. It can also happen due to allergies to pollens and other particles in the air. Sinusitis can cause symptoms of sinus congestion and a feeling of fullness. A sinus infection causes fever, headache, and facial pain. There is often green or yellow fluid draining from the nose or into the back of the throat (post-nasal drip). You have been given antibiotics to treat this condition.   Home care    Take the full course of antibiotics as instructed. Don't stop taking them, even when you feel better.    Drink plenty of water, hot tea, and other liquids as directed by the healthcare provider. This may help thin nasal mucus. It also may help your sinuses drain fluids.    Heat may help soothe painful areas of your face. Use a towel soaked in hot water. Or,  the shower and direct the warm spray onto your face. Using a vaporizer along with a menthol rub at night may also help soothe symptoms.     An expectorant with guaifenesin may help thin nasal mucus and help your sinuses drain fluids. Talk with your provider or pharmacists before taking an over-the-counter (OTC) medicine if you have any questions about it or its side effects..    You can use an OTC decongestant, unless a similar medicine was prescribed to you. Nasal sprays work the fastest. Use one that contains phenylephrine or oxymetazoline. First blow your nose gently. Then use the spray. Don't use these  medicines more often than directed on the label. If you do, your symptoms may get worse. You may also take pills that contain pseudoephedrine. Don t use products that combine multiple medicines. This is because side effects may be increased. Read labels. You can also ask the pharmacist for help. (People with high blood pressure should not use decongestants. They can raise blood pressure.) Talk with your provider or pharmacist if you have any questions about the medicine..    OTC antihistamines may help if allergies contributed to your sinusitis. Talk with your provider or pharmacist if you have any questions about the medicine..    Don't use nasal rinses or irrigation during an acute sinus infection, unless your healthcare provider tells you to. Rinsing may spread the infection to other areas in your sinuses.    Use acetaminophen or ibuprofen to control pain, unless another pain medicine was prescribed to you. If you have chronic liver or kidney disease or ever had a stomach ulcer, talk with your healthcare provider before using these medicines. Never give aspirin to anyone under age 18 who is ill with a fever. It may cause severe liver damage.    Don't smoke. This can make symptoms worse.    Follow-up care  Follow up with your healthcare provider, or as advised.   When to seek medical advice  Call your healthcare provider if any of these occur:     Facial pain or headache that gets worse    Stiff neck    Unusual drowsiness or confusion    Swelling of your forehead or eyelids    Symptoms don't go away in 10 days    Vision problems, such as blurred or double vision    Fever of 100.4 F (38 C) or higher, or as directed by your healthcare provider  Call 911  Call 911 if any of these occur:     Seizure    Trouble breathing    Feeling dizzy or faint    Fingernails, skin or lips look blue, purple , or gray  Prevention  Here are steps you can take to help prevent an infection:     Keep good hand washing habits.    Don t  have close contact with people who have sore throats, colds, or other upper respiratory infections.    Don t smoke, and stay away from secondhand smoke.    Stay up to date with of your vaccines.  [employer]ide[employer]s last reviewed this educational content on 12/1/2019 2000-2021 [employer]. All rights reserved. This information is not intended as a substitute for professional medical care. Always follow your healthcare professional's instructions.        Dear Kamala Harris    After reviewing your responses, I feel you may have ?a sinus infection caused by bacteria.?     Based on your responses and diagnosis, I have prescribed Augmentin to treat your symptoms. I have sent this to your pharmacy.?     It is also important to stay well hydrated, get lots of rest and take over-the-counter decongestants,?tylenol?or ibuprofen if you?are able to?take those medications per your primary care provider to help relieve discomfort.?     It is important that you take?all of?your prescribed medication even if your symptoms are improving after a few doses.? Taking?all of?your medicine helps prevent the symptoms from returning.?     If your symptoms worsen, you develop severe headache, vomiting, high fever (>102), or are not improving in 7 days, please contact your primary care provider for an appointment or visit any of our convenient Walk-in Care or Urgent Care Centers to be seen which can be found on our website?here.?     Thanks again for choosing?us?as your health care partner,?   ?  ASYA Malhotra CNP?

## 2022-10-22 ENCOUNTER — HEALTH MAINTENANCE LETTER (OUTPATIENT)
Age: 22
End: 2022-10-22

## 2022-10-28 ENCOUNTER — DOCUMENTATION ONLY (OUTPATIENT)
Dept: SLEEP MEDICINE | Facility: CLINIC | Age: 22
End: 2022-10-28
Payer: COMMERCIAL

## 2022-10-28 NOTE — PROGRESS NOTES
30 DAY STM VISIT    Diagnostic AHI: 6.1  PSG    Assessment: Pt not meeting objective benchmarks for compliance  Patient meeting subjective benchmarks.   Action plan: pt to have 6 month STM visit  Patient has scheduled a follow up visit with Dr. Viramontes on 11/11/2022.   Device type: Auto-CPAP  PAP settings: CPAP min 5.0 cm  H20     CPAP max 15.0 cm  H20    95th% pressure 11 cm  H20      RESMED EPR level Setting: TWO    RESMED Soft response setting:  OFF  Mask type:  Full Face Mask  Objective measures: 14 day rolling measures      Compliance  14 %      Leak  9.3 lpm  last  upload      AHI 2.73   last  upload      Average number of minutes 76      Objective measure goal  Compliance   Goal >70%  Leak   Goal < 24 lpm  AHI  Goal < 5  Usage  Goal >240        Total time spent on accessing and interpreting remote patient PAP therapy data  10 minutes    Total time spent counseling, coaching  and reviewing PAP therapy data with patient  3 minutes     [id number] this call  [id number] no  at 3 or 14 day [facility]

## 2022-10-31 ENCOUNTER — OFFICE VISIT (OUTPATIENT)
Dept: FAMILY MEDICINE | Facility: CLINIC | Age: 22
End: 2022-10-31
Payer: COMMERCIAL

## 2022-10-31 VITALS
HEIGHT: 65 IN | TEMPERATURE: 98.3 F | SYSTOLIC BLOOD PRESSURE: 122 MMHG | BODY MASS INDEX: 35.75 KG/M2 | RESPIRATION RATE: 24 BRPM | WEIGHT: 214.6 LBS | DIASTOLIC BLOOD PRESSURE: 82 MMHG | HEART RATE: 96 BPM | OXYGEN SATURATION: 100 %

## 2022-10-31 DIAGNOSIS — S69.92XS HAND INJURY, LEFT, SEQUELA: Primary | ICD-10-CM

## 2022-10-31 PROCEDURE — 99214 OFFICE O/P EST MOD 30 MIN: CPT | Performed by: FAMILY MEDICINE

## 2022-10-31 ASSESSMENT — PATIENT HEALTH QUESTIONNAIRE - PHQ9
SUM OF ALL RESPONSES TO PHQ QUESTIONS 1-9: 7
10. IF YOU CHECKED OFF ANY PROBLEMS, HOW DIFFICULT HAVE THESE PROBLEMS MADE IT FOR YOU TO DO YOUR WORK, TAKE CARE OF THINGS AT HOME, OR GET ALONG WITH OTHER PEOPLE: NOT DIFFICULT AT ALL
SUM OF ALL RESPONSES TO PHQ QUESTIONS 1-9: 7

## 2022-10-31 ASSESSMENT — PAIN SCALES - GENERAL: PAINLEVEL: SEVERE PAIN (6)

## 2022-10-31 NOTE — LETTER
[facility]  [address] [address]  Phone: [phone]    October 31, 2022        Kamala Harris  [address]   [place] [address]          To whom it may concern:    RE: Kamala Harris    Patient may return to work 10/31/2022 with the following restrictions:    She should not lift, push, or pull more than 15 lbs.    These restrictions should remain in place for the next 2 weeks.        Please contact me for questions or concerns.      Sincerely,      Radha Ashby, DO

## 2022-10-31 NOTE — PROGRESS NOTES
Assessment & Plan     Hand injury, left, sequela  - Crush injury to left hand at work on 10/21/22  - Initially seen at   - Improving, but still having pain, muscle spasms, and numbness/tingling   - Advised starting physical therapy if not back to normal by the end of this week  - Continued restrictions given (see letter)      Return in about 2 weeks (around 11/14/2022) for if symptoms worsen or fail to improve.    Radha Ashby, DO  [facility]    =============================================================================    Subjective   Kamala is a 22 year old, presenting for the following health issues:  Work Comp      History of Present Illness       Reason for visit:  Work injury hand    She eats 0-1 servings of fruits and vegetables daily.She consumes 3 sweetened beverage(s) daily.She exercises with enough effort to increase her heart rate 10 to 19 minutes per day.  She exercises with enough effort to increase her heart rate 3 or less days per week. She is missing 2 dose(s) of medications per week.  She is not taking prescribed medications regularly due to remembering to take.    Today's PHQ-9         PHQ-9 Total Score: 7    PHQ-9 Q9 Thoughts of better off dead/self-harm past 2 weeks :   Not at all    How difficult have these problems made it for you to do your work, take care of things at home, or get along with other people: Not difficult at all       ED/UC Followup:    Facility:  [facility]  Date of visit: 10/21/22  Reason for visit: hand injury, no she smashed it under a box of antifreeze  Current Status: feeling in all fingers and fingers aren't so stiff but having tingling and muscle spasms.    Reviewed  notes.  Patient smashed her left hand at work on 10/21.  XR was done and did not show any fracture or dislocation.  Referred to orthopedics ([facility]) and physical therapy .  Advised RTW in 2 weeks with restrictions.      Since then, things have improved overall.   "Less stiffness and has full sensation in her hand.  She has continued to have tingling in her palm and fingers and spasms in the same area.  No swelling.  She's been wearing a compression glove, icing, taking naproxen and ibuprofen PRN, tizanidine PRN.  She has not seen ortho since symptoms have improved.  She hasn't started physical therapy either since symptoms have improved.  Her job has continued to have her do activities that aggravate her symptoms.  They have not honored 's restrictions.        Review of Systems   Constitutional, HEENT, cardiovascular, pulmonary, gi and gu systems are negative, except as otherwise noted.      Objective    /82 (BP Location: Right arm, Patient Position: Chair, Cuff Size: Adult Regular)   Pulse 96   Temp 98.3  F (36.8  C) (Oral)   Resp 24   Ht 1.651 m (5' 5\")   Wt 97.3 kg (214 lb 9.6 oz)   SpO2 100%   BMI 35.71 kg/m    Body mass index is 35.71 kg/m .  Physical Exam   GENERAL: healthy, alert and no distress  MS: no gross musculoskeletal defects noted, no edema, mild TTP along left palmar 4th metacarpal                "

## 2022-11-11 ENCOUNTER — VIRTUAL VISIT (OUTPATIENT)
Dept: SLEEP MEDICINE | Facility: CLINIC | Age: 22
End: 2022-11-11
Payer: COMMERCIAL

## 2022-11-11 VITALS — WEIGHT: 220 LBS | BODY MASS INDEX: 36.65 KG/M2 | HEIGHT: 65 IN

## 2022-11-11 DIAGNOSIS — G47.33 OBSTRUCTIVE SLEEP APNEA: Primary | ICD-10-CM

## 2022-11-11 DIAGNOSIS — G47.10 HYPERSOMNIA: ICD-10-CM

## 2022-11-11 PROCEDURE — 99213 OFFICE O/P EST LOW 20 MIN: CPT | Mod: 95 | Performed by: INTERNAL MEDICINE

## 2022-11-11 ASSESSMENT — SLEEP AND FATIGUE QUESTIONNAIRES
HOW LIKELY ARE YOU TO NOD OFF OR FALL ASLEEP WHILE SITTING AND TALKING TO SOMEONE: SLIGHT CHANCE OF DOZING
HOW LIKELY ARE YOU TO NOD OFF OR FALL ASLEEP WHILE WATCHING TV: MODERATE CHANCE OF DOZING
HOW LIKELY ARE YOU TO NOD OFF OR FALL ASLEEP WHILE SITTING QUIETLY AFTER LUNCH WITHOUT ALCOHOL: MODERATE CHANCE OF DOZING
HOW LIKELY ARE YOU TO NOD OFF OR FALL ASLEEP WHILE LYING DOWN TO REST IN THE AFTERNOON WHEN CIRCUMSTANCES PERMIT: MODERATE CHANCE OF DOZING
HOW LIKELY ARE YOU TO NOD OFF OR FALL ASLEEP WHILE SITTING AND READING: MODERATE CHANCE OF DOZING
HOW LIKELY ARE YOU TO NOD OFF OR FALL ASLEEP IN A CAR, WHILE STOPPED FOR A FEW MINUTES IN TRAFFIC: SLIGHT CHANCE OF DOZING
HOW LIKELY ARE YOU TO NOD OFF OR FALL ASLEEP WHEN YOU ARE A PASSENGER IN A CAR FOR AN HOUR WITHOUT A BREAK: MODERATE CHANCE OF DOZING
HOW LIKELY ARE YOU TO NOD OFF OR FALL ASLEEP WHILE SITTING INACTIVE IN A PUBLIC PLACE: SLIGHT CHANCE OF DOZING

## 2022-11-11 NOTE — NURSING NOTE
cpap pressure changed from original settings of 5 Min - 15 Max to new settings of 6 Min - 11 Max.    Sent patient a [employer] message for a 1 year follow up appointment reminder.    Yung Pettit CMA

## 2022-11-11 NOTE — PROGRESS NOTES
Kamala is a 22 year old who is being evaluated via a billable video visit.      How would you like to obtain your AVS? MyChart  If the video visit is dropped, the invitation should be resent by: Text to cell phone: [phone]  Will anyone else be joining your video visit? No    Video-Visit Details    Video Start Time: 9:28 AM    Type of service:  Video Visit    Video End Time:9:35 AM    Originating Location (pt. Location): Home    Distant Location (provider location):  Off-site    Platform used for Video Visit: Machine TalkerWell     Additional 10 minutes on the date of service was spent performing the following:    -Preparing to see the patient  -Obtaining and/or reviewing separately obtained history   -Ordering medications, tests, or procedures   -Documenting clinical information in the electronic or other health record     Thank you for the opportunity to participate in the care of Kamala Harris.     She is a 22 year old y/o female patient who comes to the sleep medicine clinic for follow up.  The patient was diagnosed with DEWAYNE on 8/30/2022 (AHI=6.1).This is the patient's 1st clinical visit since starting CPAP therapy. She reports that her sleep quality and energy levels have improved. She states that she is unconciously ripping off her mask during sleep.     Assessment and Plan:  In summary Kamala Harris is a 22 year old year old female who is here for follow up.    1. Obstructive sleep apnea/Hypersomnia  I encouraged her to increase her hours of usage. I educated her on desensitization protocol and will give her a handout on the topic. I will also narrow her pressure range to 6-11 cwp.  - COMPREHENSIVE DME       Compliance Download data for 30 Days:  Pressure setting:APAP 5-15 cwp  95% pressure:11.4 cwp  Residual AHI:1.6 events per hour  Leak:Minimal  Compliance:30%  Mask Tolerance:Good  Skin irritation:None  DME:[facility]    Sleep-Wake Cycle:    The patient likes to initiate sleep at around 8-9 AM with a  sleep latency of less than 20 minutes. The patient has zero awakenings. Final wake up time is around Noon.    TORSTEN:  TORSTEN Total Score: 14  Total score - Reading: 13 (11/11/2022  9:11 AM)        Patient Active Problem List   Diagnosis     Presbyopia     Attention deficit hyperactivity disorder (ADHD)     Health Care Home     Mild major depression (H)     Intermittent asthma     Childhood hyperkinetic syndrome     Migraine with aura and without status migrainosus, not intractable     Mahogany-Parkinson-White (WPW) pattern seen on electrocardiography     Class 1 obesity with serious comorbidity and body mass index (BMI) of 34.0 to 34.9 in adult, unspecified obesity type     Pap smear of cervix with ASCUS, cannot exclude HGSIL     Dizziness     Heavy period       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      Pap smear of cervix with ASCUS, cannot exclude HGSIL 1/19/2022     Sprain of right wrist     right side muliple times      Uncomplicated asthma     exercise induced       Past Surgical History:   Procedure Laterality Date     BIOPSY       CARDIAC CATHERIZATION  4 years    Told by cardiology that tiny defect would close on own, no FU     CARDIAC SURGERY       CREATE EARDRUM OPENING,LOCAL ANESTH  4 years    PETs       Current Outpatient Medications   Medication Sig Dispense Refill     albuterol (PROAIR HFA/PROVENTIL HFA/VENTOLIN HFA) 108 (90 Base) MCG/ACT inhaler Inhale 2 puffs into the lungs       albuterol (PROAIR HFA/PROVENTIL HFA/VENTOLIN HFA) 108 (90 Base) MCG/ACT inhaler Inhale 2 puffs into the lungs every 4 hours as needed for shortness of breath / dyspnea or wheezing 18 g 3     escitalopram (LEXAPRO) 10 MG tablet        etonogestrel (NEXPLANON) 68 MG IMPL Inject 68 mg Subcutaneous       fluticasone (FLONASE) 50 MCG/ACT nasal spray INSTILL 2 SPRAYS INTO EACH NOSTRIL ONCE DAILY.       ibuprofen (ADVIL/MOTRIN) 600  MG tablet        lisdexamfetamine (VYVANSE) 20 MG capsule Take 20 mg by mouth daily       meclizine (ANTIVERT) 25 MG tablet Take 25 mg by mouth 3 times daily as needed for dizziness       naproxen (NAPROSYN) 500 MG tablet Take 1 tablet (500 mg) by mouth 2 times daily as needed for moderate pain or headaches 28 tablet 11     tiZANidine (ZANAFLEX) 4 MG tablet Take 4 mg by mouth       ubrogepant (UBRELVY) 100 MG tablet Take 1 tablet (100 mg) by mouth at onset of headache (migraine) 10 tablet 11       Allergies   Allergen Reactions     Iodine Rash and Itching     Rash itching         Physical Exam:  GEN: NAD,  Psych: normal mood, normal affect    I reviewed the efficacy and compliance report from her device. Data summarized on the HPI and the PAP compliance flow sheet.    Patient verbalized understanding of these issues, agrees with the plan and all questions were answered today. Patient was given an opportuntity to voice any other symptoms or concerns not listed above. Patient did not have any other symptoms or concerns.      Kyler Viramontes DO  Board Certified in Internal Medicine and Sleep Medicine    (Note created with Dragon voice recognition and unintended spelling errors and word substitutions may occur)     Audio and visual devices were used for this virtual clinic visit with permission from patient.

## 2022-11-11 NOTE — NURSING NOTE
Chief Complaint   Patient presents with     Video Visit     7 week follow up       Patient confirms medications and allergies are accurate via patients echeck in completion, and or denies any changes since last reviewed/verified.     Last flu shot given was 1/19/2022    Bridgette Amaro, Virtual Facilitator/KIMBERLYN

## 2022-11-21 ENCOUNTER — MYC MEDICAL ADVICE (OUTPATIENT)
Dept: NEUROLOGY | Facility: CLINIC | Age: 22
End: 2022-11-21

## 2022-12-26 ENCOUNTER — TRANSFERRED RECORDS (OUTPATIENT)
Dept: HEALTH INFORMATION MANAGEMENT | Facility: CLINIC | Age: 22
End: 2022-12-26

## 2023-01-06 ENCOUNTER — VIRTUAL VISIT (OUTPATIENT)
Dept: FAMILY MEDICINE | Facility: CLINIC | Age: 23
End: 2023-01-06
Payer: COMMERCIAL

## 2023-01-06 DIAGNOSIS — J01.00 ACUTE NON-RECURRENT MAXILLARY SINUSITIS: Primary | ICD-10-CM

## 2023-01-06 PROCEDURE — 99213 OFFICE O/P EST LOW 20 MIN: CPT | Mod: 95 | Performed by: PHYSICIAN ASSISTANT

## 2023-01-06 RX ORDER — AMOXICILLIN 875 MG
875 TABLET ORAL 2 TIMES DAILY
Qty: 20 TABLET | Refills: 0 | Status: SHIPPED | OUTPATIENT
Start: 2023-01-06 | End: 2023-01-16

## 2023-01-06 ASSESSMENT — PATIENT HEALTH QUESTIONNAIRE - PHQ9
10. IF YOU CHECKED OFF ANY PROBLEMS, HOW DIFFICULT HAVE THESE PROBLEMS MADE IT FOR YOU TO DO YOUR WORK, TAKE CARE OF THINGS AT HOME, OR GET ALONG WITH OTHER PEOPLE: NOT DIFFICULT AT ALL
SUM OF ALL RESPONSES TO PHQ QUESTIONS 1-9: 4
SUM OF ALL RESPONSES TO PHQ QUESTIONS 1-9: 4

## 2023-01-06 NOTE — PROGRESS NOTES
"Kamala is a 22 year old who is being evaluated via a billable video visit.      How would you like to obtain your AVS? MyChart  If the video visit is dropped, the invitation should be resent by: Text to cell phone: [phone]  Will anyone else be joining your video visit? No        Assessment & Plan     Acute non-recurrent maxillary sinusitis  The patient is advised to push fluids, rest, gargle warm salt water, use vaporizer or mist needed , use acetaminophen, ibuprofen as needed and Return office visit if symptoms persist or worsen.    - amoxicillin (AMOXIL) 875 MG tablet; Take 1 tablet (875 mg) by mouth 2 times daily for 10 days             BMI:   Estimated body mass index is 36.61 kg/m  as calculated from the following:    Height as of 11/11/22: 1.651 m (5' 5\").    Weight as of 11/11/22: 99.8 kg (220 lb).           No follow-ups on file.    Ramona Ann Aaseby-Aguilera, PA-C  [facility]    Hayden Wray is a 22 year old, presenting for the following health issues:  Sinus Problem      Sinus Problem     History of Present Illness       Reason for visit:  Sinus infection  Symptom onset:  1-3 days ago  Symptoms include:  Congestion, sinus pressure  Symptom intensity:  Moderate  Symptom progression:  Worsening  Had these symptoms before:  Yes  Has tried/received treatment for these symptoms:  Yes  Previous treatment was successful:  Yes  Prior treatment description:  Antibotics  What makes it worse:  None  What makes it better:  Cough drops help the throat.    She eats 0-1 servings of fruits and vegetables daily.She consumes 2 sweetened beverage(s) daily.She exercises with enough effort to increase her heart rate 9 or less minutes per day.  She exercises with enough effort to increase her heart rate 3 or less days per week.   She is taking medications regularly.    Today's PHQ-9         PHQ-9 Total Score: 4    PHQ-9 Q9 Thoughts of better off dead/self-harm past 2 weeks :   Not at " "all    How difficult have these problems made it for you to do your work, take care of things at home, or get along with other people: Not difficult at all         Review of Systems   Constitutional, HEENT, cardiovascular, pulmonary, gi and gu systems are negative, except as otherwise noted.      Objective    Vitals - Patient Reported  Weight (Patient Reported): 95.7 kg (211 lb)  Height (Patient Reported): 165.1 cm (5' 5\")  BMI (Based on Pt Reported Ht/Wt): 35.11        Physical Exam   GENERAL: Healthy, alert and no distress  EYES: Eyes grossly normal to inspection.  No discharge or erythema, or obvious scleral/conjunctival abnormalities.  RESP: No audible wheeze, cough, or visible cyanosis.  No visible retractions or increased work of breathing.    SKIN: Visible skin clear. No significant rash, abnormal pigmentation or lesions.  NEURO: Cranial nerves grossly intact.  Mentation and speech appropriate for age.  PSYCH: Mentation appears normal, affect normal/bright, judgement and insight intact, normal speech and appearance well-groomed.                Video-Visit Details    Type of service:  Video Visit   Video Start Time: 1130 AM  Video End Time:11:41 AM    Originating Location (pt. Location): Home  Distant Location (provider location):  Off-site  Platform used for Video Visit: Chiqui    "

## 2023-01-09 ENCOUNTER — TELEPHONE (OUTPATIENT)
Dept: FAMILY MEDICINE | Facility: CLINIC | Age: 23
End: 2023-01-09

## 2023-01-09 NOTE — TELEPHONE ENCOUNTER
Received call from patient. She states she had Virtual Visit 1/6/23 to address sinus pain. She reports sinus pressure and pain when changing positions, throwing up from so much pressure in her head, thick/yellow mucous, sore throat (now improving). She has not had fevers, reports chills. She states she gets a sinus infection every year and knows how it feels.     Aaseby-Aguilera, Ramona Ann, PA-C prescribed amoxicillin (AMOXIL) 875 MG tablet for patient 1/6/23. Patient started taking this, then states she spoke with her mother and her mother told her Amoxicillin never worked for her as a child. She feels that it has not been working for her currently and would like to have a different antibiotic prescribed instead.     She has requested that this message is sent through to alternate provider, routing to covering providers for Dr. Ashby to please review request.    [phone], okay to leave detailed voicemail    Pharmacy: [id number] [address] [facility] [address]    MARTITA Moctezuma RN  [facility]

## 2023-01-09 NOTE — TELEPHONE ENCOUNTER
Patient calling stating that antibiotic has not helped at all for her sinus symptoms. She has taken 2 days worth of medication.  She is requesting a different antibiotic.      LYN Jin    Triage Nurse  [facility]  Appointment line: [phone]  [facility], 24 hour nurse line, available by calling clinic at [phone] and following prompts.

## 2023-01-09 NOTE — TELEPHONE ENCOUNTER
Most likely this is viral and antiobioitc wont help. 99 % sure I told her this and encouraged her to wait a few days to even start med as it may clear up on own. Please remind her of this. Janice Munguia PA-C

## 2023-01-11 NOTE — TELEPHONE ENCOUNTER
Patient calling back frustrated she hasn't heard back regarding different Rx.  Reminded her that she was advised another visit would be needed with our providers for worsening symptoms as she was seen virtually at another location.    Pt would like to be seen in person today- no availability with [place] provider today for in person.  Sent to scheduling line to look at other locations.      She may need to go to  if no openings for in person today.        Annabel Anderson RN

## 2023-01-26 ENCOUNTER — TELEPHONE (OUTPATIENT)
Dept: FAMILY MEDICINE | Facility: CLINIC | Age: 23
End: 2023-01-26

## 2023-01-26 NOTE — TELEPHONE ENCOUNTER
On schedule this afternoon for low blood pressure requiring IV fluids due to underlying heart condition.  Please triage.  Cannot provide cardiac monitoring or IV fluids in clinic.    Karime Sanchez PA-C on 1/26/2023 at 10:31 AM

## 2023-01-26 NOTE — TELEPHONE ENCOUNTER
Pt reports that she she was seen by Cardiology and they recommended IV fluids in order to help her retain fluids better. She states she is looking to get on a regular schedule with them because she feels better. She states she drinks plenty of water and it does not stay in her system.     RN reviewed the recent notes from cardiology , and the only thing the note states is that the pt reports that she feels better when she gets IV fluids there are no actual recommendations.     Pt states her BP is currently 117/60s .   Denies current dizziness and lightheadedness .      Pt informed that we do not administer IV fluids in the clinic here and that she would need to be seen in ED / UC if she desires and infusion    RN further stated that if cardiology is recommending the IV fluids to reach out to them to see where they recommend she get them done.     States when she has gone to the ED / UC in the past that they will need to give her fluids because she is not dehydrated enough.     Pt states to please cancel her appt today with Karime as she was hoping to get IV fluids.     Sariah Weldon RN  [facility]

## 2023-02-23 ENCOUNTER — PATIENT OUTREACH (OUTPATIENT)
Dept: FAMILY MEDICINE | Facility: CLINIC | Age: 23
End: 2023-02-23
Payer: COMMERCIAL

## 2023-02-23 ENCOUNTER — TELEPHONE (OUTPATIENT)
Dept: NEUROLOGY | Facility: CLINIC | Age: 23
End: 2023-02-23
Payer: COMMERCIAL

## 2023-02-23 DIAGNOSIS — R87.611 PAP SMEAR OF CERVIX WITH ASCUS, CANNOT EXCLUDE HGSIL: ICD-10-CM

## 2023-02-23 NOTE — TELEPHONE ENCOUNTER
Called pt and verified with her that her  is 2000. Encouraged pt to work with her pharmacy and she stated pharmacy told her that they need a call from us.  Informed her I will contact her pharmacy and if any issues I will send her a my chart message.

## 2023-02-23 NOTE — TELEPHONE ENCOUNTER
[facility] Call Center    Phone Message    May a detailed message be left on voicemail: yes     Reason for Call: Medication Question or concern regarding medication   Prescription Clarification  Name of Medication: ubrogepant (UBRELVY) 100 MG tablet    Prescribing Provider:    Pharmacy: [id number] - [facility] [place] [id number] [facility] [address]   What on the order needs clarification?   Patient called states that pharmacy has the wrong  on file for her medication for the medication ubrogepant (UBRELVY) 100 MG tablet and this is preventing her from refilling meds. Patient requesting if her care team can help change it or call to update her pharmacy? If any questions please call patient at [phone]      Action Taken: Message routed to:  [facility]    Travel Screening: Not Applicable

## 2023-02-23 NOTE — TELEPHONE ENCOUNTER
Called pharmacy spoke to Tremaine, pharmacist.  He stated the issue was that they put through the script with pt's old insurance and it did not find her.  Now he has put through her script with updated insurance and it went through. Thanked him for his help and he stated he will contact pt and let her know when Ubrelvy script is ready.

## 2023-03-14 NOTE — TELEPHONE ENCOUNTER
Patient is schedule with me at 4pm today.  I do not do/ order IV fluids for patients.  If she is needing IV fluids she should go to ER?  Can you please call to advise?  ASYA Walker, FNP-BC

## 2023-03-14 NOTE — TELEPHONE ENCOUNTER
"Called pt to give her below information.     Pt denies any current acute problems and the need for IV fluids. Wants to still establish care.     Pt reports that her cardiologist recommends that she gets infusions to help with \" Migraines and Dehydration\"     Most recent cardiology visit does not reveal this, only states that pt feels better when she is on IV fluids:      Most recent Cardiology:   She continues to have episodes of dizziness. These usually are worse when going from sitting to standing. She has also started to have migraines. She gets very dizzy with these. She has an aura which she thinks worsens dizziness. Usually the only thing that helps is IV fluids. She has been trying to stay very hydrated.     Plan:   Orthostatic hypotension  -Will start Florinef 0.1mg daily and see if this improves symptoms     Plan:    1. Start Florinef 0.1mg daily  2. Continue to monitor rhythm  3. If SVT, presyncope or syncope recommend EP study and possible ablation   4. Follow up 1 year        Also talked with pt about this on 01/26/2023 see telephone encounter.         "

## 2023-03-20 ENCOUNTER — TELEPHONE (OUTPATIENT)
Dept: FAMILY MEDICINE | Facility: CLINIC | Age: 23
End: 2023-03-20
Payer: COMMERCIAL

## 2023-03-20 NOTE — TELEPHONE ENCOUNTER
Patient calling in upset. She stated she keeps trying to get an appointment to get orders for outpatient IV fluids for low blood pressure and dizziness caused by dehydration but they continue to get canceled. She currently feels okay. Nurse advised we do not do this in clinic and IV fluids is not a long term solution. She reports oral hydration is not enough and that she only feels better after getting IV fluids and cardiology and neurology referred her back to primary care. She is wanting an appointment to discuss outpatient orders to get fluids without it getting canceled. She has not reestablished a PCP at this time.     Detailed message can be left on call back.      Any guidance on this issue?     Thank you,  Jessenia Conner RN

## 2023-03-21 NOTE — TELEPHONE ENCOUNTER
Spoke with patient, relayed providers(Dr. Mariscal) message below and patient verbalized understanding.    Patient states feeling thirsty all the time; can drink 3-4 water bottles in a few hours and still feel thirsty; dizziness comes and goes and overall fatigued.    Denies loss of balance, dizziness at this time; BP's normal (last 107/69); states she can get spiked HR occasionally d/t WPW(Mahogany-Parkinson-White); normal voids; denies pain; no visual changes.    Patient would like to re-establish care and discuss why she is feeling dehydrated all the time; prefers [place] clinic location; scheduled this Thursday.    Informed patient if experiencing any lightheadedness, loss of balance, weakness to go to emergency room and not wait for appointment.    Patient verbalized agreement and understanding.    Claudia Palacios RN

## 2023-03-21 NOTE — TELEPHONE ENCOUNTER
If patient is concerned about dehydration, patient needs to schedule an in person appointment.  Based on findings, we can determine if IV fluids is appropriate at the [facility].  Unfortunately, we do not perform IV fluids in the clinic.  If patient feels like she needs urgent IV fluids, she can get evaluated in the ER and can make that decision.

## 2023-03-23 ENCOUNTER — OFFICE VISIT (OUTPATIENT)
Dept: FAMILY MEDICINE | Facility: CLINIC | Age: 23
End: 2023-03-23
Payer: COMMERCIAL

## 2023-03-23 VITALS
HEART RATE: 76 BPM | RESPIRATION RATE: 20 BRPM | TEMPERATURE: 97.3 F | WEIGHT: 207.8 LBS | DIASTOLIC BLOOD PRESSURE: 67 MMHG | HEIGHT: 66 IN | SYSTOLIC BLOOD PRESSURE: 112 MMHG | OXYGEN SATURATION: 99 % | BODY MASS INDEX: 33.4 KG/M2

## 2023-03-23 DIAGNOSIS — Z53.29: ICD-10-CM

## 2023-03-23 DIAGNOSIS — R35.0 INCREASED FREQUENCY OF URINATION: ICD-10-CM

## 2023-03-23 DIAGNOSIS — N30.01 ACUTE CYSTITIS WITH HEMATURIA: ICD-10-CM

## 2023-03-23 DIAGNOSIS — E86.0 DEHYDRATION: ICD-10-CM

## 2023-03-23 DIAGNOSIS — Z00.00 ROUTINE GENERAL MEDICAL EXAMINATION AT A HEALTH CARE FACILITY: Primary | ICD-10-CM

## 2023-03-23 DIAGNOSIS — R73.09 ELEVATED HEMOGLOBIN A1C: Primary | ICD-10-CM

## 2023-03-23 DIAGNOSIS — R73.09 ELEVATED HEMOGLOBIN A1C: ICD-10-CM

## 2023-03-23 DIAGNOSIS — R63.1 INCREASED THIRST: ICD-10-CM

## 2023-03-23 DIAGNOSIS — R42 DIZZINESS: ICD-10-CM

## 2023-03-23 LAB
ALBUMIN SERPL BCG-MCNC: 4.4 G/DL (ref 3.5–5.2)
ALBUMIN UR-MCNC: NEGATIVE MG/DL
ALP SERPL-CCNC: 90 U/L (ref 35–104)
ALT SERPL W P-5'-P-CCNC: 33 U/L (ref 10–35)
ANION GAP SERPL CALCULATED.3IONS-SCNC: 10 MMOL/L (ref 7–15)
APPEARANCE UR: CLEAR
AST SERPL W P-5'-P-CCNC: 21 U/L (ref 10–35)
BACTERIA #/AREA URNS HPF: ABNORMAL /HPF
BASOPHILS # BLD AUTO: 0 10E3/UL (ref 0–0.2)
BASOPHILS NFR BLD AUTO: 0 %
BILIRUB SERPL-MCNC: 0.3 MG/DL
BILIRUB UR QL STRIP: NEGATIVE
BUN SERPL-MCNC: 8.8 MG/DL (ref 6–20)
CALCIUM SERPL-MCNC: 9.4 MG/DL (ref 8.6–10)
CHLORIDE SERPL-SCNC: 107 MMOL/L (ref 98–107)
COLOR UR AUTO: YELLOW
CREAT SERPL-MCNC: 0.76 MG/DL (ref 0.51–0.95)
DEPRECATED HCO3 PLAS-SCNC: 25 MMOL/L (ref 22–29)
EOSINOPHIL # BLD AUTO: 0.3 10E3/UL (ref 0–0.7)
EOSINOPHIL NFR BLD AUTO: 4 %
ERYTHROCYTE [DISTWIDTH] IN BLOOD BY AUTOMATED COUNT: 15.3 % (ref 10–15)
GFR SERPL CREATININE-BSD FRML MDRD: >90 ML/MIN/1.73M2
GLUCOSE SERPL-MCNC: 116 MG/DL (ref 70–99)
GLUCOSE UR STRIP-MCNC: NEGATIVE MG/DL
HBA1C MFR BLD: 5.8 % (ref 0–5.6)
HCT VFR BLD AUTO: 41.5 % (ref 35–47)
HGB BLD-MCNC: 13.3 G/DL (ref 11.7–15.7)
HGB UR QL STRIP: ABNORMAL
KETONES UR STRIP-MCNC: NEGATIVE MG/DL
LEUKOCYTE ESTERASE UR QL STRIP: ABNORMAL
LYMPHOCYTES # BLD AUTO: 2.5 10E3/UL (ref 0.8–5.3)
LYMPHOCYTES NFR BLD AUTO: 36 %
MCH RBC QN AUTO: 25 PG (ref 26.5–33)
MCHC RBC AUTO-ENTMCNC: 32 G/DL (ref 31.5–36.5)
MCV RBC AUTO: 78 FL (ref 78–100)
MONOCYTES # BLD AUTO: 0.5 10E3/UL (ref 0–1.3)
MONOCYTES NFR BLD AUTO: 7 %
NEUTROPHILS # BLD AUTO: 3.5 10E3/UL (ref 1.6–8.3)
NEUTROPHILS NFR BLD AUTO: 52 %
NITRATE UR QL: NEGATIVE
PH UR STRIP: 6.5 [PH] (ref 5–7)
PLATELET # BLD AUTO: 243 10E3/UL (ref 150–450)
POTASSIUM SERPL-SCNC: 4.4 MMOL/L (ref 3.4–5.3)
PROT SERPL-MCNC: 6.8 G/DL (ref 6.4–8.3)
RBC # BLD AUTO: 5.31 10E6/UL (ref 3.8–5.2)
RBC #/AREA URNS AUTO: ABNORMAL /HPF
SODIUM SERPL-SCNC: 142 MMOL/L (ref 136–145)
SP GR UR STRIP: 1.02 (ref 1–1.03)
SQUAMOUS #/AREA URNS AUTO: ABNORMAL /LPF
T4 FREE SERPL-MCNC: 0.94 NG/DL (ref 0.9–1.7)
TSH SERPL DL<=0.005 MIU/L-ACNC: 0.24 UIU/ML (ref 0.3–4.2)
UROBILINOGEN UR STRIP-ACNC: 0.2 E.U./DL
WBC # BLD AUTO: 6.8 10E3/UL (ref 4–11)
WBC #/AREA URNS AUTO: ABNORMAL /HPF

## 2023-03-23 PROCEDURE — 99213 OFFICE O/P EST LOW 20 MIN: CPT | Mod: 25 | Performed by: NURSE PRACTITIONER

## 2023-03-23 PROCEDURE — 36415 COLL VENOUS BLD VENIPUNCTURE: CPT | Performed by: NURSE PRACTITIONER

## 2023-03-23 PROCEDURE — 87086 URINE CULTURE/COLONY COUNT: CPT | Performed by: NURSE PRACTITIONER

## 2023-03-23 PROCEDURE — 87186 SC STD MICRODIL/AGAR DIL: CPT | Performed by: NURSE PRACTITIONER

## 2023-03-23 PROCEDURE — 83036 HEMOGLOBIN GLYCOSYLATED A1C: CPT | Performed by: NURSE PRACTITIONER

## 2023-03-23 PROCEDURE — 87088 URINE BACTERIA CULTURE: CPT | Performed by: NURSE PRACTITIONER

## 2023-03-23 PROCEDURE — 84439 ASSAY OF FREE THYROXINE: CPT | Performed by: NURSE PRACTITIONER

## 2023-03-23 PROCEDURE — 80050 GENERAL HEALTH PANEL: CPT | Performed by: NURSE PRACTITIONER

## 2023-03-23 PROCEDURE — 99395 PREV VISIT EST AGE 18-39: CPT | Performed by: NURSE PRACTITIONER

## 2023-03-23 PROCEDURE — 81001 URINALYSIS AUTO W/SCOPE: CPT | Performed by: NURSE PRACTITIONER

## 2023-03-23 RX ORDER — FLUDROCORTISONE ACETATE 0.1 MG/1
0.1 TABLET ORAL DAILY
COMMUNITY
End: 2023-08-02

## 2023-03-23 RX ORDER — SULFAMETHOXAZOLE/TRIMETHOPRIM 800-160 MG
1 TABLET ORAL 2 TIMES DAILY
Qty: 6 TABLET | Refills: 0 | Status: SHIPPED | OUTPATIENT
Start: 2023-03-23 | End: 2023-03-26

## 2023-03-23 ASSESSMENT — ENCOUNTER SYMPTOMS
SORE THROAT: 0
COUGH: 0
JOINT SWELLING: 0
SHORTNESS OF BREATH: 0
FEVER: 0
CHILLS: 0
ABDOMINAL PAIN: 0
MYALGIAS: 0
HEMATOCHEZIA: 0
DIZZINESS: 1
HEADACHES: 1
DYSURIA: 0
CONSTIPATION: 0
HEARTBURN: 1
PARESTHESIAS: 0
EYE PAIN: 0
ARTHRALGIAS: 0
NAUSEA: 1
NERVOUS/ANXIOUS: 0
DIARRHEA: 0
WEAKNESS: 0
PALPITATIONS: 0
FREQUENCY: 0
HEMATURIA: 0

## 2023-03-23 ASSESSMENT — PAIN SCALES - GENERAL: PAINLEVEL: NO PAIN (0)

## 2023-03-23 ASSESSMENT — ANXIETY QUESTIONNAIRES
GAD7 TOTAL SCORE: 7
7. FEELING AFRAID AS IF SOMETHING AWFUL MIGHT HAPPEN: SEVERAL DAYS
IF YOU CHECKED OFF ANY PROBLEMS ON THIS QUESTIONNAIRE, HOW DIFFICULT HAVE THESE PROBLEMS MADE IT FOR YOU TO DO YOUR WORK, TAKE CARE OF THINGS AT HOME, OR GET ALONG WITH OTHER PEOPLE: SOMEWHAT DIFFICULT
GAD7 TOTAL SCORE: 7
4. TROUBLE RELAXING: SEVERAL DAYS
GAD7 TOTAL SCORE: 7
2. NOT BEING ABLE TO STOP OR CONTROL WORRYING: SEVERAL DAYS
7. FEELING AFRAID AS IF SOMETHING AWFUL MIGHT HAPPEN: SEVERAL DAYS
1. FEELING NERVOUS, ANXIOUS, OR ON EDGE: SEVERAL DAYS
3. WORRYING TOO MUCH ABOUT DIFFERENT THINGS: SEVERAL DAYS
6. BECOMING EASILY ANNOYED OR IRRITABLE: SEVERAL DAYS
5. BEING SO RESTLESS THAT IT IS HARD TO SIT STILL: SEVERAL DAYS
8. IF YOU CHECKED OFF ANY PROBLEMS, HOW DIFFICULT HAVE THESE MADE IT FOR YOU TO DO YOUR WORK, TAKE CARE OF THINGS AT HOME, OR GET ALONG WITH OTHER PEOPLE?: SOMEWHAT DIFFICULT

## 2023-03-23 ASSESSMENT — ASTHMA QUESTIONNAIRES
EMERGENCY_ROOM_LAST_YEAR_TOTAL: ONE
QUESTION_3 LAST FOUR WEEKS HOW OFTEN DID YOUR ASTHMA SYMPTOMS (WHEEZING, COUGHING, SHORTNESS OF BREATH, CHEST TIGHTNESS OR PAIN) WAKE YOU UP AT NIGHT OR EARLIER THAN USUAL IN THE MORNING: NOT AT ALL
QUESTION_1 LAST FOUR WEEKS HOW MUCH OF THE TIME DID YOUR ASTHMA KEEP YOU FROM GETTING AS MUCH DONE AT WORK, SCHOOL OR AT HOME: NONE OF THE TIME
QUESTION_2 LAST FOUR WEEKS HOW OFTEN HAVE YOU HAD SHORTNESS OF BREATH: NOT AT ALL
QUESTION_5 LAST FOUR WEEKS HOW WOULD YOU RATE YOUR ASTHMA CONTROL: COMPLETELY CONTROLLED
ACT_TOTALSCORE: 25
QUESTION_4 LAST FOUR WEEKS HOW OFTEN HAVE YOU USED YOUR RESCUE INHALER OR NEBULIZER MEDICATION (SUCH AS ALBUTEROL): NOT AT ALL
ACT_TOTALSCORE: 25

## 2023-03-23 ASSESSMENT — PATIENT HEALTH QUESTIONNAIRE - PHQ9
10. IF YOU CHECKED OFF ANY PROBLEMS, HOW DIFFICULT HAVE THESE PROBLEMS MADE IT FOR YOU TO DO YOUR WORK, TAKE CARE OF THINGS AT HOME, OR GET ALONG WITH OTHER PEOPLE: SOMEWHAT DIFFICULT
SUM OF ALL RESPONSES TO PHQ QUESTIONS 1-9: 8
SUM OF ALL RESPONSES TO PHQ QUESTIONS 1-9: 8

## 2023-03-23 NOTE — PATIENT INSTRUCTIONS
Labs ordered today. Concern for possible diabetes.     If heartburn isn't controlled by your over the counter medications, you can try omeprazole (over the counter) for 2 months. Follow-up if that doesn't help.     Continue to follow-up with cardiologist and neurologist as needed.     Follow-up in a month for Papsmear.  ------------------------------------------------  Preventive Health Recommendations  Female Ages 21 to 25     Yearly exam:   See your health care provider every year in order to  Review health changes.   Discuss preventive care.    Review your medicines if your doctor has prescribed any.    You should be tested each year for STDs (sexually transmitted diseases).     Talk to your provider about how often you should have cholesterol testing.    Get a Pap test every three years. If you have an abnormal result, your doctor may have you test more often.    If you are at risk for diabetes, you should have a diabetes test (fasting glucose).     Shots:   Get a flu shot each year.   Get a tetanus shot every 10 years.   Consider getting the shot (vaccine) that prevents cervical cancer (Gardasil).    Nutrition:   Eat at least 5 servings of fruits and vegetables each day.  Eat whole-grain bread, whole-wheat pasta and brown rice instead of white grains and rice.  Get adequate Calcium and Vitamin D.     Lifestyle  Exercise at least 150 minutes a week each week (30 minutes a day, 5 days a week). This will help you control your weight and prevent disease.  Limit alcohol to one drink per day.  No smoking.   Wear sunscreen to prevent skin cancer.  See your dentist every six months for an exam and cleaning.

## 2023-03-23 NOTE — PROGRESS NOTES
SUBJECTIVE:   CC: Kamala is an 23 year old who presents for preventive health visit.   No flowsheet data found.Patient has been advised of split billing requirements and indicates understanding: Yes  Healthy Habits:     Getting at least 3 servings of Calcium per day:  NO    Bi-annual eye exam:  Yes    Dental care twice a year:  NO    Sleep apnea or symptoms of sleep apnea:  Daytime drowsiness, Excessive snoring and Sleep apnea    Diet:  Regular (no restrictions)    Frequency of exercise:  1 day/week    Duration of exercise:  Other    Taking medications regularly:  No    Barriers to taking medications:  Problems remembering to take them    Medication side effects:  None    PHQ-2 Total Score: 1    Additional concerns today:  Yes          Concern - problems with having dehydration, feels like she is peeing everything out and nothing is staying in her body and she is really thristy, she always has a water bottle     Onset: several years, gotten worse the past year    Description:   Will end up in the ER or urgent care with migraines triggered by dehydration     Intensity: severe    Progression of Symptoms:  worsening    Accompanying Signs & Symptoms:  Dizziness, fatigue, nausea    Previous history of similar problem:   Has had this off and on for yearss    Precipitating factors:   Worsened by: unsure    Alleviating factors:  Improved by: unsure     Therapies Tried and outcome: Ubrelvy, adding salt in her diet,increasing fluids    Additional provider notes: Patient presents in clinic for annual physical and concerns for dehydration. States this has been going on for a couple years. Dizziness since middle school. She has been in ER multiple times for dehydration. Now it is triggering migraines that can last for days and she ends up in the ER. States she drinks a lot of fluids. In an 8 hour work shift she drinks 3-6 water bottles (using drip drop). She also has used gatorade and pedialyte for hydration. States she  "urinates every 30 minutes to every couple hours depending on how much she is drinking. Feels like she is thirsty all the time. States she gets dehydrated weekly to once a month. Feels fatigued and dizzy.     Dizziness: she sees a cardiologist and neurologist and they both don't know why she is having dizzy spells. States she has been tested for seizures - negative.     Migraines: \"9/10 times I have to sleep my migraines away\". She gets dizzy and nauseous.     Heartburn: causes nausea. Uses \"morning sickness drops\". States this helps.     Declines Pap today. She doesn't have time, she will reschedule a follow-up appointment to have this done since her pap last week was positive.    Allergies   Allergen Reactions     Iodine Rash and Itching     Rash itching         Current Outpatient Medications   Medication     albuterol (PROAIR HFA/PROVENTIL HFA/VENTOLIN HFA) 108 (90 Base) MCG/ACT inhaler     albuterol (PROAIR HFA/PROVENTIL HFA/VENTOLIN HFA) 108 (90 Base) MCG/ACT inhaler     escitalopram (LEXAPRO) 10 MG tablet     etonogestrel (NEXPLANON) 68 MG IMPL     fludrocortisone (FLORINEF) 0.1 MG tablet     ibuprofen (ADVIL/MOTRIN) 600 MG tablet     lisdexamfetamine (VYVANSE) 20 MG capsule     meclizine (ANTIVERT) 25 MG tablet     naproxen (NAPROSYN) 500 MG tablet     tiZANidine (ZANAFLEX) 4 MG tablet     ubrogepant (UBRELVY) 100 MG tablet     fluticasone (FLONASE) 50 MCG/ACT nasal spray     No current facility-administered medications for this visit.       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      Pap smear of cervix with ASCUS, cannot exclude HGSIL 1/19/2022     Sprain of right wrist     right side muliple times      Uncomplicated asthma     exercise induced          Today's PHQ-2 Score:   PHQ-2 ( 1999 [employer]) 3/23/2023   Q1: Little interest or pleasure in doing things 1   Q2: Feeling down, depressed or hopeless 0 "   PHQ-2 Score 1   PHQ-2 Total Score (12-17 Years)- Positive if 3 or more points; Administer PHQ-A if positive -   Q1: Little interest or pleasure in doing things Several days   Q2: Feeling down, depressed or hopeless Not at all   PHQ-2 Score 1           Social History     Tobacco Use     Smoking status: Never     Passive exposure: Never     Smokeless tobacco: Never   Substance Use Topics     Alcohol use: Not Currently         Alcohol Use 3/23/2023   Prescreen: >3 drinks/day or >7 drinks/week? No     Reviewed orders with patient.  Reviewed health maintenance and updated orders accordingly - Yes      Breast Cancer Screening:    Breast CA Risk Assessment (FHS-7) 3/23/2023   Do you have a family history of breast, colon, or ovarian cancer? No / Unknown         Patient under 40 years of age: Routine Mammogram Screening not recommended.   Pertinent mammograms are reviewed under the imaging tab.    History of abnormal Pap smear: YES - other categories - see link Cervical Cytology Screening Guidelines  PAP / HPV Latest Ref Rng & Units 1/19/2022 3/5/2019   PAP   Atypical squamous cells of undetermined significance, cannot exclude high-grade squamous intraepithelial lesion (ASC-H)(A) -   PAP (Historical) - - NIL     Reviewed and updated as needed this visit by clinical staff   Tobacco   Meds              Reviewed and updated as needed this visit by Provider     Meds             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      Pap smear of cervix with ASCUS, cannot exclude HGSIL 1/19/2022     Sprain of right wrist     right side muliple times      Uncomplicated asthma     exercise induced      Past Surgical History:   Procedure Laterality Date     BIOPSY       CARDIAC CATHERIZATION  4 years    Told by cardiology that tiny defect would close on own, no FU     CARDIAC SURGERY       CREATE EARDRUM OPENING,LOCAL ANESTH  4 years  "   PETs     OB History    Para Term  AB Living   1 1 1 0 0 1   SAB IAB Ectopic Multiple Live Births   0 0 0 0 1      # Outcome Date GA Lbr Wolf/2nd Weight Sex Delivery Anes PTL Lv   1 Term 19 41w0d 06:43 / 01:43 5 kg (11 lb 0.4 oz) F Vag-Spont EPI N CHRISTINE      Name: MITCH,FEMALE-PERLA      Apgar1: 3  Apgar5: 5       Review of Systems   Constitutional: Negative for chills and fever.   HENT: Negative for congestion, ear pain, hearing loss and sore throat.    Eyes: Negative for pain and visual disturbance.   Respiratory: Negative for cough and shortness of breath.    Cardiovascular: Negative for chest pain, palpitations and peripheral edema.   Gastrointestinal: Positive for heartburn and nausea. Negative for abdominal pain, constipation, diarrhea and hematochezia.   Genitourinary: Negative for dysuria, frequency, genital sores, hematuria and urgency.   Musculoskeletal: Negative for arthralgias, joint swelling and myalgias.   Skin: Negative for rash.   Neurological: Positive for dizziness and headaches. Negative for weakness and paresthesias.   Psychiatric/Behavioral: Negative for mood changes. The patient is not nervous/anxious.           OBJECTIVE:   /67 (BP Location: Right arm, Patient Position: Sitting, Cuff Size: Adult Large)   Pulse 76   Temp 97.3  F (36.3  C) (Tympanic)   Resp 20   Ht 1.67 m (5' 5.75\")   Wt 94.3 kg (207 lb 12.8 oz)   SpO2 99%   Breastfeeding No   BMI 33.80 kg/m    Physical Exam  GENERAL: healthy, alert and no distress  EYES: Eyes grossly normal to inspection, PERRL and conjunctivae and sclerae normal  HENT: ear canals and TM's normal, nose and mouth without ulcers or lesions  NECK: no adenopathy, no asymmetry, masses, or scars and thyroid normal to palpation  RESP: lungs clear to auscultation - no rales, rhonchi or wheezes  CV: regular rate and rhythm, normal S1 S2, no S3 or S4, no murmur, click or rub, no peripheral edema and peripheral pulses strong  ABDOMEN: " "soft, nontender, no hepatosplenomegaly, no masses and bowel sounds normal  MS: no gross musculoskeletal defects noted, no edema  SKIN: no suspicious lesions or rashes  NEURO: Normal strength and tone, mentation intact and speech normal  PSYCH: mentation appears normal, affect normal/bright    Diagnostic Test Results:  Labs reviewed in Epic    ASSESSMENT/PLAN:       ICD-10-CM    1. Routine general medical examination at a health care facility  Z00.00       2. Dizziness  R42 CBC with platelets and differential     TSH with free T4 reflex      3. Elevated hemoglobin A1c  R73.09 Hemoglobin A1c      4. Dehydration  E86.0 CBC with platelets and differential     TSH with free T4 reflex      5. Increased thirst  R63.1 Hemoglobin A1c     Comprehensive metabolic panel (BMP + Alb, Alk Phos, ALT, AST, Total. Bili, TP)     TSH with free T4 reflex      6. Increased frequency of urination  R35.0 Hemoglobin A1c     Comprehensive metabolic panel (BMP + Alb, Alk Phos, ALT, AST, Total. Bili, TP)     TSH with free T4 reflex     UA Macro with Reflex to Micro and Culture - lab collect      7. Pap smear of cervix declined because of time constraints  Z53.29           Patient has been advised of split billing requirements and indicates understanding: No      COUNSELING:  Reviewed preventive health counseling, as reflected in patient instructions       Healthy diet/nutrition       Due to time constraints, no vaccines done today.      BMI:   Estimated body mass index is 33.8 kg/m  as calculated from the following:    Height as of this encounter: 1.67 m (5' 5.75\").    Weight as of this encounter: 94.3 kg (207 lb 12.8 oz).   Weight management plan: Discussed healthy diet and exercise guidelines      She reports that she has never smoked. She has never been exposed to tobacco smoke. She has never used smokeless tobacco.      Jazzmine aWrren DNP  [facility]  Answers for HPI/ROS submitted by the patient on " 3/23/2023  If you checked off any problems, how difficult have these problems made it for you to do your work, take care of things at home, or get along with other people?: Somewhat difficult  PHQ9 TOTAL SCORE: 8  NERIS 7 TOTAL SCORE: 7

## 2023-03-24 ENCOUNTER — TELEPHONE (OUTPATIENT)
Dept: FAMILY MEDICINE | Facility: CLINIC | Age: 23
End: 2023-03-24
Payer: COMMERCIAL

## 2023-03-24 DIAGNOSIS — R79.89 ABNORMAL TSH: Primary | ICD-10-CM

## 2023-03-24 NOTE — TELEPHONE ENCOUNTER
Follow-up labs ordered for 2 weeks out. See [employer] message to patient.    Jazzmine Warren DNP, NP-C

## 2023-03-25 LAB — BACTERIA UR CULT: ABNORMAL

## 2023-04-04 ENCOUNTER — LAB (OUTPATIENT)
Dept: LAB | Facility: CLINIC | Age: 23
End: 2023-04-04
Payer: COMMERCIAL

## 2023-04-04 DIAGNOSIS — R79.89 ABNORMAL TSH: ICD-10-CM

## 2023-04-04 LAB
T3 SERPL-MCNC: 116 NG/DL (ref 85–202)
TSH SERPL DL<=0.005 MIU/L-ACNC: 0.48 UIU/ML (ref 0.3–4.2)

## 2023-04-04 PROCEDURE — 99000 SPECIMEN HANDLING OFFICE-LAB: CPT

## 2023-04-04 PROCEDURE — 84480 ASSAY TRIIODOTHYRONINE (T3): CPT

## 2023-04-04 PROCEDURE — 83520 IMMUNOASSAY QUANT NOS NONAB: CPT | Mod: 90

## 2023-04-04 PROCEDURE — 84443 ASSAY THYROID STIM HORMONE: CPT

## 2023-04-04 PROCEDURE — 36415 COLL VENOUS BLD VENIPUNCTURE: CPT

## 2023-04-05 DIAGNOSIS — R79.89 ABNORMAL TSH: Primary | ICD-10-CM

## 2023-04-05 DIAGNOSIS — R73.09 ELEVATED HEMOGLOBIN A1C: ICD-10-CM

## 2023-04-05 DIAGNOSIS — R63.1 INCREASED THIRST: ICD-10-CM

## 2023-04-05 DIAGNOSIS — R42 DIZZINESS: ICD-10-CM

## 2023-04-05 DIAGNOSIS — E86.0 DEHYDRATION: ICD-10-CM

## 2023-04-05 DIAGNOSIS — R35.0 INCREASED FREQUENCY OF URINATION: ICD-10-CM

## 2023-04-05 LAB — TSH RECEP AB SER-ACNC: <1.1 IU/L (ref 0–1.75)

## 2023-04-07 ENCOUNTER — ANCILLARY PROCEDURE (OUTPATIENT)
Dept: CT IMAGING | Facility: CLINIC | Age: 23
End: 2023-04-07
Attending: STUDENT IN AN ORGANIZED HEALTH CARE EDUCATION/TRAINING PROGRAM
Payer: COMMERCIAL

## 2023-04-07 ENCOUNTER — OFFICE VISIT (OUTPATIENT)
Dept: PEDIATRICS | Facility: CLINIC | Age: 23
End: 2023-04-07
Payer: COMMERCIAL

## 2023-04-07 ENCOUNTER — OFFICE VISIT (OUTPATIENT)
Dept: URGENT CARE | Facility: URGENT CARE | Age: 23
End: 2023-04-07
Payer: COMMERCIAL

## 2023-04-07 VITALS
OXYGEN SATURATION: 100 % | WEIGHT: 210.8 LBS | SYSTOLIC BLOOD PRESSURE: 114 MMHG | HEIGHT: 65 IN | HEART RATE: 90 BPM | DIASTOLIC BLOOD PRESSURE: 78 MMHG | BODY MASS INDEX: 35.12 KG/M2 | RESPIRATION RATE: 20 BRPM | TEMPERATURE: 98 F

## 2023-04-07 VITALS
TEMPERATURE: 98 F | HEART RATE: 90 BPM | WEIGHT: 212 LBS | SYSTOLIC BLOOD PRESSURE: 117 MMHG | DIASTOLIC BLOOD PRESSURE: 79 MMHG | OXYGEN SATURATION: 100 % | BODY MASS INDEX: 34.48 KG/M2 | RESPIRATION RATE: 20 BRPM

## 2023-04-07 DIAGNOSIS — Z88.8 ALLERGY TO IODINE: ICD-10-CM

## 2023-04-07 DIAGNOSIS — N30.01 ACUTE CYSTITIS WITH HEMATURIA: Primary | ICD-10-CM

## 2023-04-07 DIAGNOSIS — N20.0 KIDNEY STONE: ICD-10-CM

## 2023-04-07 DIAGNOSIS — R10.31 RLQ ABDOMINAL PAIN: ICD-10-CM

## 2023-04-07 DIAGNOSIS — R10.31 RIGHT LOWER QUADRANT PAIN: Primary | ICD-10-CM

## 2023-04-07 LAB
ALBUMIN SERPL BCG-MCNC: 4.6 G/DL (ref 3.5–5.2)
ALBUMIN UR-MCNC: ABNORMAL MG/DL
ALP SERPL-CCNC: 91 U/L (ref 35–104)
ALT SERPL W P-5'-P-CCNC: 26 U/L (ref 10–35)
ANION GAP SERPL CALCULATED.3IONS-SCNC: 7 MMOL/L (ref 7–15)
APPEARANCE UR: CLEAR
AST SERPL W P-5'-P-CCNC: 18 U/L (ref 10–35)
BACTERIA #/AREA URNS HPF: ABNORMAL /HPF
BILIRUB SERPL-MCNC: 0.2 MG/DL
BILIRUB UR QL STRIP: NEGATIVE
BUN SERPL-MCNC: 9.5 MG/DL (ref 6–20)
CALCIUM SERPL-MCNC: 8.9 MG/DL (ref 8.6–10)
CAOX CRY #/AREA URNS HPF: ABNORMAL /HPF
CHLORIDE SERPL-SCNC: 107 MMOL/L (ref 98–107)
COLOR UR AUTO: YELLOW
CREAT SERPL-MCNC: 0.74 MG/DL (ref 0.51–0.95)
DEPRECATED HCO3 PLAS-SCNC: 27 MMOL/L (ref 22–29)
ERYTHROCYTE [DISTWIDTH] IN BLOOD BY AUTOMATED COUNT: 14.3 % (ref 10–15)
GFR SERPL CREATININE-BSD FRML MDRD: >90 ML/MIN/1.73M2
GLUCOSE SERPL-MCNC: 94 MG/DL (ref 70–99)
GLUCOSE UR STRIP-MCNC: NEGATIVE MG/DL
HCG UR QL: NEGATIVE
HCT VFR BLD AUTO: 38.6 % (ref 35–47)
HGB BLD-MCNC: 12.5 G/DL (ref 11.7–15.7)
HGB UR QL STRIP: NEGATIVE
KETONES UR STRIP-MCNC: NEGATIVE MG/DL
LEUKOCYTE ESTERASE UR QL STRIP: NEGATIVE
MCH RBC QN AUTO: 25.2 PG (ref 26.5–33)
MCHC RBC AUTO-ENTMCNC: 32.4 G/DL (ref 31.5–36.5)
MCV RBC AUTO: 78 FL (ref 78–100)
MUCOUS THREADS #/AREA URNS LPF: PRESENT /LPF
NITRATE UR QL: NEGATIVE
PH UR STRIP: 5.5 [PH] (ref 5–7)
PLATELET # BLD AUTO: 217 10E3/UL (ref 150–450)
POTASSIUM SERPL-SCNC: 3.6 MMOL/L (ref 3.4–5.3)
PROT SERPL-MCNC: 6.7 G/DL (ref 6.4–8.3)
RBC # BLD AUTO: 4.96 10E6/UL (ref 3.8–5.2)
RBC #/AREA URNS AUTO: ABNORMAL /HPF
SODIUM SERPL-SCNC: 141 MMOL/L (ref 136–145)
SP GR UR STRIP: >=1.03 (ref 1–1.03)
SQUAMOUS #/AREA URNS AUTO: ABNORMAL /LPF
UROBILINOGEN UR STRIP-ACNC: 0.2 E.U./DL
WBC # BLD AUTO: 8.4 10E3/UL (ref 4–11)
WBC #/AREA URNS AUTO: ABNORMAL /HPF

## 2023-04-07 PROCEDURE — 74176 CT ABD & PELVIS W/O CONTRAST: CPT

## 2023-04-07 PROCEDURE — 81025 URINE PREGNANCY TEST: CPT | Performed by: STUDENT IN AN ORGANIZED HEALTH CARE EDUCATION/TRAINING PROGRAM

## 2023-04-07 PROCEDURE — 80053 COMPREHEN METABOLIC PANEL: CPT | Performed by: STUDENT IN AN ORGANIZED HEALTH CARE EDUCATION/TRAINING PROGRAM

## 2023-04-07 PROCEDURE — 81001 URINALYSIS AUTO W/SCOPE: CPT | Performed by: STUDENT IN AN ORGANIZED HEALTH CARE EDUCATION/TRAINING PROGRAM

## 2023-04-07 PROCEDURE — 99215 OFFICE O/P EST HI 40 MIN: CPT | Performed by: STUDENT IN AN ORGANIZED HEALTH CARE EDUCATION/TRAINING PROGRAM

## 2023-04-07 PROCEDURE — 85027 COMPLETE CBC AUTOMATED: CPT | Performed by: STUDENT IN AN ORGANIZED HEALTH CARE EDUCATION/TRAINING PROGRAM

## 2023-04-07 PROCEDURE — 36415 COLL VENOUS BLD VENIPUNCTURE: CPT

## 2023-04-07 PROCEDURE — 99214 OFFICE O/P EST MOD 30 MIN: CPT | Performed by: FAMILY MEDICINE

## 2023-04-07 RX ORDER — HYDROCODONE BITARTRATE AND ACETAMINOPHEN 5; 325 MG/1; MG/1
1 TABLET ORAL EVERY 6 HOURS PRN
Qty: 10 TABLET | Refills: 0 | Status: SHIPPED | OUTPATIENT
Start: 2023-04-07 | End: 2023-04-10

## 2023-04-07 RX ORDER — CIPROFLOXACIN 500 MG/1
500 TABLET, FILM COATED ORAL 2 TIMES DAILY
Qty: 6 TABLET | Refills: 0 | Status: CANCELLED | OUTPATIENT
Start: 2023-04-07 | End: 2023-04-10

## 2023-04-07 RX ORDER — KETOROLAC TROMETHAMINE 10 MG/1
10 TABLET, FILM COATED ORAL EVERY 6 HOURS PRN
Qty: 20 TABLET | Refills: 0 | Status: SHIPPED | OUTPATIENT
Start: 2023-04-07 | End: 2023-06-26

## 2023-04-07 RX ORDER — NITROFURANTOIN 25; 75 MG/1; MG/1
100 CAPSULE ORAL 2 TIMES DAILY
Qty: 10 CAPSULE | Refills: 0 | Status: SHIPPED | OUTPATIENT
Start: 2023-04-07 | End: 2023-04-12

## 2023-04-07 ASSESSMENT — PAIN SCALES - GENERAL: PAINLEVEL: MODERATE PAIN (4)

## 2023-04-07 NOTE — LETTER
April 7, 2023      Kamala Harris  [address]   [place] [address]        To Whom It May Concern:    Kamala Harris  was seen on 4/7/23. I advised no lifting more than 10 lbs no work with heavy machinery for the next week .May return to regular work duties on 4/15/23. Okay to return to regular work duties sooner than 4/15/23 if symptoms spontaneously improve.        Sincerely,        ASYA Renee CNP

## 2023-04-07 NOTE — PROGRESS NOTES
Chief Complaint   Patient presents with     Cyst     Possible ovarian cyst for the last three days. Patient have a hx of ovarian cyst        Kamala was seen today for cyst.    Diagnoses and all orders for this visit:    Right lower quadrant pain    Other orders  -     Referral to Acute and Diagnostic Services (Day of diagnostic / First order acute)      Patient with history of RLQ  presented to urgent care with fluctuating  abdominal pain. The clinical exam today is non-specific. The exact etiology of the abdominal pain is not clear at this time. The differential diagnosis of abdominal pain includes: Appendicitis, Bowel Obstruction, Ulcer, Ischemia, Cholecystitis, Diverticulitis, Pancreatitis, UTI, kidney stone, Enteritis/Colitis amongst many other etiologies. Given patient's moderate-to-severe distress, abdominal exam concerning forpossible appendicitis /ovarian cyst . I instructed the patient to go to the [facility], does not ER now for further evaluation. patient voices understanding and agreement with the plan. All questions answered.  I did call [facility] and discussed about the possibilities.  Would have UA and CBC if needed done at the [facility].  As her vitals are normal I think she is okay traveling in her private vehicle.      SUBJECTIVE  HPI:  Kamala Harris is a 23 year old female who presents with the CC of abdominal/pelvic pain.    Pain is located in the RLQ area, with radiation to None.  The pain is characterized as sharp, and at worst is a level 8 on a scale of 1-10.  Pain has been present for 3 day(s) and is fluctuating.she is on nexplanon and not sure about her LMP  Has no change in her bowel habits , has no uti symtoms   Recently got over uti   Has no uti symptoms   EXACERBATING FACTORS: movement/walking  RELIEVING FACTORS: nothing.  ASSOCIATED SX: none.    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      Pap  smear of cervix with ASCUS, cannot exclude HGSIL 1/19/2022     Sprain of right wrist     right side muliple times      Uncomplicated asthma     exercise induced     Current Outpatient Medications   Medication Sig Dispense Refill     albuterol (PROAIR HFA/PROVENTIL HFA/VENTOLIN HFA) 108 (90 Base) MCG/ACT inhaler Inhale 2 puffs into the lungs       albuterol (PROAIR HFA/PROVENTIL HFA/VENTOLIN HFA) 108 (90 Base) MCG/ACT inhaler Inhale 2 puffs into the lungs every 4 hours as needed for shortness of breath / dyspnea or wheezing 18 g 3     escitalopram (LEXAPRO) 10 MG tablet        etonogestrel (NEXPLANON) 68 MG IMPL Inject 68 mg Subcutaneous       fludrocortisone (FLORINEF) 0.1 MG tablet Take 0.1 mg by mouth daily       fluticasone (FLONASE) 50 MCG/ACT nasal spray        ibuprofen (ADVIL/MOTRIN) 600 MG tablet        lisdexamfetamine (VYVANSE) 20 MG capsule Take 20 mg by mouth daily       meclizine (ANTIVERT) 25 MG tablet Take 25 mg by mouth 3 times daily as needed for dizziness       naproxen (NAPROSYN) 500 MG tablet Take 1 tablet (500 mg) by mouth 2 times daily as needed for moderate pain or headaches 28 tablet 11     tiZANidine (ZANAFLEX) 4 MG tablet Take 4 mg by mouth       ubrogepant (UBRELVY) 100 MG tablet Take 1 tablet (100 mg) by mouth at onset of headache (migraine) 10 tablet 11     HYDROcodone-acetaminophen (NORCO) 5-325 MG tablet Take 1 tablet by mouth every 6 hours as needed for severe pain 10 tablet 0     ketorolac (TORADOL) 10 MG tablet Take 1 tablet (10 mg) by mouth every 6 hours as needed for moderate pain 20 tablet 0     nitroFURantoin macrocrystal-monohydrate (MACROBID) 100 MG capsule Take 1 capsule (100 mg) by mouth 2 times daily for 5 days 10 capsule 0     Social History     Tobacco Use     Smoking status: Never     Passive exposure: Never     Smokeless tobacco: Never   Vaping Use     Vaping status: Never Used     Passive vaping exposure: Yes   Substance Use Topics     Alcohol use: Not Currently        ROS:  Review of systems negative except as stated above.    OBJECTIVE:  /79 (BP Location: Left arm, Patient Position: Sitting, Cuff Size: Adult Large)   Pulse 90   Temp 98  F (36.7  C) (Oral)   Resp 20   Wt 96.2 kg (212 lb)   SpO2 100%   BMI 34.48 kg/m    GENERAL APPEARANCE: healthy, alert and no distress  EYES: EOMI,  PERRL, conjunctiva clear  Mouth without ulcers, erythema or lesions  RESP: lungs clear to auscultation - no rales, rhonchi or wheezes  CV: regular rates and rhythm, normal S1 S2, no murmur noted  ABDOMEN:  Soft , RLQ tender, no HSM or masses and bowel sounds normal  PSYCH: mentation appears normal       done

## 2023-04-07 NOTE — PROGRESS NOTES
Assessment & Plan     Acute cystitis with hematuria  RLQ abdominal pain  CT negative for appendicitis or other acute cause to explain patient's pain. US was unable to fit her into their schedule today to evaluate for ovarian cyst. She has history of right ovarian cyst that felt very similar to current symptoms. Her UA is positive for 5-10 WBC and with recent treatment for UTI with Bactrim it may be that it is still lingering. I am going to treat again for UTI with Macrobid x 5 days. She has been taking naproxen and Tylenol for pain and they are not effective. I gave her a prescription for ketorolac which she was prescribed last time she had an ovarian cyst and a small prescription for Norco to use if severe pain. I checked PMDP and no red flag concerns. Advised no operation of machinery or driving if she takes Norco and wrote work letter. Follow up if symptoms persist or worsen.   - CBC with platelets  - Comprehensive metabolic panel  - UA with Microscopic reflex to Culture  - CT Abdomen Pelvis w/o Contrast  - CBC with platelets  - Comprehensive metabolic panel  - UA with Microscopic reflex to Culture  - HCG Qual, Urine ([id number])  - HCG Qual, Urine ([id number])  - UA Microscopic with Reflex to Culture  - ketorolac (TORADOL) 10 MG tablet  Dispense: 20 tablet; Refill: 0  - HYDROcodone-acetaminophen (NORCO) 5-325 MG tablet  Dispense: 10 tablet; Refill: 0  - nitroFURantoin macrocrystal-monohydrate (MACROBID) 100 MG capsule  Dispense: 10 capsule; Refill: 0    Allergy to iodine  - CT Abdomen Pelvis w/o Contrast    Kidney stone   Tiny 2 mm stone in right kidney incidental finding.     50 minutes spent by me on the date of the encounter doing chart review, history and exam, documentation and further activities per the note       There are no Patient Instructions on file for this visit.    No follow-ups on file.    Marielle Palma, ASYA [facility] MARISA Wray is a 23 year  "old, presenting for the following health issues:  Gastrointestinal Problem (Abdominal pain RLQ)        3/23/2023     8:07 AM   Additional Questions   Roomed by Diane   Accompanied by none     HPI     Abdominal/Flank Pain  Onset/Duration: 3 days ago  Description:   Character: Gnawing and Cramping  Location: right lower quadrant  Radiation: None and slightly to the middle with cramping  Intensity: mild to severe  Progression of Symptoms:  worsening and waxing and waning  Accompanying Signs & Symptoms:  Fever/Chills: no   Gas/Bloating: YES- gas  Nausea: no   Vomiting: no   Diarrhea: no   Constipation: no   Dysuria or Hematuria: no   History:   Trauma: no   Previous similar pain: YES- cyst last year  Previous tests done: no   Previous Abdominal Surgery: no   Precipitating factors:   Does the pain change with:     Food: no     Bowel Movement: no     Urination: no    Other factors:  no   Therapies tried and outcome: None    When did you eat last: 9 hours ago        Review of Systems   Constitutional, HEENT, cardiovascular, pulmonary, GI, , musculoskeletal, neuro, skin, endocrine and psych systems are negative, except as otherwise noted.      Objective    /78 (BP Location: Left arm, Patient Position: Sitting, Cuff Size: Adult Large)   Pulse 90   Temp 98  F (36.7  C)   Resp 20   Ht 1.651 m (5' 5\")   Wt 95.6 kg (210 lb 12.8 oz)   SpO2 100%   BMI 35.08 kg/m    Body mass index is 35.08 kg/m .  Physical Exam   GENERAL: healthy, alert and no distress  ABDOMEN: tenderness RLQ, no organomegaly or masses and bowel sounds normal  MS: no gross musculoskeletal defects noted, no edema  SKIN: no suspicious lesions or rashes  NEURO: Normal strength and tone, mentation intact and speech normal  BACK: no CVA tenderness, no paralumbar tenderness  PSYCH: mentation appears normal, affect normal/bright    Results for orders placed or performed in visit on 04/07/23   CT Abdomen Pelvis w/o Contrast     Status: None    Narrative    " EXAM: CT ABDOMEN PELVIS W/O CONTRAST  LOCATION: [facility]  DATE/TIME: 4/7/2023 3:19 PM    INDICATION: RLQ pain; iodine allergy unable to use contrast; rule out appendicitis.  COMPARISON: None.  TECHNIQUE: CT scan of the abdomen and pelvis was performed without IV contrast. Multiplanar reformats were obtained. Dose reduction techniques were used.  CONTRAST: None.    FINDINGS:   LOWER CHEST: Normal.    HEPATOBILIARY: Normal.    PANCREAS: Normal.    SPLEEN: Normal size spleen. Splenule in the splenic hilum.    ADRENAL GLANDS: Normal.    KIDNEYS/BLADDER: 2 mm nonobstructing calyceal tip stone in the right lower pole. No right ureteral calculi. No left nephroureteral calculi. No urinary tract dilatation bilaterally. No bladder wall thickening or bladder stones.    BOWEL: Normal. Normal appendix.    LYMPH NODES: Normal.    VASCULATURE: No abdominal aortic aneurysm.    PELVIC ORGANS: Normal.    MUSCULOSKELETAL: Normal.      Impression    IMPRESSION:   1.  No acute unenhanced CT findings in the abdomen or pelvis.    2.  2 mm nonobstructing calyceal tip stone within the right kidney.     Results for orders placed or performed in visit on 04/07/23   CBC with platelets     Status: Abnormal   Result Value Ref Range    WBC Count 8.4 4.0 - 11.0 10e3/uL    RBC Count 4.96 3.80 - 5.20 10e6/uL    Hemoglobin 12.5 11.7 - 15.7 g/dL    Hematocrit 38.6 35.0 - 47.0 %    MCV 78 78 - 100 fL    MCH 25.2 (L) 26.5 - 33.0 pg    MCHC 32.4 31.5 - 36.5 g/dL    RDW 14.3 10.0 - 15.0 %    Platelet Count 217 150 - 450 10e3/uL   Comprehensive metabolic panel     Status: Normal   Result Value Ref Range    Sodium 141 136 - 145 mmol/L    Potassium 3.6 3.4 - 5.3 mmol/L    Chloride 107 98 - 107 mmol/L    Carbon Dioxide (CO2) 27 22 - 29 mmol/L    Anion Gap 7 7 - 15 mmol/L    Urea Nitrogen 9.5 6.0 - 20.0 mg/dL    Creatinine 0.74 0.51 - 0.95 mg/dL    Calcium 8.9 8.6 - 10.0 mg/dL    Glucose 94 70 - 99 mg/dL    Alkaline Phosphatase 91  35 - 104 U/L    AST 18 10 - 35 U/L    ALT 26 10 - 35 U/L    Protein Total 6.7 6.4 - 8.3 g/dL    Albumin 4.6 3.5 - 5.2 g/dL    Bilirubin Total 0.2 <=1.2 mg/dL    GFR Estimate >90 >60 mL/min/1.73m2   UA with Microscopic reflex to Culture     Status: Abnormal    Specimen: Urine, Clean Catch   Result Value Ref Range    Color Urine Yellow Colorless, Straw, Light Yellow, Yellow    Appearance Urine Clear Clear    Glucose Urine Negative Negative mg/dL    Bilirubin Urine Negative Negative    Ketones Urine Negative Negative mg/dL    Specific Gravity Urine >=1.030 1.003 - 1.035    Blood Urine Negative Negative    pH Urine 5.5 5.0 - 7.0    Protein Albumin Urine Trace (A) Negative mg/dL    Urobilinogen Urine 0.2 0.2, 1.0 E.U./dL    Nitrite Urine Negative Negative    Leukocyte Esterase Urine Negative Negative   HCG Qual, Urine ([id number])     Status: Normal   Result Value Ref Range    hCG Urine Qualitative Negative Negative   UA Microscopic with Reflex to Culture     Status: Abnormal   Result Value Ref Range    Bacteria Urine Few (A) None Seen /HPF    RBC Urine 2-5 (A) 0-2 /HPF /HPF    WBC Urine 5-10 (A) 0-5 /HPF /HPF    Squamous Epithelials Urine Moderate (A) None Seen /LPF    Mucus Urine Present (A) None Seen /LPF    Calcium Oxalate Crystals Urine Moderate (A) None Seen /HPF    Narrative    Urine Culture not indicated

## 2023-04-17 ENCOUNTER — MYC MEDICAL ADVICE (OUTPATIENT)
Dept: PEDIATRICS | Facility: CLINIC | Age: 23
End: 2023-04-17
Payer: COMMERCIAL

## 2023-04-17 NOTE — TELEPHONE ENCOUNTER
Patient called and has not needed pain medication for the past couple days and is feeling much better. Letter written to return to work.  Maia Palma, CNP

## 2023-04-17 NOTE — LETTER
April 17, 2023      Kamala Harris  [address]   [place] [address]        To Whom It May Concern:    Kamala Harris was seen in our clinic. She may return to work without restrictions.      Sincerely,        ASYA Renee CNP

## 2023-04-25 ENCOUNTER — OFFICE VISIT (OUTPATIENT)
Dept: FAMILY MEDICINE | Facility: CLINIC | Age: 23
End: 2023-04-25
Attending: NURSE PRACTITIONER
Payer: COMMERCIAL

## 2023-04-25 ENCOUNTER — OFFICE VISIT (OUTPATIENT)
Dept: FAMILY MEDICINE | Facility: CLINIC | Age: 23
End: 2023-04-25
Payer: OTHER MISCELLANEOUS

## 2023-04-25 VITALS
HEART RATE: 81 BPM | HEIGHT: 65 IN | SYSTOLIC BLOOD PRESSURE: 108 MMHG | TEMPERATURE: 98.5 F | RESPIRATION RATE: 22 BRPM | WEIGHT: 208.2 LBS | BODY MASS INDEX: 34.69 KG/M2 | DIASTOLIC BLOOD PRESSURE: 65 MMHG | OXYGEN SATURATION: 100 %

## 2023-04-25 DIAGNOSIS — S06.0X0D CONCUSSION WITHOUT LOSS OF CONSCIOUSNESS, SUBSEQUENT ENCOUNTER: Primary | ICD-10-CM

## 2023-04-25 DIAGNOSIS — Z12.4 ENCOUNTER FOR SCREENING FOR CERVICAL CANCER: Primary | ICD-10-CM

## 2023-04-25 DIAGNOSIS — S09.90XD INJURY OF HEAD, SUBSEQUENT ENCOUNTER: ICD-10-CM

## 2023-04-25 PROCEDURE — G0124 SCREEN C/V THIN LAYER BY MD: HCPCS

## 2023-04-25 PROCEDURE — 99207 PR NO BILLABLE SERVICE THIS VISIT: CPT | Performed by: NURSE PRACTITIONER

## 2023-04-25 PROCEDURE — 99213 OFFICE O/P EST LOW 20 MIN: CPT | Performed by: NURSE PRACTITIONER

## 2023-04-25 PROCEDURE — 87624 HPV HI-RISK TYP POOLED RSLT: CPT | Performed by: NURSE PRACTITIONER

## 2023-04-25 PROCEDURE — G0145 SCR C/V CYTO,THINLAYER,RESCR: HCPCS | Performed by: NURSE PRACTITIONER

## 2023-04-25 ASSESSMENT — ENCOUNTER SYMPTOMS
HEADACHES: 1
DIZZINESS: 1

## 2023-04-25 ASSESSMENT — PATIENT HEALTH QUESTIONNAIRE - PHQ9: SUM OF ALL RESPONSES TO PHQ QUESTIONS 1-9: 11

## 2023-04-25 ASSESSMENT — PAIN SCALES - GENERAL: PAINLEVEL: MILD PAIN (3)

## 2023-04-25 NOTE — PROGRESS NOTES
Assessment & Plan     Encounter for screening for cervical cancer  Screening pap done today. No concerns. PAP nurse to follow-up with results.     - Pap Screen reflex to HPV if ASCUS - recommend age 25 - 29    Ordering of each unique test         There are no Patient Instructions on file for this visit.    Jazzmine Warren [facility] KHAI Wray is a 23 year old, presenting for the following health issues:  Gyn Exam        4/25/2023     1:46 PM   Additional Questions   Roomed by Diane   Accompanied by none         4/25/2023     1:46 PM   Patient Reported Additional Medications   Patient reports taking the following new medications none     History of Present Illness       Reason for visit:  Papsmear    She eats 0-1 servings of fruits and vegetables daily.She consumes 2 sweetened beverage(s) daily.She exercises with enough effort to increase her heart rate 20 to 29 minutes per day.  She exercises with enough effort to increase her heart rate 3 or less days per week. She is missing 2 dose(s) of medications per week.       Additional provider notes: Patient presents in clinic for papsmear. Annual physical was a month ago.       Allergies   Allergen Reactions     Iodine Rash and Itching     Rash itching         Current Outpatient Medications   Medication     albuterol (PROAIR HFA/PROVENTIL HFA/VENTOLIN HFA) 108 (90 Base) MCG/ACT inhaler     albuterol (PROAIR HFA/PROVENTIL HFA/VENTOLIN HFA) 108 (90 Base) MCG/ACT inhaler     escitalopram (LEXAPRO) 10 MG tablet     etonogestrel (NEXPLANON) 68 MG IMPL     ketorolac (TORADOL) 10 MG tablet     lisdexamfetamine (VYVANSE) 20 MG capsule     meclizine (ANTIVERT) 25 MG tablet     naproxen (NAPROSYN) 500 MG tablet     tiZANidine (ZANAFLEX) 4 MG tablet     ubrogepant (UBRELVY) 100 MG tablet     fludrocortisone (FLORINEF) 0.1 MG tablet     fluticasone (FLONASE) 50 MCG/ACT nasal spray     ibuprofen (ADVIL/MOTRIN) 600 MG tablet     No  "current facility-administered medications for this visit.       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           Objective    /65 (BP Location: Right arm, Patient Position: Sitting, Cuff Size: Adult Large)   Pulse 81   Temp 98.5  F (36.9  C) (Tympanic)   Resp 22   Ht 1.651 m (5' 5\")   Wt 94.4 kg (208 lb 3.2 oz)   SpO2 100%   BMI 34.65 kg/m    Body mass index is 34.65 kg/m .  Physical Exam  Vitals reviewed.   Constitutional:       General: She is not in acute distress.     Appearance: Normal appearance. She is not ill-appearing or toxic-appearing.   Genitourinary:     Vagina: Normal.      Cervix: Normal.   Skin:     General: Skin is warm and dry.   Neurological:      Mental Status: She is alert and oriented to person, place, and time.   Psychiatric:         Behavior: Behavior normal.                            "

## 2023-04-25 NOTE — LETTER
2023      Kamala Harris  [address]   [place] [address]        To Whom It May Concern:    Kamala Harris was seen in our clinic. Please excuse her from work from 2023 - 2023 due to head injury. She may return to work with the followin/2 days for 1 week with frequent breaks on or about 2023.      Sincerely,        Jazzmine Warren, DNP

## 2023-04-25 NOTE — PROGRESS NOTES
Assessment & Plan     Concussion without loss of consciousness, subsequent encounter  Symptoms consistent with concussion. No urgent symptoms that would warrant ER visit. Concussion referral placed and letter for slow return to work.    - Concussion  Referral; Future    Injury of head, subsequent encounter  See above.                Patient Instructions   Work letter provided.     Concussion referral placed.       Jazzmine Warren DNP  [facility]    Hayden Wray is a 23 year old, presenting for the following health issues:  No chief complaint on file.        4/25/2023     1:46 PM   Additional Questions   Roomed by Diane   Accompanied by none     HPI     ER note from 04/22/2023:  ED Provider Note - Amelia Gregory DO - 04/22/2023 8:41 AM CDT  Formatting of this note is different from the original.  Chief Complaint:  Head Injury    History of Present Illness:  Kamala Harris is a 23 y.o. female with a past medical history significant for migraines and anemia who presents to the emergency department for evaluation after a head injury. 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 Initially, she was unsure if she had trauma to her head because it was not a very hard mechanism, but later developed a right sided headache over her forehead and ear pressure. With concern for further problems, patient presents to the emergency department today. Denies loss of consciousness, vomiting, loss of memory, dental pain, numbness/tingling, or weakness. No attempted pain alleviation methods prior to arrival. Has been ambulatory without any difficulty. Not on any blood thinners. No other concerns voiced at this time.    Disposition:  The patient was discharged to home.    Impression and Plan:  Kamala HARRIS is a 23 y.o. female  presenting to the ED with complaint of head injury. Vital signs are stable. Physical exam as above. Patient has no focal neurological deficits. GCS of 15. She has no reports of any LOC, vomiting, blood thinner usage, retrograde amnesia and again no focal neurological deficits. TMs are clear without any evidence of hemotympanum. No rush sign on exam. No palpable scalp deformity or hematoma or open wounds. Mechanism is minor and given her age and lack of risk factors, I do not feel that CT imaging is indicated at this time. I did discuss this with patient and she felt comfortable holding off on any imaging at this time. Counseled patient on possible minor concussion given head injury and referral to concussion clinic as well as supportive care treatment at home. Patient verbalized understanding agreeable with this plan. She does have an upcoming PCP appointment in 2 to 3 days that was already scheduled. Patient instructed to monitor for any signs of worsening headache, altered mentation, confusion, vomiting or any other acute change in her condition and to return to the ED immediately if that should occur. Patient verbalized understanding, requesting a work note for tonight and agreeable with plan of care for discharge. Patient ambulatory with steady gait upon discharge.    ----------------------------------------------  Additional provider notes: Patient presents in clinic for follow-up on head injury at work. She was out of work Saturday and Sunday and she went back in on Monday. She ended up leaving early due to right sided head pressure. Other symptoms include: right sided tension/pressure, foggy, things are taking longer to process, increase in fatigue, mild dizziness. Head is  to touch where she hit head.     Allergies   Allergen Reactions     Iodine Rash and Itching     Rash itching         Current Outpatient Medications   Medication     albuterol (PROAIR HFA/PROVENTIL HFA/VENTOLIN HFA) 108 (90  Base) MCG/ACT inhaler     albuterol (PROAIR HFA/PROVENTIL HFA/VENTOLIN HFA) 108 (90 Base) MCG/ACT inhaler     escitalopram (LEXAPRO) 10 MG tablet     etonogestrel (NEXPLANON) 68 MG IMPL     fludrocortisone (FLORINEF) 0.1 MG tablet     fluticasone (FLONASE) 50 MCG/ACT nasal spray     ibuprofen (ADVIL/MOTRIN) 600 MG tablet     ketorolac (TORADOL) 10 MG tablet     lisdexamfetamine (VYVANSE) 20 MG capsule     meclizine (ANTIVERT) 25 MG tablet     naproxen (NAPROSYN) 500 MG tablet     tiZANidine (ZANAFLEX) 4 MG tablet     ubrogepant (UBRELVY) 100 MG tablet     No current facility-administered medications for this visit.       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            Objective    There were no vitals taken for this visit.  There is no height or weight on file to calculate BMI.   BP: 108/65  Pulse: 81  Temp: 98.5  Sp02: 100%    Physical Exam  Vitals reviewed.   Constitutional:       General: She is not in acute distress.     Appearance: Normal appearance. She is not ill-appearing or toxic-appearing.   HENT:      Right Ear: Tympanic membrane, ear canal and external ear normal. There is no impacted cerumen.      Left Ear: Tympanic membrane, ear canal and external ear normal. There is no impacted cerumen.      Nose: Nose normal.      Mouth/Throat:      Mouth: Mucous membranes are moist.      Pharynx: Oropharynx is clear.   Cardiovascular:      Rate and Rhythm: Normal rate and regular rhythm.      Pulses: Normal pulses.      Heart sounds: Normal heart sounds.   Pulmonary:      Effort: Pulmonary effort is normal.      Breath sounds: Normal breath  sounds.   Musculoskeletal:      Cervical back: Normal range of motion and neck supple.   Skin:     General: Skin is warm and dry.      Findings: No lesion.   Neurological:      Mental Status: She is alert and oriented to person, place, and time.   Psychiatric:         Behavior: Behavior normal.

## 2023-04-28 LAB
BKR LAB AP GYN ADEQUACY: ABNORMAL
BKR LAB AP GYN INTERPRETATION: ABNORMAL
BKR LAB AP HPV REFLEX: ABNORMAL
BKR LAB AP LMP: ABNORMAL
BKR LAB AP PREVIOUS ABNL DX: ABNORMAL
BKR LAB AP PREVIOUS ABNORMAL: ABNORMAL
PATH REPORT.COMMENTS IMP SPEC: ABNORMAL
PATH REPORT.COMMENTS IMP SPEC: ABNORMAL
PATH REPORT.RELEVANT HX SPEC: ABNORMAL

## 2023-05-03 ENCOUNTER — PATIENT OUTREACH (OUTPATIENT)
Dept: FAMILY MEDICINE | Facility: CLINIC | Age: 23
End: 2023-05-03
Payer: COMMERCIAL

## 2023-05-03 LAB
HUMAN PAPILLOMA VIRUS 16 DNA: NEGATIVE
HUMAN PAPILLOMA VIRUS 18 DNA: NEGATIVE
HUMAN PAPILLOMA VIRUS FINAL DIAGNOSIS: NORMAL
HUMAN PAPILLOMA VIRUS OTHER HR: NEGATIVE

## 2023-05-03 NOTE — TELEPHONE ENCOUNTER
1/19/22 ASC-H @ 23 yo. Plan: [place] bef 4/19/22  3/10/22 [place] bx: HOLLIE 1. ECC: no HOLLIE. Plan: [facility] in 1 yr.   4/25/23 ASC-H, neg HR HPV @ 24 yo. Plan: [facility] bef 7/25/23

## 2023-05-04 ENCOUNTER — TELEPHONE (OUTPATIENT)
Dept: FAMILY MEDICINE | Facility: CLINIC | Age: 23
End: 2023-05-04
Payer: COMMERCIAL

## 2023-05-04 NOTE — TELEPHONE ENCOUNTER
patient called stated that she thought that the letter to excuse her from work was through 05/09/2023.    Patient states she can not get with the  Concussion doctor due to them not accepting workers comp patient.     States that her work wants her to return back tonight to work 4hrs, patient states that she still having symptoms and getting dizzy still when getting up and is not doing well to return until her follow -up on 05/09    Are you able to re-write work excuse letter for her or would you like her to have another appt before 05/09      Please let me know so I can reach back to the patient.    Thanks,      Mignon Underwood    [facility]

## 2023-05-05 NOTE — TELEPHONE ENCOUNTER
RN called pt to relay provider message    Patient verbalized understanding and in agreement with plan of care.     Alisson Martines RN

## 2023-05-05 NOTE — TELEPHONE ENCOUNTER
I uploaded a letter to [employer]. Please call and let patient know.     Thanks,  Jazzmine Warren DNP, NP-C

## 2023-05-09 ENCOUNTER — OFFICE VISIT (OUTPATIENT)
Dept: FAMILY MEDICINE | Facility: CLINIC | Age: 23
End: 2023-05-09
Payer: OTHER MISCELLANEOUS

## 2023-05-09 VITALS
RESPIRATION RATE: 24 BRPM | HEART RATE: 97 BPM | HEIGHT: 65 IN | WEIGHT: 208.8 LBS | BODY MASS INDEX: 34.79 KG/M2 | OXYGEN SATURATION: 97 % | SYSTOLIC BLOOD PRESSURE: 105 MMHG | TEMPERATURE: 98 F | DIASTOLIC BLOOD PRESSURE: 70 MMHG

## 2023-05-09 DIAGNOSIS — S06.0X0D CONCUSSION WITHOUT LOSS OF CONSCIOUSNESS, SUBSEQUENT ENCOUNTER: Primary | ICD-10-CM

## 2023-05-09 DIAGNOSIS — S09.90XD INJURY OF HEAD, SUBSEQUENT ENCOUNTER: ICD-10-CM

## 2023-05-09 PROCEDURE — 99214 OFFICE O/P EST MOD 30 MIN: CPT | Performed by: NURSE PRACTITIONER

## 2023-05-09 ASSESSMENT — ENCOUNTER SYMPTOMS
HEADACHES: 1
DIZZINESS: 1
DECREASED CONCENTRATION: 1
WEAKNESS: 0

## 2023-05-09 ASSESSMENT — ANXIETY QUESTIONNAIRES
GAD7 TOTAL SCORE: 7
3. WORRYING TOO MUCH ABOUT DIFFERENT THINGS: SEVERAL DAYS
GAD7 TOTAL SCORE: 7
4. TROUBLE RELAXING: MORE THAN HALF THE DAYS
6. BECOMING EASILY ANNOYED OR IRRITABLE: SEVERAL DAYS
5. BEING SO RESTLESS THAT IT IS HARD TO SIT STILL: SEVERAL DAYS
IF YOU CHECKED OFF ANY PROBLEMS ON THIS QUESTIONNAIRE, HOW DIFFICULT HAVE THESE PROBLEMS MADE IT FOR YOU TO DO YOUR WORK, TAKE CARE OF THINGS AT HOME, OR GET ALONG WITH OTHER PEOPLE: SOMEWHAT DIFFICULT
7. FEELING AFRAID AS IF SOMETHING AWFUL MIGHT HAPPEN: NOT AT ALL
1. FEELING NERVOUS, ANXIOUS, OR ON EDGE: SEVERAL DAYS
GAD7 TOTAL SCORE: 7
8. IF YOU CHECKED OFF ANY PROBLEMS, HOW DIFFICULT HAVE THESE MADE IT FOR YOU TO DO YOUR WORK, TAKE CARE OF THINGS AT HOME, OR GET ALONG WITH OTHER PEOPLE?: SOMEWHAT DIFFICULT
7. FEELING AFRAID AS IF SOMETHING AWFUL MIGHT HAPPEN: NOT AT ALL
2. NOT BEING ABLE TO STOP OR CONTROL WORRYING: SEVERAL DAYS

## 2023-05-09 ASSESSMENT — PATIENT HEALTH QUESTIONNAIRE - PHQ9
SUM OF ALL RESPONSES TO PHQ QUESTIONS 1-9: 6
SUM OF ALL RESPONSES TO PHQ QUESTIONS 1-9: 6
10. IF YOU CHECKED OFF ANY PROBLEMS, HOW DIFFICULT HAVE THESE PROBLEMS MADE IT FOR YOU TO DO YOUR WORK, TAKE CARE OF THINGS AT HOME, OR GET ALONG WITH OTHER PEOPLE: SOMEWHAT DIFFICULT

## 2023-05-09 ASSESSMENT — PAIN SCALES - GENERAL: PAINLEVEL: NO PAIN (0)

## 2023-05-09 NOTE — PROGRESS NOTES
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 Any heavy lifting and certain position changes are still causing dizziness. Symptoms still consistent with concussion. She was not able to get into [facility]'s concussion clinic due to it being workcomp. Advised patient to reach out to HR to get the recommended location for concussion clinic/referral and I will send a new referral.     Work letter with restrictions updated.  CT scan not indicated at this time as symptoms are improving and there are no red flags that would suggest brain bleed or other concerns.    Injury of head, subsequent encounter  See above.       I spent a total of 30 minutes on the day of the visit.   Time spent by me doing chart review, history and exam, documentation and further activities per the note          Patient Instructions   Work note updated.     Please follow-up with your HR at work to see who I need to send a referral to for a concussion referral/evaluation since [facility] doesn't see Workcomp patients.       Jazzmine Warren DNP  [facility]    Hayden Wray is a 23 year old, presenting for the following health issues:  Letter for School/Work (Patient got concussion 05/22/23, has been out of work since, unsure if safe to return to work, needs note for work )        5/9/2023     2:34 PM   Additional Questions   Roomed by Diane   Accompanied by none     Forms 5/9/2023   Any forms needing to be completed Yes         5/9/2023     2:34 PM   Patient Reported Additional Medications   Patient reports taking the following new medications none     History of Present Illness       Reason for visit:  Workers comp, need note for work, update since concussion DOI 05/22/23    She eats 0-1 servings of fruits and vegetables daily.She consumes 3  sweetened beverage(s) daily.She exercises with enough effort to increase her heart rate 20 to 29 minutes per day.  She exercises with enough effort to increase her heart rate 3 or less days per week. She is missing 2 dose(s) of medications per week.  She is not taking prescribed medications regularly due to remembering to take.    Today's PHQ-9         PHQ-9 Total Score: 6    PHQ-9 Q9 Thoughts of better off dead/self-harm past 2 weeks :   Not at all    How difficult have these problems made it for you to do your work, take care of things at home, or get along with other people: Somewhat difficult  Today's NERIS-7 Score: 7         Additional provider notes: Patient presents in clinic for follow-up on workcomp injury. Needing note for work updating since concussion on 4/22/23. Patient is still having right sided pressure into neck. She gets HA on that side (not migraines). HA are less frequent (not daily now). Still having dizziness with position changes. States she went in for one day and only lasted 4 hours before she had a bad headache. She is needing an updated letter with specific work restrictions. She does a lot of lifting and climbing at work. 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      Allergies   Allergen Reactions     Iodine Rash and Itching     Rash itching         Current Outpatient Medications   Medication     albuterol (PROAIR HFA/PROVENTIL HFA/VENTOLIN HFA) 108 (90 Base) MCG/ACT inhaler     albuterol (PROAIR HFA/PROVENTIL HFA/VENTOLIN HFA) 108 (90 Base) MCG/ACT inhaler     escitalopram (LEXAPRO) 10 MG tablet     etonogestrel (NEXPLANON) 68 MG IMPL     lisdexamfetamine (VYVANSE) 20 MG capsule     meclizine (ANTIVERT) 25 MG tablet     naproxen (NAPROSYN) 500 MG tablet     ubrogepant (UBRELVY) 100 MG tablet     fludrocortisone (FLORINEF) 0.1 MG tablet     fluticasone (FLONASE) 50 MCG/ACT nasal spray     ibuprofen (ADVIL/MOTRIN) 600 MG tablet     ketorolac (TORADOL) 10 MG tablet     tiZANidine  "(ZANAFLEX) 4 MG tablet     No current facility-administered medications for this visit.       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 Negative for weakness.   Psychiatric/Behavioral: Positive for decreased concentration.   All other systems reviewed and are negative.           Objective    /70 (BP Location: Right arm, Patient Position: Sitting, Cuff Size: Adult Large)   Pulse 97   Temp 98  F (36.7  C) (Tympanic)   Resp 24   Ht 1.651 m (5' 5\")   Wt 94.7 kg (208 lb 12.8 oz)   SpO2 97%   BMI 34.75 kg/m    Body mass index is 34.75 kg/m .  Physical Exam  Vitals reviewed.   Constitutional:       General: She is not in acute distress.     Appearance: Normal appearance. She is not ill-appearing or toxic-appearing.   HENT:      Right Ear: Tympanic membrane, ear canal and external ear normal. There is no impacted cerumen.      Left Ear: Tympanic membrane, ear canal and external ear normal. There is no impacted cerumen.      Nose: Nose normal.      Mouth/Throat:      Mouth: Mucous membranes are moist.      Pharynx: Oropharynx is clear. No posterior oropharyngeal erythema.   Eyes:      Pupils: Pupils are equal, round, and reactive to light.   Cardiovascular:      Rate and Rhythm: Normal rate and regular rhythm.      Pulses: Normal pulses.      Heart sounds: Normal heart sounds.   Pulmonary:      Effort: Pulmonary effort is normal.      Breath sounds: Normal breath sounds.   Skin:     General: Skin is warm and dry.   Neurological:      Mental Status: She is alert and oriented to person, place, and time.   Psychiatric:         Behavior: Behavior normal.      "                         Answers for HPI/ROS submitted by the patient on 5/9/2023  If you checked off any problems, how difficult have these problems made it for you to do your work, take care of things at home, or get along with other people?: Somewhat difficult  PHQ9 TOTAL SCORE: 6  NERIS 7 TOTAL SCORE: 7  What is the reason for your visit today? : Workers comp, need note for work, update since concussion DOI 05/22/23  How many servings of fruits and vegetables do you eat daily?: 0-1  On average, how many sweetened beverages do you drink each day (Examples: soda, juice, sweet tea, etc.  Do NOT count diet or artificially sweetened beverages)?: 3  How many minutes a day do you exercise enough to make your heart beat faster?: 20 to 29  How many days a week do you exercise enough to make your heart beat faster?: 3 or less  How many days per week do you miss taking your medication?: 2  What makes it hard for you to take your medication every day?: remembering to take

## 2023-05-09 NOTE — PATIENT INSTRUCTIONS
Work note updated.     Please follow-up with your HR at work to see who I need to send a referral to for a concussion referral/evaluation since [facility] doesn't see Workcomp patients.

## 2023-05-09 NOTE — LETTER
May 9, 2023      Kamala Harris  [address]   [place] [address]        To Whom It May Concern:    Kamala Harris was seen in our clinic. She may return to work with the following restrictions: no pulling, pushing, lifting greater than 10lbs until 05/25/2023.       Sincerely,        Jazzmine Warren, DNP

## 2023-05-13 ENCOUNTER — NURSE TRIAGE (OUTPATIENT)
Dept: NURSING | Facility: CLINIC | Age: 23
End: 2023-05-13
Payer: COMMERCIAL

## 2023-05-13 NOTE — TELEPHONE ENCOUNTER
"Still having symptoms of concussion which seems worse. More nausea, pain isn't going away and there is a lot of pressure in her head.  She will go in to urgent care. She will need a note for work.  Micheline Alexander RN  [facility]      Reason for Disposition    Traumatic Brain Injury (mTBI, concussion) symptoms are worsening    Additional Information    Negative: Weakness (i.e., paralysis, loss of muscle strength) of the face, arm or leg on one side of the body    Negative: Loss of speech or garbled speech    Negative: Difficult to awaken or acting confused (e.g., disoriented, slurred speech)    Negative: Sounds like a life-threatening emergency to the triager    Negative: [1] Traumatic brain injury (concussion) AND [2] less than 14 days since head injury    Negative: [1] SEVERE headache (e.g., excruciating) AND [2] \"worst headache\" of life     Pain at 5    Negative: [1] SEVERE headache AND [2] sudden-onset (i.e., reaching maximum intensity within seconds to 1 hour)    Negative: [1] TBI symptoms are worsening AND [2]  taking Coumadin (warfarin) or other strong blood thinner, or known bleeding disorder (e.g., thrombocytopenia)    Negative: [1] TBI symptoms are worsening AND [2] age > 60 years    Negative: Patient sounds very sick or weak to the triager    Negative: [1] SEVERE headache (e.g., excruciating, pain scale 8-10) AND [2] not improved after pain medications    Negative: [1] Caller has URGENT question AND [2] triager unable to answer question    Negative: [1] Caller has NON-URGENT question AND [2] triager unable to answer question    Negative: Known moderate or severe traumatic brain injury    Protocols used: TRAUMATIC BRAIN INJURY MORE THAN 14 DAYS AGO FOLLOW-UP CALL-A-      "

## 2023-05-15 ENCOUNTER — OFFICE VISIT (OUTPATIENT)
Dept: FAMILY MEDICINE | Facility: CLINIC | Age: 23
End: 2023-05-15
Payer: OTHER MISCELLANEOUS

## 2023-05-15 VITALS
OXYGEN SATURATION: 97 % | SYSTOLIC BLOOD PRESSURE: 108 MMHG | WEIGHT: 208 LBS | RESPIRATION RATE: 20 BRPM | TEMPERATURE: 98 F | DIASTOLIC BLOOD PRESSURE: 74 MMHG | HEIGHT: 66 IN | HEART RATE: 102 BPM | BODY MASS INDEX: 33.43 KG/M2

## 2023-05-15 DIAGNOSIS — R51.9 NONINTRACTABLE HEADACHE, UNSPECIFIED CHRONICITY PATTERN, UNSPECIFIED HEADACHE TYPE: Primary | ICD-10-CM

## 2023-05-15 DIAGNOSIS — R11.0 NAUSEA: ICD-10-CM

## 2023-05-15 DIAGNOSIS — S09.90XD INJURY OF HEAD, SUBSEQUENT ENCOUNTER: ICD-10-CM

## 2023-05-15 PROCEDURE — 99213 OFFICE O/P EST LOW 20 MIN: CPT | Performed by: PHYSICIAN ASSISTANT

## 2023-05-15 RX ORDER — ONDANSETRON 4 MG/1
4 TABLET, ORALLY DISINTEGRATING ORAL EVERY 12 HOURS PRN
Qty: 10 TABLET | Refills: 0 | Status: SHIPPED | OUTPATIENT
Start: 2023-05-15 | End: 2023-10-30

## 2023-05-15 ASSESSMENT — PAIN SCALES - GENERAL: PAINLEVEL: MILD PAIN (3)

## 2023-05-15 NOTE — PROGRESS NOTES
Assessment & Plan     Injury of head, subsequent encounter  Nonintractable headache, unspecified chronicity pattern, unspecified headache type  Nausea  Patient is a 23 YOF, with past medical history of migraines-treated with Ubrelvy, who presents to clinic for follow-up regarding concussion-like symptoms and work restrictions.  Patient notes that symptoms worsened when she returned to work full-time.  She will states that she is unable to complete prior work restrictions which included part-time work.  Vital signs normal.  No neurological deficits present on exam.    Recommended starting physical therapy for concussion-like symptoms.  Referral placed.  Work restrictions updated to part-time with lifting/pushing/pulling restrictions for 1 week.  After that patient to return to work full-time or as advised by primary care provider.  Patient requests medication for nausea.  Zofran prescribed and risks discussed.  Return and follow-up recommenations provided.    - Physical Therapy Referral; Future  -Zofran 4MG ODT Q12 PRN        See Patient Instructions    Karime Sanchez PA-C  [facility] [place] Wray is a 23 year old, presenting for the following health issues:  RECHECK (Patient coming in for a follow up on Head injury. Patient was seen at [facility] for a Head injury on 04/22/2023. Patent was told she had a concussion w/o loss of consciousness.)        5/15/2023     8:25 AM   Additional Questions   Roomed by Gay BORJAS MA   Accompanied by No one         5/15/2023     8:25 AM   Patient Reported Additional Medications   Patient reports taking the following new medications None     HPI     Pain History:  When did you first notice your pain? 04/22/2023. Patient notes that she went back to work full time one day and the following day she had to sleep for a total of 16 hours out of 24 due to severe fatigue. The next shift she developed nausea, headaches, balance issues, confusion. She is using  Tylenol/Ibuprofen without relief. Patient notes no recent migraines (with aura typically) and has not had to use her Ubrelva.     Patient notes that with initial restrictions-she did not go back at all for the 1/2 days because she did not feel well enough.     Have you seen anyone else for your pain? Yes - review chart  How has your pain affected your ability to work? Unable to work due to pain   What type of work do you or did you do?  for [employer]  Where in your body do you have pain? Headache  Description  Location: Only on Rt Side   Character: throbbing pain, sharp pain, squeezing pain, pressure  Frequency:  constent  Duration:  .  Wake with headaches: YES  Able to do daily activities when headache present: no   Intensity:  severe  Progression of Symptoms:  worsening and constant  Accompanying signs and symptoms:  Stiff neck: No  Neck or upper back pain: No  Sinus or URI symptoms no   Fever: No  Nausea or vomiting: YES  Dizziness: YES  Numbness/tingling: YES  Weakness: No  Visual changes: blurry  History  Head trauma: YES  Family history of migraines: No  Daily pain medication use: YES  Previous tests for headaches: YES  Neurologist evaluation: No  Precipitating or Alleviating factors (light/sound/sleep/caffeine): Yes  Therapies tried and outcome: Headache cap, Ibuprofen (Advil, Motrin), Naproxyn (Aleve) and Tylenol              Outcome - not effective  Frequent/daily pain medication use: YES    Per chart review, patient evaluated initially in emergency department 4/22/2023 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  No LOC, vomiting, paresthesias, weakness.  CT was not indicated.  Supportive care recommended for management of possible minor concussion.  Patient subsequently evaluated by PCP 4/25/2023 where symptoms thought to be related to concussion and referral was placed to concussion clinic.   "Work restriction's at that time: She may return to work with the followin/2 days for 1 week with frequent breaks on or about 2023.  Patient reevaluated by PCP 2023 where symptoms are slowly improving.  Patient was not able to get into concussion clinic due to work comp.  Work restrictions at that time: She may return to work with the following restrictions: no pulling, pushing, lifting greater than 10lbs until 2023.  Patient has scheduled neurology appointment 2023 for migraine management.        Objective    /74   Pulse 102   Temp 98  F (36.7  C) (Temporal)   Resp 20   Ht 1.665 m (5' 5.55\")   Wt 94.3 kg (208 lb)   SpO2 97%   BMI 34.03 kg/m    Body mass index is 34.03 kg/m .  Physical Exam  Vitals and nursing note reviewed.   Constitutional:       General: She is not in acute distress.     Appearance: Normal appearance.   HENT:      Head: Normocephalic and atraumatic.      Mouth/Throat:      Mouth: Mucous membranes are moist.      Pharynx: Oropharynx is clear.   Eyes:      Extraocular Movements: Extraocular movements intact.      Pupils: Pupils are equal, round, and reactive to light.   Cardiovascular:      Rate and Rhythm: Normal rate and regular rhythm.      Heart sounds: Normal heart sounds.   Pulmonary:      Effort: Pulmonary effort is normal.      Breath sounds: Normal breath sounds.   Musculoskeletal:         General: Normal range of motion.      Cervical back: Normal range of motion.   Skin:     General: Skin is warm and dry.   Neurological:      General: No focal deficit present.      Mental Status: She is alert.      Cranial Nerves: No cranial nerve deficit (2-12).      Sensory: No sensory deficit.      Motor: No weakness.      Coordination: Coordination normal.      Gait: Gait normal.   Psychiatric:         Mood and Affect: Mood normal.         Behavior: Behavior normal.                            "

## 2023-05-15 NOTE — LETTER
My Depression Action Plan  Name: Kamala Harris   Date of Birth 2000  Date: 5/15/2023    My doctor: Jazzmine Warren   [facility]  [address]  [place] [id number] [phone]            GREEN    ZONE   Good Control    What it looks like:     Things are going generally well. You have normal ups and downs. You may even feel depressed from time to time, but bad moods usually last less than a day.   What you need to do:  1. Continue to care for yourself (see self care plan)  2. Check your depression survival kit and update it as needed  3. Follow your physician s recommendations including any medication.  4. Do not stop taking medication unless you consult with your physician first.             YELLOW         ZONE Getting Worse    What it looks like:     Depression is starting to interfere with your life.     It may be hard to get out of bed; you may be starting to isolate yourself from others.    Symptoms of depression are starting to last most all day and this has happened for several days.     You may have suicidal thoughts but they are not constant.   What you need to do:     1. Call your care team. Your response to treatment will improve if you keep your care team informed of your progress. Yellow periods are signs an adjustment may need to be made.     2. Continue your self-care.  Just get dressed and ready for the day.  Don't give yourself time to talk yourself out of it.    3. Talk to someone in your support network.    4. Open up your Depression Self-Care Plan/Wellness Kit.             RED    ZONE Medical Alert - Get Help    What it looks like:     Depression is seriously interfering with your life.     You may experience these or other symptoms: You can t get out of bed most days, can t work or engage in other necessary activities, you have trouble taking care of basic hygiene, or basic responsibilities, thoughts of suicide or death that  will not go away, self-injurious behavior.     What you need to do:  1. Call your care team and request a same-day appointment. If they are not available (weekends or after hours) call your local crisis line, emergency room or 911.          Depression Self-Care Plan / Wellness Kit    Many people find that medication and therapy are helpful treatments for managing depression. In addition, making small changes to your everyday life can help to boost your mood and improve your wellbeing. Below are some tips for you to consider. Be sure to talk with your medical provider and/or behavioral health consultant if your symptoms are worsening or not improving.     Sleep   Sleep hygiene  means all of the habits that support good, restful sleep. It includes maintaining a consistent bedtime and wake time, using your bedroom only for sleeping or sex, and keeping the bedroom dark and free of distractions like a computer, smartphone, or television.     Develop a Healthy Routine  Maintain good hygiene. Get out of bed in the morning, make your bed, brush your teeth, take a shower, and get dressed. Don t spend too much time viewing media that makes you feel stressed. Find time to relax each day.    Exercise  Get some form of exercise every day. This will help reduce pain and release endorphins, the  feel good  chemicals in your brain. It can be as simple as just going for a walk or doing some gardening, anything that will get you moving.      Diet  Strive to eat healthy foods, including fruits and vegetables. Drink plenty of water. Avoid excessive sugar, caffeine, alcohol, and other mood-altering substances.     Stay Connected with Others  Stay in touch with friends and family members.    Manage Your Mood  Try deep breathing, massage therapy, biofeedback, or meditation. Take part in fun activities when you can. Try to find something to smile about each day.     Psychotherapy  Be open to working with a therapist if your provider  recommends it.     Medication  Be sure to take your medication as prescribed. Most anti-depressants need to be taken every day. It usually takes several weeks for medications to work. Not all medicines work for all people. It is important to follow-up with your provider to make sure you have a treatment plan that is working for you. Do not stop your medication abruptly without first discussing it with your provider.    Crisis Resources   These hotlines are for both adults and children. They and are open 24 hours a day, 7 days a week unless noted otherwise.      National Suicide Prevention Lifeline   988 or 9-972-169-RKUC (5954)      Crisis Text Line    www.crisistextline.org  Text HOME to 301256 from anywhere in the [place], anytime, about any type of crisis. A live, trained crisis counselor will receive the text and respond quickly.      [facility] Lifeline for LGBTQ Youth  A national crisis intervention and suicide lifeline for LGBTQ youth under 25. Provides a safe place to talk without judgement. Call 1-942.431.7948; text START to 685842 or visit www.thetrevorproject.org to talk to a trained counselor.      For [facility] crisis numbers, visit the [facility] website at:  https://[place].gov/dhs/people-we-serve/adults/health-care/mental-health/resources/crisis-contacts.jsp

## 2023-05-15 NOTE — LETTER
May 15, 2023      Kamala Harris  [address]   [place] [address]        To Whom It May Concern:    Kamala Harris was seen in our clinic. She may return to work with the following restrictions:     1/2 day shifts  No pulling, lifting, pushing greater than 10 lbs     Restrictions in place for 1 week. After one week patient can return to full duty or advised by primary care provider.       Sincerely,        Karime Sanchez PA-C

## 2023-05-15 NOTE — PATIENT INSTRUCTIONS
For further treatment of your concussion-like symptoms a referral has been placed to physical therapy.  The scheduling team will call to set up your appointment.  Your work restrictions have been updated for 1 week.  You need to visit with your primary care provider in 1 week for any additional work restrictions or updates.  You have been prescribed a small amount of Zofran to use for nausea. Please use this medication sparingly as it can have side effects including affecting your heart rhythm.  Return to clinic for new or worsening symptoms.

## 2023-05-15 NOTE — LETTER
My Asthma Action Plan    Name: Kamala Harris   YOB: 2000  Date: 5/15/2023   My doctor: Karime Sanchez PA-C   My clinic: [facility]        My Rescue Medicine:   Albuterol inhaler (Proair/Ventolin/Proventil HFA)  2-4 puffs EVERY 4 HOURS as needed. Use a spacer if recommended by your provider.   My Asthma Severity:   Intermittent / Exercise Induced  Know your asthma triggers: dust mites and pollens  upper respiratory infections          GREEN ZONE   Good Control    I feel good    No cough or wheeze    Can work, sleep and play without asthma symptoms       Take your asthma control medicine every day.     1. If exercise triggers your asthma, take your rescue medication    15 minutes before exercise or sports, and    During exercise if you have asthma symptoms  2. Spacer to use with inhaler: If you have a spacer, make sure to use it with your inhaler             YELLOW ZONE Getting Worse  I have ANY of these:    I do not feel good    Cough or wheeze    Chest feels tight    Wake up at night   1. Keep taking your Green Zone medications  2. Start taking your rescue medicine:    every 20 minutes for up to 1 hour. Then every 4 hours for 24-48 hours.  3. If you stay in the Yellow Zone for more than 12-24 hours, contact your doctor.  4. If you do not return to the Green Zone in 12-24 hours or you get worse, start taking your oral steroid medicine if prescribed by your provider.           RED ZONE Medical Alert - Get Help  I have ANY of these:    I feel awful    Medicine is not helping    Breathing getting harder    Trouble walking or talking    Nose opens wide to breathe       1. Take your rescue medicine NOW  2. If your provider has prescribed an oral steroid medicine, start taking it NOW  3. Call your doctor NOW  4. If you are still in the Red Zone after 20 minutes and you have not reached your doctor:    Take your rescue medicine again and    Call 911 or go to the emergency room right  away    See your regular doctor within 2 weeks of an Emergency Room or Urgent Care visit for follow-up treatment.          Annual Reminders:  Meet with Asthma Educator,  Flu Shot in the Fall, consider Pneumonia Vaccination for patients with asthma (aged 19 and older).    Pharmacy: [id number] - [place], [place] - [address] AT [place] [address]    Electronically signed by Karime Sanchez PA-C   Date: 05/15/23                    Asthma Triggers  How To Control Things That Make Your Asthma Worse    Triggers are things that make your asthma worse.  Look at the list below to help you find your triggers and   what you can do about them. You can help prevent asthma flare-ups by staying away from your triggers.      Trigger                                                          What you can do   Cigarette Smoke  Tobacco smoke can make asthma worse. Do not allow smoking in your home, car or around you.  Be sure no one smokes at a child s day care or school.  If you smoke, ask your health care provider for ways to help you quit.  Ask family members to quit too.  Ask your health care provider for a referral to Quit Plan to help you quit smoking, or call 0-981-922-PLAN.     Colds, Flu, Bronchitis  These are common triggers of asthma. Wash your hands often.  Don t touch your eyes, nose or mouth.  Get a flu shot every year.     Dust Mites  These are tiny bugs that live in cloth or carpet. They are too small to see. Wash sheets and blankets in hot water every week.   Encase pillows and mattress in dust mite proof covers.  Avoid having carpet if you can. If you have carpet, vacuum weekly.   Use a dust mask and HEPA vacuum.   Pollen and Outdoor Mold  Some people are allergic to trees, grass, or weed pollen, or molds. Try to keep your windows closed.  Limit time out doors when pollen count is high.   Ask you health care provider about taking medicine during allergy season.     Animal Dander  Some people  are allergic to skin flakes, urine or saliva from pets with fur or feathers. Keep pets with fur or feathers out of your home.    If you can t keep the pet outdoors, then keep the pet out of your bedroom.  Keep the bedroom door closed.  Keep pets off cloth furniture and away from stuffed toys.     Mice, Rats, and Cockroaches  Some people are allergic to the waste from these pests.   Cover food and garbage.  Clean up spills and food crumbs.  Store grease in the refrigerator.   Keep food out of the bedroom.   Indoor Mold  This can be a trigger if your home has high moisture. Fix leaking faucets, pipes, or other sources of water.   Clean moldy surfaces.  Dehumidify basement if it is damp and smelly.   Smoke, Strong Odors, and Sprays  These can reduce air quality. Stay away from strong odors and sprays, such as perfume, powder, hair spray, paints, smoke incense, paint, cleaning products, candles and new carpet.   Exercise or Sports  Some people with asthma have this trigger. Be active!  Ask your doctor about taking medicine before sports or exercise to prevent symptoms.    Warm up for 5-10 minutes before and after sports or exercise.     Other Triggers of Asthma  Cold air:  Cover your nose and mouth with a scarf.  Sometimes laughing or crying can be a trigger.  Some medicines and food can trigger asthma.

## 2023-05-15 NOTE — PROGRESS NOTES
Pt calling     She is requesting to get a letter to excuse her from work today for her 4 hour shift, she currently has headache and is worried about working tonight and condition worsening.    Letter can be sent to Za Jin RN    Triage Nurse  [facility]  Appointment line: [phone]  [facility], 24 hour nurse line, available by calling clinic at [phone] and following prompts.

## 2023-05-15 NOTE — LETTER
May 15, 2023      Kamala Harris  [address]   [place] [address]        To Whom It May Concern:    Kamala Harris was seen in our clinic. She may return to work 5/16/23 with the following restrictions:     1/2 day shifts  No pulling, lifting, pushing greater than 10 lbs     Restrictions in place for 1 week. After one week patient can return to full duty or advised by primary care provider.       Sincerely,        Karime Sanchez PA-C

## 2023-05-16 ENCOUNTER — OFFICE VISIT (OUTPATIENT)
Dept: FAMILY MEDICINE | Facility: CLINIC | Age: 23
End: 2023-05-16
Payer: OTHER MISCELLANEOUS

## 2023-05-16 VITALS
DIASTOLIC BLOOD PRESSURE: 65 MMHG | SYSTOLIC BLOOD PRESSURE: 104 MMHG | BODY MASS INDEX: 33.43 KG/M2 | RESPIRATION RATE: 18 BRPM | WEIGHT: 208 LBS | HEIGHT: 66 IN | TEMPERATURE: 98.3 F | HEART RATE: 85 BPM | OXYGEN SATURATION: 97 %

## 2023-05-16 DIAGNOSIS — S06.0X0D CONCUSSION WITHOUT LOSS OF CONSCIOUSNESS, SUBSEQUENT ENCOUNTER: Primary | ICD-10-CM

## 2023-05-16 DIAGNOSIS — R51.9 NONINTRACTABLE HEADACHE, UNSPECIFIED CHRONICITY PATTERN, UNSPECIFIED HEADACHE TYPE: ICD-10-CM

## 2023-05-16 DIAGNOSIS — S09.90XD INJURY OF HEAD, SUBSEQUENT ENCOUNTER: ICD-10-CM

## 2023-05-16 PROCEDURE — 99213 OFFICE O/P EST LOW 20 MIN: CPT | Performed by: NURSE PRACTITIONER

## 2023-05-16 ASSESSMENT — PAIN SCALES - GENERAL: PAINLEVEL: MILD PAIN (2)

## 2023-05-16 ASSESSMENT — ENCOUNTER SYMPTOMS
NAUSEA: 1
FATIGUE: 1
HEADACHES: 1
DIZZINESS: 1

## 2023-05-16 NOTE — PROGRESS NOTES
Assessment & Plan     Concussion without loss of consciousness, subsequent encounter  Not improving. New work note written. She contacted her neurologist and they were able to refer her to another clinic that will cover WC injuries. She is waiting on a call back to get that scheduled.     Injury of head, subsequent encounter  See above.     Nonintractable headache, unspecified chronicity pattern, unspecified headache type  See above.              MED REC REQUIRED  Post Medication Reconciliation Status:  Discharge medications reconciled, continue medications without change    Patient Instructions   If you aren't contacted by neurology today, please call them to get the number of the clinic they referred you to.     New letter printed.       Jazzmine Warren DNP  [facility]    Hayden Wray is a 23 year old, presenting for the following health issues:  Headache  HPI     Patient following up with primary care provider today for any additional work restrictions or updates.      Pain History:  When did you first notice your pain? 4/22/2023  Have you seen anyone else for your pain? Yes   How has your pain affected your ability to work? She has been out of work.   Where in your body do you have pain? 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 She spoke with her neurology specialist and they are going to call her to schedule a sooner appointment.     No pain first thing in the morning, but as the day goes on her pain/pressure behind her right eye increases. She keeps it dark in her house. She limits  her phone use and TV use. States she is sleeping 8 hours at night, then is awake for another 5 hours, then sleeps again. She is having a lot of nausea and feels like she is going to vomit after eating. She get a script for zofran yesterday.     States she went to work last week (Thursday), but fell asleep during lunch and during her 15 minute break. States her HA gets bad while at work.     She has been taking Tylenol and Naproxen for HA, with minimal relief. She is also doing cold compression, which also offers some relief.     States she almost went to the ER 2 days ago due to pressure behind her right eye.     She hasn't been able to get into concussion specialist due to it being a work comp. Neurology is supposed to be calling her today.     Allergies   Allergen Reactions     Iodine Rash and Itching     Rash itching         Current Outpatient Medications   Medication     albuterol (PROAIR HFA/PROVENTIL HFA/VENTOLIN HFA) 108 (90 Base) MCG/ACT inhaler     albuterol (PROAIR HFA/PROVENTIL HFA/VENTOLIN HFA) 108 (90 Base) MCG/ACT inhaler     escitalopram (LEXAPRO) 10 MG tablet     etonogestrel (NEXPLANON) 68 MG IMPL     lisdexamfetamine (VYVANSE) 20 MG capsule     meclizine (ANTIVERT) 25 MG tablet     naproxen (NAPROSYN) 500 MG tablet     ondansetron (ZOFRAN ODT) 4 MG ODT tab     ubrogepant (UBRELVY) 100 MG tablet     fludrocortisone (FLORINEF) 0.1 MG tablet     fluticasone (FLONASE) 50 MCG/ACT nasal spray     ibuprofen (ADVIL/MOTRIN) 600 MG tablet     ketorolac (TORADOL) 10 MG tablet     tiZANidine (ZANAFLEX) 4 MG tablet     No current facility-administered medications for this visit.       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      Pap smear of cervix with ASCUS, cannot exclude HGSIL 1/19/2022     Sprain of right wrist     right side muliple times      Uncomplicated asthma     exercise induced            Review  "of Systems   Constitutional: Positive for fatigue.   Gastrointestinal: Positive for nausea.   Neurological: Positive for dizziness and headaches.            Objective    /65   Pulse 85   Temp 98.3  F (36.8  C) (Oral)   Resp 18   Ht 1.665 m (5' 5.55\")   Wt 94.3 kg (208 lb)   SpO2 97%   BMI 34.03 kg/m    Body mass index is 34.03 kg/m .  Physical Exam  Vitals reviewed.   Constitutional:       General: She is not in acute distress.     Appearance: Normal appearance. She is not ill-appearing or toxic-appearing.   HENT:      Right Ear: Tympanic membrane, ear canal and external ear normal. There is no impacted cerumen.      Left Ear: Tympanic membrane, ear canal and external ear normal.      Nose: Nose normal.   Eyes:      Extraocular Movements: Extraocular movements intact.      Pupils: Pupils are equal, round, and reactive to light.   Cardiovascular:      Rate and Rhythm: Normal rate and regular rhythm.      Pulses: Normal pulses.      Heart sounds: Normal heart sounds.   Pulmonary:      Effort: Pulmonary effort is normal.      Breath sounds: Normal breath sounds.   Musculoskeletal:         General: Normal range of motion.      Cervical back: Normal range of motion and neck supple.   Skin:     General: Skin is warm and dry.   Neurological:      Mental Status: She is alert and oriented to person, place, and time.   Psychiatric:         Behavior: Behavior normal.                            "

## 2023-05-16 NOTE — PATIENT INSTRUCTIONS
If you aren't contacted by neurology today, please call them to get the number of the clinic they referred you to.     New letter printed.

## 2023-05-16 NOTE — LETTER
May 16, 2023      Kamala Harris  [address]   [place] [address]        To Whom It May Concern:    Kamala Harris was seen in our clinic on 05/16/2023. She may return to work on Thursday 05/18/2023 with the following: no lifting/pulling/push greater than 10lbs; no/limited computer use; allow her to change positions slowly. Please have her work 1/2 shifts until cleared by neurology.      Sincerely,        Jazzmine Warren DNP

## 2023-05-17 ENCOUNTER — THERAPY VISIT (OUTPATIENT)
Dept: PHYSICAL THERAPY | Facility: CLINIC | Age: 23
End: 2023-05-17
Attending: PHYSICIAN ASSISTANT
Payer: OTHER MISCELLANEOUS

## 2023-05-17 DIAGNOSIS — R51.9 NONINTRACTABLE HEADACHE, UNSPECIFIED CHRONICITY PATTERN, UNSPECIFIED HEADACHE TYPE: ICD-10-CM

## 2023-05-17 DIAGNOSIS — R11.0 NAUSEA: ICD-10-CM

## 2023-05-17 DIAGNOSIS — S09.90XD INJURY OF HEAD, SUBSEQUENT ENCOUNTER: ICD-10-CM

## 2023-05-17 PROCEDURE — 97161 PT EVAL LOW COMPLEX 20 MIN: CPT | Mod: GP | Performed by: PHYSICAL THERAPIST

## 2023-05-17 PROCEDURE — 97110 THERAPEUTIC EXERCISES: CPT | Mod: GP | Performed by: PHYSICAL THERAPIST

## 2023-05-17 NOTE — PROGRESS NOTES
PHYSICAL THERAPY EVALUATION  Type of Visit: Evaluation    See electronic medical record for Abuse and Falls Screening details.    Subjective      Presenting condition or subjective complaint: had head injury at work (works at [employer]) on 4/22, tried to do an 8 hour shift 6 days ago and had severe nausea and headaches.  still has nausea now .    Employment: Yes works at [employer]  Hobbies/Interests:      Patient goals for therapy: feel better and get back to work    Pain assessment: reports neck tension/pain     Objective    Vestibular/Ocular Motor Test:     Not Tested Headache Dizziness Nausea Fogginess Comments   Baseline N/A 3 0 1 0    Smooth Pursuits  3 1 1 0    Saccades-Horizontal  3 0 1 0    Saccades-Vertical  3 0 1 0    Convergence (Near Point)      (Near Point in CM)  Measure 1: < 5 cm  Measure 2: < 5cm  Measure 3 < 5cm   VOR Horizontal  3 0 1 0 Pressure in neck   VOR Vertical  4 0 1 0 Pressure in neck   Visual Motion Sensitivity Test  3 0 1 0         Tested DVA:  Static: logmar 0    2hz: logmar .2    DVA WNL    See CSA flowsheet for specific concussion symptons.  Denies balance feeling impaired.     Assessment & Plan   CLINICAL IMPRESSIONS   Medical Diagnosis: concussion without loss of consciousness    Treatment Diagnosis: dizziness, pain and decreased activity tolerance   Impression/Assessment: Patient is a 23 year old female with dizziness, pain and decreased activity tolerance complaints. These impairments interfere with their ability to perform work tasks, recreational activities and household chores as compared to previous level of function.     Clinical Decision Making (Complexity):   Clinical Presentation: Evolving/Changing  Clinical Presentation Rationale: based on medical and personal factors listed in PT evaluation  Clinical Decision Making (Complexity): Low complexity    PLAN OF CARE  Treatment Interventions:  Interventions: Neuromuscular Re-education, Therapeutic Exercise, Self-Care/Home  Management    Long Term Goals     PT Goal 1  Goal Identifier: concussion sx management  Goal Description: CSA < 20  Target Date: 08/02/23  PT Goal 2  Goal Identifier: activity tolerance  Goal Description: report tolerating 8 hour work day  Target Date: 08/02/23  PT Goal 3  Goal Identifier: neck pain  Goal Description: report 0-1 neck pain on CSA  Target Date: 08/02/23      Frequency of Treatment: 2-4 visits  Duration of Treatment: 12 weeks    Recommended Referrals to Other Professionals: consider OT referral in future    Risks and benefits of evaluation/treatment have been explained.   Patient/Family/caregiver agrees with Plan of Care.     Evaluation Time:     PT Eval, Low Complexity Minutes (25907): 20      Signing Clinician: Fidel Mahan PT

## 2023-05-22 ENCOUNTER — TELEPHONE (OUTPATIENT)
Dept: FAMILY MEDICINE | Facility: CLINIC | Age: 23
End: 2023-05-22
Payer: COMMERCIAL

## 2023-05-22 NOTE — TELEPHONE ENCOUNTER
Oneil from [facility] worker's comp calling.     Oneil calling for worker's comp information - he is looking for orders for PT ENT referral and neurology orders.     RN attempted to call back to let Oneil know that they will need to fax those requests to our clinic and get a signed release of PHI from patient before we can release this info.    [phone]  [phone] extension    Amelia Pitt RN

## 2023-05-24 ENCOUNTER — THERAPY VISIT (OUTPATIENT)
Dept: PHYSICAL THERAPY | Facility: CLINIC | Age: 23
End: 2023-05-24
Attending: PHYSICIAN ASSISTANT
Payer: OTHER MISCELLANEOUS

## 2023-05-24 DIAGNOSIS — S09.90XD INJURY OF HEAD, SUBSEQUENT ENCOUNTER: Primary | ICD-10-CM

## 2023-05-24 PROCEDURE — 97110 THERAPEUTIC EXERCISES: CPT | Mod: GP | Performed by: PHYSICAL THERAPIST

## 2023-05-24 PROCEDURE — 97112 NEUROMUSCULAR REEDUCATION: CPT | Mod: GP | Performed by: PHYSICAL THERAPIST

## 2023-05-26 ENCOUNTER — OFFICE VISIT (OUTPATIENT)
Dept: PHYSICAL MEDICINE AND REHAB | Facility: CLINIC | Age: 23
End: 2023-05-26
Attending: NURSE PRACTITIONER
Payer: OTHER MISCELLANEOUS

## 2023-05-26 VITALS — HEART RATE: 101 BPM | SYSTOLIC BLOOD PRESSURE: 108 MMHG | DIASTOLIC BLOOD PRESSURE: 70 MMHG

## 2023-05-26 DIAGNOSIS — S06.0X0D CONCUSSION WITHOUT LOSS OF CONSCIOUSNESS, SUBSEQUENT ENCOUNTER: Primary | ICD-10-CM

## 2023-05-26 DIAGNOSIS — G44.319 ACUTE POST-TRAUMATIC HEADACHE, NOT INTRACTABLE: ICD-10-CM

## 2023-05-26 DIAGNOSIS — R42 DIZZINESS: ICD-10-CM

## 2023-05-26 PROCEDURE — 99204 OFFICE O/P NEW MOD 45 MIN: CPT | Performed by: PHYSICAL MEDICINE & REHABILITATION

## 2023-05-26 RX ORDER — LORATADINE 10 MG/1
10 TABLET ORAL DAILY PRN
Qty: 30 TABLET | Refills: 0 | Status: SHIPPED | OUTPATIENT
Start: 2023-05-26 | End: 2023-06-25

## 2023-05-26 ASSESSMENT — PAIN SCALES - GENERAL: PAINLEVEL: NO PAIN (1)

## 2023-05-26 NOTE — PATIENT INSTRUCTIONS
----------------------------------------------------------------  [facility] Number:  [phone]   Call with any questions about your care and for scheduling assistance.   Calls are returned Monday through Friday between 8 AM and 4:30 PM. We usually get back to you within 2 business days depending on the issue/request.    If we are prescribing your medications:  For opioid medication refills, call the clinic or send a Moodsnap message 7 days in advance.  Please include:  Name of requested medication  Name of the pharmacy.  For non-opioid medications, call your pharmacy directly to request a refill. Please allow 3-4 days to be processed.   Per [place] State Law:  All controlled substance prescriptions must be filled within 30 days of being written.    For those controlled substances allowing refills, pickup must occur within 30 days of last fill.      We believe regular attendance is key to your success in our program!    Any time you are unable to keep your appointment we ask that you call us at least 24 hours in advance to cancel.This will allow us to offer the appointment time to another patient.   Multiple missed appointments may lead to dismissal from the clinic.

## 2023-05-26 NOTE — LETTER
May 26, 2023      Kamala Harris  [address]   [place] [address]      To Whom It May Concern:    Kamala Harris is under my care for a concussion that occurred on 4/22/23.      May return to work as of date with the following restrictions:  Work hour limited to 4 hours per day until 7/16/23  No driving  No heavy lifting/No working with machinery - 10 pound limit   No heights due to risk of dizziness, balance problems      The following accommodations may help in reducing the cognitive load, thereby minimizing post-concussion symptoms.  As the employer, it is encouraged to discuss and establish accommodations based on individual work responsibilities.  If symptoms persist, more formal accommodations may be necessary.    1)  Allow more time for, or delay for projects.  2)  Allow more time for work completion.  3)  Allow for reduced work load.  4)  Allow for less multi-task work.  Single tasks until completion will improve productivity.  5)  Allow breaks as needed to control symptom levels.  For example, if symptoms worsen during a specific task, project or meeting, she may need to leave that area temporarily or rest in a quiet area until symptoms improve.  6)  Provide a quiet area for lunch.  7)  Allow use of sunglasses during the day.     Full or partial days missed due to post-concussion symptoms and follow up appointments should be medically excused.  Follow up evaluation and revision of recommendations to occur on 8/26/23.    Please feel free to contact me at the number below with any questions or concerns    Sincerely,      Fermin Morales, DO

## 2023-05-26 NOTE — PROGRESS NOTES
PM&R Clinic Note     Patient Name: Kamala Harris : 2000 Medical Record: [id number]     Requesting Physician/clinician: Jazzmine Warren DNP           History of Present Illness:     Kamala Harris is a 23 year old female that per records and reporting patient with past medical history significant for migraines and anemia who presents to the emergency department 23 for evaluation after a head injury. 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 Initially, she was unsure if she had trauma to her head because it was not a very hard mechanism, but later developed a right sided headache over her forehead and ear pressure. With concern for further problems, patient presents to the emergency department today. Denies loss of consciousness, vomiting, loss of memory, dental pain, numbness/tingling, or weakness. No attempted pain alleviation methods prior to arrival. Has been ambulatory without any difficulty. Not on any blood thinners. No other concerns voiced at that time.  Went to ER and that not likely concussion, but felt worse 72 hours than went to PCP with referall to concussion clinic.       Symptoms      2023    10:00 AM 2023     2:00 PM 2023    12:00 PM   CONCUSSION SYMPTOMS ASSESSMENT   Headache or Pressure In Head 3 - moderate 3 - moderate 3 - moderate   Upset Stomach or Throwing Up 5 - severe 1 - mild 1 - mild   Problems with Balance 0 - none 2 - mild to moderate 2 - mild to moderate   Feeling Dizzy 3 - moderate 1 - mild 1 - mild   Sensitivity to Light 1 - mild 0 - none 0 - none   Sensitivity to Noise 1 - mild 1 - mild 1 - mild   Mood Changes 0 - none 0 - none 0 - none   Feeling sluggish, hazy, or foggy 3 - moderate 1 - mild 1 - mild   Trouble Concentrating, Lack of Focus 5 - severe 2 - mild to moderate 2 - mild to moderate   Motion  Sickness 4 - moderate to severe 0 - none 0 - none   Vision Changes 4 - moderate to severe 1 - mild 1 - mild   Memory Problems 5 - severe 5 - severe 5 - severe   Feeling Confused 5 - severe 3 - moderate 3 - moderate   Neck Pain 5 - severe 2 - mild to moderate 2 - mild to moderate   Trouble Sleeping 4 - moderate to severe 2 - mild to moderate 2 - mild to moderate   Total Number of Symptoms 15 12 12   Symptom Severity Score 48 24 24         Current:  Feels getting better day by day.  Its mainly just pressure in ears with headache, than some dizziness.  She takes naproxen and APAP if bad once a day.   She started PT with balance specialicist.  She has back working 4 hour days which is helpful and tolerable.  Goes home and sleeps.  Never had concussion before.  Denies issues with bowel or bladder.          Past Medical and Surgical History:     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      Pap smear of cervix with ASCUS, cannot exclude HGSIL 1/19/2022     Sprain of right wrist     right side muliple times      Uncomplicated asthma     exercise induced     Past Surgical History:   Procedure Laterality Date     BIOPSY       CARDIAC CATHERIZATION  4 years    Told by cardiology that tiny defect would close on own, no FU     CARDIAC SURGERY       CREATE EARDRUM OPENING,LOCAL ANESTH  4 years    PETs            Social History:     Social History     Tobacco Use     Smoking status: Never     Passive exposure: Never     Smokeless tobacco: Never   Vaping Use     Vaping status: Never Used     Passive vaping exposure: Yes   Substance Use Topics     Alcohol use: Not Currently     Living situation: apartment  Family support: yes   Vocational History: [employer]  Recreational drug use:  None          Functional history:     Kamala Harris is independent with all aspects of life.    ADLs: I   Assistive devices: none   iADLs (medication  management and finances):  I  Hand dominance: R  Driving:  No, doesn't drive            Family History:     Family History   Problem Relation Age of Onset     Migraines Mother      Sleep Apnea Mother      Substance Abuse Father      Snoring Father      Depression Brother      Anxiety Disorder Brother      Diabetes Maternal Grandmother      Cerebrovascular Disease Maternal Grandfather      Myocardial Infarction Paternal Grandmother      Myocardial Infarction Paternal Grandfather             Medications:     Current Outpatient Medications   Medication Sig Dispense Refill     albuterol (PROAIR HFA/PROVENTIL HFA/VENTOLIN HFA) 108 (90 Base) MCG/ACT inhaler Inhale 2 puffs into the lungs       escitalopram (LEXAPRO) 10 MG tablet        etonogestrel (NEXPLANON) 68 MG IMPL Inject 68 mg Subcutaneous       lisdexamfetamine (VYVANSE) 20 MG capsule Take 20 mg by mouth daily       loratadine (CLARITIN) 10 MG tablet Take 1 tablet (10 mg) by mouth daily as needed for allergies 30 tablet 0     meclizine (ANTIVERT) 25 MG tablet Take 25 mg by mouth 3 times daily as needed for dizziness       naproxen (NAPROSYN) 500 MG tablet Take 1 tablet (500 mg) by mouth 2 times daily as needed for moderate pain or headaches 28 tablet 11     ondansetron (ZOFRAN ODT) 4 MG ODT tab Take 1 tablet (4 mg) by mouth every 12 hours as needed for nausea 10 tablet 0     ubrogepant (UBRELVY) 100 MG tablet Take 1 tablet (100 mg) by mouth at onset of headache (migraine) 10 tablet 11     albuterol (PROAIR HFA/PROVENTIL HFA/VENTOLIN HFA) 108 (90 Base) MCG/ACT inhaler Inhale 2 puffs into the lungs every 4 hours as needed for shortness of breath / dyspnea or wheezing 18 g 3     fludrocortisone (FLORINEF) 0.1 MG tablet Take 0.1 mg by mouth daily (Patient not taking: Reported on 4/25/2023)       fluticasone (FLONASE) 50 MCG/ACT nasal spray  (Patient not taking: Reported on 4/25/2023)       ibuprofen (ADVIL/MOTRIN) 600 MG tablet  (Patient not taking: Reported on  "4/25/2023)       ketorolac (TORADOL) 10 MG tablet Take 1 tablet (10 mg) by mouth every 6 hours as needed for moderate pain (Patient not taking: Reported on 5/9/2023) 20 tablet 0     tiZANidine (ZANAFLEX) 4 MG tablet Take 4 mg by mouth (Patient not taking: Reported on 5/9/2023)              Allergies:     Allergies   Allergen Reactions     Iodine Rash and Itching     Rash itching                ROS:        ROS: 10 point ROS neg other than the symptoms noted above in the HPI.             Physical Examiniation:     VITAL SIGNS: /70   Pulse 101   BMI: Estimated body mass index is 34.03 kg/m  as calculated from the following:    Height as of 5/16/23: 1.665 m (5' 5.55\").    Weight as of 5/16/23: 94.3 kg (208 lb).    Gen: NAD, pleasant and cooperative   Cardio: regular pulse  Pulm: non-labored breathing in room air  Abd: benign  Ext: WWP, no edema in BLE, no tenderness in calves  Neuro/MSK:              Laboratory/Imaging:     Lab Results   Component Value Date    WBC 8.4 04/07/2023    WBC 6.9 03/20/2020     Lab Results   Component Value Date    RBC 4.96 04/07/2023    RBC 5.58 03/20/2020     Lab Results   Component Value Date    HGB 12.5 04/07/2023    HGB 13.7 03/20/2020     Lab Results   Component Value Date    HCT 38.6 04/07/2023    HCT 42.1 03/20/2020     Lab Results   Component Value Date    MCV 78 04/07/2023    MCV 75 03/20/2020     Lab Results   Component Value Date    MCH 25.2 04/07/2023    MCH 24.6 03/20/2020     Lab Results   Component Value Date    MCHC 32.4 04/07/2023    MCHC 32.5 03/20/2020     Lab Results   Component Value Date    RDW 14.3 04/07/2023    RDW 14.9 03/20/2020     Lab Results   Component Value Date     04/07/2023     03/20/2020                Assessment/Plan:     Kamala was seen today for headache.    Diagnoses and all orders for this visit:    Concussion without loss of consciousness, subsequent encounter  -     Cancel: Concussion  Referral; Future  -     loratadine " (CLARITIN) 10 MG tablet; Take 1 tablet (10 mg) by mouth daily as needed for allergies  -     Concussion  Referral; Future    Acute post-traumatic headache, not intractable    Dizziness          1. Patient education: In depth discussion and education was provided about the assessment and implications of each of the below recommendations for management. Patient indicated readiness to learn, all questions were answered and understanding of material presented was confirmed.    2. Work-up:  None     3. Therapy/equipment/braces:  Continue therapy program     4. Medications: add Claritin for sinus congestion/vestibular issues     5. Interventions:   Discussed sleep hygiene and progressive return to activity including exercise for brain health    6. Referral / follow up with other providers:  PCP    7. Follow up: 3 months for progress     Fermin Morales, DO  Physical Medicine & Rehabilitation    I spent a total of 45 minutes face-to-face with Kamala Harris during today's office visit. Over 50% of this time was spent counseling the patient and/or coordinating care. See note for details.     45 minutes spent on the date of the encounter doing chart review, history and exam, documentation and further activities as noted above

## 2023-05-26 NOTE — LETTER
2023       RE: Kamala Harris  [address]  [place] [address]     Dear Colleague,    Thank you for referring your patient, Kamala Harris, to the [facility] [place] [facility]. Please see a copy of my visit note below.           PM&R Clinic Note     Patient Name: Kamala Harris : 2000 Medical Record: [id number]     Requesting Physician/clinician: Jazzmine Warren DNP           History of Present Illness:     Kamala Harris is a 23 year old female that per records and reporting patient with past medical history significant for migraines and anemia who presents to the emergency department 23 for evaluation after a head injury. 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 Initially, she was unsure if she had trauma to her head because it was not a very hard mechanism, but later developed a right sided headache over her forehead and ear pressure. With concern for further problems, patient presents to the emergency department today. Denies loss of consciousness, vomiting, loss of memory, dental pain, numbness/tingling, or weakness. No attempted pain alleviation methods prior to arrival. Has been ambulatory without any difficulty. Not on any blood thinners. No other concerns voiced at that time.  Went to ER and that not likely concussion, but felt worse 72 hours than went to PCP with referall to concussion clinic.       Symptoms      2023    10:00 AM 2023     2:00 PM 2023    12:00 PM   CONCUSSION SYMPTOMS ASSESSMENT   Headache or Pressure In Head 3 - moderate 3 - moderate 3 - moderate   Upset Stomach or Throwing Up 5 - severe 1 - mild 1 - mild   Problems with Balance 0 - none 2 - mild to moderate 2 - mild to moderate   Feeling Dizzy 3 - moderate 1 -  mild 1 - mild   Sensitivity to Light 1 - mild 0 - none 0 - none   Sensitivity to Noise 1 - mild 1 - mild 1 - mild   Mood Changes 0 - none 0 - none 0 - none   Feeling sluggish, hazy, or foggy 3 - moderate 1 - mild 1 - mild   Trouble Concentrating, Lack of Focus 5 - severe 2 - mild to moderate 2 - mild to moderate   Motion Sickness 4 - moderate to severe 0 - none 0 - none   Vision Changes 4 - moderate to severe 1 - mild 1 - mild   Memory Problems 5 - severe 5 - severe 5 - severe   Feeling Confused 5 - severe 3 - moderate 3 - moderate   Neck Pain 5 - severe 2 - mild to moderate 2 - mild to moderate   Trouble Sleeping 4 - moderate to severe 2 - mild to moderate 2 - mild to moderate   Total Number of Symptoms 15 12 12   Symptom Severity Score 48 24 24         Current:  Feels getting better day by day.  Its mainly just pressure in ears with headache, than some dizziness.  She takes naproxen and APAP if bad once a day.   She started PT with balance specialicist.  She has back working 4 hour days which is helpful and tolerable.  Goes home and sleeps.  Never had concussion before.  Denies issues with bowel or bladder.          Past Medical and Surgical History:     Past Medical History:   Diagnosis Date    ADHD     Depressive disorder     Heart disease     maternal congeital heart - cardiac catherization     Pap smear of cervix with ASCUS, cannot exclude HGSIL 1/19/2022    Sprain of right wrist     right side muliple times     Uncomplicated asthma     exercise induced     Past Surgical History:   Procedure Laterality Date    BIOPSY      CARDIAC CATHERIZATION  4 years    Told by cardiology that tiny defect would close on own, no FU    CARDIAC SURGERY      CREATE EARDRUM OPENING,LOCAL ANESTH  4 years    PETs            Social History:     Social History     Tobacco Use    Smoking status: Never     Passive exposure: Never    Smokeless tobacco: Never   Vaping Use    Vaping status: Never Used     Passive vaping exposure: Yes    Substance Use Topics    Alcohol use: Not Currently     Living situation: apartment  Family support: yes   Vocational History: [employer]  Recreational drug use:  None          Functional history:     Kamala Harris is independent with all aspects of life.    ADLs: I   Assistive devices: none   iADLs (medication management and finances):  I  Hand dominance: R  Driving:  No, doesn't drive            Family History:     Family History   Problem Relation Age of Onset    Migraines Mother     Sleep Apnea Mother     Substance Abuse Father     Snoring Father     Depression Brother     Anxiety Disorder Brother     Diabetes Maternal Grandmother     Cerebrovascular Disease Maternal Grandfather     Myocardial Infarction Paternal Grandmother     Myocardial Infarction Paternal Grandfather             Medications:     Current Outpatient Medications   Medication Sig Dispense Refill    albuterol (PROAIR HFA/PROVENTIL HFA/VENTOLIN HFA) 108 (90 Base) MCG/ACT inhaler Inhale 2 puffs into the lungs      escitalopram (LEXAPRO) 10 MG tablet       etonogestrel (NEXPLANON) 68 MG IMPL Inject 68 mg Subcutaneous      lisdexamfetamine (VYVANSE) 20 MG capsule Take 20 mg by mouth daily      loratadine (CLARITIN) 10 MG tablet Take 1 tablet (10 mg) by mouth daily as needed for allergies 30 tablet 0    meclizine (ANTIVERT) 25 MG tablet Take 25 mg by mouth 3 times daily as needed for dizziness      naproxen (NAPROSYN) 500 MG tablet Take 1 tablet (500 mg) by mouth 2 times daily as needed for moderate pain or headaches 28 tablet 11    ondansetron (ZOFRAN ODT) 4 MG ODT tab Take 1 tablet (4 mg) by mouth every 12 hours as needed for nausea 10 tablet 0    ubrogepant (UBRELVY) 100 MG tablet Take 1 tablet (100 mg) by mouth at onset of headache (migraine) 10 tablet 11    albuterol (PROAIR HFA/PROVENTIL HFA/VENTOLIN HFA) 108 (90 Base) MCG/ACT inhaler Inhale 2 puffs into the lungs every 4 hours as needed for shortness of breath / dyspnea or wheezing 18 g 3     "fludrocortisone (FLORINEF) 0.1 MG tablet Take 0.1 mg by mouth daily (Patient not taking: Reported on 4/25/2023)      fluticasone (FLONASE) 50 MCG/ACT nasal spray  (Patient not taking: Reported on 4/25/2023)      ibuprofen (ADVIL/MOTRIN) 600 MG tablet  (Patient not taking: Reported on 4/25/2023)      ketorolac (TORADOL) 10 MG tablet Take 1 tablet (10 mg) by mouth every 6 hours as needed for moderate pain (Patient not taking: Reported on 5/9/2023) 20 tablet 0    tiZANidine (ZANAFLEX) 4 MG tablet Take 4 mg by mouth (Patient not taking: Reported on 5/9/2023)              Allergies:     Allergies   Allergen Reactions    Iodine Rash and Itching     Rash itching                ROS:        ROS: 10 point ROS neg other than the symptoms noted above in the HPI.             Physical Examiniation:     VITAL SIGNS: /70   Pulse 101   BMI: Estimated body mass index is 34.03 kg/m  as calculated from the following:    Height as of 5/16/23: 1.665 m (5' 5.55\").    Weight as of 5/16/23: 94.3 kg (208 lb).    Gen: NAD, pleasant and cooperative   Cardio: regular pulse  Pulm: non-labored breathing in room air  Abd: benign  Ext: WWP, no edema in BLE, no tenderness in calves  Neuro/MSK:              Laboratory/Imaging:     Lab Results   Component Value Date    WBC 8.4 04/07/2023    WBC 6.9 03/20/2020     Lab Results   Component Value Date    RBC 4.96 04/07/2023    RBC 5.58 03/20/2020     Lab Results   Component Value Date    HGB 12.5 04/07/2023    HGB 13.7 03/20/2020     Lab Results   Component Value Date    HCT 38.6 04/07/2023    HCT 42.1 03/20/2020     Lab Results   Component Value Date    MCV 78 04/07/2023    MCV 75 03/20/2020     Lab Results   Component Value Date    MCH 25.2 04/07/2023    MCH 24.6 03/20/2020     Lab Results   Component Value Date    MCHC 32.4 04/07/2023    MCHC 32.5 03/20/2020     Lab Results   Component Value Date    RDW 14.3 04/07/2023    RDW 14.9 03/20/2020     Lab Results   Component Value Date     " 04/07/2023     03/20/2020                Assessment/Plan:     Kamala was seen today for headache.    Diagnoses and all orders for this visit:    Concussion without loss of consciousness, subsequent encounter  -     Cancel: Concussion  Referral; Future  -     loratadine (CLARITIN) 10 MG tablet; Take 1 tablet (10 mg) by mouth daily as needed for allergies  -     Concussion  Referral; Future    Acute post-traumatic headache, not intractable    Dizziness          Patient education: In depth discussion and education was provided about the assessment and implications of each of the below recommendations for management. Patient indicated readiness to learn, all questions were answered and understanding of material presented was confirmed.    Work-up:  None     Therapy/equipment/braces:  Continue therapy program     Medications: add Claritin for sinus congestion/vestibular issues     Interventions:   Discussed sleep hygiene and progressive return to activity including exercise for brain health    Referral / follow up with other providers:  PCP    Follow up: 3 months for progress     Fermin Morales, DO  Physical Medicine & Rehabilitation    I spent a total of 45 minutes face-to-face with Kamala Harris during today's office visit. Over 50% of this time was spent counseling the patient and/or coordinating care. See note for details.     45 minutes spent on the date of the encounter doing chart review, history and exam, documentation and further activities as noted above

## 2023-05-26 NOTE — NURSING NOTE
5/17/2023    10:00 AM 5/24/2023     2:00 PM 5/26/2023    12:00 PM   CONCUSSION SYMPTOMS ASSESSMENT   Headache or Pressure In Head 3 - moderate 3 - moderate 3 - moderate   Upset Stomach or Throwing Up 5 - severe 1 - mild 1 - mild   Problems with Balance 0 - none 2 - mild to moderate 2 - mild to moderate   Feeling Dizzy 3 - moderate 1 - mild 1 - mild   Sensitivity to Light 1 - mild 0 - none 0 - none   Sensitivity to Noise 1 - mild 1 - mild 1 - mild   Mood Changes 0 - none 0 - none 0 - none   Feeling sluggish, hazy, or foggy 3 - moderate 1 - mild 1 - mild   Trouble Concentrating, Lack of Focus 5 - severe 2 - mild to moderate 2 - mild to moderate   Motion Sickness 4 - moderate to severe 0 - none 0 - none   Vision Changes 4 - moderate to severe 1 - mild 1 - mild   Memory Problems 5 - severe 5 - severe 5 - severe   Feeling Confused 5 - severe 3 - moderate 3 - moderate   Neck Pain 5 - severe 2 - mild to moderate 2 - mild to moderate   Trouble Sleeping 4 - moderate to severe 2 - mild to moderate 2 - mild to moderate   Total Number of Symptoms 15 12 12   Symptom Severity Score 48 24 24

## 2023-06-16 ENCOUNTER — TELEPHONE (OUTPATIENT)
Dept: FAMILY MEDICINE | Facility: CLINIC | Age: 23
End: 2023-06-16

## 2023-06-16 NOTE — TELEPHONE ENCOUNTER
"Pt calling for a same day appointment due to her asthma being \"out of control.\"     Pt reports she went to work last night and pushed a broom that made her out of breath.     Pt states she uses a CPAP at night and that is not working for her at this time    Pt is audibly sounding SOB over the phone.     RN relayed pt go to UC or ER today to be assessed and get asthma under control again.     Patient verbalized understanding and in agreement with plan of care.     Alisson Martines RN      "

## 2023-06-20 ENCOUNTER — THERAPY VISIT (OUTPATIENT)
Dept: OCCUPATIONAL THERAPY | Facility: CLINIC | Age: 23
End: 2023-06-20
Attending: PHYSICAL MEDICINE & REHABILITATION
Payer: OTHER MISCELLANEOUS

## 2023-06-20 DIAGNOSIS — S06.0X0D CONCUSSION WITHOUT LOSS OF CONSCIOUSNESS, SUBSEQUENT ENCOUNTER: ICD-10-CM

## 2023-06-20 PROCEDURE — 97165 OT EVAL LOW COMPLEX 30 MIN: CPT | Mod: GO | Performed by: SPECIALIST/TECHNOLOGIST

## 2023-06-20 PROCEDURE — 97537 COMMUNITY/WORK REINTEGRATION: CPT | Mod: GO | Performed by: SPECIALIST/TECHNOLOGIST

## 2023-06-20 NOTE — PROGRESS NOTES
OCCUPATIONAL THERAPY EVALUATION  Type of Visit: Evaluation    See electronic medical record for Abuse and Falls Screening details.    Subjective      Presenting condition or subjective complaint: Concussion  Date of onset: 04/22/23    Relevant medical history: Asthma; Concussions; Depression; Dizziness; Heart problems; Mental Illness; Migraines or headaches; Sleep disorder like apnea   Prior therapy history for the same diagnosis, illness or injury: No      Patient hit front of her head at work, doesn't remember specific details of injury but reports pain afterword but continued working. Pain persisted, then filled out incident report with supervisor. Symptoms worsened, she then went in to ED later that day, was told she did not have a concussion. Symptoms got worse for about two weeks, then better for about a week or two, tried to go back to work with symptoms returning and new symptom of tinnitus and nausea, worsening pressure behind eye. Has been on work restrictions for 4 hour shifts, at about 4-5 shift per week. Reports this has been going well, fatigued near end of shift but not as draining as before. Will go home and sleep after work. Has history of ADHD, reports this has gotten worse. Migraines for the last two years, exacerbated following concussion but resolving - these were not her typical migraines, more so pressure and pain behind eye, this has gotten better. Denies mood changes, has depression at baseline.     Patient identifies improving , not dropping things as a primary concern she would like to address.  Dr. Morales's orders for hand-eye coordination and safe and sound program.    Prior Level of Function   Transfers: Independent  Ambulation: Independent  ADL: Independent  IADL: Finances, Housekeeping, Laundry, Meal preparation, Medication management, Work; Work restrictions in place, household tasks take longer, increased fatigue with daily routine    Living Environment  Social support: With a  significant other or spouse   Type of home: Apartment/condo   Stairs to enter the home: Yes 13 Is there a railing: Yes   Ramp: No   Stairs inside the home: No       Help at home: None  Employment: Yes [employer]  Hobbies/Interests: Watch tv and reading  Patient goals for therapy: Not drop so many things    Pain assessment: Pain denied     Objective   Reading Endurance/Fatigue    Visual Fatigue Comments Minimal concerns   Convergence Normal   Pursuits Normal   Pupillary Function Normal        Difficulty with IADL Performance: Symptom Increase    Difficulty Concentrating at School, Work or Home Minor difficulty   Difficulty Multitasking/Planning Takes longer   Busy/Dynamic Environments Auditory and visual tolerance   Path Finding in Busy Environments No concerns   Sensory Tolerance Below baseline   Startles Easily No concerns        Cognitive Status Examination  Orientation: Oriented to person, place and time   Level of Consciousness: Alert  Follows Commands and Answers Questions: 100% of the time, Follows multi step instructions  Personal Safety and Judgement: Intact  Memory: Impaired; per report  Attention: Reports problems attending  Organization/Problem Solving: Reports problems with problem solving during eval, Problem solving impaired, Prioritizing of info/tasks impaired  Executive Function: Working memory impaired, decreased storage of information for performing tasks    VISUAL SKILLS  Visual Acuity: Wears glasses, eye fatigue, improving pain/pressure behind eye  RANGE OF MOTION: no deficit identified  STRENGTH: Impaired  Strength, R  MUSCLE TONE: no deficit identified  COORDINATION: UE Coordination: Impaired, per report - patient has been dropping things and 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     ACTIVITY TOLERANCE: Below baseline    INSTRUMENTAL ACTIVITIES OF DAILY LIVING (IADL): Independent, but slower due to processing, attention deficits, increased fatigue (housekeeping, laundry, work). Does not drive at baseline.     CSA score 18 this date          Assessment & Plan   CLINICAL IMPRESSIONS   Medical Diagnosis: Concussion without loss of consciousness    Treatment Diagnosis: Impaired IADL, work, leisure tasks    Impression/Assessment: Pt is a 23 year old female presenting to Occupational Therapy due to concussion.  The following significant findings have been identified: Impaired activity tolerance, Impaired cognition, Impaired coordination, Impaired strength and Impaired visual perception.  These identified deficits interfere with their ability to perform self care tasks, work tasks, recreational activities, household chores, medication management, financial management,  yard work, care of others and meal planning and preparation as compared to previous level of function.     Clinical Decision Making (Complexity):   Assessment of Occupational Performance: 3-5 Performance Deficits  Occupational Performance Limitations: care of others, communication management, health management and maintenance, home establishment and management, meal preparation and cleanup, shopping, work, leisure activities, social participation and sustained participation in daily I/ADL routines  Clinical Decision Making (Complexity): Low complexity    PLAN OF CARE  Treatment Interventions:   Interventions: Cognitive Skills, Community/Work Reintegration, Self-Care/Home Management, Therapeutic Activity, Therapeutic Exercise, Sensory Integration    Long Term Goals   OT Goal 1  Goal Identifier: Coordination  Goal Description: Patient will complete bilateral and hand-ee coordination tasks with 80% accuracy, fluid motor planning, and visual engagement as a measure of improved coordination required for IADL, work, and  leisure tasks  Target Date: 08/18/23  OT Goal 2  Goal Identifier:  Strength  Goal Description: Patient will improve R hand strength by #8lbs in order to return to IADL, work, and independently  Target Date: 08/18/23  OT Goal 3  Goal Identifier: Symptom Management  Goal Description: Patient will verbalize and demonstrate understanding of at least 5 accommodations/modifications, compensatory strategies, or energy conservation/fatigue management strategies implemented with I/ADL, work, or leisure tasks to support return to prior level of function with routine tasks.  Target Date: 08/18/23  OT Goal 4  Goal Identifier: Home Programming  Goal Description: Patient will report 100% compliance with an UE strengthening, symptom management (SAFE AND SOUND PROGRAM), or coordination home program in order to increase performance and participation with IADL, work, and leisure tasks  Target Date: 08/18/23      Frequency of Treatment: 1x/week  Duration of Treatment: 8 weeks     Recommended Referrals to Other Professionals: None  Education Assessment:       Risks and benefits of evaluation/treatment have been explained.   Patient/Family/caregiver agrees with Plan of Care.     Evaluation Time:    OT Eval, Low Complexity Minutes (54[address]): 15       Signing Clinician: ANNITA ALVARADO

## 2023-06-22 ENCOUNTER — TELEPHONE (OUTPATIENT)
Dept: FAMILY MEDICINE | Facility: CLINIC | Age: 23
End: 2023-06-22
Payer: COMMERCIAL

## 2023-06-22 NOTE — TELEPHONE ENCOUNTER
Pt calling stating her symptoms from the 6/16 hospital encounter are the same- getting worse at times.    Pt states she is no longer wheezing but her cough is worse.     Pt states she is taking mediations prescribed at discharge with minimal changes.     RN advised UC as there are no appointments available here at clinic. Pt was hesitant- RN offered next day appointment. Pt declined.     RN discussed in detail when to call back or go to ER    Patient verbalized understanding and in agreement with plan of care.     Alisson Martines RN   Decreased ADL status, Decreased strength, Decreased endurance, Decreased balance, Decreased high-level IADLs, Decreased fine motor control, Decreased coordination  Prognosis: Good  Discharge Recommendations: Continue to assess pending progress  Decision Making: High Complexity  History: Pt's medical history is moderately complex  Exam: Pt. has 8 performance deficits  Assistance / Modification: Pt. requires mod A    Six Click Score   How much help for putting on and taking off regular lower body clothing?: A Lot  How much help for Bathing?: A Lot  How much help for Toileting?: A Lot  How much help for putting on and taking off regular upper body clothing?: A Little  How much help for taking care of personal grooming?: A Little  How much help for eating meals?: A Little  AM-[facility] Inpatient Daily Activity Raw Score: 15  AM-PAC Inpatient ADL T-Scale Score : 34.69  ADL Inpatient CMS 0-100% Score: 56.46    Plan:  Plan  Times per week: 1-3x  Plan weeks: Length of acute stay  Current Treatment Recommendations: Strengthening, Balance Training, Functional Mobility Training, Endurance Training, Patient/Caregiver Education & Training, Equipment Evaluation, Education, & procurement, Self-Care / ADL, Safety Education & Training, Home Management Training, Pain Management    Goals:   Patient will:    - Improve functional endurance to tolerate/complete 60 mins of ADL's  - Be Mod I in UB ADLs   - Be SBA in LB ADLs  - Be SBA in ADL transfers without LOB  - Be SBA in toileting tasks  - Improve B hand fine motor coordination to [facility] in order to manage clothing fasteners/self-care containers in a timely manner  - Improve B UE strength and endurance to 4/5 in order to participate in self-care activities as projected. - Access appropriate D/C site with as few architectural barriers as possible. - Sequence self-care tasks with no verbal cues for safety    Patient Goal: Patient goals : \"I want to go home\"     Discussed and agreed upon:  Yes Comments:     Therapy Time:   OT Individual Minutes  Time In: 1009  Time Out: 1025  Minutes: 16    Eval: 16 minutes     Electronically signed by:     SHRADDHA Hernandez  2/5/2020, 10:59 AM

## 2023-06-24 ENCOUNTER — NURSE TRIAGE (OUTPATIENT)
Dept: NURSING | Facility: CLINIC | Age: 23
End: 2023-06-24
Payer: COMMERCIAL

## 2023-06-25 NOTE — TELEPHONE ENCOUNTER
Nurse Triage SBAR    Is this a 2nd Level Triage? NO    Situation: Pt called with concerns for elevated blood sugar.    Background: Pt states she is pre-diabetic and was also been taking prednisone for asthma.    Assessment: Pt was feeling fatigued, 'foggy', and thirstier than usual. She checked her BG which was 270 currently.     Protocol Recommended Disposition:   See PCP Within 24 Hours    Recommendation:  Dispo to be seen within 24 hours. Pt advised to go to ER to be evaluated.     Reason for Disposition    New-onset diabetes suspected (e.g., frequent urination, weakness, weight loss)    Additional Information    Negative: Unconscious or difficult to awaken    Negative: Acting confused (e.g., disoriented, slurred speech)    Negative: Very weak (e.g., can't stand)    Negative: Sounds like a life-threatening emergency to the triager    Negative: Blood glucose > 500 mg/dL (27.8 mmol/L)    Negative: [1] Vomiting AND [2] signs of dehydration (e.g., very dry mouth, lightheaded, dark urine)    Negative: [1] Blood glucose > 240 mg/dL (13.3 mmol/L) AND [2] rapid breathing    Negative: Vomiting lasts > 4 hours    Negative: Patient sounds very sick or weak to the triager    Negative: [1] Blood glucose > 240 mg/dL (13.3 mmol/L) AND [2] urine ketones moderate-large (or more than 1+)    Negative: [1] Blood glucose > 240 mg/dL (13.3 mmol/L) AND [2] blood ketones > 1.4 mmol/L    Negative: [1] Blood glucose > 240 mg/dL (13.3 mmol/L) AND [2] vomiting AND [3] unable to check for ketones (in blood or urine)    Negative: [1] New-onset diabetes suspected (e.g., frequent urination, weak, weight loss) AND [2] vomiting or rapid breathing    Negative: Blood glucose > 400 mg/dL (22.2 mmol/L)    Negative: [1] Blood glucose > 300 mg/dL (16.7 mmol/L) AND [2] two or more times in a row    Negative: Urine ketones moderate - large (or blood ketones > 1.4 mmol/L)    Negative: [1] Caller has URGENT medication or insulin pump question AND [2]  triager unable to answer question    Negative: Fever > 100.4 F (38.0 C)    Negative: [1] Symptoms of high blood sugar (e.g., frequent urination, weak, weight loss) AND [2] not able to test blood glucose    Protocols used: DIABETES - HIGH BLOOD SUGAR-A-    Leonora Gibson RN  [facility] Nurse Advisor   6/24/2023  8:24 PM

## 2023-06-26 ENCOUNTER — OFFICE VISIT (OUTPATIENT)
Dept: FAMILY MEDICINE | Facility: CLINIC | Age: 23
End: 2023-06-26
Payer: COMMERCIAL

## 2023-06-26 VITALS
HEIGHT: 66 IN | TEMPERATURE: 98.6 F | SYSTOLIC BLOOD PRESSURE: 102 MMHG | RESPIRATION RATE: 18 BRPM | BODY MASS INDEX: 34.62 KG/M2 | HEART RATE: 114 BPM | WEIGHT: 215.4 LBS | OXYGEN SATURATION: 95 % | DIASTOLIC BLOOD PRESSURE: 70 MMHG

## 2023-06-26 DIAGNOSIS — R73.03 PREDIABETES: Primary | ICD-10-CM

## 2023-06-26 DIAGNOSIS — Z83.3 FAMILY HISTORY OF DIABETES MELLITUS: ICD-10-CM

## 2023-06-26 DIAGNOSIS — R35.0 FREQUENCY OF URINATION AND POLYURIA: ICD-10-CM

## 2023-06-26 DIAGNOSIS — R35.89 FREQUENCY OF URINATION AND POLYURIA: ICD-10-CM

## 2023-06-26 LAB
CREAT UR-MCNC: 151 MG/DL
HBA1C MFR BLD: 6.4 % (ref 0–5.6)
MICROALBUMIN UR-MCNC: <12 MG/L
MICROALBUMIN/CREAT UR: NORMAL MG/G{CREAT}

## 2023-06-26 PROCEDURE — 99213 OFFICE O/P EST LOW 20 MIN: CPT | Performed by: NURSE PRACTITIONER

## 2023-06-26 PROCEDURE — 82570 ASSAY OF URINE CREATININE: CPT | Performed by: NURSE PRACTITIONER

## 2023-06-26 PROCEDURE — 82043 UR ALBUMIN QUANTITATIVE: CPT | Performed by: NURSE PRACTITIONER

## 2023-06-26 PROCEDURE — 83036 HEMOGLOBIN GLYCOSYLATED A1C: CPT | Performed by: NURSE PRACTITIONER

## 2023-06-26 PROCEDURE — 36415 COLL VENOUS BLD VENIPUNCTURE: CPT | Performed by: NURSE PRACTITIONER

## 2023-06-26 ASSESSMENT — PAIN SCALES - GENERAL: PAINLEVEL: NO PAIN (0)

## 2023-06-26 ASSESSMENT — PATIENT HEALTH QUESTIONNAIRE - PHQ9
SUM OF ALL RESPONSES TO PHQ QUESTIONS 1-9: 9
10. IF YOU CHECKED OFF ANY PROBLEMS, HOW DIFFICULT HAVE THESE PROBLEMS MADE IT FOR YOU TO DO YOUR WORK, TAKE CARE OF THINGS AT HOME, OR GET ALONG WITH OTHER PEOPLE: SOMEWHAT DIFFICULT
SUM OF ALL RESPONSES TO PHQ QUESTIONS 1-9: 9

## 2023-06-26 NOTE — PATIENT INSTRUCTIONS
Continue to work on decreasing mountain dew.     Recheck A1c and urine today.     Continue to monitor fasting sugars.     Pair protein with carbs to help keep sugars stable and prevent the highs and lows which cause more symptoms.

## 2023-06-26 NOTE — PROGRESS NOTES
Assessment & Plan     Prediabetes  A1c is increasing from 5.8 to 6.4 since 3 months ago. She is still in the prediabetic range. Recommend she continue to work on diet and reducing soda. Urine pending.     - Hemoglobin A1c  - Albumin Random Urine Quantitative with Creat Ratio  - [facility] Adult Diabetes Educator Referral    Frequency of urination and polyuria  Urine done today.     - Albumin Random Urine Quantitative with Creat Ratio    Family history of diabetes mellitus  High risk for developing due to habits.      Ordering of each unique test       MED REC REQUIRED  Post Medication Reconciliation Status:  Discharge medications reconciled, continue medications without change    Patient Instructions   Continue to work on decreasing mountain dew.     Recheck A1c and urine today.     Continue to monitor fasting sugars.     Pair protein with carbs to help keep sugars stable and prevent the highs and lows which cause more symptoms.       Jazzmine Warren DNP  [facility]    Hayden Wray is a 23 year old, presenting for the following health issues:  ER F/U    HPI     ED/UC Followup:    Facility: [facility]   Date of visit: 6/24/2023  Reason for visit: Hyperglycemia   Current Status: Patient voices dizziness, thirsty, HA. She have been trying to limit sugary foods/drinks. Fasting glucose this morning was 100.     ED notes from 6/24/23:    ED Provider Note - Rayna Gasca MD - 06/24/2023 10:09 PM CDT  Formatting of this note is different from the original.  Chief Complaint:  High Blood Sugar    History of Present Illness:  Kamala MEYER is a 23 y.o. female with history of WPW syndrome who presents to the emergency department for evaluation of high blood sugar. Patient states she has been checking her blood sugars due to family history of diabetes, and noted a high blood sugar of 270. She states prior to that her last blood sugar was 128. She notes that she had an asthma exacerbation  last week and was on a course of steroids. Here in the emergency department patient has been feeling foggy, tired and lightheaded. She denies any fever, vomiting, diarrhea, or abdominal pain. Patient denies alcohol or caffeine No further complaints or concerns are voiced at this time.     Impression and Plan:  Kamala MEYER is a 23 y.o. female presenting with hyperglycemia. Patient was told to watch her glucose given prediabetes. Besides polyuria and polydipsia, patient is not having any other symptoms concerning for DKA. She does report feeling lightheaded likely from dehydration. She is not having any vomiting therefore she was given oral hydration. Labs are collected the patient does not have any evidence for DKA. She also revealed that she is on prednisone which is likely contributing to her hyperglycemia. There is no indication to acutely treat her hyperglycemia in the emergency department and her glucose will likely normalize after the prednisone course is done. Therefore advised that she continue checking her glucose and discussing management with her primary care provider. Return to the ED with any new or worsening symptoms including signs of severe dehydration such as oliguria/anuria or abdominal pain or persistent vomiting or any other concerns. Patient felt comfortable with this plan and was discharged home.    Discharge Instructions  Rayna Gasca MD - 06/24/2023 10:52 PM CDT    Formatting of this note might be different from the original.  Continue to check your blood sugar and record it. Follow-up with your primary care provider this week to further discuss management of your blood sugar. Given your recent prednisone use, this elevation is likely temporary. If you start having severe abdominal pain, persistent vomiting or any other concerns you can come back to the ED. Otherwise this would be discussing management with your  primary.    --------------------------------------------------  Additional provider notes: Patient presents in clinic for hospital follow-up on hyperglycemia. Notes that her sugars were high one night and she wasn't feeling good. She called out from work and went to ED.     States she doesn't feel good when her sugars drop down. States her sugars dropped 100 points while she was at the ED and she could feel it. She has never passed out, but sometimes feels like it.    Diet: she has cut caffeine down to 1-2 cans of mountain dew a day (from 5-6 cans a day). She hasn't had any in the past week. She has been using flavored water at work.    Fasting sugars are usually 100-120s. This morning it was 100. She has been using an thania to track her sugars - One drop. The thania estimates that her A1C 6.9. She wanted to come in to have it checked.    She was on prednisone for an asthma flare up. She finished the steroids 3 days ago.     She has been urinating a lot. She has been drinking a lot, though, too. Denies any other urinary symptoms.     Allergies   Allergen Reactions     Iodine Rash and Itching     Rash itching         Current Outpatient Medications   Medication     albuterol (PROAIR HFA/PROVENTIL HFA/VENTOLIN HFA) 108 (90 Base) MCG/ACT inhaler     albuterol (PROAIR HFA/PROVENTIL HFA/VENTOLIN HFA) 108 (90 Base) MCG/ACT inhaler     escitalopram (LEXAPRO) 10 MG tablet     etonogestrel (NEXPLANON) 68 MG IMPL     fludrocortisone (FLORINEF) 0.1 MG tablet     lisdexamfetamine (VYVANSE) 20 MG capsule     meclizine (ANTIVERT) 25 MG tablet     naproxen (NAPROSYN) 500 MG tablet     ondansetron (ZOFRAN ODT) 4 MG ODT tab     ubrogepant (UBRELVY) 100 MG tablet     fluticasone (FLONASE) 50 MCG/ACT nasal spray     No current facility-administered medications for this visit.       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      Pap smear  "of cervix with ASCUS, cannot exclude HGSIL 1/19/2022     Sprain of right wrist     right side muliple times      Uncomplicated asthma     exercise induced            Review of Systems   Endocrine: Positive for polyuria.   All other systems reviewed and are negative.           Objective    /70   Pulse 114   Temp 98.6  F (37  C) (Oral)   Resp 18   Ht 1.665 m (5' 5.55\")   Wt 97.7 kg (215 lb 6.4 oz)   SpO2 95%   BMI 35.24 kg/m    Body mass index is 35.24 kg/m .  Physical Exam  Vitals reviewed.   Constitutional:       General: She is not in acute distress.     Appearance: Normal appearance. She is obese. She is not ill-appearing or toxic-appearing.   Cardiovascular:      Rate and Rhythm: Normal rate and regular rhythm.      Pulses: Normal pulses.      Heart sounds: Normal heart sounds.   Pulmonary:      Effort: Pulmonary effort is normal.      Breath sounds: Normal breath sounds.   Skin:     General: Skin is warm and dry.   Neurological:      Mental Status: She is alert and oriented to person, place, and time.   Psychiatric:         Behavior: Behavior normal.            Results for orders placed or performed in visit on 06/26/23 (from the past 24 hour(s))   Hemoglobin A1c   Result Value Ref Range    Hemoglobin A1C 6.4 (H) 0.0 - 5.6 %                   "

## 2023-07-10 NOTE — CONFIDENTIAL NOTE
RECORDS RECEIVED FROM: internal /ce    DATE RECEIVED: 7/18/23    NOTES (FOR ALL VISITS) STATUS DETAILS   OFFICE NOTES from referring provider internal  Jazzmine Warren, [facility]   OFFICE NOTES from other specialist internal  4.19.23 [facility]   ED NOTES ce [facility]- 6.24.23 [facility]        MEDICATION LIST internal     IMAGING        XR (Chest) ce [facility]- 6.16.23      LABS     DIABETES: HBGA1C, CREATININE, FASTING LIPIDS, MICROALBUMIN URINE, POTASSIUM, TSH, T4    THYROID: TSH, T4, CBC, THYRODLONULIN, TOTAL T3, FREE T4, CALCITONIN, CEA internal /ce HBGA1C- 6.26.23  glucse meter- 6.24.23  BMP- 6.24.23  Cbc- 6.24.23  T3- 4.4.23  T4- 4.4.23  cmp- 3.23.23  Vitamin D- 7/30/21  Parathyroid- 7/30/21

## 2023-07-13 ENCOUNTER — VIRTUAL VISIT (OUTPATIENT)
Dept: EDUCATION SERVICES | Facility: CLINIC | Age: 23
End: 2023-07-13
Payer: COMMERCIAL

## 2023-07-13 DIAGNOSIS — R73.09 ELEVATED HEMOGLOBIN A1C: Primary | ICD-10-CM

## 2023-07-13 PROCEDURE — 97802 MEDICAL NUTRITION INDIV IN: CPT | Mod: VID | Performed by: NUTRITIONIST

## 2023-07-13 NOTE — PROGRESS NOTES
Medical Nutrition Therapy  Visit Type:Initial assessment and intervention    Kamala Harris presents today for MNT and education related to prediabetes.   She is accompanied by self.     ASSESSMENT:   Patient comments/concerns relating to nutrition: Concern for elevations in blood glucose, monitoring 1-2 times/day, had recent 200's when on prednisone for asthma.  Has made a lot of lifestyle changes since she learned of this, has cut back on sweets and eliminated sugary drinks    NUTRITION HISTORY:    Breakfast: Premier Protein Shake  Lunch: Turkey sandwich with cheese, lettuce, tomato or cheese quesadilla    Dinner: Healthy choice frozen meal or pasta with vegetables or zucchini noodles with veg.  Snacks: popcorn chips  Beverages: Sports Drink (SF Gatorade, Propel, etc.)  1-2 /day and Water 3-4/day    Misses meals? no  Eats out:  seldom     Previous diet education:  No     Food allergies/intolerances: no    Diet is high in: current diet changes show a more balanced diet  Diet is low in: fruits    EXERCISE: no regular exercise program    SOCIO/ECONOMIC:   Lives with: self    MEDICATIONS:  Current Outpatient Medications   Medication     albuterol (PROAIR HFA/PROVENTIL HFA/VENTOLIN HFA) 108 (90 Base) MCG/ACT inhaler     albuterol (PROAIR HFA/PROVENTIL HFA/VENTOLIN HFA) 108 (90 Base) MCG/ACT inhaler     escitalopram (LEXAPRO) 10 MG tablet     etonogestrel (NEXPLANON) 68 MG IMPL     fludrocortisone (FLORINEF) 0.1 MG tablet     fluticasone (FLONASE) 50 MCG/ACT nasal spray     lisdexamfetamine (VYVANSE) 20 MG capsule     meclizine (ANTIVERT) 25 MG tablet     naproxen (NAPROSYN) 500 MG tablet     ondansetron (ZOFRAN ODT) 4 MG ODT tab     ubrogepant (UBRELVY) 100 MG tablet     No current facility-administered medications for this visit.       LABS:  Lab Results   Component Value Date     04/07/2023     03/20/2020      Lab Results   Component Value Date    POTASSIUM 3.6 04/07/2023    POTASSIUM 3.8 04/12/2022     POTASSIUM 4.1 03/20/2020     Lab Results   Component Value Date    CHLORIDE 107 04/07/2023    CHLORIDE 108 04/12/2022    CHLORIDE 108 03/20/2020     Lab Results   Component Value Date    MIKO 8.9 04/07/2023    MIKO 9.2 03/20/2020     Lab Results   Component Value Date    CO2 27 04/07/2023    CO2 25 04/12/2022    CO2 26 03/20/2020     Lab Results   Component Value Date    BUN 9.5 04/07/2023    BUN 13 04/12/2022    BUN 12 03/20/2020     Lab Results   Component Value Date    CR 0.74 04/07/2023    CR 0.66 03/20/2020     Lab Results   Component Value Date    GLC 94 04/07/2023    GLC 98 04/12/2022     03/20/2020     No results found for: LDL  No results found for: HDL]  GFR Estimate   Date Value Ref Range Status   04/07/2023 >90 >60 mL/min/1.73m2 Final     Comment:     eGFR calculated using 2021 CKD-EPI equation.   03/20/2020 >90 >60 mL/min/[1.73_m2] Final     Comment:     Non  GFR Calc  Starting 12/18/2018, serum creatinine based estimated GFR (eGFR) will be   calculated using the Chronic Kidney Disease Epidemiology Collaboration   (CKD-EPI) equation.       Lab Results   Component Value Date    CR 0.74 04/07/2023    CR 0.66 03/20/2020     No results found for: MICROALBUMIN    ANTHROPOMETRICS:  Vitals: There were no vitals taken for this visit.  There is no height or weight on file to calculate BMI.      Wt Readings from Last 5 Encounters:   06/26/23 97.7 kg (215 lb 6.4 oz)   05/16/23 94.3 kg (208 lb)   05/15/23 94.3 kg (208 lb)   05/09/23 94.7 kg (208 lb 12.8 oz)   04/25/23 94.4 kg (208 lb 3.2 oz)       ESTIMATED KCAL REQUIREMENTS:  9423-5864 kcal per day    NUTRITION DIAGNOSIS: Food- and nutrition-related knowledge deficit related to portion sizes as evidenced by food recall    NUTRITION INTERVENTION:  Education Materials Provided: My Plate Planner/Choose My Plate and Weight Loss Tips    PATIENT'S BEHAVIOR CHANGE GOALS:     1) Walk for 30 minutes 3-4 days/week  2) Limit carbohydrate at meals to  30-45 grams and snacks to 15 grams  3) Continue to omit sugary drinks.  MONITOR / EVALUATE:  RD will monitor/evaluate:  Blood Glucose / A1c    FOLLOW-UP:  Call RD with questions/concerns.     Celine Duarte RD, LD, [facility]  Time spent in minutes: 32  Encounter: Individual

## 2023-07-13 NOTE — PATIENT INSTRUCTIONS
Medical Nutrition Therapy    My Healthy Eating and Activity Goals:      1)Count carbohydrates and have 30-45 grans carbohydrates at each meal and 15 grams carbohydrates at snacks  2)Continue to omit sugar-sweetened drink intake  3)Eat 3 meals a day  4)Have a snack at between breakfast and lunch meal  5)Increase physical activity: 30 minutes/day 3-4 days/week

## 2023-07-13 NOTE — LETTER
7/13/2023         RE: Kamala Harris  [address]  [place] [address]        Dear Colleague,    Thank you for referring your patient, Kamala Harris, to the [facility]. Please see a copy of my visit note below.    Medical Nutrition Therapy  Visit Type:Initial assessment and intervention    Kamala Harris presents today for MNT and education related to prediabetes.   She is accompanied by self.     ASSESSMENT:   Patient comments/concerns relating to nutrition: Concern for elevations in blood glucose, monitoring 1-2 times/day, had recent 200's when on prednisone for asthma.  Has made a lot of lifestyle changes since she learned of this, has cut back on sweets and eliminated sugary drinks    NUTRITION HISTORY:    Breakfast: Premier Protein Shake  Lunch: Turkey sandwich with cheese, lettuce, tomato or cheese quesadilla    Dinner: Healthy choice frozen meal or pasta with vegetables or zucchini noodles with veg.  Snacks: popcorn chips  Beverages: Sports Drink (SF Gatorade, Propel, etc.)  1-2 /day and Water 3-4/day    Misses meals? no  Eats out:  seldom     Previous diet education:  No     Food allergies/intolerances: no    Diet is high in: current diet changes show a more balanced diet  Diet is low in: fruits    EXERCISE: no regular exercise program    SOCIO/ECONOMIC:   Lives with: self    MEDICATIONS:  Current Outpatient Medications   Medication     albuterol (PROAIR HFA/PROVENTIL HFA/VENTOLIN HFA) 108 (90 Base) MCG/ACT inhaler     albuterol (PROAIR HFA/PROVENTIL HFA/VENTOLIN HFA) 108 (90 Base) MCG/ACT inhaler     escitalopram (LEXAPRO) 10 MG tablet     etonogestrel (NEXPLANON) 68 MG IMPL     fludrocortisone (FLORINEF) 0.1 MG tablet     fluticasone (FLONASE) 50 MCG/ACT nasal spray     lisdexamfetamine (VYVANSE) 20 MG capsule     meclizine (ANTIVERT) 25 MG tablet     naproxen (NAPROSYN) 500 MG tablet     ondansetron (ZOFRAN ODT) 4 MG ODT tab     ubrogepant  (UBRELVY) 100 MG tablet     No current facility-administered medications for this visit.       LABS:  Lab Results   Component Value Date     04/07/2023     03/20/2020      Lab Results   Component Value Date    POTASSIUM 3.6 04/07/2023    POTASSIUM 3.8 04/12/2022    POTASSIUM 4.1 03/20/2020     Lab Results   Component Value Date    CHLORIDE 107 04/07/2023    CHLORIDE 108 04/12/2022    CHLORIDE 108 03/20/2020     Lab Results   Component Value Date    MIKO 8.9 04/07/2023    MIKO 9.2 03/20/2020     Lab Results   Component Value Date    CO2 27 04/07/2023    CO2 25 04/12/2022    CO2 26 03/20/2020     Lab Results   Component Value Date    BUN 9.5 04/07/2023    BUN 13 04/12/2022    BUN 12 03/20/2020     Lab Results   Component Value Date    CR 0.74 04/07/2023    CR 0.66 03/20/2020     Lab Results   Component Value Date    GLC 94 04/07/2023    GLC 98 04/12/2022     03/20/2020     No results found for: LDL  No results found for: HDL]  GFR Estimate   Date Value Ref Range Status   04/07/2023 >90 >60 mL/min/1.73m2 Final     Comment:     eGFR calculated using 2021 CKD-EPI equation.   03/20/2020 >90 >60 mL/min/[1.73_m2] Final     Comment:     Non  GFR Calc  Starting 12/18/2018, serum creatinine based estimated GFR (eGFR) will be   calculated using the Chronic Kidney Disease Epidemiology Collaboration   (CKD-EPI) equation.       Lab Results   Component Value Date    CR 0.74 04/07/2023    CR 0.66 03/20/2020     No results found for: MICROALBUMIN    ANTHROPOMETRICS:  Vitals: There were no vitals taken for this visit.  There is no height or weight on file to calculate BMI.      Wt Readings from Last 5 Encounters:   06/26/23 97.7 kg (215 lb 6.4 oz)   05/16/23 94.3 kg (208 lb)   05/15/23 94.3 kg (208 lb)   05/09/23 94.7 kg (208 lb 12.8 oz)   04/25/23 94.4 kg (208 lb 3.2 oz)       ESTIMATED KCAL REQUIREMENTS:  1921-2084 kcal per day    NUTRITION DIAGNOSIS: Food- and nutrition-related knowledge deficit  related to portion sizes as evidenced by food recall    NUTRITION INTERVENTION:  Education Materials Provided: My Plate Planner/Choose My Plate and Weight Loss Tips    PATIENT'S BEHAVIOR CHANGE GOALS:     1) Walk for 30 minutes 3-4 days/week  2) Limit carbohydrate at meals to 30-45 grams and snacks to 15 grams  3) Continue to omit sugary drinks.  MONITOR / EVALUATE:  RD will monitor/evaluate:  Blood Glucose / A1c    FOLLOW-UP:  Call RD with questions/concerns.     Celine Duarte RD, LD, [facility]  Time spent in minutes: 32  Encounter: Individual

## 2023-07-17 ASSESSMENT — ENCOUNTER SYMPTOMS
ALTERED TEMPERATURE REGULATION: 0
DEPRESSION: 1
SEIZURES: 0
SHORTNESS OF BREATH: 1
DIZZINESS: 0
TREMORS: 0
INCREASED ENERGY: 1
FEVER: 0
POSTURAL DYSPNEA: 0
DECREASED APPETITE: 1
MEMORY LOSS: 1
WHEEZING: 0
FATIGUE: 1
PARALYSIS: 0
SPEECH CHANGE: 1
POLYPHAGIA: 0
HEADACHES: 1
DISTURBANCES IN COORDINATION: 1
LOSS OF CONSCIOUSNESS: 0
DYSPNEA ON EXERTION: 0
COUGH: 0
HEMOPTYSIS: 0
PANIC: 1
COUGH DISTURBING SLEEP: 0
DECREASED CONCENTRATION: 1
POLYDIPSIA: 1
HALLUCINATIONS: 0
SPUTUM PRODUCTION: 0
WEAKNESS: 0
WEIGHT LOSS: 0
INSOMNIA: 1
NIGHT SWEATS: 0
NERVOUS/ANXIOUS: 1
CHILLS: 0
WEIGHT GAIN: 0
TINGLING: 0
SNORES LOUDLY: 1
NUMBNESS: 0

## 2023-07-18 ENCOUNTER — OFFICE VISIT (OUTPATIENT)
Dept: ENDOCRINOLOGY | Facility: CLINIC | Age: 23
End: 2023-07-18
Attending: NURSE PRACTITIONER
Payer: COMMERCIAL

## 2023-07-18 ENCOUNTER — PRE VISIT (OUTPATIENT)
Dept: ENDOCRINOLOGY | Facility: CLINIC | Age: 23
End: 2023-07-18

## 2023-07-18 VITALS
SYSTOLIC BLOOD PRESSURE: 112 MMHG | BODY MASS INDEX: 34.85 KG/M2 | OXYGEN SATURATION: 97 % | DIASTOLIC BLOOD PRESSURE: 76 MMHG | HEART RATE: 80 BPM | WEIGHT: 213 LBS

## 2023-07-18 DIAGNOSIS — R35.0 INCREASED FREQUENCY OF URINATION: ICD-10-CM

## 2023-07-18 DIAGNOSIS — E66.812 CLASS 2 OBESITY WITH BODY MASS INDEX (BMI) OF 35.0 TO 35.9 IN ADULT, UNSPECIFIED OBESITY TYPE, UNSPECIFIED WHETHER SERIOUS COMORBIDITY PRESENT: Primary | ICD-10-CM

## 2023-07-18 DIAGNOSIS — R42 DIZZINESS: ICD-10-CM

## 2023-07-18 DIAGNOSIS — E86.0 DEHYDRATION: ICD-10-CM

## 2023-07-18 DIAGNOSIS — R79.89 ABNORMAL TSH: ICD-10-CM

## 2023-07-18 DIAGNOSIS — R73.09 ELEVATED HEMOGLOBIN A1C: ICD-10-CM

## 2023-07-18 DIAGNOSIS — R63.1 INCREASED THIRST: ICD-10-CM

## 2023-07-18 PROCEDURE — 99204 OFFICE O/P NEW MOD 45 MIN: CPT | Performed by: INTERNAL MEDICINE

## 2023-07-18 RX ORDER — PAROXETINE 20 MG/1
TABLET, FILM COATED ORAL
COMMUNITY
Start: 2023-07-11 | End: 2023-10-30

## 2023-07-18 ASSESSMENT — PAIN SCALES - GENERAL: PAINLEVEL: NO PAIN (0)

## 2023-07-18 NOTE — LETTER
7/18/2023       RE: Kamala Harris  [address]  [place] [address]     Dear Colleague,    Thank you for referring your patient, Kamala Harris, to the [facility] [place] [facility]. Please see a copy of my visit note below.    Kamala is a 23 year old female presents today for New Patient (Thyroid )      HPI  23-year-old female who has been referred by Jazzmine Warren DNP for evaluation of abnormal hemoglobin A1c and TSH.    On recent labs patient was noted to have an A1c of 6.4% which increased from 5.8% 3 months ago.  Patient reports requiring prednisone treatment for asthma exacerbation.  She does not remember the dose of prednisone but took it for about 7 days.  She also reports that she had a random glucose of 270 while on prednisone and ended up going to emergency room for evaluation of high glucose.  Glucose readings have improved since stopping prednisone  currently checking blood glucose usually in the morning average fasting glucose over the last ~10 days is 115 with standard deviation of 7.  She is not checking glucose readings during the day regularly but notes that readings are now usually less than 180   has met with dietitian trying to restrict carb intake to 35 to 40 g for each meal    She reports multiple positive symptoms on review of systems.  Most concerning symptoms for her dizziness spells, migraine, increased thirst, feeling of fogginess   reports that she can experience symptoms related to fluctuation in blood glucose level  History of type 2 diabetes in grandmother    No known family history of thyroid issues.  TSH was 0.24 with normal free T4 in March 2023, repeat TSH in 2 weeks was normal.  Thyrotropin receptor antibody was negative    Reports gaining weight after  birth of her daughter.  Complains of not able to lose weight     Latest Ref Rng 7/30/2021  1:51 PM 4/12/2022  2:03 PM  3/23/2023  8:21 AM   ENDO THYROID LABS-[facility]       TSH 0.40 - 4.00 mU/L 0.32 (L)  0.75     TSH 0.30 - 4.20 uIU/mL   0.24 (L)    Free T3 2.3 - 4.2 pg/mL      Triiodothyronine (T3) 85 - 202 ng/dL      FREE T4 0.90 - 1.70 ng/dL 0.96   0.94       Latest Ref Rng 4/4/2023  2:05 PM   ENDO THYROID LABS[facility]     TSH 0.40 - 4.00 mU/L    TSH 0.30 - 4.20 uIU/mL 0.48    Free T3 2.3 - 4.2 pg/mL    Triiodothyronine (T3) 85 - 202 ng/dL 116    FREE T4 0.90 - 1.70 ng/dL       Legend:  (L) Low       Latest Reference Range & Units 04/04/23 14:05   Thyrotropin Receptor Antibody 0.00 - 1.75 IU/L <1.10        Latest Ref Rng 3/23/2023  8:21 AM 4/7/2023  2:29 PM 6/26/2023  8:19 AM   ENDO DIABETES       Hemoglobin A1C 0.0 - 5.6 % 5.8 (H)   6.4 (H)       Legend:  (H) High      Past Medical History:   Diagnosis Date    ADHD     Depressive disorder     Heart disease     maternal congeital heart - cardiac catherization     Pap smear of cervix with ASCUS, cannot exclude HGSIL 1/19/2022    Sprain of right wrist     right side muliple times     Uncomplicated asthma     exercise induced   Asthma    WPW   Insomnia   Migraines  Sleep Apnea      Allergies  Allergies   Allergen Reactions    Iodine Rash and Itching     Rash itching       Medications  Current Outpatient Medications   Medication Sig Dispense Refill    albuterol (PROAIR HFA/PROVENTIL HFA/VENTOLIN HFA) 108 (90 Base) MCG/ACT inhaler Inhale 2 puffs into the lungs      albuterol (PROAIR HFA/PROVENTIL HFA/VENTOLIN HFA) 108 (90 Base) MCG/ACT inhaler Inhale 2 puffs into the lungs every 4 hours as needed for shortness of breath / dyspnea or wheezing 18 g 3    etonogestrel (NEXPLANON) 68 MG IMPL Inject 68 mg Subcutaneous      meclizine (ANTIVERT) 25 MG tablet Take 25 mg by mouth 3 times daily as needed for dizziness      naproxen (NAPROSYN) 500 MG tablet Take 1 tablet (500 mg) by mouth 2 times daily as needed for moderate pain or headaches 28 tablet 11    ondansetron (ZOFRAN ODT) 4 MG ODT tab Take 1  tablet (4 mg) by mouth every 12 hours as needed for nausea 10 tablet 0    PARoxetine (PAXIL) 20 MG tablet       ubrogepant (UBRELVY) 100 MG tablet Take 1 tablet (100 mg) by mouth at onset of headache (migraine) 10 tablet 11    escitalopram (LEXAPRO) 10 MG tablet       fludrocortisone (FLORINEF) 0.1 MG tablet Take 0.1 mg by mouth daily      fluticasone (FLONASE) 50 MCG/ACT nasal spray  (Patient not taking: Reported on 4/25/2023)      lisdexamfetamine (VYVANSE) 20 MG capsule Take 20 mg by mouth daily       Family History  family history includes Anxiety Disorder in her brother; Cerebrovascular Disease in her maternal grandfather; Depression in her brother; Diabetes in her maternal grandmother; Migraines in her mother; Myocardial Infarction in her paternal grandfather and paternal grandmother; Sleep Apnea in her mother; Snoring in her father; Substance Abuse in her father.  Social History     Socioeconomic History    Marital status:      Spouse name: Not on file    Number of children: Not on file    Years of education: Not on file    Highest education level: Not on file   Occupational History    Not on file   Tobacco Use    Smoking status: Never     Passive exposure: Never    Smokeless tobacco: Never   Vaping Use    Vaping Use: Never used   Substance and Sexual Activity    Alcohol use: Not Currently    Drug use: No    Sexual activity: Yes     Partners: Male     Birth control/protection: Implant     Comment: nexplanon    Other Topics Concern     Service Not Asked    Blood Transfusions Not Asked    Caffeine Concern Yes     Comment: occ    Occupational Exposure Not Asked    Hobby Hazards Not Asked    Sleep Concern Not Asked    Stress Concern Not Asked    Weight Concern Not Asked    Special Diet Not Asked    Back Care Not Asked    Exercise Not Asked    Bike Helmet Not Asked    Seat Belt Yes    Self-Exams Not Asked    Parent/sibling w/ CABG, MI or angioplasty before 65F 55M? No   Social History Narrative     Not on file     Social Determinants of Health     Financial Resource Strain: Not on file   Food Insecurity: Not on file   Transportation Needs: Not on file   Physical Activity: Not on file   Stress: Not on file   Social Connections: Not on file   Intimate Partner Violence: Not on file   Housing Stability: Not on file     Lives with mother and daughter      ROS      Physical Exam  /76   Pulse 80   Wt 96.6 kg (213 lb)   SpO2 97%   BMI 34.85 kg/m    Body mass index is 34.85 kg/m .    Constitutional: no distress, comfortable, pleasant   Eyes: anicteric, normal extra-ocular movements, No lig lag, retraction or proptosis  Neck: supple, no thyromegaly.   Cardiovascular: regular rate and rhythm, normal S1 and S2, no murmurs  Respiratory: clear to auscultation, no wheezes or crackles, normal breath sounds    Musculoskeletal: no edema   Skin: no concerning lesions, no jaundice, pale thin striae over abdomen  Neurological: cranial nerves intact, normal strength, reflexes at patella and biceps normal, no tremor   Psychological: appropriate mood   Lymphatic: no cervical lymphadenopathy    RESULTS  Pertinent labs noted in HPI    ASSESSMENT AND  PLAN:     Prediabetes: Based on hemoglobin A1c of 6.4%.  Fingerstick glucose readings were in the diabetes range while on steroids  Patient has seen dietitian and is implementing low-carb diet  Continue to monitor fingerstick blood glucose including fasting and occasionally 2 to 3 hours after meals  Repeat A1c in 3 months  Consider starting metformin if no improvement in A1c with dietary changes  Further A1c follow-up and if needed metformin could be started through the primary care office    Obesity: Patient was counseled about obesity and association with type 2 diabetes.  Weight loss would reduce A1c and prevent risk of developing type 2 diabetes  Referral to weight management clinic.  Patient is agreeable    Abnormal TSH: Mildly low TSH with normal free T4.  Repeat TSH and  free T4 were normal.  Thyrotropin receptor antibody was negative  Continue to monitor TSH once a year    We will not schedule follow-up at endocrine clinic at this time.  Patient to follow-up with primary care  Return to endocrine clinic as needed    SARBJIT Willams    Note: Chart documentation done in part with Dragon Voice Recognition software. Although reviewed after completion, some word and grammatical errors may remain.  Please consider this when interpreting information in this chart

## 2023-07-18 NOTE — PROGRESS NOTES
Kamala is a 23 year old female presents today for New Patient (Thyroid )      HPI  23-year-old female who has been referred by Jazzmine Warren DNP for evaluation of abnormal hemoglobin A1c and TSH.    On recent labs patient was noted to have an A1c of 6.4% which increased from 5.8% 3 months ago.  Patient reports requiring prednisone treatment for asthma exacerbation.  She does not remember the dose of prednisone but took it for about 7 days.  She also reports that she had a random glucose of 270 while on prednisone and ended up going to emergency room for evaluation of high glucose.  Glucose readings have improved since stopping prednisone  currently checking blood glucose usually in the morning average fasting glucose over the last ~10 days is 115 with standard deviation of 7.  She is not checking glucose readings during the day regularly but notes that readings are now usually less than 180   has met with dietitian trying to restrict carb intake to 35 to 40 g for each meal    She reports multiple positive symptoms on review of systems.  Most concerning symptoms for her dizziness spells, migraine, increased thirst, feeling of fogginess   reports that she can experience symptoms related to fluctuation in blood glucose level  History of type 2 diabetes in grandmother    No known family history of thyroid issues.  TSH was 0.24 with normal free T4 in March 2023, repeat TSH in 2 weeks was normal.  Thyrotropin receptor antibody was negative    Reports gaining weight after  birth of her daughter.  Complains of not able to lose weight     Latest Ref Rng 7/30/2021  1:51 PM 4/12/2022  2:03 PM 3/23/2023  8:21 AM   ENDO THYROID LABS-[facility]       TSH 0.40 - 4.00 mU/L 0.32 (L)  0.75     TSH 0.30 - 4.20 uIU/mL   0.24 (L)    Free T3 2.3 - 4.2 pg/mL      Triiodothyronine (T3) 85 - 202 ng/dL      FREE T4 0.90 - 1.70 ng/dL 0.96   0.94       Latest Ref Rng 4/4/2023  2:05 PM   ENDO THYROID LABS-[facility]     TSH 0.40 - 4.00 mU/L    TSH 0.30 -  4.20 uIU/mL 0.48    Free T3 2.3 - 4.2 pg/mL    Triiodothyronine (T3) 85 - 202 ng/dL 116    FREE T4 0.90 - 1.70 ng/dL       Legend:  (L) Low       Latest Reference Range & Units 04/04/23 14:05   Thyrotropin Receptor Antibody 0.00 - 1.75 IU/L <1.10        Latest Ref Rng 3/23/2023  8:21 AM 4/7/2023  2:29 PM 6/26/2023  8:19 AM   ENDO DIABETES       Hemoglobin A1C 0.0 - 5.6 % 5.8 (H)   6.4 (H)       Legend:  (H) High      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      Pap smear of cervix with ASCUS, cannot exclude HGSIL 1/19/2022     Sprain of right wrist     right side muliple times      Uncomplicated asthma     exercise induced   Asthma    WPW   Insomnia   Migraines  Sleep Apnea      Allergies  Allergies   Allergen Reactions     Iodine Rash and Itching     Rash itching       Medications  Current Outpatient Medications   Medication Sig Dispense Refill     albuterol (PROAIR HFA/PROVENTIL HFA/VENTOLIN HFA) 108 (90 Base) MCG/ACT inhaler Inhale 2 puffs into the lungs       albuterol (PROAIR HFA/PROVENTIL HFA/VENTOLIN HFA) 108 (90 Base) MCG/ACT inhaler Inhale 2 puffs into the lungs every 4 hours as needed for shortness of breath / dyspnea or wheezing 18 g 3     etonogestrel (NEXPLANON) 68 MG IMPL Inject 68 mg Subcutaneous       meclizine (ANTIVERT) 25 MG tablet Take 25 mg by mouth 3 times daily as needed for dizziness       naproxen (NAPROSYN) 500 MG tablet Take 1 tablet (500 mg) by mouth 2 times daily as needed for moderate pain or headaches 28 tablet 11     ondansetron (ZOFRAN ODT) 4 MG ODT tab Take 1 tablet (4 mg) by mouth every 12 hours as needed for nausea 10 tablet 0     PARoxetine (PAXIL) 20 MG tablet        ubrogepant (UBRELVY) 100 MG tablet Take 1 tablet (100 mg) by mouth at onset of headache (migraine) 10 tablet 11     escitalopram (LEXAPRO) 10 MG tablet        fludrocortisone (FLORINEF) 0.1 MG tablet Take 0.1 mg  by mouth daily       fluticasone (FLONASE) 50 MCG/ACT nasal spray  (Patient not taking: Reported on 4/25/2023)       lisdexamfetamine (VYVANSE) 20 MG capsule Take 20 mg by mouth daily       Family History  family history includes Anxiety Disorder in her brother; Cerebrovascular Disease in her maternal grandfather; Depression in her brother; Diabetes in her maternal grandmother; Migraines in her mother; Myocardial Infarction in her paternal grandfather and paternal grandmother; Sleep Apnea in her mother; Snoring in her father; Substance Abuse in her father.  Social History     Socioeconomic History     Marital status:      Spouse name: Not on file     Number of children: Not on file     Years of education: Not on file     Highest education level: Not on file   Occupational History     Not on file   Tobacco Use     Smoking status: Never     Passive exposure: Never     Smokeless tobacco: Never   Vaping Use     Vaping Use: Never used   Substance and Sexual Activity     Alcohol use: Not Currently     Drug use: No     Sexual activity: Yes     Partners: Male     Birth control/protection: Implant     Comment: nexplanon    Other Topics Concern      Service Not Asked     Blood Transfusions Not Asked     Caffeine Concern Yes     Comment: occ     Occupational Exposure Not Asked     Hobby Hazards Not Asked     Sleep Concern Not Asked     Stress Concern Not Asked     Weight Concern Not Asked     Special Diet Not Asked     Back Care Not Asked     Exercise Not Asked     Bike Helmet Not Asked     Seat Belt Yes     Self-Exams Not Asked     Parent/sibling w/ CABG, MI or angioplasty before 65F 55M? No   Social History Narrative     Not on file     Social Determinants of Health     Financial Resource Strain: Not on file   Food Insecurity: Not on file   Transportation Needs: Not on file   Physical Activity: Not on file   Stress: Not on file   Social Connections: Not on file   Intimate Partner Violence: Not on file    Housing Stability: Not on file     Lives with mother and daughter      ALVAREZ      Physical Exam  /76   Pulse 80   Wt 96.6 kg (213 lb)   SpO2 97%   BMI 34.85 kg/m    Body mass index is 34.85 kg/m .    Constitutional: no distress, comfortable, pleasant   Eyes: anicteric, normal extra-ocular movements, No lig lag, retraction or proptosis  Neck: supple, no thyromegaly.   Cardiovascular: regular rate and rhythm, normal S1 and S2, no murmurs  Respiratory: clear to auscultation, no wheezes or crackles, normal breath sounds    Musculoskeletal: no edema   Skin: no concerning lesions, no jaundice, pale thin striae over abdomen  Neurological: cranial nerves intact, normal strength, reflexes at patella and biceps normal, no tremor   Psychological: appropriate mood   Lymphatic: no cervical lymphadenopathy    RESULTS  Pertinent labs noted in HPI    ASSESSMENT AND  PLAN:     Prediabetes: Based on hemoglobin A1c of 6.4%.  Fingerstick glucose readings were in the diabetes range while on steroids  Patient has seen dietitian and is implementing low-carb diet  Continue to monitor fingerstick blood glucose including fasting and occasionally 2 to 3 hours after meals  Repeat A1c in 3 months  Consider starting metformin if no improvement in A1c with dietary changes  Further A1c follow-up and if needed metformin could be started through the primary care office    Obesity: Patient was counseled about obesity and association with type 2 diabetes.  Weight loss would reduce A1c and prevent risk of developing type 2 diabetes  Referral to weight management clinic.  Patient is agreeable    Abnormal TSH: Mildly low TSH with normal free T4.  Repeat TSH and free T4 were normal.  Thyrotropin receptor antibody was negative  Continue to monitor TSH once a year    We will not schedule follow-up at endocrine clinic at this time.  Patient to follow-up with primary care  Return to endocrine clinic as needed    SARBJIT Willams    Note: Chart  documentation done in part with Dragon Voice Recognition software. Although reviewed after completion, some word and grammatical errors may remain.  Please consider this when interpreting information in this chart

## 2023-07-19 ENCOUNTER — THERAPY VISIT (OUTPATIENT)
Dept: OCCUPATIONAL THERAPY | Facility: CLINIC | Age: 23
End: 2023-07-19
Attending: PHYSICAL MEDICINE & REHABILITATION
Payer: OTHER MISCELLANEOUS

## 2023-07-19 DIAGNOSIS — S06.0X0D CONCUSSION WITHOUT LOSS OF CONSCIOUSNESS, SUBSEQUENT ENCOUNTER: Primary | ICD-10-CM

## 2023-07-19 PROCEDURE — 97530 THERAPEUTIC ACTIVITIES: CPT | Mod: GO | Performed by: SPECIALIST/TECHNOLOGIST

## 2023-07-26 ENCOUNTER — THERAPY VISIT (OUTPATIENT)
Dept: OCCUPATIONAL THERAPY | Facility: CLINIC | Age: 23
End: 2023-07-26
Attending: PHYSICAL MEDICINE & REHABILITATION
Payer: OTHER MISCELLANEOUS

## 2023-07-26 DIAGNOSIS — S06.0X0D CONCUSSION WITHOUT LOSS OF CONSCIOUSNESS, SUBSEQUENT ENCOUNTER: Primary | ICD-10-CM

## 2023-07-26 PROCEDURE — 97110 THERAPEUTIC EXERCISES: CPT | Mod: GO | Performed by: SPECIALIST/TECHNOLOGIST

## 2023-07-26 PROCEDURE — 97530 THERAPEUTIC ACTIVITIES: CPT | Mod: GO | Performed by: SPECIALIST/TECHNOLOGIST

## 2023-07-27 ENCOUNTER — MYC MEDICAL ADVICE (OUTPATIENT)
Dept: FAMILY MEDICINE | Facility: CLINIC | Age: 23
End: 2023-07-27
Payer: COMMERCIAL

## 2023-07-31 ENCOUNTER — TELEPHONE (OUTPATIENT)
Dept: FAMILY MEDICINE | Facility: CLINIC | Age: 23
End: 2023-07-31
Payer: COMMERCIAL

## 2023-07-31 NOTE — TELEPHONE ENCOUNTER
Please help patient schedule a visit (OV or virtual) to discuss how she is doing now (it's been a month since I last saw her) and to clarify disability paperwork.     Thanks,  Jazmzine Warren DNP, NP-C

## 2023-08-02 ENCOUNTER — OFFICE VISIT (OUTPATIENT)
Dept: FAMILY MEDICINE | Facility: CLINIC | Age: 23
End: 2023-08-02
Payer: COMMERCIAL

## 2023-08-02 ENCOUNTER — NURSE TRIAGE (OUTPATIENT)
Dept: FAMILY MEDICINE | Facility: CLINIC | Age: 23
End: 2023-08-02

## 2023-08-02 VITALS
HEART RATE: 100 BPM | OXYGEN SATURATION: 100 % | DIASTOLIC BLOOD PRESSURE: 66 MMHG | RESPIRATION RATE: 16 BRPM | BODY MASS INDEX: 34.82 KG/M2 | SYSTOLIC BLOOD PRESSURE: 114 MMHG | WEIGHT: 209 LBS | TEMPERATURE: 97.7 F | HEIGHT: 65 IN

## 2023-08-02 DIAGNOSIS — K62.5 BRBPR (BRIGHT RED BLOOD PER RECTUM): Primary | ICD-10-CM

## 2023-08-02 PROCEDURE — 99213 OFFICE O/P EST LOW 20 MIN: CPT | Performed by: PHYSICIAN ASSISTANT

## 2023-08-02 RX ORDER — HYDROXYZINE PAMOATE 25 MG/1
25 CAPSULE ORAL 4 TIMES DAILY PRN
COMMUNITY
Start: 2023-07-25

## 2023-08-02 RX ORDER — HYDROCORTISONE ACETATE 25 MG/1
25 SUPPOSITORY RECTAL 2 TIMES DAILY PRN
Qty: 24 SUPPOSITORY | Refills: 0 | Status: SHIPPED | OUTPATIENT
Start: 2023-08-02 | End: 2023-10-30

## 2023-08-02 RX ORDER — TRAZODONE HYDROCHLORIDE 50 MG/1
50 TABLET, FILM COATED ORAL AT BEDTIME
COMMUNITY
Start: 2023-07-18

## 2023-08-02 ASSESSMENT — ANXIETY QUESTIONNAIRES
4. TROUBLE RELAXING: MORE THAN HALF THE DAYS
3. WORRYING TOO MUCH ABOUT DIFFERENT THINGS: MORE THAN HALF THE DAYS
IF YOU CHECKED OFF ANY PROBLEMS ON THIS QUESTIONNAIRE, HOW DIFFICULT HAVE THESE PROBLEMS MADE IT FOR YOU TO DO YOUR WORK, TAKE CARE OF THINGS AT HOME, OR GET ALONG WITH OTHER PEOPLE: VERY DIFFICULT
GAD7 TOTAL SCORE: 13
1. FEELING NERVOUS, ANXIOUS, OR ON EDGE: MORE THAN HALF THE DAYS
GAD7 TOTAL SCORE: 13
2. NOT BEING ABLE TO STOP OR CONTROL WORRYING: MORE THAN HALF THE DAYS
5. BEING SO RESTLESS THAT IT IS HARD TO SIT STILL: MORE THAN HALF THE DAYS
7. FEELING AFRAID AS IF SOMETHING AWFUL MIGHT HAPPEN: NOT AT ALL
6. BECOMING EASILY ANNOYED OR IRRITABLE: NEARLY EVERY DAY

## 2023-08-02 ASSESSMENT — PATIENT HEALTH QUESTIONNAIRE - PHQ9
SUM OF ALL RESPONSES TO PHQ QUESTIONS 1-9: 13
10. IF YOU CHECKED OFF ANY PROBLEMS, HOW DIFFICULT HAVE THESE PROBLEMS MADE IT FOR YOU TO DO YOUR WORK, TAKE CARE OF THINGS AT HOME, OR GET ALONG WITH OTHER PEOPLE: EXTREMELY DIFFICULT
SUM OF ALL RESPONSES TO PHQ QUESTIONS 1-9: 13

## 2023-08-02 NOTE — PATIENT INSTRUCTIONS
Coni Wray,    Thank you for allowing [facility] to manage your care.    I am unsure of the cause of your symptoms, but your exam is reassuring. This is likely an internal hemorrhoid.    If you develop worsening/changing symptoms at any time, please call 911 or go to the emergency department for evaluation as we discussed.    I sent your prescriptions to your pharmacy. Drink 8-10 glasses of fluid daily to stay well-hydrated.    Use Miralax 1-3 times a day as needed to keep stools soft.    I made a referral to gastroenterology. They will be calling in approximately 1 week to set up your appointment.  If you do not hear from them, please call the specialty number on your after visit summary. If the blood resolves, you do not need to follow up with gastroenterology.    If you have any questions or concerns, please feel free to call us at [phone]    Sincerely,    Jason Ignacio PA-C    Did you know?      You can schedule a video visit for follow-up appointments as well as future appointments for certain conditions.  Please see the below link.     [email or web address]    If you have not already done so,  I encourage you to sign up for [facility] ([email or web address]).  This will allow you to review your results, securely communicate with a provider, and schedule virtual visits as well.

## 2023-08-02 NOTE — TELEPHONE ENCOUNTER
Pt called with rectal bleeding after being constipated for 2 days.       RN assisted in scheduling an appointment same day. Pt believes this could be a hemorrhoid.     Mild bleeding one time today    RN discussed in detail when to go to ED    Patient verbalized understanding and in agreement with plan of care.     Alisson Martines RN  Reason for Disposition   MILD rectal bleeding (more than just a few drops or streaks)    Additional Information   Negative: Passed out (i.e., fainted, collapsed and was not responding)   Negative: Shock suspected (e.g., cold/pale/clammy skin, too weak to stand, low BP, rapid pulse)   Negative: Vomiting red blood or black (coffee ground) material   Negative: Sounds like a life-threatening emergency to the triager   Negative: Diarrhea is main symptom   Negative: Rectal symptoms   Negative: SEVERE rectal bleeding (large blood clots; constant or on and off bleeding)   Negative: SEVERE dizziness (e.g., unable to stand, requires support to walk, feels like passing out now)   Negative: MODERATE rectal bleeding (small blood clots, passing blood without stool, or toilet water turns red) more than once a day   Negative: Bloody, black, or tarry bowel movements  (Exception: Chronic-unchanged black-grey bowel movements and is taking iron pills or Pepto-Bismol.)   Negative: High-risk adult (e.g., prior surgery on aorta, abdominal aortic aneurysm)   Negative: Rectal foreign body (inserted or swallowed)   Negative: SEVERE abdominal pain (e.g., excruciating)   Negative: Constant abdominal pain lasting > 2 hours   Negative: Pale skin (pallor) of new-onset or worsening   Negative: Patient sounds very sick or weak to the triager   Negative: MODERATE rectal bleeding (small blood clots, passing blood without stool, or toilet water turns red)   Negative: Taking Coumadin (warfarin) or other strong blood thinner, or known bleeding disorder (e.g., thrombocytopenia)   Negative: Colonoscopy in past 72 hours    "Negative: Known cirrhosis of the liver (or history of liver failure or ascites)   Negative: Patient wants to be seen    Answer Assessment - Initial Assessment Questions  1. APPEARANCE of BLOOD: \"What color is it?\" \"Is it passed separately, on the surface of the stool, or mixed in with the stool?\"       Bright red, surface   2. AMOUNT: \"How much blood was passed?\"       1-2 oz   3. FREQUENCY: \"How many times has blood been passed with the stools?\"       Just the once   4. ONSET: \"When was the blood first seen in the stools?\" (Days or weeks)       Today   5. DIARRHEA: \"Is there also some diarrhea?\" If Yes, ask: \"How many diarrhea stools in the past 24 hours?\"       none  6. CONSTIPATION: \"Do you have constipation?\" If Yes, ask: \"How bad is it?\"      Yes this was the first BM in 2 days   7. RECURRENT SYMPTOMS: \"Have you had blood in your stools before?\" If Yes, ask: \"When was the last time?\" and \"What happened that time?\"       no  8. BLOOD THINNERS: \"Do you take any blood thinners?\" (e.g., Coumadin/warfarin, Pradaxa/dabigatran, aspirin)      no  9. OTHER SYMPTOMS: \"Do you have any other symptoms?\"  (e.g., abdomen pain, vomiting, dizziness, fever)      Fatigued, numbness/ faint pressure   10. PREGNANCY: \"Is there any chance you are pregnant?\" \"When was your last menstrual period?\"        Have nexplanon    Protocols used: Rectal Bleeding-A-[place]    "

## 2023-08-02 NOTE — PROGRESS NOTES
Assessment & Plan   Problem List Items Addressed This Visit    None  Visit Diagnoses       BRBPR (bright red blood per rectum)    -  Primary    Relevant Medications    hydrocortisone (ANUSOL-HC) 25 MG suppository    Other Relevant Orders    Adult GI  Referral - Consult Only           Red blood per rectum of unknown etiology. Suspect bleeding internal hemorrhoid vs diverticular disease, though favor the former given patient's age. Lower suspicion for IBD, CA or other worrisome issue clinically. Will treat empirically with Anusol and bowel regimen/hydration for hemorrhoids. Referral to GI ordered, unless symptoms completely resolve.     Complete history and physical exam as below. Afebrile with normal vital signs  aside from tachycardia which resolved by the time I examined him.    DDx and Dx discussed with and explained to the pt to their satisfaction.  All questions were answered at this time. Pt expressed understanding of and agreement with this dx, tx, and plan. No further workup warranted and standard medication warnings given. I have given the patient a list of pertinent indications for re-evaluation. Will go to the Emergency Department if symptoms worsen or new concerning symptoms arise. Patient left in no apparent distress.   Prescription drug management     See Patient Instructions    HEVER Bass  [facility] FABRICIO Wray is a 23 year old, presenting for the following health issues:  Rectal Problem        8/2/2023     2:32 PM   Additional Questions   Roomed by VALDEZ   Accompanied by TEREZA         8/2/2023     2:32 PM   Patient Reported Additional Medications   Patient reports taking the following new medications trazodone and hydroxizine       History of Present Illness       Reason for visit:  Blood in stool  Symptom onset:  Today  Symptom intensity:  Moderate  Symptom progression:  Staying the same  Had these symptoms before:  No  What makes it worse:  Trying  "to poop    She eats 0-1 servings of fruits and vegetables daily.She consumes 0 sweetened beverage(s) daily.She exercises with enough effort to increase her heart rate 10 to 19 minutes per day.  She exercises with enough effort to increase her heart rate 3 or less days per week. She is missing 2 dose(s) of medications per week.     Bright red blood in toilet and on toilet. Stool was light brown in color. No history of this. No bruising or bleeding elsewhere. Last BM 2-3 day ago which is normal for her. No rectal foreign body use. Pelvic cramping the last 2 days. Urinating less than normal     Review of Systems   Constitutional, HEENT, cardiovascular, pulmonary, gi and gu systems are negative, except as otherwise noted.      Objective    /66   Pulse 100   Temp 97.7  F (36.5  C) (Temporal)   Resp 16   Ht 1.651 m (5' 5\")   Wt 94.8 kg (209 lb)   LMP  (LMP Unknown)   SpO2 100%   Breastfeeding No   BMI 34.78 kg/m    Body mass index is 34.78 kg/m .  Physical Exam  Vitals and nursing note reviewed. Exam conducted with a chaperone present (Komal Travis CMA).   Constitutional:       General: She is not in acute distress.     Appearance: She is not ill-appearing or diaphoretic.   HENT:      Head: Normocephalic and atraumatic.      Mouth/Throat:      Mouth: Mucous membranes are moist.   Eyes:      Conjunctiva/sclera: Conjunctivae normal.   Cardiovascular:      Rate and Rhythm: Normal rate and regular rhythm.      Heart sounds: Normal heart sounds. No murmur heard.     No friction rub. No gallop.   Pulmonary:      Effort: Pulmonary effort is normal. No respiratory distress.      Breath sounds: Normal breath sounds. No stridor. No wheezing, rhonchi or rales.   Abdominal:      General: Bowel sounds are normal. There is no distension.      Palpations: Abdomen is soft. There is no mass.      Tenderness: There is no abdominal tenderness. There is no guarding or rebound.      Hernia: No hernia is present. "   Genitourinary:     Comments: No external hemorrhoid, fissure, abscess. Normal sphincter tone without blood or melena on glove.  Skin:     General: Skin is warm and dry.   Neurological:      General: No focal deficit present.      Mental Status: She is alert. Mental status is at baseline.   Psychiatric:         Mood and Affect: Mood normal.         Behavior: Behavior normal.

## 2023-08-03 ENCOUNTER — TELEPHONE (OUTPATIENT)
Dept: FAMILY MEDICINE | Facility: CLINIC | Age: 23
End: 2023-08-03
Payer: COMMERCIAL

## 2023-08-03 ENCOUNTER — TELEPHONE (OUTPATIENT)
Dept: SURGERY | Facility: CLINIC | Age: 23
End: 2023-08-03
Payer: COMMERCIAL

## 2023-08-03 NOTE — TELEPHONE ENCOUNTER
Diagnosis, Referred by & from: BRBPR   Appt date: 10/23/2023   NOTES STATUS DETAILS   OFFICE NOTE from referring provider Internal [facility]:  8/2/23 - PCC OV with HEVER Lindsey   OFFICE NOTE from other specialist Care Everywhere / [facility]:  7/18/23 - ENDO OV with Dr. Clayton  8/19/22 - PT OV with Shawanda Son PT    [facility]:  6/26/23 - PCC OV with Jazzmine Warren NP    [facility]:  4/7/23 - UC OV with Dr. Mullins    [facility]:  9/26/22 - PCC OV with HEVER Vale  6/6/22 - PCC OV with Dr. Dolores Salinas:  6/8/22 - OBGYN OV with Shwetha Hernandez CNM   DISCHARGE SUMMARY from hospital N/A    DISCHARGE REPORT from the ER Care Everywhere [facility]:  6/26/22 - ED OV with HEVER Cody  6/5/22 - ED OV with Dr. Koenig   OPERATIVE REPORT N/A    MEDICATION LIST Internal    LABS     ANAL PAP/CEA N/A    BIOPSIES/PATHOLOGY RELATED TO DIAGNOSIS N/A    DIAGNOSTIC PROCEDURES     PFC TESTING (from the Pelvic floor center includes Manometry, PDNL, EMG, etc.) N/A    COLONOSCOPY N/A    UPPER ENDOSCOPY (EGD) N/A    FLEX SIGMOIDOSCOPY N/A    ERCP N/A    IMAGING (DISC & REPORT)      CT In process / [facility]:  4/7/23 - CT Abd/Pelvis    [facility]:  6/26/22 - US Pelvis  6/5/22 - US Pelvis   MRI N/A    XRAY N/A    ULTRASOUND  (ENDOANAL/ENDORECTAL) [facility]:  8/26/22 - US Pelvic     Records Requested  08/03/23    Facility  [facility]  Fax: [phone]   Outcome * 8/3/23 11:14 AM Faxed req to [facility] for images to be pushed to [facility]. - Jasmin

## 2023-08-03 NOTE — TELEPHONE ENCOUNTER
[facility] Call Center    Phone Message    May a detailed message be left on voicemail: yes     Reason for Call: Appointment Intake    Referring Provider Name: Nate Ignacio PA  Diagnosis and/or Symptoms: BRBPR (bright red blood per rectum) [K62.5]    Sending encounter per guidelines for review of dx/records. No outside recs per pt.   Pt scheduled for 10/23/23 with SEBASTIAN Fairchild    Action Taken: Message routed to:  [facility] CRS    Travel Screening: Not Applicable

## 2023-08-03 NOTE — TELEPHONE ENCOUNTER
Prior Authorization Retail Medication Request    Medication/Dose: hydrocortisone (ANUSOL-HC) 25 MG suppository   ICD code (if different than what is on RX):  K62.5   Previously Tried and Failed:  na  Rationale:  na    Insurance Name:  [facility]  Insurance ID:  [id number]       Pharmacy Information (if different than what is on RX)  Name:  [facility]  Phone:  [phone]

## 2023-08-07 ENCOUNTER — VIRTUAL VISIT (OUTPATIENT)
Dept: FAMILY MEDICINE | Facility: CLINIC | Age: 23
End: 2023-08-07
Payer: OTHER MISCELLANEOUS

## 2023-08-07 DIAGNOSIS — S06.0X0D CONCUSSION WITHOUT LOSS OF CONSCIOUSNESS, SUBSEQUENT ENCOUNTER: Primary | ICD-10-CM

## 2023-08-07 DIAGNOSIS — S09.90XD INJURY OF HEAD, SUBSEQUENT ENCOUNTER: ICD-10-CM

## 2023-08-07 PROCEDURE — 99213 OFFICE O/P EST LOW 20 MIN: CPT | Mod: 95 | Performed by: NURSE PRACTITIONER

## 2023-08-07 ASSESSMENT — PATIENT HEALTH QUESTIONNAIRE - PHQ9
SUM OF ALL RESPONSES TO PHQ QUESTIONS 1-9: 13
SUM OF ALL RESPONSES TO PHQ QUESTIONS 1-9: 13
10. IF YOU CHECKED OFF ANY PROBLEMS, HOW DIFFICULT HAVE THESE PROBLEMS MADE IT FOR YOU TO DO YOUR WORK, TAKE CARE OF THINGS AT HOME, OR GET ALONG WITH OTHER PEOPLE: EXTREMELY DIFFICULT

## 2023-08-07 ASSESSMENT — ENCOUNTER SYMPTOMS
SLEEP DISTURBANCE: 1
DECREASED CONCENTRATION: 1
DIZZINESS: 1
NERVOUS/ANXIOUS: 1
LIGHT-HEADEDNESS: 1
FATIGUE: 1

## 2023-08-07 NOTE — PROGRESS NOTES
Kamala is a 23 year old who is being evaluated via a billable video visit.      How would you like to obtain your AVS? MyChart  If the video visit is dropped, the invitation should be resent by: Text to cell phone: [phone]  Will anyone else be joining your video visit? No          Assessment & Plan     Concussion without loss of consciousness, subsequent encounter  Paperwork filled out. She hasn't been able to work since first week of July. She continues to do OT therapy and will start safe and sound OT program on 9/13/23. Paperwork filled out through 8/31/23. She will need follow-up appt if extension is needed past that as well as access to OT records to see improvement.     Injury of head, subsequent encounter  See above.              Patient Instructions   Paperwork filled out. Our office will contact you as soon as it is ready for .     Continue working with occupational therapy (OT) as recommended. It looks like you are due for follow-up with sports medicine at the end of this month as well.     Follow-up after 8/31/23 with me if symptoms are still present. I will need OT records so I can see progress.     Jazzmine Warren [facility]   Kamala is a 23 year old, presenting for the following health issues:  Forms (Unemployment forms)        8/7/2023     7:32 AM   Additional Questions   Roomed by Diane   Accompanied by none         8/7/2023     7:32 AM   Patient Reported Additional Medications   Patient reports taking the following new medications None             Concern - forms for unemployment  Onset: concussion DOI 04/22/23  Description: was work related, hit her head while at work  Intensity: severe, received a concussion, went to the ER a few hours after the incident  Progression of Symptoms:  worsening  Accompanying Signs & Symptoms: mood changes, irritability, cognative issues  Previous history of similar problem: none  Precipitating factors:         Worsened by: stress, working  Alleviating factors:        Improved by: nothing that she knows  Therapies tried and outcome: OT     Additional provider notes: Patient presents virtually via video visit for follow-up on concussion from 4/22/23 and paperwork to be fill out for unemployment. States reading and writing has been more difficult for the past 2-3 weeks.     Mental health has worsened over the past month since concussion. She is working with OT weekly. States she is told her scores have gotten worse.     She is also going to start a safe and sound program with OT. She starts this on September 13th.     She hasn't been working since early 07/2023. States she was terminated while working through concussion symptoms. Prior to that she had had to stop due to increase in anxiety and panic attacks end of June 2023 (last week).     She was having issues with scheduling concussion referral visit due to not being covered with work comp status. She was able to finally see someone.     Allergies   Allergen Reactions    Iodine Rash and Itching     Rash itching         Current Outpatient Medications   Medication    albuterol (PROAIR HFA/PROVENTIL HFA/VENTOLIN HFA) 108 (90 Base) MCG/ACT inhaler    etonogestrel (NEXPLANON) 68 MG IMPL    hydrocortisone (ANUSOL-HC) 25 MG suppository    hydrOXYzine (VISTARIL) 25 MG capsule    meclizine (ANTIVERT) 25 MG tablet    naproxen (NAPROSYN) 500 MG tablet    ondansetron (ZOFRAN ODT) 4 MG ODT tab    PARoxetine (PAXIL) 20 MG tablet    traZODone (DESYREL) 50 MG tablet    ubrogepant (UBRELVY) 100 MG tablet     No current facility-administered medications for this visit.       Past Medical History:   Diagnosis Date    ADHD     Depressive disorder     Heart disease     maternal congeital heart - cardiac catherization     Pap smear of cervix with ASCUS, cannot exclude HGSIL 1/19/2022    Sprain of right wrist     right side muliple times     Uncomplicated asthma     exercise induced       "      Review of Systems   Constitutional:  Positive for fatigue.   Neurological:  Positive for dizziness and light-headedness.   Psychiatric/Behavioral:  Positive for decreased concentration and sleep disturbance. The patient is nervous/anxious.    All other systems reviewed and are negative.           Objective    Vitals - Patient Reported  Weight (Patient Reported): 95.7 kg (211 lb)  Height (Patient Reported): 165.1 cm (5' 5\")  BMI (Based on Pt Reported Ht/Wt): 35.11  Pain Score: No Pain (0)        Physical Exam   GENERAL: Healthy, appears tired and stressed  EYES: Eyes grossly normal to inspection.  No discharge or erythema, or obvious scleral/conjunctival abnormalities.  RESP: No audible wheeze, cough, or visible cyanosis.  No visible retractions or increased work of breathing.    SKIN: Visible skin clear. No significant rash, abnormal pigmentation or lesions.  NEURO: Cranial nerves grossly intact.  Mentation and speech appropriate for age.  PSYCH: Mentation appears normal, affect normal, judgement and insight intact, normal speech.                Video-Visit Details    Type of service:  Video Visit   Video Start Time: 07:54 AM  Video End Time:8:09 AM    Originating Location (pt. Location): Home    Distant Location (provider location):  On-site  Platform used for Video Visit: Chiqui      "

## 2023-08-07 NOTE — TELEPHONE ENCOUNTER
Central Prior Authorization Team - Phone: [phone]     Prior Authorization Not Needed per Insurance    Medication: HYDROCORTISONE ACETATE 25 MG RE SUPP  Insurance Company: [employer] ([facility]) - Phone [phone] Fax [phone]  Expected CoPay:   $7  Pharmacy Filling the Rx: [id number] - [place] [place], [place] - [facility] AT [facility] [address]  Pharmacy Notified: Yes pharmacy received paid claim  Patient Notified: Yes pharmacy will notify when ready

## 2023-08-07 NOTE — PATIENT INSTRUCTIONS
Paperwork filled out. Our office will contact you as soon as it is ready for .     Continue working with occupational therapy (OT) as recommended. It looks like you are due for follow-up with sports medicine at the end of this month as well.     Follow-up after 8/31/23 with me if symptoms are still present. I will need OT records so I can see progress.

## 2023-08-08 ENCOUNTER — TELEPHONE (OUTPATIENT)
Dept: FAMILY MEDICINE | Facility: CLINIC | Age: 23
End: 2023-08-08
Payer: COMMERCIAL

## 2023-08-08 NOTE — TELEPHONE ENCOUNTER
TC reached to let know forms are ready for      Copy made     Placed at      Patient needs to sign her portion       Mignon Underwood    [facility]

## 2023-08-14 ENCOUNTER — NURSE TRIAGE (OUTPATIENT)
Dept: FAMILY MEDICINE | Facility: CLINIC | Age: 23
End: 2023-08-14

## 2023-08-14 ENCOUNTER — HOSPITAL ENCOUNTER (EMERGENCY)
Facility: CLINIC | Age: 23
Discharge: HOME | End: 2023-08-14
Attending: EMERGENCY MEDICINE | Admitting: EMERGENCY MEDICINE
Payer: COMMERCIAL

## 2023-08-14 VITALS
BODY MASS INDEX: 33.28 KG/M2 | OXYGEN SATURATION: 98 % | DIASTOLIC BLOOD PRESSURE: 55 MMHG | SYSTOLIC BLOOD PRESSURE: 117 MMHG | WEIGHT: 200 LBS | RESPIRATION RATE: 18 BRPM | HEART RATE: 90 BPM | TEMPERATURE: 97.2 F

## 2023-08-14 DIAGNOSIS — R19.7 DIARRHEA, UNSPECIFIED TYPE: ICD-10-CM

## 2023-08-14 LAB
ALBUMIN SERPL BCG-MCNC: 4.5 G/DL (ref 3.5–5.2)
ALP SERPL-CCNC: 81 U/L (ref 35–104)
ALT SERPL W P-5'-P-CCNC: 20 U/L (ref 0–50)
ANION GAP SERPL CALCULATED.3IONS-SCNC: 13 MMOL/L (ref 7–15)
AST SERPL W P-5'-P-CCNC: 16 U/L (ref 0–45)
BASOPHILS # BLD AUTO: 0 10E3/UL (ref 0–0.2)
BASOPHILS NFR BLD AUTO: 1 %
BILIRUB SERPL-MCNC: 0.2 MG/DL
BUN SERPL-MCNC: 11.4 MG/DL (ref 6–20)
CALCIUM SERPL-MCNC: 9.7 MG/DL (ref 8.6–10)
CHLORIDE SERPL-SCNC: 103 MMOL/L (ref 98–107)
CREAT SERPL-MCNC: 0.67 MG/DL (ref 0.51–0.95)
DEPRECATED HCO3 PLAS-SCNC: 24 MMOL/L (ref 22–29)
EOSINOPHIL # BLD AUTO: 0.2 10E3/UL (ref 0–0.7)
EOSINOPHIL NFR BLD AUTO: 4 %
ERYTHROCYTE [DISTWIDTH] IN BLOOD BY AUTOMATED COUNT: 14 % (ref 10–15)
GFR SERPL CREATININE-BSD FRML MDRD: >90 ML/MIN/1.73M2
GLUCOSE SERPL-MCNC: 96 MG/DL (ref 70–99)
HCG SERPL QL: NEGATIVE
HCT VFR BLD AUTO: 41.6 % (ref 35–47)
HGB BLD-MCNC: 13.3 G/DL (ref 11.7–15.7)
IMM GRANULOCYTES # BLD: 0 10E3/UL
IMM GRANULOCYTES NFR BLD: 1 %
LYMPHOCYTES # BLD AUTO: 2.4 10E3/UL (ref 0.8–5.3)
LYMPHOCYTES NFR BLD AUTO: 44 %
MCH RBC QN AUTO: 25.2 PG (ref 26.5–33)
MCHC RBC AUTO-ENTMCNC: 32 G/DL (ref 31.5–36.5)
MCV RBC AUTO: 79 FL (ref 78–100)
MONOCYTES # BLD AUTO: 0.5 10E3/UL (ref 0–1.3)
MONOCYTES NFR BLD AUTO: 9 %
NEUTROPHILS # BLD AUTO: 2.1 10E3/UL (ref 1.6–8.3)
NEUTROPHILS NFR BLD AUTO: 41 %
NRBC # BLD AUTO: 0 10E3/UL
NRBC BLD AUTO-RTO: 0 /100
PLATELET # BLD AUTO: 212 10E3/UL (ref 150–450)
POTASSIUM SERPL-SCNC: 4.2 MMOL/L (ref 3.4–5.3)
PROT SERPL-MCNC: 6.8 G/DL (ref 6.4–8.3)
RBC # BLD AUTO: 5.27 10E6/UL (ref 3.8–5.2)
SODIUM SERPL-SCNC: 140 MMOL/L (ref 136–145)
WBC # BLD AUTO: 5.2 10E3/UL (ref 4–11)

## 2023-08-14 PROCEDURE — 258N000003 HC RX IP 258 OP 636: Performed by: EMERGENCY MEDICINE

## 2023-08-14 PROCEDURE — 96361 HYDRATE IV INFUSION ADD-ON: CPT

## 2023-08-14 PROCEDURE — 99284 EMERGENCY DEPT VISIT MOD MDM: CPT | Mod: 25

## 2023-08-14 PROCEDURE — 80053 COMPREHEN METABOLIC PANEL: CPT | Performed by: EMERGENCY MEDICINE

## 2023-08-14 PROCEDURE — 36415 COLL VENOUS BLD VENIPUNCTURE: CPT | Performed by: EMERGENCY MEDICINE

## 2023-08-14 PROCEDURE — 96374 THER/PROPH/DIAG INJ IV PUSH: CPT | Mod: 59

## 2023-08-14 PROCEDURE — 84703 CHORIONIC GONADOTROPIN ASSAY: CPT | Performed by: EMERGENCY MEDICINE

## 2023-08-14 PROCEDURE — 250N000011 HC RX IP 250 OP 636: Mod: JZ | Performed by: EMERGENCY MEDICINE

## 2023-08-14 PROCEDURE — 85004 AUTOMATED DIFF WBC COUNT: CPT | Performed by: EMERGENCY MEDICINE

## 2023-08-14 RX ORDER — ONDANSETRON 2 MG/ML
4 INJECTION INTRAMUSCULAR; INTRAVENOUS ONCE
Status: COMPLETED | OUTPATIENT
Start: 2023-08-14 | End: 2023-08-14

## 2023-08-14 RX ADMIN — ONDANSETRON 4 MG: 2 INJECTION INTRAMUSCULAR; INTRAVENOUS at 21:40

## 2023-08-14 RX ADMIN — SODIUM CHLORIDE 1000 ML: 9 INJECTION, SOLUTION INTRAVENOUS at 21:40

## 2023-08-14 ASSESSMENT — ACTIVITIES OF DAILY LIVING (ADL): ADLS_ACUITY_SCORE: 35

## 2023-08-14 NOTE — TELEPHONE ENCOUNTER
"Pt calling to follow up on office visit from 8/2/23.     Bright red blood resolved a few days after appointment. Miralax was effective a few days after. Miralax stopped after first day of a BM. Diarrhea every day for the past week. Frequency has increased to every hour needing to have BM. Affecting sleep now. Pt reports that she drinks protein shakes, gatorade, water. No weakness but feeling more tired.     Per protocol below pt was advised to be seen in UC as there were no available appointments at [place]  and [place] . Pt verbalized good understanding and will be seen at [facility] in [place] to be evaluated.     Cecy Abraham RN on 8/14/2023 at 4:19 PM      Reason for Disposition   SEVERE diarrhea (e.g., 7 or more times / day more than normal) and present > 24 hours (1 day)    Additional Information   Negative: Shock suspected (e.g., cold/pale/clammy skin, too weak to stand, low BP, rapid pulse)   Negative: Difficult to awaken or acting confused (e.g., disoriented, slurred speech)   Negative: Sounds like a life-threatening emergency to the triager   Negative: Vomiting also present and worse than the diarrhea   Negative: Blood in stool and without diarrhea   Negative: SEVERE abdominal pain (e.g., excruciating) and present > 1 hour   Negative: SEVERE abdominal pain and age > 60 years   Negative: Bloody, black, or tarry bowel movements (Exception: chronic-unchanged black-grey bowel movements and is taking iron pills or Pepto-Bismol)   Negative: SEVERE diarrhea (e.g., 7 or more times / day more than normal) and age > 60 years   Negative: Constant abdominal pain lasting > 2 hours   Negative: Drinking very little and has signs of dehydration (e.g., no urine > 12 hours, very dry mouth, very lightheaded)   Negative: Patient sounds very sick or weak to the triager    Answer Assessment - Initial Assessment Questions  1. DIARRHEA SEVERITY: \"How bad is the diarrhea?\" \"How many more stools have you had in the past 24 hours than " "normal?\"     - NO DIARRHEA (SCALE 0)    - MILD (SCALE 1-3): Few loose or mushy BMs; increase of 1-3 stools over normal daily number of stools; mild increase in ostomy output.    -  MODERATE (SCALE 4-7): Increase of 4-6 stools daily over normal; moderate increase in ostomy output.  * SEVERE (SCALE 8-10; OR 'WORST POSSIBLE'): Increase of 7 or more stools daily over normal; moderate increase in ostomy output; incontinence.      severe  2. ONSET: \"When did the diarrhea begin?\"       For a week now   3. BM CONSISTENCY: \"How loose or watery is the diarrhea?\"       Oatmeal, cream of wheat thickness. Not watery  4. VOMITING: \"Are you also vomiting?\" If Yes, ask: \"How many times in the past 24 hours?\"       no  5. ABDOMINAL PAIN: \"Are you having any abdominal pain?\" If Yes, ask: \"What does it feel like?\" (e.g., crampy, dull, intermittent, constant)       Cramping from gas build up  6. ABDOMINAL PAIN SEVERITY: If present, ask: \"How bad is the pain?\"  (e.g., Scale 1-10; mild, moderate, or severe)    - MILD (1-3): doesn't interfere with normal activities, abdomen soft and not tender to touch     - MODERATE (4-7): interferes with normal activities or awakens from sleep, abdomen tender to touch     - SEVERE (8-10): excruciating pain, doubled over, unable to do any normal activities        Mild normally, intermittent moderate pain with gas  7. ORAL INTAKE: If vomiting, \"Have you been able to drink liquids?\" \"How much liquids have you had in the past 24 hours?\"      64 oz daily  8. HYDRATION: \"Any signs of dehydration?\" (e.g., dry mouth [not just dry lips], too weak to stand, dizziness, new weight loss) \"When did you last urinate?\"      Dry lips  9. EXPOSURE: \"Have you traveled to a foreign country recently?\" \"Have you been exposed to anyone with diarrhea?\" \"Could you have eaten any food that was spoiled?\"      no  10. ANTIBIOTIC USE: \"Are you taking antibiotics now or have you taken antibiotics in the past 2 months?\"        " "no  11. OTHER SYMPTOMS: \"Do you have any other symptoms?\" (e.g., fever, blood in stool)        Sweaty  12. PREGNANCY: \"Is there any chance you are pregnant?\" \"When was your last menstrual period?\"        no    Protocols used: Diarrhea-A-OH    " Klisyri Pregnancy And Lactation Text: It is unknown if this medication can harm a developing fetus or if it is excreted in breast milk.

## 2023-08-15 NOTE — DISCHARGE INSTRUCTIONS
Imodium as needed for diarrhea.  [place] diet.  Lots of water or sugar-free Gatorade.  Return with severe abdominal pain, fever greater than 100.4  F, uncontrolled vomiting, or any other new or concerning symptoms.

## 2023-08-15 NOTE — ED PROVIDER NOTES
"    History     Chief Complaint:  Diarrhea       The history is provided by the patient.      Kamala Harris is a 23 year old female with history of migraines and depressive disorder presenting alone with diarrhea.  Almost 2 weeks ago she was having bright red blood per rectum and saw her primary care provider who recommended MiraLAX.  She took MiraLAX for 3 days and had resolution of symptoms without constipation or hematochezia.  Shortly thereafter she developed diarrhea which has been daily but significantly worsened in the last 3 days.  This is nonbloody and she describes it as \"acid.\"  She has gurgling in her abdomen with some cramping but no other pain.  She has not had fever.  She has nausea without vomiting and decreased appetite.  She denies recent antibiotic use, hospitalization, or travel.  She has no recent medication changes.  She is awaiting follow-up with colorectal surgery in October for the initial issue of bright red blood per rectum.    Independent Historian:    As above    Review of External Notes:  Nurse triage call today for diarrhea.  Family medicine visit 8/7/2023 for concussion.  Primary care visit 8/2/2023 for bright red blood per rectum.    Medications:    albuterol (PROAIR HFA/PROVENTIL HFA/VENTOLIN HFA) 108 (90 Base) MCG/ACT inhaler  etonogestrel (NEXPLANON) 68 MG IMPL  hydrOXYzine (VISTARIL) 25 MG capsule  meclizine (ANTIVERT) 25 MG tablet  naproxen (NAPROSYN) 500 MG tablet  PARoxetine (PAXIL) 20 MG tablet  traZODone (DESYREL) 50 MG tablet  ubrogepant (UBRELVY) 100 MG tablet    Past Medical History:    Past Medical History:   Diagnosis Date    ADHD     Depressive disorder     Heart disease     Pap smear of cervix with ASCUS, cannot exclude HGSIL 1/19/2022    Sprain of right wrist     Uncomplicated asthma        Past Surgical History:    Past Surgical History:   Procedure Laterality Date    BIOPSY      CARDIAC CATHERIZATION  4 years    Told by cardiology that tiny defect would close on " own, no FU    CARDIAC SURGERY      CREATE EARDRUM OPENING,LOCAL ANESTH  4 years    PETs      Physical Exam   Patient Vitals for the past 24 hrs:   BP Temp Temp src Pulse Resp SpO2 Weight   08/14/23 2055 117/55 -- -- 90 18 98 % --   08/14/23 2009 119/56 97.2  F (36.2  C) Temporal 87 18 98 % 90.7 kg (200 lb)        Physical Exam  General: Well-developed and well-nourished. Well appearing young  woman. Cooperative.  Head:  Atraumatic.  Eyes:  Conjunctivae, lids, and sclerae are normal.  ENT:    Normal nose. Moist mucous membranes.  Neck:  Supple. Normal range of motion.  CV:  Regular rate.  Resp:  No respiratory distress.   GI:  Soft. Non-distended. Non-tender.    MS:  Normal ROM.  Skin:  Warm. Non-diaphoretic. No pallor.  Neuro:  Awake. A&Ox3. Normal strength.  Psych: Normal mood and affect. Normal speech.  Vitals reviewed.    Emergency Department Course   Laboratory:  Labs Ordered and Resulted from Time of ED Arrival to Time of ED Departure   CBC WITH PLATELETS AND DIFFERENTIAL - Abnormal       Result Value    WBC Count 5.2      RBC Count 5.27 (*)     Hemoglobin 13.3      Hematocrit 41.6      MCV 79      MCH 25.2 (*)     MCHC 32.0      RDW 14.0      Platelet Count 212      % Neutrophils 41      % Lymphocytes 44      % Monocytes 9      % Eosinophils 4      % Basophils 1      % Immature Granulocytes 1      NRBCs per 100 WBC 0      Absolute Neutrophils 2.1      Absolute Lymphocytes 2.4      Absolute Monocytes 0.5      Absolute Eosinophils 0.2      Absolute Basophils 0.0      Absolute Immature Granulocytes 0.0      Absolute NRBCs 0.0     COMPREHENSIVE METABOLIC PANEL - Normal    Sodium 140      Potassium 4.2      Chloride 103      Carbon Dioxide (CO2) 24      Anion Gap 13      Urea Nitrogen 11.4      Creatinine 0.67      Calcium 9.7      Glucose 96      Alkaline Phosphatase 81      AST 16      ALT 20      Protein Total 6.8      Albumin 4.5      Bilirubin Total 0.2      GFR Estimate >90     HCG QUALITATIVE  PREGNANCY - Normal    hCG Serum Qualitative Negative        Emergency Department Course & Assessments:  Interventions:  Medications   0.9% sodium chloride BOLUS (1,000 mLs Intravenous Stopped 8/14/23 2227)   ondansetron (ZOFRAN) injection 4 mg (4 mg Intravenous $Given 8/14/23 2140)      Assessments:  2254 I updated the patient and her grandmother, now at bedside. She is comfortable with discharge. She has not had diarrhea.    Independent Interpretation (X-rays, CTs, rhythm strip):  Not applicable     Consultations/Discussion of Management or Tests:  Not applicable    Social Determinants of Health affecting care:  Established primary care provider.  Supportive grandmother.     Disposition:  Discharged.    Impression & Plan    Medical Decision Making:  Kamala is a 23 year old woman who has had intermittent diarrhea for about a week or 10 days but significantly worsened over the last 3 days.  She has had abdominal cramping and nausea with decreased appetite but no vomiting or fever.  Notably, she had constipation and bright red blood per rectum prior to onset of diarrhea for which she took MiraLAX.  She has an appointment with colorectal in October.  On exam she appears well.  She has no abdominal tenderness to warrant abdominal imaging.  She is afebrile with unremarkable vital signs.    Fortunately, laboratory studies are reassuring.  There is no leukocytosis.  There is no kidney injury, electrolyte derangement, hepatitis, or evidence of biliary obstruction.    She was given IV fluids and Zofran and did not have recurrent diarrhea in order to provide a stool sample for studies.  As such, she is appropriate for discharge.  I did  her she can use Imodium as needed for diarrhea.  She requested referral for GI which I think is a reasonable course of action given she initially had constipation and hematochezia.  In the meantime she will eat a bland diet with lots of water and return if she has worsening symptoms or  new concerns.  All of her and her grandmother's questions were answered and they verbalized understanding.  Amenable to discharge.    Diagnosis:    ICD-10-CM    1. Diarrhea, unspecified type  R19.7 Adult GI  Referral - Consult Only           Discharge Medications:  Discharge Medication List as of 8/14/2023 10:56 PM         8/14/2023   Kari Brandon MD Dixson, Kylie S, MD  08/15/23 1613

## 2023-08-15 NOTE — ED TRIAGE NOTES
Patient presents to ER with complaints of diarrhea for over the last 3 days. States that she can not tolerate PO intake and has significant abdominal cramping. Intermittent nausea.     Recently was seen for hard stools and then was referred to colorectal for follow up. Instructed to take Miralax and now has loose stools. Sent from urgent care today looking for CT scan     Triage Assessment       Row Name 08/14/23 2010       Triage Assessment (Adult)    Airway WDL WDL       Respiratory WDL    Respiratory WDL WDL       Skin Circulation/Temperature WDL    Skin Circulation/Temperature WDL WDL       Cardiac WDL    Cardiac WDL WDL       Peripheral/Neurovascular WDL    Peripheral Neurovascular WDL WDL       Cognitive/Neuro/Behavioral WDL    Cognitive/Neuro/Behavioral WDL WDL

## 2023-08-21 ENCOUNTER — TRANSFERRED RECORDS (OUTPATIENT)
Dept: HEALTH INFORMATION MANAGEMENT | Facility: CLINIC | Age: 23
End: 2023-08-21
Payer: COMMERCIAL

## 2023-08-21 ENCOUNTER — MYC MEDICAL ADVICE (OUTPATIENT)
Dept: FAMILY MEDICINE | Facility: CLINIC | Age: 23
End: 2023-08-21
Payer: COMMERCIAL

## 2023-08-21 LAB
ALT SERPL-CCNC: 25 IU/L (ref 0–32)
AST SERPL-CCNC: 19 IU/L (ref 0–40)
CREATININE (EXTERNAL): 0.69 MG/DL (ref 0.57–1)
GFR ESTIMATED (EXTERNAL): 123 ML/MIN/1.73M2
GFR ESTIMATED (IF AFRICAN AMERICAN) (EXTERNAL): NORMAL
GLUCOSE (EXTERNAL): 86 MG/DL (ref 70–99)
POTASSIUM (EXTERNAL): 4.4 MMOL/L (ref 3.5–5.2)
TSH SERPL-ACNC: 0.2 UIU/ML (ref 0.45–4.5)

## 2023-08-21 NOTE — TELEPHONE ENCOUNTER
TC reached out unaware that patient wanted forms faxed     Patient pickup at  but never mention our copies needed to be faxed     So TC faxed today       Mignon Underwood    [facility]

## 2023-08-22 ENCOUNTER — TRANSFERRED RECORDS (OUTPATIENT)
Dept: HEALTH INFORMATION MANAGEMENT | Facility: CLINIC | Age: 23
End: 2023-08-22
Payer: COMMERCIAL

## 2023-08-24 ENCOUNTER — OFFICE VISIT (OUTPATIENT)
Dept: FAMILY MEDICINE | Facility: CLINIC | Age: 23
End: 2023-08-24
Payer: COMMERCIAL

## 2023-08-24 VITALS
HEIGHT: 65 IN | BODY MASS INDEX: 34.26 KG/M2 | OXYGEN SATURATION: 97 % | HEART RATE: 96 BPM | SYSTOLIC BLOOD PRESSURE: 107 MMHG | DIASTOLIC BLOOD PRESSURE: 64 MMHG | TEMPERATURE: 98.9 F | WEIGHT: 205.6 LBS | RESPIRATION RATE: 16 BRPM

## 2023-08-24 DIAGNOSIS — J45.20 MILD INTERMITTENT ASTHMA WITHOUT COMPLICATION: ICD-10-CM

## 2023-08-24 DIAGNOSIS — R79.89 ABNORMAL CBC: ICD-10-CM

## 2023-08-24 DIAGNOSIS — R73.03 PREDIABETES: ICD-10-CM

## 2023-08-24 DIAGNOSIS — R79.89 LOW TSH LEVEL: Primary | ICD-10-CM

## 2023-08-24 DIAGNOSIS — R19.7 DIARRHEA, UNSPECIFIED TYPE: ICD-10-CM

## 2023-08-24 LAB
ERYTHROCYTE [DISTWIDTH] IN BLOOD BY AUTOMATED COUNT: 13.2 % (ref 10–15)
HCT VFR BLD AUTO: 41.8 % (ref 35–47)
HGB BLD-MCNC: 13.6 G/DL (ref 11.7–15.7)
MCH RBC QN AUTO: 24.9 PG (ref 26.5–33)
MCHC RBC AUTO-ENTMCNC: 32.5 G/DL (ref 31.5–36.5)
MCV RBC AUTO: 76 FL (ref 78–100)
PLATELET # BLD AUTO: 244 10E3/UL (ref 150–450)
RBC # BLD AUTO: 5.47 10E6/UL (ref 3.8–5.2)
TSH SERPL DL<=0.005 MIU/L-ACNC: 0.2 UIU/ML (ref 0.3–4.2)
WBC # BLD AUTO: 7.1 10E3/UL (ref 4–11)

## 2023-08-24 PROCEDURE — 85027 COMPLETE CBC AUTOMATED: CPT | Performed by: NURSE PRACTITIONER

## 2023-08-24 PROCEDURE — 84439 ASSAY OF FREE THYROXINE: CPT | Performed by: NURSE PRACTITIONER

## 2023-08-24 PROCEDURE — 84443 ASSAY THYROID STIM HORMONE: CPT | Performed by: NURSE PRACTITIONER

## 2023-08-24 PROCEDURE — 36415 COLL VENOUS BLD VENIPUNCTURE: CPT | Performed by: NURSE PRACTITIONER

## 2023-08-24 PROCEDURE — 99214 OFFICE O/P EST MOD 30 MIN: CPT | Performed by: NURSE PRACTITIONER

## 2023-08-24 RX ORDER — FLUTICASONE PROPIONATE 44 UG/1
1 AEROSOL, METERED RESPIRATORY (INHALATION) 2 TIMES DAILY
Qty: 10.6 G | Refills: 3 | Status: SHIPPED | OUTPATIENT
Start: 2023-08-24 | End: 2023-10-30

## 2023-08-24 RX ORDER — ALBUTEROL SULFATE 90 UG/1
2 AEROSOL, METERED RESPIRATORY (INHALATION) EVERY 4 HOURS PRN
Qty: 18 G | Refills: 3 | Status: SHIPPED | OUTPATIENT
Start: 2023-08-24

## 2023-08-24 RX ORDER — FLUTICASONE PROPIONATE 110 UG/1
1 AEROSOL, METERED RESPIRATORY (INHALATION) 2 TIMES DAILY
Status: CANCELLED | OUTPATIENT
Start: 2023-08-24

## 2023-08-24 ASSESSMENT — ENCOUNTER SYMPTOMS
DIARRHEA: 1
ABDOMINAL PAIN: 0
HEMATOCHEZIA: 0
CONSTITUTIONAL NEGATIVE: 1
NAUSEA: 1
CONSTIPATION: 0
ABDOMINAL DISTENTION: 0

## 2023-08-24 ASSESSMENT — ASTHMA QUESTIONNAIRES: ACT_TOTALSCORE: 22

## 2023-08-24 ASSESSMENT — PAIN SCALES - GENERAL: PAINLEVEL: NO PAIN (0)

## 2023-08-24 NOTE — PATIENT INSTRUCTIONS
Labs today.     New referral to endocrine to follow-up on low TSH.     Inhaler refills sent to pharmacy.
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
## 2023-08-25 ENCOUNTER — TELEPHONE (OUTPATIENT)
Dept: ENDOCRINOLOGY | Facility: CLINIC | Age: 23
End: 2023-08-25

## 2023-08-25 LAB — T4 FREE SERPL-MCNC: 0.84 NG/DL (ref 0.9–1.7)

## 2023-08-25 NOTE — TELEPHONE ENCOUNTER
To schedulers : please offer to move forward on schedule with either  Dr Guzman, Allan, Keagan, Arnaldo, Julissa, Tamar, Patsy, Alek, Tasha South Kristan, Kohlenberg, Opal Choi, Nic Medellin using NON-CALL week open new/CHAS (60 minutes) or 2 back to back 30 minute CHAS spaces.      Ev Richmond MD  Endocrine triage

## 2023-09-05 ENCOUNTER — OFFICE VISIT (OUTPATIENT)
Dept: FAMILY MEDICINE | Facility: CLINIC | Age: 23
End: 2023-09-05
Payer: COMMERCIAL

## 2023-09-05 VITALS
SYSTOLIC BLOOD PRESSURE: 111 MMHG | WEIGHT: 210 LBS | OXYGEN SATURATION: 98 % | DIASTOLIC BLOOD PRESSURE: 70 MMHG | TEMPERATURE: 99 F | HEART RATE: 95 BPM | BODY MASS INDEX: 34.95 KG/M2

## 2023-09-05 DIAGNOSIS — R19.7 DIARRHEA, UNSPECIFIED TYPE: ICD-10-CM

## 2023-09-05 DIAGNOSIS — S06.0X0D CONCUSSION WITHOUT LOSS OF CONSCIOUSNESS, SUBSEQUENT ENCOUNTER: Primary | ICD-10-CM

## 2023-09-05 DIAGNOSIS — R79.89 LOW TSH LEVEL: ICD-10-CM

## 2023-09-05 PROCEDURE — 99213 OFFICE O/P EST LOW 20 MIN: CPT | Performed by: NURSE PRACTITIONER

## 2023-09-05 ASSESSMENT — ENCOUNTER SYMPTOMS
HEADACHES: 1
DECREASED CONCENTRATION: 1
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
## 2023-09-05 NOTE — LETTER
September 5, 2023      Kamala Harris  [address]   [place] [address]        To Whom It May Concern:    Kamala Harris was seen in our clinic on 09/05/2023. Due to previous injury, she has the following work restrictions: no lifting/pulling/push greater than 10lbs; limited computer use to 4 hours with breaks. Recommend shifts no longer than 4 hours at a time.      Sincerely,        Jazzmine Warren, DNP

## 2023-09-05 NOTE — PATIENT INSTRUCTIONS
Work note provided.     It looks like Endo was going to reach out to you about possibly moving the appointment up. Please call [phone] to check on that.

## 2023-09-06 ENCOUNTER — TELEPHONE (OUTPATIENT)
Dept: ENDOCRINOLOGY | Facility: CLINIC | Age: 23
End: 2023-09-06
Payer: COMMERCIAL

## 2023-09-06 NOTE — CONFIDENTIAL NOTE
RECORDS RECEIVED FROM: internal    DATE RECEIVED: 10.25.23    NOTES (FOR ALL VISITS) STATUS DETAILS   OFFICE NOTES from referring provider internal  Jazzmine Warren DNP in [facility]/Lizandro Amaro MD      MEDICATION LIST internal     IMAGING        XR (Chest) internal  6.16.23    CT (HEAD/NECK/CHEST/ABDOMEN) internal  4/7/23      LABS     DIABETES: HBGA1C, CREATININE, FASTING LIPIDS, MICROALBUMIN URINE, POTASSIUM, TSH, T4    THYROID: TSH, T4, CBC, THYRODLONULIN, TOTAL T3, FREE T4, CALCITONIN, CEA internal /ce T4- 8.27.23  Cbc- 8.24.23  TSH- 8.24.23  Reiceved labs- 8.22.23  Cmp- 8.14.23  HBGA1C- 6.26.23  Glucse meter- 6.24.23  T3- 4.4.23

## 2023-10-23 ENCOUNTER — PRE VISIT (OUTPATIENT)
Dept: SURGERY | Facility: CLINIC | Age: 23
End: 2023-10-23

## 2023-10-25 ENCOUNTER — PRE VISIT (OUTPATIENT)
Dept: ENDOCRINOLOGY | Facility: CLINIC | Age: 23
End: 2023-10-25

## 2023-10-30 ENCOUNTER — OFFICE VISIT (OUTPATIENT)
Dept: FAMILY MEDICINE | Facility: CLINIC | Age: 23
End: 2023-10-30
Payer: COMMERCIAL

## 2023-10-30 VITALS
TEMPERATURE: 97.3 F | RESPIRATION RATE: 15 BRPM | DIASTOLIC BLOOD PRESSURE: 77 MMHG | HEART RATE: 108 BPM | SYSTOLIC BLOOD PRESSURE: 122 MMHG | BODY MASS INDEX: 34.78 KG/M2 | HEIGHT: 66 IN | WEIGHT: 216.4 LBS | OXYGEN SATURATION: 99 %

## 2023-10-30 DIAGNOSIS — J06.9 VIRAL URI: Primary | ICD-10-CM

## 2023-10-30 PROCEDURE — 99213 OFFICE O/P EST LOW 20 MIN: CPT | Performed by: FAMILY MEDICINE

## 2023-10-30 ASSESSMENT — PATIENT HEALTH QUESTIONNAIRE - PHQ9
SUM OF ALL RESPONSES TO PHQ QUESTIONS 1-9: 10
10. IF YOU CHECKED OFF ANY PROBLEMS, HOW DIFFICULT HAVE THESE PROBLEMS MADE IT FOR YOU TO DO YOUR WORK, TAKE CARE OF THINGS AT HOME, OR GET ALONG WITH OTHER PEOPLE: SOMEWHAT DIFFICULT
SUM OF ALL RESPONSES TO PHQ QUESTIONS 1-9: 10

## 2023-10-30 ASSESSMENT — PAIN SCALES - GENERAL: PAINLEVEL: MODERATE PAIN (4)

## 2023-10-30 NOTE — COMMUNITY RESOURCES LIST (ENGLISH)
10/30/2023   [facility]  N/A  For questions about this resource list or additional care needs, please contact your primary care clinic or care manager.  Phone: [phone]   Email: N/A   Address: [facility] [place] [address]   Hours: N/A        Transportation       Free or low-cost transportation  1  [employer] [facility] - Free or low-cost transportation Distance: 8.99 miles      In-Person   [address] [place] [address]  Language: English  Hours: Mon - Fri 9:00 AM - 5:00 PM  Fees: Free   Phone: (610) 205-5680 Email: info@[employer] Website: https://www.MobileGlobe/     2  [facility] Distance: 9.72 miles      In-Person   [address] [place] [address]  Language: English  Hours: Mon - Wed 9:30 AM - 12:00 PM , Mon - Wed 1:00 PM - 2:00 PM Appt. Only  Fees: Free   Phone: (624) 427-2687 Email: info@saintolaf.org Website: http://www.saintolaf.org/     Transportation to medical appointments  3  [facility]   Family Wellness (AIFW) Distance: 5.4 miles      In-Person   [address] [place] [address]  Language: Occitan, Mohawk, English, Gujarati, Micaela, Albanian, Telugu, Kinyarwanda, Syriac, Bulgarian  Hours: Mon - Wed 9:00 AM - 5:00 PM , Thu 12:00 PM - 6:00 PM , Fri 9:00 AM - 5:00 PM , Sun 10:30 AM - 2:00 PM Appt. Only  Fees: Free   Phone: (904) 685-2690 Email: info@sewa-aifw.org Website: https://www.sewa-aifw.org/     4  [employer] Distance: 8.05 miles      In-Person   [address] [address]  Language: English  Hours: Mon - Thu 6:00 AM - 6:00 PM , Fri 6:00 AM - 5:00 PM  Fees: Insurance, Self Pay   Phone: (875) 210-5197 Email: office@[employer] Website: https://www.eBrisk Video/          Important Numbers & Websites       Emergency Services   911  [facility]   311  Poison Control   (131) 651-4390  Suicide Prevention Lifeline   (841) 423-1957 (TALK)  Child Abuse Hotline   (634) 574-2208  (4-A-Child)  Sexual Assault Hotline   (767) 536-6223 (HOPE)  National Runaway Safeline   (711) 277-9301 (RUNAWAY)  All-Options Talkline   (326) 588-5762  Substance Abuse Referral   (196) 216-7305 (HELP)

## 2023-10-30 NOTE — PATIENT INSTRUCTIONS
Viral Respiratory Infection: Care Instructions  Overview     A viral respiratory infection is an infection of the nose, sinuses, or throat caused by a virus. Colds and the flu are common types of viral respiratory infections.  The symptoms of a viral respiratory infection often start quickly. They include a fever, sore throat, and runny nose. You may also just not feel well. Or you may not want to eat much.  Most viral infections can be treated with home care. This may include drinking lots of fluids and taking over-the-counter pain medicine. You will probably feel better in 4 to 10 days.  Antibiotics are not used to treat a viral infection. Antibiotics don't kill viruses, so they won't help cure a viral illness.  In some cases, a doctor may prescribe antiviral medicine to help your body fight a serious viral infection.  Follow-up care is a key part of your treatment and safety. Be sure to make and go to all appointments, and call your doctor if you are having problems. It's also a good idea to know your test results and keep a list of the medicines you take.  How can you care for yourself at home?  To prevent dehydration, drink plenty of fluids. Choose water and other clear liquids until you feel better. If you have kidney, heart, or liver disease and have to limit fluids, talk with your doctor before you increase the amount of fluids you drink.  Ask your doctor if you can take an over-the-counter pain medicine, such as acetaminophen (Tylenol), ibuprofen (Advil, Motrin), or naproxen (Aleve). Be safe with medicines. Read and follow all instructions on the label. No one younger than 20 should take aspirin. It has been linked to Reye syndrome, a serious illness.  Be careful when taking over-the-counter cold or flu medicines and Tylenol at the same time. Many of these medicines have acetaminophen, which is Tylenol. Read the labels to make sure that you are not taking more than the recommended dose. Too much  "acetaminophen (Tylenol) can be harmful.  Get plenty of rest.  Use saline (saltwater) nasal washes to help keep your nasal passages open and wash out mucus and allergens. You can buy saline nose sprays at a grocery store or drugstore. Follow the instructions on the package. Or you can make your own at home. Add 1 teaspoon of non-iodized salt and 1 teaspoon of baking soda to 2 cups of distilled or boiled and cooled water. Fill a squeeze bottle or neti pot with the nasal wash. Then put the tip into your nostril, and lean over the sink. With your mouth open, gently squirt the liquid. Repeat on the other side.  Use a vaporizer or humidifier to add moisture to your bedroom. Follow the instructions for cleaning the machine.  Do not smoke or allow others to smoke around you. If you need help quitting, talk to your doctor about stop-smoking programs and medicines. These can increase your chances of quitting for good.  When should you call for help?   Call 911 anytime you think you may need emergency care. For example, call if:    You have severe trouble breathing.   Call your doctor now or seek immediate medical care if:    You have a new or higher fever.     Your fever lasts more than 48 hours.     You have trouble breathing.     You have a fever with a stiff neck or a severe headache.     You are sensitive to light.     You feel very sleepy or confused.   Watch closely for changes in your health, and be sure to contact your doctor if:    You do not get better as expected.   Where can you learn more?  Go to https://www.Citilog.net/patiented  Enter Q795 in the search box to learn more about \"Viral Respiratory Infection: Care Instructions.\"  Current as of: October 31, 2022               Content Version: 13.7    6251-1816 [employer].   Care instructions adapted under license by your healthcare professional. If you have questions about a medical condition or this instruction, always ask your healthcare " professional. [employer] disclaims any warranty or liability for your use of this information.

## 2023-10-30 NOTE — COMMUNITY RESOURCES LIST (ENGLISH)
10/30/2023   [facility]  N/A  For questions about this resource list or additional care needs, please contact your primary care clinic or care manager.  Phone: [phone]   Email: N/A   Address: [facility] [place] [address]   Hours: N/A        Transportation       Free or low-cost transportation  1  [employer] [facility] Community [facility] - Free or low-cost transportation Distance: 8.99 miles      In-Person   [address] [place] [address]  Language: English  Hours: Mon - Fri 9:00 AM - 5:00 PM  Fees: Free   Phone: (147) 979-2432 Email: info@[employer] Website: https://www.Spendji/     2  [facility] Distance: 9.72 miles      In-Person   [address] [place] [address]  Language: English  Hours: Mon - Wed 9:30 AM - 12:00 PM , Mon - Wed 1:00 PM - 2:00 PM Appt. Only  Fees: Free   Phone: (265) 987-7566 Email: info@saintolaf.org Website: http://www.saintolaf.org/     Transportation to medical appointments  3  [facility]   Family Wellness (AIFW) Distance: 5.4 miles      In-Person   [address] [place] [address]  Language: Chinese, Yoruba, English, Gujarati, Micaela, Ukrainian, Telugu, Occitan, Upper sorbian, Luxembourgish  Hours: Mon - Wed 9:00 AM - 5:00 PM , Thu 12:00 PM - 6:00 PM , Fri 9:00 AM - 5:00 PM , Sun 10:30 AM - 2:00 PM Appt. Only  Fees: Free   Phone: (881) 667-9821 Email: info@sewa-aifw.org Website: https://www.sewa-aifw.org/     4  [employer] Ride Distance: 8.05 miles      In-Person   [address] [address]  Language: English  Hours: Mon - Thu 6:00 AM - 6:00 PM , Fri 6:00 AM - 5:00 PM  Fees: Insurance, Self Pay   Phone: (167) 422-2887 Email: office@Aasonn Website: https://www.Aasonn/          Important Numbers & Websites       Emergency Services   911  [facility]   311  Poison Control   (572) 845-2418  Suicide Prevention Lifeline   (422) 339-6698 (TALK)  Child Abuse Hotline   (984) 941-4298  (4-A-Child)  Sexual Assault Hotline   (715) 311-4815 (HOPE)  National Runaway Safeline   (244) 124-8932 (RUNAWAY)  All-Options Talkline   (521) 763-3418  Substance Abuse Referral   (948) 427-2003 (HELP)

## 2023-10-30 NOTE — PROGRESS NOTES
"  Assessment & Plan     Viral URI  Symptomatic care with Sudafed and follow up in 3 - 5 days if not improved.          BMI:   Estimated body mass index is 35.2 kg/m  as calculated from the following:    Height as of this encounter: 1.67 m (5' 5.75\").    Weight as of this encounter: 98.2 kg (216 lb 6.4 oz).           Cami Patterson MD  [facility]    Hayden Wray is a 23 year old, presenting for the following health issues:  Sinus Problem      10/30/2023     2:41 PM   Additional Questions   Roomed by Crystal   Accompanied by self         10/30/2023     2:41 PM   Patient Reported Additional Medications   Patient reports taking the following new medications none       Sinus Problem     History of Present Illness       Reason for visit:  Sinus infection  Symptom onset:  1-3 days ago  Symptoms include:  Thick yellow mucas and cant breath out of nose  Symptom intensity:  Moderate  Symptom progression:  Staying the same  Had these symptoms before:  Yes  Has tried/received treatment for these symptoms:  No    She eats 2-3 servings of fruits and vegetables daily.She consumes 3 sweetened beverage(s) daily.She exercises with enough effort to increase her heart rate 9 or less minutes per day.  She exercises with enough effort to increase her heart rate 3 or less days per week. She is missing 1 dose(s) of medications per week.        Hayden Wray is a 23 year old, presenting for the following health issues:  Sinus Problem      10/30/2023     2:41 PM   Additional Questions   Roomed by Crystal   Accompanied by self         10/30/2023     2:41 PM   Patient Reported Additional Medications   Patient reports taking the following new medications none     Continued productive cough but improved.  For the last few day having thick post nasal mucus.  Has tried OTC medication but not much improvement.      Review of Systems   Constitutional, HEENT, cardiovascular, pulmonary, gi and gu " "systems are negative, except as otherwise noted.      Objective    /77 (BP Location: Left arm, Patient Position: Sitting, Cuff Size: Adult Regular)   Pulse 108   Temp 97.3  F (36.3  C) (Oral)   Resp 15   Ht 1.67 m (5' 5.75\")   Wt 98.2 kg (216 lb 6.4 oz)   SpO2 99%   BMI 35.20 kg/m    Body mass index is 35.2 kg/m .  Physical Exam   GENERAL: healthy, alert and no distress  EYES: Eyes grossly normal to inspection, PERRL and conjunctivae and sclerae normal  HENT: normal cephalic/atraumatic, ear canals and TM's normal, nasal mucosa edematous , oropharynx clear, and oral mucous membranes moist  NECK: no adenopathy, no asymmetry, masses, or scars and thyroid normal to palpation  RESP: lungs clear to auscultation - no rales, rhonchi or wheezes  CV: regular rate and rhythm, normal S1 S2, no S3 or S4, no murmur, click or rub, no peripheral edema and peripheral pulses strong  MS: no gross musculoskeletal defects noted, no edema                  "

## 2023-11-06 ENCOUNTER — VIRTUAL VISIT (OUTPATIENT)
Dept: ENDOCRINOLOGY | Facility: CLINIC | Age: 23
End: 2023-11-06
Payer: COMMERCIAL

## 2023-11-06 VITALS — HEIGHT: 66 IN | BODY MASS INDEX: 33.75 KG/M2 | WEIGHT: 210 LBS

## 2023-11-06 DIAGNOSIS — E66.812 CLASS 2 OBESITY WITH BODY MASS INDEX (BMI) OF 35.0 TO 35.9 IN ADULT, UNSPECIFIED OBESITY TYPE, UNSPECIFIED WHETHER SERIOUS COMORBIDITY PRESENT: ICD-10-CM

## 2023-11-06 PROCEDURE — 99213 OFFICE O/P EST LOW 20 MIN: CPT | Mod: VID | Performed by: INTERNAL MEDICINE

## 2023-11-06 ASSESSMENT — PAIN SCALES - GENERAL: PAINLEVEL: NO PAIN (0)

## 2023-11-06 ASSESSMENT — PATIENT HEALTH QUESTIONNAIRE - PHQ9: SUM OF ALL RESPONSES TO PHQ QUESTIONS 1-9: 13

## 2023-11-06 NOTE — PROGRESS NOTES
"    New Medical Weight Management Consult    PATIENT:  Kamala Harris  MRN:         [id number]  :         2000  PARKER:         2023    Dear Jazzmine Warren DNP,    I had the pleasure of seeing your patient, Kamala Harris.  Full intake/assessment done to determine barriers to weight loss success and develop a treatment plan.  Kamala Harris is a 23 year old female interested in treatment of medical problems associated with weight.  Her weight today is 210 lbs 0 oz, Body mass index is 34.16 kg/m ., and she has the following co-morbidities:      11/3/2023    12:01 PM   --   I have the following health issues associated with obesity Pre-Diabetes    Sleep Apnea    Asthma   I have the following symptoms associated with obesity None of the above           11/3/2023    12:01 PM   Referring Provider   Please name the provider who referred you to Medical Weight Management  If you do not know, please answer \"I Don't Know\" I don t know       Wt Readings from Last 4 Encounters:   23 95.3 kg (210 lb)   10/30/23 98.2 kg (216 lb 6.4 oz)   23 95.3 kg (210 lb)   23 93.3 kg (205 lb 9.6 oz)           11/3/2023    12:01 PM   Weight History   How concerned are you about your weight? Somewhat Concerned   I became overweight In College   The following factors have contributed to my weight gain Mental Health Issues    Eating Too Much    Lack of Exercise    Stress   I have tried the following methods to lose weight Watching Portions or Calories    Exercise    Meal Replacements   My lowest weight since age 16 was 200   My highest weight since age 16 was 220   The most weight I have ever lost was (lbs) 15   I have the following family history of obesity/being overweight My mother is overweight    One or more of my siblings are overweight    Many of my relatives are overweight   How has your weight changed over the last year? Gained   How many pounds? 2           11/3/2023    12:01 PM   Diet Recall   Glass " juice/day 1   Glass milk/day 1   Glass sugary drink/day 2   How many cans/bottles of sugar pop/soda/tea/sports drinks do you drink in a day? 3   Diet drink/day 3   Alcohol 2-4 Times a Month   Drinks/day 1 or 2           11/3/2023    12:01 PM   Eating Habits   Generally, my meals include foods like these bread, pasta, rice, potatoes, corn, crackers, sweet dessert, pop, or juice Almost Everyday   Generally, my meals include foods like these fried meats, brats, burgers, french fries, pizza, cheese, chips, or ice cream A Few Times a Week   Eat fast food (like McDonalds, Burger Isac, SpamLion Bell) A Few Times a Week   Eat at a buffet or sit-down restaurant Less Than Weekly   Eat most of my meals in front of the TV or computer A Few Times a Week   Often skip meals, eat at random times, have no regular eating times A Few Times a Week   Rarely sit down for a meal but snack or graze throughout A Few Times a Week   Eat extra snacks between meals Almost Everyday   Eat most of my food at the end of the day A Few Times a Week   Eat in the middle of the night or wake up at night to eat Once a Week   Eat extra snacks to prevent or correct low blood sugar Less Than Weekly   Eat to prevent acid reflux or stomach pain Less Than Weekly   Worry about not having enough food to eat Never   I eat when I am depressed A Few Times a Week   I eat when I am stressed A Few Times a Week   I eat when I am bored Almost Everyday   I eat when I am anxious A Few Times a Week   I eat when I am happy or as a reward Almost Everyday   I feel hungry all the time even if I just have eaten A Few Times a Week   Feeling full is important to me A Few Times a Week   I finish all the food on my plate even if I am already full A Few Times a Week   I can't resist eating delicious food or walk past the good food/smell Once a Week   I eat/snack without noticing that I am eating A Few Times a Week   I eat when I am preparing the meal A Few Times a Week   I eat more than  usual when I see others eating Once a Week   I have trouble not eating sweets, ice cream, cookies, or chips if they are around the house Almost Everyday   I think about food all day Almost Everyday   What foods, if any, do you crave? Sweets/Candy/Chocolate   Please list any other foods you crave? Ice cream and popcorn           11/3/2023    12:01 PM   Activity/Exercise History   How much of a typical 12 hour day do you spend sitting? Half the Day   How much of a typical 12 hour day do you spend lying down? Half the Day   How much of a typical day do you spend walking/standing? Half the Day   How many hours (not including work) do you spend on the TV/Video Games/Computer/Tablet/Phone? 4-5 Hours   How many times a week are you active for the purpose of exercise? Once a Week   What keeps you from being more active? Too tired   How many total minutes do you spend doing some activity for the purpose of exercising when you exercise? Less Than 15 Minutes       PAST MEDICAL HISTORY:  Past Medical History:   Diagnosis Date    ADHD     Depressive disorder     Heart disease     maternal congeital heart - cardiac catherization     Pap smear of cervix with ASCUS, cannot exclude HGSIL 1/19/2022    Sprain of right wrist     right side muliple times     Uncomplicated asthma     exercise induced           11/3/2023    12:01 PM   Work/Social History Reviewed With Patient   My employment status is Part-Time   My job is    How much of your job is spent on the computer or phone? Less Than 50%   How many hours do you spend commuting to work daily? 1   What is your marital status? /In a Relationship   If in a relationship, is your significant other overweight? Yes   If you have children, are they overweight? Yes   Who do you live with?  and daughter   Who does the food shopping? Me           11/3/2023    12:01 PM   Mental Health History Reviewed With Patient   Have you ever been physically or sexually abused? No   How  "often in the past 2 weeks have you felt little interest or pleasure in doing things? For Several Days   Over the past 2 weeks how often have you felt down, depressed, or hopeless? For Several Days           11/3/2023    12:01 PM   Sleep History Reviewed With Patient   How many hours do you sleep at night? 6       MEDICATIONS:   Current Outpatient Medications   Medication Sig Dispense Refill    albuterol (PROAIR HFA/PROVENTIL HFA/VENTOLIN HFA) 108 (90 Base) MCG/ACT inhaler Inhale 2 puffs into the lungs every 4 hours as needed for shortness of breath, wheezing or cough 18 g 3    etonogestrel (NEXPLANON) 68 MG IMPL Inject 68 mg Subcutaneous      hydrOXYzine (VISTARIL) 25 MG capsule 25 mg 4 times daily as needed for anxiety      naproxen (NAPROSYN) 500 MG tablet Take 1 tablet (500 mg) by mouth 2 times daily as needed for moderate pain or headaches 28 tablet 11    traZODone (DESYREL) 50 MG tablet Take 50 mg by mouth At Bedtime      ubrogepant (UBRELVY) 100 MG tablet Take 1 tablet (100 mg) by mouth at onset of headache (migraine) 10 tablet 11       ALLERGIES:   Allergies   Allergen Reactions    Iodine Rash and Itching     Rash itching         PHYSICAL EXAM:  Ht 1.67 m (5' 5.75\")   Wt 95.3 kg (210 lb)   BMI 34.16 kg/m     A & O x 3  HEENT: NCAT, mucous membranes moist  Respirations unlabored  Location of obesity: Mixed Obesity    ASSESSMENT:  Kamala is a patient with mature onset obesity without significant element of familial/genetic influence and with current health consequences. She does need aggressive weight loss plan due to pre-diabetes and sleep apnea.  In 4 years has gained 50 lb, does not bother her that much but keeps getting referred for weight management. Has been told has pre-diabetes. She is not interested in weight medications as her weight not now bothering her enough.    Kamala Harris eats a high carb diet, uses food as a reward, and has a disorganized meal pattern.    Her problem is complicated by " strong craving/reward pathways and gender and short stature    Her ability to lose weight is impacted by lack of confidence.    PLAN:    Meal planning -focus on no between meal snacking, aggressive lowering of starches and cheese    Programmatic/Healthy Living  Ancillary testing:  N/A.  Food Plan:  Volumetrics.  Activity Plan:  Activity journal.  Supplementary:   N/A.    Medication:  Deferred    RTC:    PRN    Sincerely,  Chito Lou MD

## 2023-11-06 NOTE — LETTER
"2023       RE: Kamala Harris  [address]  [place] [address]     Dear Colleague,    Thank you for referring your patient, Kamala Harris, to the [facility] WEIGHT MANAGEMENT CLINIC [place] at [facility]. Please see a copy of my visit note below.    Virtual Visit Details    Type of service:  Video Visit 10:02-10:21    Originating Location (pt. Location): Home    Distant Location (provider location):  Off-site  Platform used for Video Visit: [facility] Medical Weight Management Consult    PATIENT:  Kamala Harris  MRN:         [id number]  :         2000  PARKER:         2023    Dear Jazzmine Warren DNP,    I had the pleasure of seeing your patient, Kamala Harris.  Full intake/assessment done to determine barriers to weight loss success and develop a treatment plan.  Kamala Harris is a 23 year old female interested in treatment of medical problems associated with weight.  Her weight today is 210 lbs 0 oz, Body mass index is 34.16 kg/m ., and she has the following co-morbidities:      11/3/2023    12:01 PM   --   I have the following health issues associated with obesity Pre-Diabetes    Sleep Apnea    Asthma   I have the following symptoms associated with obesity None of the above           11/3/2023    12:01 PM   Referring Provider   Please name the provider who referred you to Medical Weight Management  If you do not know, please answer \"I Don't Know\" I don t know       Wt Readings from Last 4 Encounters:   23 95.3 kg (210 lb)   10/30/23 98.2 kg (216 lb 6.4 oz)   23 95.3 kg (210 lb)   23 93.3 kg (205 lb 9.6 oz)           11/3/2023    12:01 PM   Weight History   How concerned are you about your weight? Somewhat Concerned   I became overweight In College   The following factors have contributed to my weight gain Mental Health Issues    Eating Too Much    Lack of Exercise    Stress   I have " tried the following methods to lose weight Watching Portions or Calories    Exercise    Meal Replacements   My lowest weight since age 16 was 200   My highest weight since age 16 was 220   The most weight I have ever lost was (lbs) 15   I have the following family history of obesity/being overweight My mother is overweight    One or more of my siblings are overweight    Many of my relatives are overweight   How has your weight changed over the last year? Gained   How many pounds? 2           11/3/2023    12:01 PM   Diet Recall   Glass juice/day 1   Glass milk/day 1   Glass sugary drink/day 2   How many cans/bottles of sugar pop/soda/tea/sports drinks do you drink in a day? 3   Diet drink/day 3   Alcohol 2-4 Times a Month   Drinks/day 1 or 2           11/3/2023    12:01 PM   Eating Habits   Generally, my meals include foods like these bread, pasta, rice, potatoes, corn, crackers, sweet dessert, pop, or juice Almost Everyday   Generally, my meals include foods like these fried meats, brats, burgers, french fries, pizza, cheese, chips, or ice cream A Few Times a Week   Eat fast food (like McDonalds, Burger Isac, Taco Bell) A Few Times a Week   Eat at a buffet or sit-down restaurant Less Than Weekly   Eat most of my meals in front of the TV or computer A Few Times a Week   Often skip meals, eat at random times, have no regular eating times A Few Times a Week   Rarely sit down for a meal but snack or graze throughout A Few Times a Week   Eat extra snacks between meals Almost Everyday   Eat most of my food at the end of the day A Few Times a Week   Eat in the middle of the night or wake up at night to eat Once a Week   Eat extra snacks to prevent or correct low blood sugar Less Than Weekly   Eat to prevent acid reflux or stomach pain Less Than Weekly   Worry about not having enough food to eat Never   I eat when I am depressed A Few Times a Week   I eat when I am stressed A Few Times a Week   I eat when I am bored Almost  Everyday   I eat when I am anxious A Few Times a Week   I eat when I am happy or as a reward Almost Everyday   I feel hungry all the time even if I just have eaten A Few Times a Week   Feeling full is important to me A Few Times a Week   I finish all the food on my plate even if I am already full A Few Times a Week   I can't resist eating delicious food or walk past the good food/smell Once a Week   I eat/snack without noticing that I am eating A Few Times a Week   I eat when I am preparing the meal A Few Times a Week   I eat more than usual when I see others eating Once a Week   I have trouble not eating sweets, ice cream, cookies, or chips if they are around the house Almost Everyday   I think about food all day Almost Everyday   What foods, if any, do you crave? Sweets/Candy/Chocolate   Please list any other foods you crave? Ice cream and popcorn           11/3/2023    12:01 PM   Activity/Exercise History   How much of a typical 12 hour day do you spend sitting? Half the Day   How much of a typical 12 hour day do you spend lying down? Half the Day   How much of a typical day do you spend walking/standing? Half the Day   How many hours (not including work) do you spend on the TV/Video Games/Computer/Tablet/Phone? 4-5 Hours   How many times a week are you active for the purpose of exercise? Once a Week   What keeps you from being more active? Too tired   How many total minutes do you spend doing some activity for the purpose of exercising when you exercise? Less Than 15 Minutes       PAST MEDICAL HISTORY:  Past Medical History:   Diagnosis Date    ADHD     Depressive disorder     Heart disease     maternal congeital heart - cardiac catherization     Pap smear of cervix with ASCUS, cannot exclude HGSIL 1/19/2022    Sprain of right wrist     right side muliple times     Uncomplicated asthma     exercise induced           11/3/2023    12:01 PM   Work/Social History Reviewed With Patient   My employment status is  "Part-Time   My job is    How much of your job is spent on the computer or phone? Less Than 50%   How many hours do you spend commuting to work daily? 1   What is your marital status? /In a Relationship   If in a relationship, is your significant other overweight? Yes   If you have children, are they overweight? Yes   Who do you live with?  and daughter   Who does the food shopping? Me           11/3/2023    12:01 PM   Mental Health History Reviewed With Patient   Have you ever been physically or sexually abused? No   How often in the past 2 weeks have you felt little interest or pleasure in doing things? For Several Days   Over the past 2 weeks how often have you felt down, depressed, or hopeless? For Several Days           11/3/2023    12:01 PM   Sleep History Reviewed With Patient   How many hours do you sleep at night? 6       MEDICATIONS:   Current Outpatient Medications   Medication Sig Dispense Refill    albuterol (PROAIR HFA/PROVENTIL HFA/VENTOLIN HFA) 108 (90 Base) MCG/ACT inhaler Inhale 2 puffs into the lungs every 4 hours as needed for shortness of breath, wheezing or cough 18 g 3    etonogestrel (NEXPLANON) 68 MG IMPL Inject 68 mg Subcutaneous      hydrOXYzine (VISTARIL) 25 MG capsule 25 mg 4 times daily as needed for anxiety      naproxen (NAPROSYN) 500 MG tablet Take 1 tablet (500 mg) by mouth 2 times daily as needed for moderate pain or headaches 28 tablet 11    traZODone (DESYREL) 50 MG tablet Take 50 mg by mouth At Bedtime      ubrogepant (UBRELVY) 100 MG tablet Take 1 tablet (100 mg) by mouth at onset of headache (migraine) 10 tablet 11       ALLERGIES:   Allergies   Allergen Reactions    Iodine Rash and Itching     Rash itching         PHYSICAL EXAM:  Ht 1.67 m (5' 5.75\")   Wt 95.3 kg (210 lb)   BMI 34.16 kg/m     A & O x 3  HEENT: NCAT, mucous membranes moist  Respirations unlabored  Location of obesity: Mixed Obesity    ASSESSMENT:  Kamala is a patient with mature onset " obesity without significant element of familial/genetic influence and with current health consequences. She does need aggressive weight loss plan due to pre-diabetes and sleep apnea.  In 4 years has gained 50 lb, does not bother her that much but keeps getting referred for weight management. Has been told has pre-diabetes. She is not interested in weight medications as her weight not now bothering her enough.    Kamala Harris eats a high carb diet, uses food as a reward, and has a disorganized meal pattern.    Her problem is complicated by strong craving/reward pathways and gender and short stature    Her ability to lose weight is impacted by lack of confidence.    PLAN:    Meal planning -focus on no between meal snacking, aggressive lowering of starches and cheese    Programmatic/Healthy Living  Ancillary testing:  N/A.  Food Plan:  Volumetrics.  Activity Plan:  Activity journal.  Supplementary:   N/A.    Medication:  Deferred    RTC:    PRN    Sincerely,  Chito Lou MD

## 2023-11-06 NOTE — PROGRESS NOTES
Virtual Visit Details    Type of service:  Video Visit 10:02-10:21    Originating Location (pt. Location): Home    Distant Location (provider location):  Off-site  Platform used for Video Visit: Chiqui

## 2023-11-06 NOTE — NURSING NOTE
Is the patient currently in the state of [place]? YES    Visit mode:VIDEO    If the visit is dropped, the patient can be reconnected by: VIDEO VISIT: Text to cell phone:   Telephone Information:   Mobile [phone]       Will anyone else be joining the visit? NO  (If patient encounters technical issues they should call [phone]    How would you like to obtain your AVS? MyChart    Are changes needed to the allergy or medication list? Pt stated no changes to allergies and Pt stated no med changes    Reason for visit: Consult    Aleida Pineda VVF    High PHQ2&9, decline triage, pt stated pt is seeing a therapist for depression.

## 2023-11-10 ENCOUNTER — MYC MEDICAL ADVICE (OUTPATIENT)
Dept: FAMILY MEDICINE | Facility: CLINIC | Age: 23
End: 2023-11-10
Payer: COMMERCIAL

## 2023-11-30 NOTE — TELEPHONE ENCOUNTER
11/30/23 [place] not done. Tracking updated for 6 mo colp/pap due 10/25/23.    Patient due for Colposcopy.    Reminder done today via [employer].

## 2023-12-14 ENCOUNTER — TELEPHONE (OUTPATIENT)
Dept: NEUROLOGY | Facility: CLINIC | Age: 23
End: 2023-12-14
Payer: COMMERCIAL

## 2023-12-14 DIAGNOSIS — G43.709 CHRONIC MIGRAINE WITHOUT AURA WITHOUT STATUS MIGRAINOSUS, NOT INTRACTABLE: ICD-10-CM

## 2023-12-14 DIAGNOSIS — G43.009 MIGRAINE WITHOUT AURA AND WITHOUT STATUS MIGRAINOSUS, NOT INTRACTABLE: ICD-10-CM

## 2023-12-14 RX ORDER — NAPROXEN 500 MG/1
500 TABLET ORAL 2 TIMES DAILY PRN
Qty: 28 TABLET | Refills: 3 | Status: SHIPPED | OUTPATIENT
Start: 2023-12-14 | End: 2024-05-22

## 2023-12-14 RX ORDER — ONDANSETRON 4 MG/1
4 TABLET, ORALLY DISINTEGRATING ORAL EVERY 8 HOURS PRN
Qty: 20 TABLET | Refills: 3 | Status: SHIPPED | OUTPATIENT
Start: 2023-12-14 | End: 2024-05-22

## 2023-12-14 NOTE — TELEPHONE ENCOUNTER
Prior Authorization Retail Medication Request    Medication/Dose: ubrogepant (UBRELVY) 100 MG tablet  Diagnosis and ICD code (if different than what is on RX):  Chronic migraine without aura without status migrainosus, not intractable [G43.709]   New/renewal/insurance change PA/secondary ins. PA:  Previously Tried and Failed:    OTC medications are not helpful. Ibuprofen/naproxen and Tylenol or Excedrin take a little of the pain away.     She previously took sumatriptan oral and nasal which made her nauseous. Rizatriptan was not effective. She received an injection (Toradol) which helped for a day.     For prevention, she wears sunglasses and darkens rooms. She takes topiramate 50 mg at night. She thinks it may be helping.   Rationale:  Chronic migraine     Primary: [facility]  Insurance ID:  [id number]         Pharmacy Information (if different than what is on RX)  Name:    Phone:    Rationale:

## 2023-12-14 NOTE — TELEPHONE ENCOUNTER
Health Call Center    Phone Message    May a detailed message be left on voicemail: yes     Reason for Call: Symptoms or Concerns     If patient has red-flag symptoms, warm transfer to triage line    Current symptom or concern: Migraines    Symptoms have been present for:  2 week(s)    Has patient previously been seen for this? Yes    By Nina Fleming    Are there any new or worsening symptoms? Yes: Pt has been experiencing worsening migraines for two weeks and is out of her medications. Pt is requesting refills and a different medication to help her symptoms.     Refill request for:   naproxen (NAPROSYN) 500 MG tablet  ubrogepant (UBRELVY) 100 MG tablet (Pt is out of both)    Pharmacy: [id number] - [place] [place], [place] - [address] AT [facility] [address]    Please call pt to advise at # [phone].     Action Taken: Message routed to:  [facility] Neurology     Travel Screening: Not Applicable

## 2023-12-14 NOTE — TELEPHONE ENCOUNTER
"Spoke with pt about migraine symptoms. Started two weeks ago with no known trigger. She did state her suffered from a mild concussion in April where her migraines stopped up until a couple months ago. She described her migraine to be on both sides by her temples with a throbbing/pressure feeling, \"like someone is sitting on the side of my head\". Has mild relief when waking up in the AM, but progressively worsens into the evening/night. Denies fever, dizziness, numbness, weakness, and visual changes. Does c/o nausea, but no vomiting. She has tried caffeine, OTC naproxen, migraine patches, ice packs, and hydrating with no relief. Requesting refill for Ubrelvy, Naproxen 500 mg, and Zofran. Has not seen Dr. Fleming since 2022. Now spot held for video visit with Dr. Fleming on 12/18 for follow-up.   "

## 2023-12-18 ENCOUNTER — VIRTUAL VISIT (OUTPATIENT)
Dept: NEUROLOGY | Facility: CLINIC | Age: 23
End: 2023-12-18
Payer: COMMERCIAL

## 2023-12-18 ENCOUNTER — TELEPHONE (OUTPATIENT)
Dept: NEUROLOGY | Facility: CLINIC | Age: 23
End: 2023-12-18

## 2023-12-18 DIAGNOSIS — G43.719 INTRACTABLE CHRONIC MIGRAINE WITHOUT AURA AND WITHOUT STATUS MIGRAINOSUS: ICD-10-CM

## 2023-12-18 DIAGNOSIS — G43.709 CHRONIC MIGRAINE WITHOUT AURA WITHOUT STATUS MIGRAINOSUS, NOT INTRACTABLE: Primary | ICD-10-CM

## 2023-12-18 PROCEDURE — 99214 OFFICE O/P EST MOD 30 MIN: CPT | Mod: 95 | Performed by: PSYCHIATRY & NEUROLOGY

## 2023-12-18 RX ORDER — METHYLPREDNISOLONE SODIUM SUCCINATE 125 MG/2ML
125 INJECTION, POWDER, LYOPHILIZED, FOR SOLUTION INTRAMUSCULAR; INTRAVENOUS
Status: CANCELLED
Start: 2023-12-18

## 2023-12-18 RX ORDER — PROCHLORPERAZINE MALEATE 10 MG
10 TABLET ORAL EVERY 6 HOURS PRN
Qty: 10 TABLET | Refills: 11 | Status: SHIPPED | OUTPATIENT
Start: 2023-12-18 | End: 2024-05-22

## 2023-12-18 RX ORDER — ONDANSETRON 2 MG/ML
4 INJECTION INTRAMUSCULAR; INTRAVENOUS
Status: CANCELLED | OUTPATIENT
Start: 2023-12-18

## 2023-12-18 RX ORDER — EPINEPHRINE 1 MG/ML
0.3 INJECTION, SOLUTION, CONCENTRATE INTRAVENOUS EVERY 5 MIN PRN
Status: CANCELLED | OUTPATIENT
Start: 2023-12-18

## 2023-12-18 RX ORDER — DIPHENHYDRAMINE HYDROCHLORIDE 50 MG/ML
50 INJECTION INTRAMUSCULAR; INTRAVENOUS
Status: CANCELLED
Start: 2023-12-18

## 2023-12-18 RX ORDER — ALBUTEROL SULFATE 0.83 MG/ML
2.5 SOLUTION RESPIRATORY (INHALATION)
Status: CANCELLED | OUTPATIENT
Start: 2023-12-18

## 2023-12-18 RX ORDER — ALBUTEROL SULFATE 90 UG/1
1-2 AEROSOL, METERED RESPIRATORY (INHALATION)
Status: CANCELLED
Start: 2023-12-18

## 2023-12-18 RX ORDER — FREMANEZUMAB-VFRM 225 MG/1.5ML
1.5 INJECTION SUBCUTANEOUS
Qty: 1.5 ML | Refills: 11 | Status: SHIPPED | OUTPATIENT
Start: 2023-12-18 | End: 2024-09-16 | Stop reason: ALTCHOICE

## 2023-12-18 RX ORDER — MEPERIDINE HYDROCHLORIDE 25 MG/ML
25 INJECTION INTRAMUSCULAR; INTRAVENOUS; SUBCUTANEOUS EVERY 30 MIN PRN
Status: CANCELLED | OUTPATIENT
Start: 2023-12-18

## 2023-12-18 ASSESSMENT — HEADACHE IMPACT TEST (HIT 6)
WHEN YOU HAVE HEADACHES HOW OFTEN IS THE PAIN SEVERE: VERY OFTEN
HIT6 TOTAL SCORE: 68
HOW OFTEN DID HEADACHS LIMIT CONCENTRATION ON WORK OR DAILY ACTIVITY: VERY OFTEN
HOW OFTEN DO HEADACHES LIMIT YOUR DAILY ACTIVITIES: VERY OFTEN
HOW OFTEN HAVE YOU FELT TOO TIRED TO WORK BECAUSE OF YOUR HEADACHES: VERY OFTEN
HOW OFTEN HAVE YOU FELT FED UP OR IRRITATED BECAUSE OF YOUR HEADACHES: VERY OFTEN
WHEN YOU HAVE A HEADACHE HOW OFTEN DO YOU WISH YOU COULD LIE DOWN: ALWAYS

## 2023-12-18 NOTE — NURSING NOTE
Is the patient currently in the state of [place]? YES    Visit mode:VIDEO    If the visit is dropped, the patient can be reconnected by: TELEPHONE VISIT: Phone number:   Telephone Information:   Mobile [phone]       Will anyone else be joining the visit? NO  (If patient encounters technical issues they should call [phone])    How would you like to obtain your AVS? MyChart    Are changes needed to the allergy or medication list? Pt stated no med changes    Reason for visit: Video Visit (Follow-up )    Marina SHAIKH

## 2023-12-18 NOTE — LETTER
12/18/2023       RE: Kamala Harris  [address]  [place] [address]       Dear Colleague,    Thank you for referring your patient, Kamala Harris, to the [facility] NEUROLOGY CLINIC [place] at [facility]. Please see a copy of my visit note below.    [facility]    Headache Neurology Progress Note  December 18, 2023    Subjective:    Kamala Harris returns for follow up of chronic migraine.    Today, she reports that she is having more frequent headache, occurring daily. The headache starts by early afternoon and lasts all day. This pattern has been present for 2 weeks, but headache have changed since April.  Pain severity rates up to 7 or 8 out of 10.    She reports that headaches changed after concussion (hit head on metal rack at work) in April 2023. Had symptoms until August 2023, with daily symptoms, worsening of depression (did outpatient program), and completed PT, OT, pain specialist.  Headaches have changed since that time.    She takes naproxen, Ubrelvy, which are helpful typically.  She describes additional headaches that feel like a screwdriver is going into the back of her right eye, that are not responsive to this.    She reports that IV fluids have been helpful in the emergency room.    Objective:    Vitals: There were no vitals taken for this visit.  General: Cooperative, NAD  Neurologic:  Mental Status: Fully alert, attentive and oriented. Speech clear and fluent.   Cranial Nerves: Facial movements symmetric.   Motor: No abnormal movements.      Head CT (5/7/2021):  No evidence of acute intracranial hemorrhage, mass, or herniation.    Assessment/Plan:   Kamala Harris is a 23 year old woman returns for follow-up.  She previously had well-controlled episodic migraine with possible visual aura, but this changed after head trauma in April 2023, and she now has chronic posttraumatic headaches  with a chronic migraine phenotype.      We reviewed concussion, expected recovery.  With ongoing daily headache, and no head imaging since head trauma, I offered an MRI of the brain with and without contrast for further evaluation for structural causes.    If MRI is unrevealing, then we discussed moving forward with a symptomatic treatment strategy for post traumatic headache.  -With lightheadedness, I do not recommend an antihypertensive.  Her pulse is elevated, so tricyclic antidepressants will be avoided.  She is previously trialed topiramate without effect.  -I recommended trial of Ajovy subcutaneous injection every 30 days.  Potential side effects were discussed.    For acute treatment, we discussed the following:  -For acute treatment of mild headache, I recommend naproxen 500 mg twice a day as needed, not to exceed more than 14 days/month to avoid medication overuse.  - For acute treatment of moderate severe headache, I recommend Ubrelvy 100 mg at the onset of headache, with a repeat dose in 2 hours if needed.  - For headache related nausea, she has ondansetron available.  - As a rescue treatment for headache, I recommend prochlorperazine 10 mg with diphenhydramine 25 mg.  This can be repeated in 6 hours if needed.  This is not to be used with ondansetron, and limited to no more than 9 days/month to avoid medication side effects.  -As a last resort, as an alternative to the emergency room, will have an IV fluid plan in place at the infusion center.  We discussed how to contact the clinic for screening prior to scheduling at the infusion center.    I will plan to see her back in 3 to 6 months to monitor progress.          Again, thank you for allowing me to participate in the care of your patient.      Sincerely,    Nina Fleming MD

## 2023-12-18 NOTE — PROGRESS NOTES
[facility]    Headache Neurology Progress Note  December 18, 2023    Subjective:    Kamala Harris returns for follow up of chronic migraine.    Today, she reports that she is having more frequent headache, occurring daily. The headache starts by early afternoon and lasts all day. This pattern has been present for 2 weeks, but headache have changed since April.  Pain severity rates up to 7 or 8 out of 10.    She reports that headaches changed after concussion (hit head on metal rack at work) in April 2023. Had symptoms until August 2023, with daily symptoms, worsening of depression (did outpatient program), and completed PT, OT, pain specialist.  Headaches have changed since that time.    She takes naproxen, Ubrelvy, which are helpful typically.  She describes additional headaches that feel like a screwdriver is going into the back of her right eye, that are not responsive to this.    She reports that IV fluids have been helpful in the emergency room.    Objective:    Vitals: There were no vitals taken for this visit.  General: Cooperative, NAD  Neurologic:  Mental Status: Fully alert, attentive and oriented. Speech clear and fluent.   Cranial Nerves: Facial movements symmetric.   Motor: No abnormal movements.      Head CT (5/7/2021):  No evidence of acute intracranial hemorrhage, mass, or herniation.    Assessment/Plan:   Kamala Harris is a 23 year old woman returns for follow-up.  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      We reviewed concussion, expected recovery.  With ongoing daily headache, and no head imaging since head trauma, I offered an MRI of the brain with and without contrast for further evaluation for structural causes.    If MRI is unrevealing, then we discussed moving forward with a symptomatic  treatment strategy for post traumatic headache.  -With lightheadedness, I do not recommend an antihypertensive.  Her pulse is elevated, so tricyclic antidepressants will be avoided.  She is previously trialed topiramate without effect.  -I recommended trial of Ajovy subcutaneous injection every 30 days.  Potential side effects were discussed.    For acute treatment, we discussed the following:  -For acute treatment of mild headache, I recommend naproxen 500 mg twice a day as needed, not to exceed more than 14 days/month to avoid medication overuse.  - For acute treatment of moderate severe headache, I recommend Ubrelvy 100 mg at the onset of headache, with a repeat dose in 2 hours if needed.  - For headache related nausea, she has ondansetron available.  - As a rescue treatment for headache, I recommend prochlorperazine 10 mg with diphenhydramine 25 mg.  This can be repeated in 6 hours if needed.  This is not to be used with ondansetron, and limited to no more than 9 days/month to avoid medication side effects.  -As a last resort, as an alternative to the emergency room, will have an IV fluid plan in place at the infusion center.  We discussed how to contact the clinic for screening prior to scheduling at the infusion center.    I will plan to see her back in 3 to 6 months to monitor progress.    Nina Fleming MD  Neurology

## 2023-12-18 NOTE — PROGRESS NOTES
Virtual Visit Details    Type of service:  Video Visit   Video Start Time:  7:00am  Video End Time: 7:26am    Originating Location (pt. Location): Home    Distant Location (provider location):  On-site  Platform used for Video Visit: Chiqui

## 2023-12-18 NOTE — TELEPHONE ENCOUNTER
Prior Authorization Retail Medication Request    Medication/Dose: Ajovy 225 mg every 30 days  Diagnosis and ICD code (if different than what is on RX):    New/renewal/insurance change PA/secondary ins. PA:  Previously Tried and Failed:  OTC medications are not helpful. Ibuprofen/naproxen and Tylenol or Excedrin take a little of the pain away.  She previously took sumatriptan oral and nasal which made her nauseous. Rizatriptan was not effective.  Topiramate    Rationale:   ongoing daily headache,     Insurance   Primary: [facility]  Insurance ID:  [id number]    Secondary (if applicable):  Insurance ID:      Pharmacy Information (if different than what is on RX)  Name:    Phone:    Fax:

## 2023-12-19 NOTE — TELEPHONE ENCOUNTER
Prior Authorization Not Needed per Insurance    Medication: UBROGEPANT 100 MG PO TABS  Insurance Company: [employer] ([facility]) - Phone [phone] Fax [phone]  Expected CoPay: $    Pharmacy Filling the Rx: [id number] - [place], [place] - [address] [place] [address] AT [facility] & [facility]  Pharmacy Notified: y  Patient Notified: y - pharmacy to notify

## 2023-12-21 NOTE — TELEPHONE ENCOUNTER
Prior Authorization Not Needed per Insurance    Medication: Ajovy 225 mg is covered under patients formulary plan.    Pharmacy Notified:  Pharmacy has received a paid claim on 12/18/23.  Patient Notified:  No      Please close encounter when finished.    Thank you,  Central Prior Authorization Team  [phone]

## 2024-01-03 PROBLEM — R87.611 PAP SMEAR OF CERVIX WITH ASCUS, CANNOT EXCLUDE HGSIL: Status: ACTIVE | Noted: 2022-01-19

## 2024-01-03 NOTE — TELEPHONE ENCOUNTER
FYI to provider - Patient is lost to pap tracking follow-up. Attempts to contact pt have been made per reminder process and there has been no reply and/or no appt scheduled. Contact hx listed below.     1/19/22 ASC-H @ 21 yo. Plan: [place] bef 4/19/22  3/10/22 [place] bx: HOLLIE 1. ECC: no HOLLIE. Plan: [facility] in 1 yr.   4/25/23 ASC-H, neg HR HPV @ 26 yo. Plan: colp bef 7/25/23 5/4/23 Pt notified by phone.   11/3/23 Reminder mychart -- read  11/30/23 [place] not done. Tracking updated for 6 mo colp/pap due 10/25/23.  11/30/23 Reminder mychart - viewed  01/03/24 Lost to follow-up for pap tracking     MARCELLO ElliottN, RN

## 2024-01-04 NOTE — TELEPHONE ENCOUNTER
Please reach out to patient and let her know that I recommend a colposcopy as indicated by PAP team due to her recent abnormal PAP. She can schedule with OBGYN. Sending to RN team in case she has questions.    Thanks,  Jazzmine Warren DNP, NP-C

## 2024-01-04 NOTE — TELEPHONE ENCOUNTER
RN called patient    She is overdue for colposcopy. RN gave her OB scheduling number schedule an appt to gain further clarification on this and help her schedule.    Amelia Pitt RN

## 2024-01-08 NOTE — PROGRESS NOTES
DISCHARGE  Reason for Discharge: Patient chooses to discontinue therapy.  Patient has failed to schedule further appointments.  Patient last seen for OT 7/26/23 (see below) with subsequent follow up appointments cancelled, including [facility] appointment. Anticipated goals to be met to patient's satisfaction.    Equipment Issued: NA    Discharge Plan: Follow up with referring provider and/or PCP as needed    Referring Provider:  Fermin Morales,        07/26/23 0500   Appointment Info   Treating Provider Josefina Burks OTR/L   Visits Used 3/10   Medical Diagnosis Concussion without loss of consciousness   OT Tx Diagnosis Impaired IADL, work, leisure tasks   Other pertinent information Work Comp   Progress Note/Certification   Onset of Illness/Injury or Date of Surgery 04/22/23   Therapy Frequency 1x/week   Predicted Duration 8 weeks   Goals   OT Goals 1;2;3;4;5   OT Goal 1   Goal Identifier Coordination   Goal Description Patient will complete bilateral and hand-ee coordination tasks with 80% accuracy, fluid motor planning, and visual engagement as a measure of improved coordination required for IADL, work, and leisure tasks   Goal Progress 75 on dynavision, x130 alternating hand balloon taps consecutive with 99% accuracy, 1:21 grooved peg board. x50 tennis ball/finger web catch bounce back with 70% accuracy to taget, improving bilateral coordination. Patient reports on consistency with hand eye coordination tasks in home environement.   Target Date 08/18/23   OT Goal 2   Goal Identifier  Strength   Goal Description Patient will improve R hand strength by #8lbs in order to return to IADL, work, and independently   Goal Progress Yellow theraputty exercises for  strength.   Target Date 08/18/23   OT Goal 3   Goal Identifier Symptom Management   Goal Description Patient will verbalize and demonstrate understanding of at least 5 accommodations/modifications, compensatory strategies, or energy  conservation/fatigue management strategies implemented with I/ADL, work, or leisure tasks to support return to prior level of function with routine tasks.   Goal Progress Receptive to education on symptom awareness and modifications/compensatory for managment, including energy conservation. Has been diligent with decreasing screen time, electronic use. Uses blue light blockers, sunglasses, dark mode on cell phone. Receptive to education acetate overlays and reading tracker for improved tolerance to reacing; recommended adjusting warm of computer screen as well. She finds stress/anxiety increases her symptoms, has been working with her doctor and started medications for anxiety and sleep   Target Date 08/18/23   OT Goal 4   Goal Identifier Home Programming   Goal Description Patient will report 100% compliance with an UE strengthening, symptom management ([facility]), or coordination home program in order to increase performance and participation with IADL, work, and leisure tasks   Goal Progress Home program recommendations for 30:00/day of hand eye coordination tasks. Yellow Theraputty exercsies recommended 1-2x/day. Eye exercsies (flexing, exaggerated blink, near/far focus, figure 8's, palming)   Target Date 08/18/23   OT Goal 5   Goal Identifier Cognition   Goal Description Patient will complete basic to moderate level cognitive tasks with various demands (problem solving, functional calculations, divided/alternating attention, EF, error detection/correction) with no more than min assist x2 trials to increase problem solving skills, attention, and safety/judgement for improved performance in ADL, IADL, leisure, and work related tasks   Goal Progress Novel goal added this date, new onset cognitive symptoms   Target Date 08/18/23   Subjective Report   Subjective Report Has had increased stress lately related to termination at her job, she finds this increases her symptoms. Reports work comp has denied  her claim, but she is dual insured   Objective Measures   Objective Measures Objective Measure 1;Objective Measure 2;Objective Measure 3;Objective Measure 4;Objective Measure 5;Objective Measure 6;Objective Measure 7   Objective Measure 1   Objective Measure Dynavision, Mode A, Standing, All Quadrants   Details 69.5, 73, 067, 075; well tolerated, no exacerbation of symptoms   Objective Measure 2   Objective Measure R  Strength   Details 55 lbs  (BNL, age related norm is 74.5)   Objective Measure 3   Objective Measure R 9 Hole Peg Test   Details 21.07 second  (WNL (w/in -1 SD), age related norm is 16.7)   Objective Measure 4   Objective Measure Trailmaking A   Details 18.31 seconds  (WNL)   Objective Measure 5   Objective Measure Trailmaking B   Details 36.74, no errors  (WNL)   Objective Measure 6   Objective Measure Symbol Digit Modalities Test   Details 45  (BNL, age related norm is 58)   Objective Measure 7   Objective Measure CSA   Details 54, previously 36, 18   Treatment Interventions (OT)   Interventions Therapeutic Activity;Therapeutic Procedure/Exercise   Therapeutic Procedure/Exercise   Therapeutic Procedure: strength, endurance, ROM, flexibillity minutes (78501) 15   Skilled Intervention Therapist educated patient on theraputty exercises (yellow) including  & pass, taffy pulls, three point pinch, IP extension, and small item retrieval. Recommended 1-2x/day   Patient Response/Progress Well tolerated, offers accurate return demonstration   Therapeutic Activity   Therapeutic Activity Minutes (98430) 30   Skilled Intervention Education on strategies for symptom management including reading tolerance including acetate sheet overlays, reading tracker. Educated on use of elctronic adjustments for backlighting warmth and use of night mode. Therapist trained patient on eye extercises for oculomotor strengthening including flexing, timed/emphasized blinking, near/far focus, figure 8s, and palming 1x/day.  Therapist facilitated novel hand-eye coordination task with finger web/tennis ball bounce back with bilateral hands and medium sized web x50 reps   Patient Response/Progress Receptive to education, plans to utilize strategies. Eye exercises well tolerated, Hand eye coordination task with 70% accuracy, improving coordination   Plan   Home program Yellow Putty, Eye Exercises, Hand-Eye Coordination activities   Updates to plan of care Novel cog goal added due to new onset processing, attention, cognitive symptoms   Plan for next session SSP with raad, Strength/coordination HEP, hand eye coordination, WCPA   Comments   Comments Novel cog goal   Total Session Time   Timed Code Treatment Minutes 45   Total Treatment Time (sum of timed and untimed services) 45     Thank you for your referral.  Josefina Burks, OTR/DARREL     [facility]  O: [phone]  E: [email or web address]

## 2024-01-10 ENCOUNTER — ANCILLARY PROCEDURE (OUTPATIENT)
Dept: MRI IMAGING | Facility: CLINIC | Age: 24
End: 2024-01-10
Attending: PSYCHIATRY & NEUROLOGY
Payer: COMMERCIAL

## 2024-01-10 DIAGNOSIS — G43.709 CHRONIC MIGRAINE WITHOUT AURA WITHOUT STATUS MIGRAINOSUS, NOT INTRACTABLE: ICD-10-CM

## 2024-01-10 PROCEDURE — 70553 MRI BRAIN STEM W/O & W/DYE: CPT | Mod: TC | Performed by: RADIOLOGY

## 2024-01-10 PROCEDURE — A9585 GADOBUTROL INJECTION: HCPCS | Performed by: RADIOLOGY

## 2024-01-10 RX ORDER — GADOBUTROL 604.72 MG/ML
9.5 INJECTION INTRAVENOUS ONCE
Status: COMPLETED | OUTPATIENT
Start: 2024-01-10 | End: 2024-01-10

## 2024-01-10 RX ADMIN — GADOBUTROL 9.5 ML: 604.72 INJECTION INTRAVENOUS at 10:14

## 2024-02-22 ENCOUNTER — PATIENT OUTREACH (OUTPATIENT)
Dept: CARE COORDINATION | Facility: CLINIC | Age: 24
End: 2024-02-22
Payer: COMMERCIAL

## 2024-02-29 ENCOUNTER — TELEPHONE (OUTPATIENT)
Dept: NEUROLOGY | Facility: CLINIC | Age: 24
End: 2024-02-29
Payer: COMMERCIAL

## 2024-02-29 NOTE — TELEPHONE ENCOUNTER
[facility] Call Center    Phone Message    May a detailed message be left on voicemail: yes     Reason for Call:     Patient called to speak to care team regarding medication, patient wondering what to do to adjust missing a dose of ajovy and inquiring what the status is for her Ajovy PA?     Action Taken: Message routed to:  [facility] neurology    Travel Screening: Not Applicable

## 2024-02-29 NOTE — TELEPHONE ENCOUNTER
Prior Authorization Retail Medication Request  NEW Insurance     Medication/Dose: Ajovy 225 mg every 30 days  Diagnosis and ICD code (if different than what is on RX):    New/renewal/insurance change PA/secondary ins. PA:  Previously Tried and Failed:  OTC medications are not helpful. Ibuprofen/naproxen and Tylenol or Excedrin take a little of the pain away.  She previously took sumatriptan oral and nasal which made her nauseous. Rizatriptan was not effective.  Topiramate   Baseline lightheadedness - anti-hypertensives contraindicated  Pulse is elevated - tricyclic antidepressants contraindicated  Topirimate  Fluoxetine  Sertraline  Verapamil  Ubrelvy    Rationale:  migraine prevention     Insurance   Primary: [employer]  Insurance ID:  [id number]        Pharmacy Information (if different than what is on RX)  Name:    Phone:    Fax:

## 2024-02-29 NOTE — TELEPHONE ENCOUNTER
Central Prior Authorization Team   Phone: [phone]    PA Initiation    Medication: AJOVY (fremanezumab-vfrm) injection 225MG/1.5ML auto-injectors    Insurance Company: [employer] ([facility]) - Phone [phone] Fax [phone]  Pharmacy Filling the Rx: [id number] - [place], [place] - [address] [place] AT [facility] & [facility]  Filling Pharmacy Phone: [phone]  Filling Pharmacy Fax:    Start Date: 2/29/2024

## 2024-03-01 NOTE — TELEPHONE ENCOUNTER
Prior Authorization Approval    Medication: AJOVY 225 MG/1.5ML SC SOAJ  Authorization Effective Date: 2/29/2024  Authorization Expiration Date: 5/29/2024  Approved Dose/Quantity:   Reference #:     Insurance Company: Edna ([facility]) - Phone [phone] Fax [phone]  Expected CoPay: $    CoPay Card Available:      Financial Assistance Needed:   Which Pharmacy is filling the prescription: [id number] - [place], [place] - [address] [facility] AT [place] [address]  Pharmacy Notified: YES  Patient Notified: **Instructed pharmacy to notify patient when script is ready to /ship.**

## 2024-03-15 ENCOUNTER — VIRTUAL VISIT (OUTPATIENT)
Dept: FAMILY MEDICINE | Facility: CLINIC | Age: 24
End: 2024-03-15
Payer: COMMERCIAL

## 2024-03-15 DIAGNOSIS — E16.2 HYPOGLYCEMIA: Primary | ICD-10-CM

## 2024-03-15 DIAGNOSIS — R73.03 PREDIABETES: ICD-10-CM

## 2024-03-15 DIAGNOSIS — G43.109 MIGRAINE WITH AURA AND WITHOUT STATUS MIGRAINOSUS, NOT INTRACTABLE: ICD-10-CM

## 2024-03-15 PROCEDURE — 99214 OFFICE O/P EST MOD 30 MIN: CPT | Mod: 95 | Performed by: NURSE PRACTITIONER

## 2024-03-15 RX ORDER — FLASH GLUCOSE SENSOR
KIT MISCELLANEOUS
COMMUNITY

## 2024-03-15 ASSESSMENT — ASTHMA QUESTIONNAIRES
QUESTION_2 LAST FOUR WEEKS HOW OFTEN HAVE YOU HAD SHORTNESS OF BREATH: NOT AT ALL
QUESTION_5 LAST FOUR WEEKS HOW WOULD YOU RATE YOUR ASTHMA CONTROL: WELL CONTROLLED
ACT_TOTALSCORE: 24
QUESTION_4 LAST FOUR WEEKS HOW OFTEN HAVE YOU USED YOUR RESCUE INHALER OR NEBULIZER MEDICATION (SUCH AS ALBUTEROL): NOT AT ALL
QUESTION_3 LAST FOUR WEEKS HOW OFTEN DID YOUR ASTHMA SYMPTOMS (WHEEZING, COUGHING, SHORTNESS OF BREATH, CHEST TIGHTNESS OR PAIN) WAKE YOU UP AT NIGHT OR EARLIER THAN USUAL IN THE MORNING: NOT AT ALL
ACT_TOTALSCORE: 24
QUESTION_1 LAST FOUR WEEKS HOW MUCH OF THE TIME DID YOUR ASTHMA KEEP YOU FROM GETTING AS MUCH DONE AT WORK, SCHOOL OR AT HOME: NONE OF THE TIME

## 2024-03-15 ASSESSMENT — PATIENT HEALTH QUESTIONNAIRE - PHQ9: SUM OF ALL RESPONSES TO PHQ QUESTIONS 1-9: 15

## 2024-03-15 NOTE — PATIENT INSTRUCTIONS
MTM (pharmacist) referral placed. Please bring your slick monitor in with you when you come to see her.     A1c ordered.     Schedule annual with papsmear with me.

## 2024-03-15 NOTE — PROGRESS NOTES
"Kamala is a 24 year old who is being evaluated via a billable video visit.    How would you like to obtain your AVS? MyChart  If the video visit is dropped, the invitation should be resent by: Text to cell phone: [phone]  Will anyone else be joining your video visit? No      Assessment & Plan     Hypoglycemia  Chronic. She uses a slick monitor she got from her insurance ~1 month ago. She is having multiple episodes of blood sugar being so low the machine isn't able to read it. Sometimes she is slightly confused when it is low. She has been eating well and combining sugars/carbs with protein and has been avoiding. MTM Referral placed so her slick can be further assessed. May need to refer to endocrine again. Encouraged combining protein with any sugar/carb and making sure she is eating frequent small meals.     - Med Therapy Management Referral    Prediabetes  See above. Will repeat A1c.     - Hemoglobin A1c  - Med Therapy Management Referral    Migraine with aura and without status migrainosus, not intractable  Recently started Ajovy and feels it is working well for migraines. Low blood sugar is not on the side effect list. She also hasn't noticed a difference in sugars since starting that medication.     Ordering of each unique test        BMI  Estimated body mass index is 34.16 kg/m  as calculated from the following:    Height as of 11/6/23: 1.67 m (5' 5.75\").    Weight as of 11/6/23: 95.3 kg (210 lb).         There are no Patient Instructions on file for this visit.    Subjective   Kamala is a 24 year old, presenting for the following health issues:  low blood sugars (Has been having low blood sugars )        3/15/2024    11:42 AM   Additional Questions   Roomed by Diane   Accompanied by none         3/15/2024    11:42 AM   Patient Reported Additional Medications   Patient reports taking the following new medications dioni sensor         HPI           Concern - her blood sugar has been getting really low, " when this happens she feels very weak, fatigue, her readings have been worse at night   Onset: worse lately, unsure, has had the monitor for about a month to notice when her levels go too low   Description: fatigue, faint, dizzy   Intensity: moderate sometimes is mild  Progression of Symptoms:  waxing and waning, sometimes will get really bad and olga take longer to feel better  Accompanying Signs & Symptoms: none   Previous history of similar problem: no, in the past she had high blood sugar readings   Precipitating factors:        Worsened by: unsure, she always drinks water   Alleviating factors:        Improved by: glucose tablets, orange juice, some candy   Therapies tried and outcome: as above       Additional provider notes: Patient presents virtually via video visit for low blood sugars. She has a Cira sensor. States the sensor is telling her her sugars are too low to read. States she had 2 this morning before she even woke up. This is happening at least once a week. She drinks [place] water which has sugar in it. States she has been eating more protein. Last night she had a steak rice bowl.     She was able to get her cira sensor due to low blood sugars and so she could monitor it at the gym. States her sugars dip 2-3 times during the night. She has started eating at night to help offset the low.     She gets low sugars at work and has to stop and sit down and eat/drink.     The highest her sugars have gone since having the sensor has been 140. She has had the sensor for ~1 month.     She has cut out soda. She has soda only 1-2 a week to offset migraine. She will do sugar free redbull for caffeine if needed. She cut out candy.     She has seen endocrine and states she was sent to weight mgmt.     She was on prednisone recently and her sugars went low, not high.     Migraines: she started new medications around December 2023. Symptoms improving with injections.     Allergies   Allergen Reactions    Iodine Rash  "and Itching     Rash itching         Current Outpatient Medications   Medication    albuterol (PROAIR HFA/PROVENTIL HFA/VENTOLIN HFA) 108 (90 Base) MCG/ACT inhaler    etonogestrel (NEXPLANON) 68 MG IMPL    Fremanezumab-vfrm (AJOVY) 225 MG/1.5ML SOAJ    hydrOXYzine (VISTARIL) 25 MG capsule    naproxen (NAPROSYN) 500 MG tablet    ondansetron (ZOFRAN ODT) 4 MG ODT tab    prochlorperazine (COMPAZINE) 10 MG tablet    traZODone (DESYREL) 50 MG tablet    ubrogepant (UBRELVY) 100 MG tablet     No current facility-administered medications for this visit.       Past Medical History:   Diagnosis Date    ADHD     Depressive disorder     Heart disease     maternal congeital heart - cardiac catherization     Pap smear of cervix with ASCUS, cannot exclude HGSIL 1/19/2022    Sprain of right wrist     right side muliple times     Uncomplicated asthma     exercise induced            Review of Systems  Constitutional, HEENT, cardiovascular, pulmonary, gi and gu systems are negative, except as otherwise noted.      Objective    Vitals - Patient Reported  Weight (Patient Reported): 97.5 kg (215 lb)  Height (Patient Reported): 167 cm (5' 5.75\")  BMI (Based on Pt Reported Ht/Wt): 34.97  Pain Score: No Pain (0)        Physical Exam   GENERAL: alert and no distress  EYES: Eyes grossly normal to inspection.  No discharge or erythema, or obvious scleral/conjunctival abnormalities.  RESP: No audible wheeze, cough, or visible cyanosis.    SKIN: Visible skin clear. No significant rash, abnormal pigmentation or lesions.  NEURO: Cranial nerves grossly intact.  Mentation and speech appropriate for age.  PSYCH: Appropriate affect, tone, and pace of words          Video-Visit Details    Type of service:  Video Visit   Video End Time:12:45 PM (20 minutes)    Originating Location (pt. Location): Home    Distant Location (provider location):  On-site  Platform used for Video Visit: Eden  Signed Electronically by: Jazzmine Warren DNP    "

## 2024-03-29 NOTE — PROGRESS NOTES
Medication Therapy Management (MTM) Encounter    ASSESSMENT:                            Medication Adherence/Access: No issues identified    Prediabetes: pt is experiencing hypoglycemia based on CGM data but it has not been corroborated with finger sticks at this point. Unclear if these are true episodes of hypoglycemia or just that the CGM is overly sensitive and not calibrated appropriately for her. Pt is not taking any medications that can cause hypoglycemia. Recommend she check with fingerstick if CGM ever indicates hypoglycemia. May also be beneficial to switch to newest available product since blood glucose accuracy is slightly better with this product.    Chronic Migraines: improving since starting Ajovy. Pt to continue following with neuro for med management.    Anxiety/insomnia: controlled with as needed trazodone and hydroxyzine    Asthma: controlled. ACT is at goal of 20 or greater.    PLAN:                            1. Work on incorporating protein/fat/fiber into each meal to help stabilize blood sugars    2. If you have a low blood sugar on your Cira, check with a fingerstick to verify/calibrate your meter    3. Ordered Cira 3 to replace 14 day sensors -- this may be more accurate    Follow-up: with PCP as directed    SUBJECTIVE/OBJECTIVE:                          Kamala Harris is a 24 year old female coming in for an initial visit. She was referred to me from Jazzmine Warren DNP.      Reason for visit: pt having issues with low blood sugars.    Allergies/ADRs: Reviewed in chart  Past Medical History: Reviewed in chart  Tobacco: She reports that she has never smoked. She has never been exposed to tobacco smoke. She has never used smokeless tobacco.  Alcohol: Less than 1 beverages / week  Immunizations: due for influenza, COVID booster, and Prevnar 20. Pt declines    Medication Adherence/Access: no issues reported  Diabetes   Prediabetes:  No medications    Pt is not taking medications for  prediabetes  Her A1C increased this June after she completed a course of prednisone  Since blood sugars were rising, she asked for a CGM  Started using Cira and now is seeing frequent low blood sugars  She feels symptomatic when her blood sugars drop quickly  She only checked with finger stick once this month  Her Cira said she was 80 mg/dL but fingerstick was 94 mg/dL  She is aware how to treat hypoglycemia    Blood sugar monitoring: Freestyle Cira 14 day sensor           Eye exam is up to date  Foot exam is up to date    Chronic Migraines:   Ajovy 225 mg monthly  Naproxen 500 mg if needed for pain - uses a few times per month  Ubelvy 100 mg at onset of migraine if no benefit from naproxen  Ondansetron or prochlorperazine if needed for nausea due to migraine     Pt is following with neuro for chronic migraines  Has been on Ajovy for a few months now  Feels it is working well  Is still having migraines but they are less intense and less frequent now  Hasn't had to use antinausea meds at all lately  Denies concerns at this time    Anxiety/insomnia:  Trazodone 50 mg at bedtime if needed  Hydroxyzine 25 mg if needed for anxiety    Uses trazodone on occasion to help with sleep  Only takes it when she is unable to fall asleep  Says this is infrequent  Uses hydroxyzine on occasion when feeling anxious  No concerns at this time    Asthma:   Flovent HFA 44 mcg - 1 puff twice daily when needed in red zone  Albuterol (ProAir/Ventolin/Proventil) as needed    Patient rinses their mouth after using steroid inhaler.   Feels asthma is well controlled  Uses albuterol very infrequently  Triggers include:  low air quality .  Patient reports the following symptoms: none.        3/23/2023     7:16 AM 8/24/2023     2:47 PM 3/15/2024    11:44 AM   ACT Total Scores   ACT TOTAL SCORE (Goal Greater than or Equal to 20) 25 22 24   In the past 12 months, how many times did you visit the emergency room for your asthma without being admitted  to the hospital? 1 1 0   In the past 12 months, how many times were you hospitalized overnight because of your asthma? 0 0 0     BP Readings from Last 3 Encounters:   10/30/23 122/77   09/05/23 111/70   08/24/23 107/64     Pulse Readings from Last 3 Encounters:   10/30/23 108   09/05/23 95   08/24/23 96       Lab Results   Component Value Date    A1C 6.4 (H) 06/26/2023    GLC 96 08/14/2023     08/14/2023    POTASSIUM 4.2 08/14/2023    MIKO 9.7 08/14/2023    CHLORIDE 103 08/14/2023    CO2 24 08/14/2023    BUN 11.4 08/14/2023    CR 0.67 08/14/2023    GFRESTIMATED >90 08/14/2023    VITDT 22 07/30/2021    UMALCR  06/26/2023      Comment:      Unable to calculate, urine albumin and/or urine creatinine is outside detectable limits.  Microalbuminuria is defined as an albumin:creatinine ratio of 17 to 299 for males and 25 to 299 for females. A ratio of albumin:creatinine of 300 or higher is indicative of overt proteinuria.  Due to biologic variability, positive results should be confirmed by a second, first-morning random or 24-hour timed urine specimen. If there is discrepancy, a third specimen is recommended. When 2 out of 3 results are in the microalbuminuria range, this is evidence for incipient nephropathy and warrants increased efforts at glucose control, blood pressure control, and institution of therapy with an angiotensin-converting-enzyme (ACE) inhibitor (if the patient can tolerate it).      TSH 0.20 (L) 08/24/2023     ----------------  I spent 40 minutes with this patient today. All changes were made via collaborative practice agreement with Jazzmine Warren DNP. A copy of the visit note was provided to the patient's provider(s).    A summary of these recommendations was given to the patient.    John Resendiz, PharmD, BCACP  Medication Therapy Management Provider  [phone]     Medication Therapy Recommendations  Glucose intolerance (impaired glucose tolerance)    Current Medication: Continuous Blood  Gluc Sensor (FREESTYLE TATY 14 DAY SENSOR) MISC   Rationale: More effective medication available - Ineffective medication - Effectiveness   Recommendation: Change Medication - FreeStyle Taty 3 Sensor Misc   Status: Accepted per CPA

## 2024-04-01 ENCOUNTER — INFUSION THERAPY VISIT (OUTPATIENT)
Dept: INFUSION THERAPY | Facility: CLINIC | Age: 24
End: 2024-04-01
Attending: PSYCHIATRY & NEUROLOGY
Payer: COMMERCIAL

## 2024-04-01 ENCOUNTER — TELEPHONE (OUTPATIENT)
Dept: NEUROLOGY | Facility: CLINIC | Age: 24
End: 2024-04-01
Payer: COMMERCIAL

## 2024-04-01 VITALS
HEART RATE: 85 BPM | DIASTOLIC BLOOD PRESSURE: 73 MMHG | RESPIRATION RATE: 16 BRPM | SYSTOLIC BLOOD PRESSURE: 107 MMHG | OXYGEN SATURATION: 100 % | TEMPERATURE: 97.4 F

## 2024-04-01 DIAGNOSIS — G43.719 INTRACTABLE CHRONIC MIGRAINE WITHOUT AURA AND WITHOUT STATUS MIGRAINOSUS: Primary | ICD-10-CM

## 2024-04-01 PROCEDURE — 96360 HYDRATION IV INFUSION INIT: CPT

## 2024-04-01 PROCEDURE — 258N000003 HC RX IP 258 OP 636: Performed by: PSYCHIATRY & NEUROLOGY

## 2024-04-01 RX ORDER — ALBUTEROL SULFATE 0.83 MG/ML
2.5 SOLUTION RESPIRATORY (INHALATION)
Status: CANCELLED | OUTPATIENT
Start: 2024-04-01

## 2024-04-01 RX ORDER — MEPERIDINE HYDROCHLORIDE 25 MG/ML
25 INJECTION INTRAMUSCULAR; INTRAVENOUS; SUBCUTANEOUS EVERY 30 MIN PRN
Status: CANCELLED | OUTPATIENT
Start: 2024-04-01

## 2024-04-01 RX ORDER — ALBUTEROL SULFATE 90 UG/1
1-2 AEROSOL, METERED RESPIRATORY (INHALATION)
Status: CANCELLED
Start: 2024-04-01

## 2024-04-01 RX ORDER — METHYLPREDNISOLONE SODIUM SUCCINATE 125 MG/2ML
125 INJECTION, POWDER, LYOPHILIZED, FOR SOLUTION INTRAMUSCULAR; INTRAVENOUS
Status: CANCELLED
Start: 2024-04-01

## 2024-04-01 RX ORDER — DIPHENHYDRAMINE HYDROCHLORIDE 50 MG/ML
50 INJECTION INTRAMUSCULAR; INTRAVENOUS
Status: CANCELLED
Start: 2024-04-01

## 2024-04-01 RX ORDER — ONDANSETRON 2 MG/ML
4 INJECTION INTRAMUSCULAR; INTRAVENOUS
Status: CANCELLED | OUTPATIENT
Start: 2024-04-01

## 2024-04-01 RX ORDER — EPINEPHRINE 1 MG/ML
0.3 INJECTION, SOLUTION INTRAMUSCULAR; SUBCUTANEOUS EVERY 5 MIN PRN
Status: CANCELLED | OUTPATIENT
Start: 2024-04-01

## 2024-04-01 RX ADMIN — SODIUM CHLORIDE 1000 ML: 9 INJECTION, SOLUTION INTRAVENOUS at 15:21

## 2024-04-01 NOTE — PROGRESS NOTES
Infusion Nursing Note:  Kamala Harris presents today for IVF.    Patient seen by provider today: No   present during visit today: Not Applicable.    Note: Per orders in the therapy plan, 1 L NS infused over an hr.  Administrations This Visit       sodium chloride 0.9% BOLUS 1,000 mL       Admin Date  04/01/2024 Action  $New Bag Dose  1,000 mL Rate  1,000 mL/hr Route  Intravenous Documented By  Lacie Greenfield, LYN                     Intravenous Access:  Peripheral IV placed.    Treatment Conditions:  None.      Post Infusion Assessment:  Patient tolerated infusion without incident.  Blood return noted pre and post infusion.  Site patent and intact, free from redness, edema or discomfort.  No evidence of extravasations.  Access discontinued per protocol.       Discharge Plan:   Discharge instructions reviewed with: Patient.  Patient and/or family verbalized understanding of discharge instructions and all questions answered.  AVS to patient via [employer].  Patient will return as needed for next appointment.   Patient discharged in stable condition accompanied by: self.  Departure Mode: Ambulatory.    /73 (BP Location: Right arm, Patient Position: Sitting, Cuff Size: Adult Large)   Pulse 85   Temp 97.4  F (36.3  C) (Oral)   Resp 16   SpO2 100%      Lacie Greenfield, RN

## 2024-04-01 NOTE — TELEPHONE ENCOUNTER
[facility] Call Center    Phone Message    May a detailed message be left on voicemail: yes     Reason for Call: Other: Pt is requesting a call back from the care team today. Pt states she needs to answer some questions with  the care team in order to be scheduled to receive IV fluids.     Please contact Pt at [phone]    Action Taken: Message routed to:  [facility] Neurology     Travel Screening: Not Applicable

## 2024-04-01 NOTE — PATIENT INSTRUCTIONS
Dear Kamala Harris    Thank you for choosing [facility] ([facility]) for your infusion.  The following information is a summary of our appointment as well as important reminders.          If you are a transplant patient and require transplant education, please click on this link: [email or web address].    We look forward in seeing you on your next appointment here at [facility] ([facility]).  Please don t hesitate to call us at [phone] to reschedule any of your appointments or to speak with one of the [facility] registered nurses.  It was a pleasure taking care of you today.    Sincerely,    [facility]  [facility]  [address] [place] [address]  [address]  Phone:  [phone]

## 2024-04-01 NOTE — TELEPHONE ENCOUNTER
Headache Crisis 2024  Onset: Started on Thursday  Description:                Type: Migraine headache, triggered by stress/hormonal                 Location: right frontal region                 Duration:  lasts all day, builds up throughout the day with pressure and pain stays constant.                  Pain scale ratin-6/10                 Are headaches getting more intense or more frequent: No    Is this headache similar to previous headaches? Yes     Are you experiencing any new or different symptoms? Yes/No     Accompanying Signs & Symptoms:   Fever: No  Nausea or vomiting: YES, nausea is typical, no vomiting  Dizziness: No  Numbness: No  Weakness: No  Visual changes: No  Medications tried and outcome:    Water, caffeine, rest, repeated Tylenol, Ubrelvy and Naproxen with minimal relief.     Infusion to be completed. Pt to call back if there are further questions or concerns. Will call clinic back if needing a sooner appt.     Approved- Patient has a standing infusion order in place by Dr. Fleming. Patient is approved to have infusion complete today in order to alleviate current headache crisis.

## 2024-04-02 ENCOUNTER — OFFICE VISIT (OUTPATIENT)
Dept: PHARMACY | Facility: CLINIC | Age: 24
End: 2024-04-02
Payer: COMMERCIAL

## 2024-04-02 DIAGNOSIS — F32.9 MAJOR DEPRESSIVE DISORDER, REMISSION STATUS UNSPECIFIED, UNSPECIFIED WHETHER RECURRENT: ICD-10-CM

## 2024-04-02 DIAGNOSIS — R73.02 GLUCOSE INTOLERANCE (IMPAIRED GLUCOSE TOLERANCE): Primary | ICD-10-CM

## 2024-04-02 DIAGNOSIS — J45.20 MILD INTERMITTENT ASTHMA WITHOUT COMPLICATION: ICD-10-CM

## 2024-04-02 DIAGNOSIS — G43.719 INTRACTABLE CHRONIC MIGRAINE WITHOUT AURA AND WITHOUT STATUS MIGRAINOSUS: ICD-10-CM

## 2024-04-02 PROCEDURE — 99607 MTMS BY PHARM ADDL 15 MIN: CPT | Performed by: PHARMACIST

## 2024-04-02 PROCEDURE — 99605 MTMS BY PHARM NP 15 MIN: CPT | Performed by: PHARMACIST

## 2024-04-02 RX ORDER — BLOOD-GLUCOSE SENSOR
1 EACH MISCELLANEOUS
Qty: 6 EACH | Refills: 5 | Status: SHIPPED | OUTPATIENT
Start: 2024-04-02

## 2024-04-02 RX ORDER — FLUTICASONE PROPIONATE 44 UG/1
1 AEROSOL, METERED RESPIRATORY (INHALATION) 2 TIMES DAILY
COMMUNITY

## 2024-04-02 RX ORDER — KETOROLAC TROMETHAMINE 30 MG/ML
1 INJECTION, SOLUTION INTRAMUSCULAR; INTRAVENOUS ONCE
Qty: 1 EACH | Refills: 0 | Status: SHIPPED | OUTPATIENT
Start: 2024-04-02 | End: 2024-04-02

## 2024-04-02 NOTE — PATIENT INSTRUCTIONS
Favian Wray, it was great talking with you today!     Recommendations from today's MTM visit:                                                      1. Work on incorporating protein/fat/fiber into each meal to help stabilize blood sugars    2. If you have a low blood sugar on your Cira, check with a fingerstick to verify/calibrate your meter    3. I'm not sure the Cira is an accurate representation of your blood sugars - the low readings may be due to incorrect application of the sensor    4. I ordered you the Cira 3 which is the newest product -- this may be more accurate    5. Shoot me a message on [employer] if the Cira 3 isn't covered and I can order you the 14 day reader instead    I value your experience and would be very grateful for your time with providing feedback on our clinic survey. You may receive a survey via email or text message in the next few days.     To schedule another MTM appointment, please call the clinic directly or you may call the MTM scheduling line at [phone] or toll-free at [phone].     My Clinical Pharmacist's contact information:                                                      Please feel free to contact me with any questions or concerns you have.      John Resendiz, PharmD, BCACP  [facility]  Phone: [phone]

## 2024-04-02 NOTE — Clinical Note
FYI - switched to Cira 3 since it is more accurate and advised she check fingerstick when Cira says she is low. Hopefully this will provide more information. If they are true lows, she may have reactive hypoglycemia and just needs to keep juice/snacks on her to treat in these instances. Thanks for referral! John

## 2024-04-04 ENCOUNTER — TELEPHONE (OUTPATIENT)
Dept: FAMILY MEDICINE | Facility: CLINIC | Age: 24
End: 2024-04-04
Payer: COMMERCIAL

## 2024-04-04 NOTE — TELEPHONE ENCOUNTER
"Prior Authorization Retail Medication Request    Medication/Dose: Continuous Blood Gluc Sensor (FREESTYLE TATY 3 SENSOR) MISC  Diagnosis and ICD code (if different than what is on RX):  na  New/renewal/insurance change PA/secondary ins. PA:  Previously Tried and Failed:  na  Rationale:  na    Insurance   Primary: na  Insurance ID:  KEY: [id number]    Secondary (if applicable):NA  Insurance ID:  NA    Pharmacy Information (if different than what is on RX)  [facility]  Phone:  [facility]  Fax:[facility]    To submit the PA, please follow the instructions below:    Login to [email or web address] and \"enter a Key\"  KEY: [id number]  "

## 2024-04-17 NOTE — TELEPHONE ENCOUNTER
Central Prior Authorization Team   Phone: [phone]    PA Initiation    Medication: Continuous Blood Gluc Sensor (FREESTYLE TATY 3 SENSOR) [facility]  Insurance Company: [employer] Part D - Phone [phone] Fax [phone]  Pharmacy Filling the Rx: [id number] - [employer] [place], [place] - [address] AT [facility]  Filling Pharmacy Phone: [phone]  Filling Pharmacy Fax:    Start Date: 4/17/2024

## 2024-04-19 NOTE — TELEPHONE ENCOUNTER
PRIOR AUTHORIZATION DENIED    Medication: Continuous Blood Gluc Sensor (FREESTYLE TATY 3 SENSOR) MISC-PA DENIED     Denial Date: 4/17/2024    Denial Rational:         Appeal Information:            dehydration

## 2024-04-23 NOTE — TELEPHONE ENCOUNTER
Freestyle slick was denied by insurance.     Can you have patient reach out to her insurance and see what is the preferred continuous glucose monitor? Do they cover Dexcom?    Thanks,  Jazzmine Warren DNP, NP-C

## 2024-04-29 NOTE — TELEPHONE ENCOUNTER
Called and spoke to the patient      FYI     Patient stated she did get the Freestyle Cira just the reader is not covered by insurance, patient has to use her phone which she has been doing with no problem the past month.    Diane COHN CMA

## 2024-05-01 ENCOUNTER — TELEPHONE (OUTPATIENT)
Dept: NEUROLOGY | Facility: CLINIC | Age: 24
End: 2024-05-01
Payer: COMMERCIAL

## 2024-05-01 NOTE — TELEPHONE ENCOUNTER
Headache Crisis May 1, 2024  Onset: 2-3 day(s) ago (menstrual cycle started)  Description:                Type: Migraine headache                 Location: all over                 Duration:  Consistent, all day                 Pain scale ratin-6/10                 Are headaches getting more intense or more frequent: No    Is this headache similar to previous headaches? Yes    Are you experiencing any new or different symptoms? No     Accompanying Signs & Symptoms:   Fever: No  Nausea or vomiting: YES, typical  Dizziness: No  Numbness: No  Weakness: No  Visual changes: No  Medications tried and outcome:   Tylenol w/ minimal effect  Unable to take Naproxen due to nausea  Ubrelvy w/ minimal effect        Approved- Patient has a standing infusion order in place by Dr. Fleming. Patient is approved to have infusion complete today in order to alleviate current headache crisis.

## 2024-05-01 NOTE — TELEPHONE ENCOUNTER
[facility] Call Center    Phone Message    May a detailed message be left on voicemail: yes     Reason for Call: Other: Pt is requesting a call back from the care team. Pt states she needs to answer some questions with  the care team in order to scheduled her infusion.    Please contact Pt at: [phone]      Action Taken: Message routed to:  [facility] ([facility] Neurology     Travel Screening: Not Applicable

## 2024-05-02 ENCOUNTER — INFUSION THERAPY VISIT (OUTPATIENT)
Dept: INFUSION THERAPY | Facility: CLINIC | Age: 24
End: 2024-05-02
Attending: PSYCHIATRY & NEUROLOGY
Payer: COMMERCIAL

## 2024-05-02 VITALS
SYSTOLIC BLOOD PRESSURE: 109 MMHG | OXYGEN SATURATION: 97 % | RESPIRATION RATE: 18 BRPM | DIASTOLIC BLOOD PRESSURE: 74 MMHG | TEMPERATURE: 97.8 F | HEART RATE: 98 BPM

## 2024-05-02 DIAGNOSIS — G43.719 INTRACTABLE CHRONIC MIGRAINE WITHOUT AURA AND WITHOUT STATUS MIGRAINOSUS: Primary | ICD-10-CM

## 2024-05-02 PROCEDURE — 258N000003 HC RX IP 258 OP 636: Performed by: PSYCHIATRY & NEUROLOGY

## 2024-05-02 PROCEDURE — 96361 HYDRATE IV INFUSION ADD-ON: CPT

## 2024-05-02 PROCEDURE — 96360 HYDRATION IV INFUSION INIT: CPT

## 2024-05-02 PROCEDURE — 96374 THER/PROPH/DIAG INJ IV PUSH: CPT

## 2024-05-02 RX ORDER — ONDANSETRON 2 MG/ML
4 INJECTION INTRAMUSCULAR; INTRAVENOUS
Status: CANCELLED | OUTPATIENT
Start: 2024-05-02

## 2024-05-02 RX ORDER — EPINEPHRINE 1 MG/ML
0.3 INJECTION, SOLUTION INTRAMUSCULAR; SUBCUTANEOUS EVERY 5 MIN PRN
OUTPATIENT
Start: 2024-05-02

## 2024-05-02 RX ORDER — METHYLPREDNISOLONE SODIUM SUCCINATE 125 MG/2ML
125 INJECTION, POWDER, LYOPHILIZED, FOR SOLUTION INTRAMUSCULAR; INTRAVENOUS
Start: 2024-05-02

## 2024-05-02 RX ORDER — MEPERIDINE HYDROCHLORIDE 25 MG/ML
25 INJECTION INTRAMUSCULAR; INTRAVENOUS; SUBCUTANEOUS EVERY 30 MIN PRN
OUTPATIENT
Start: 2024-05-02

## 2024-05-02 RX ORDER — ALBUTEROL SULFATE 90 UG/1
1-2 AEROSOL, METERED RESPIRATORY (INHALATION)
Start: 2024-05-02

## 2024-05-02 RX ORDER — DIPHENHYDRAMINE HYDROCHLORIDE 50 MG/ML
50 INJECTION INTRAMUSCULAR; INTRAVENOUS
Start: 2024-05-02

## 2024-05-02 RX ORDER — ALBUTEROL SULFATE 0.83 MG/ML
2.5 SOLUTION RESPIRATORY (INHALATION)
OUTPATIENT
Start: 2024-05-02

## 2024-05-02 RX ORDER — ONDANSETRON 2 MG/ML
4 INJECTION INTRAMUSCULAR; INTRAVENOUS
Status: DISCONTINUED | OUTPATIENT
Start: 2024-05-02 | End: 2024-05-02 | Stop reason: HOSPADM

## 2024-05-02 RX ADMIN — SODIUM CHLORIDE 1000 ML: 9 INJECTION, SOLUTION INTRAVENOUS at 10:26

## 2024-05-02 ASSESSMENT — PAIN SCALES - GENERAL: PAINLEVEL: MODERATE PAIN (5)

## 2024-05-02 NOTE — PATIENT INSTRUCTIONS
Dear Kamala Harris    Thank you for choosing [facility] ([facility]) for your infusion.  The following information is a summary of our appointment as well as important reminders.      If you are a transplant patient and require transplant education, please click on this link: [email or web address].    We look forward in seeing you on your next appointment here at [facility] ([facility]).  Please don t hesitate to call us at [phone] to reschedule any of your appointments or to speak with one of the [facility] registered nurses.  It was a pleasure taking care of you today.    Sincerely,    [facility]  [facility]  [address] [place] [address]  [address]  Phone:  [phone]

## 2024-05-02 NOTE — PROGRESS NOTES
Infusion Nursing Note:  Kamala Harris presents today for IV fluids - migraine treatment.    Patient seen by provider today: No   present during visit today: Not Applicable.    Note: Pt presenting with migraine since early Monday morning.     Intravenous Access:  Peripheral IV placed.    Treatment Conditions:  Pt approved for neuro plan  Pt does not need zofran today as her nausea and emesis has resolved    Post Infusion Assessment:  Patient tolerated infusion without incident.  Site patent and intact, free from redness, edema or discomfort.  Access discontinued per protocol.     Discharge Plan:   Patient and/or family verbalized understanding of discharge instructions and all questions answered.  Patient discharged in stable condition accompanied by: self.    Administrations This Visit       sodium chloride 0.9% BOLUS 1,000 mL       Admin Date  05/02/2024 Action  $New Bag Dose  1,000 mL Rate  1,000 mL/hr Route  Intravenous Documented By  Susana Chapman RN                  /74 (BP Location: Left arm, Patient Position: Sitting, Cuff Size: Adult Large)   Pulse 98   Temp 97.8  F (36.6  C) (Oral)   Resp 18   SpO2 97%     Susana Chapman RN

## 2024-05-22 ENCOUNTER — VIRTUAL VISIT (OUTPATIENT)
Dept: NEUROLOGY | Facility: CLINIC | Age: 24
End: 2024-05-22
Payer: COMMERCIAL

## 2024-05-22 DIAGNOSIS — G43.709 CHRONIC MIGRAINE WITHOUT AURA WITHOUT STATUS MIGRAINOSUS, NOT INTRACTABLE: Primary | ICD-10-CM

## 2024-05-22 PROCEDURE — G2211 COMPLEX E/M VISIT ADD ON: HCPCS | Mod: 95 | Performed by: PSYCHIATRY & NEUROLOGY

## 2024-05-22 PROCEDURE — 99214 OFFICE O/P EST MOD 30 MIN: CPT | Mod: 95 | Performed by: PSYCHIATRY & NEUROLOGY

## 2024-05-22 RX ORDER — PROCHLORPERAZINE MALEATE 10 MG
10 TABLET ORAL EVERY 6 HOURS PRN
Qty: 10 TABLET | Refills: 11 | Status: SHIPPED | OUTPATIENT
Start: 2024-05-22

## 2024-05-22 RX ORDER — ONDANSETRON 4 MG/1
4 TABLET, ORALLY DISINTEGRATING ORAL EVERY 8 HOURS PRN
Qty: 20 TABLET | Refills: 11 | Status: SHIPPED | OUTPATIENT
Start: 2024-05-22

## 2024-05-22 RX ORDER — NAPROXEN 500 MG/1
500 TABLET ORAL 2 TIMES DAILY PRN
Qty: 28 TABLET | Refills: 11 | Status: SHIPPED | OUTPATIENT
Start: 2024-05-22

## 2024-05-22 ASSESSMENT — MIGRAINE DISABILITY ASSESSMENT (MIDAS)
ON A SCALE FROM 0-10 ON AVERAGE HOW PAINFUL WERE HEADACHES: 7
HOW MANY DAYS DID YOU MISS WORK OR SCHOOL BECAUSE OF HEADACHES: 0
TOTAL SCORE: 120
HOW OFTEN WERE SOCIAL ACTIVITIES MISSED DUE TO HEADACHES: 5
HOW MANY DAYS WAS YOUR PRODUCTIVITY CUT IN HALF BECAUSE OF HEADACHES: 25
HOW MANY DAYS DID YOU NOT DO HOUSEWORK BECAUSE OF HEADACHES: 45
HOW MANY DAYS IN THE PAST 3 MONTHS HAVE YOU HAD A HEADACHE: 20
HOW MANY DAYS WAS HOUSEWORK PRODUCTIVITY CUT IN HALF DUE TO HEADACHES: 45

## 2024-05-22 ASSESSMENT — HEADACHE IMPACT TEST (HIT 6)
HOW OFTEN HAVE YOU FELT TOO TIRED TO WORK BECAUSE OF YOUR HEADACHES: VERY OFTEN
WHEN YOU HAVE HEADACHES HOW OFTEN IS THE PAIN SEVERE: VERY OFTEN
HOW OFTEN DO HEADACHES LIMIT YOUR DAILY ACTIVITIES: VERY OFTEN
HOW OFTEN HAVE YOU FELT FED UP OR IRRITATED BECAUSE OF YOUR HEADACHES: ALWAYS
HIT6 TOTAL SCORE: 70
HOW OFTEN DO HEADACHES LIMIT YOUR DAILY ACTIVITIES: VERY OFTEN
HIT6 TOTAL SCORE: 70
HOW OFTEN HAVE YOU FELT TOO TIRED TO WORK BECAUSE OF YOUR HEADACHES: VERY OFTEN
WHEN YOU HAVE A HEADACHE HOW OFTEN DO YOU WISH YOU COULD LIE DOWN: ALWAYS
HOW OFTEN HAVE YOU FELT FED UP OR IRRITATED BECAUSE OF YOUR HEADACHES: ALWAYS
HOW OFTEN DID HEADACHS LIMIT CONCENTRATION ON WORK OR DAILY ACTIVITY: VERY OFTEN
WHEN YOU HAVE A HEADACHE HOW OFTEN DO YOU WISH YOU COULD LIE DOWN: ALWAYS
WHEN YOU HAVE HEADACHES HOW OFTEN IS THE PAIN SEVERE: VERY OFTEN
HOW OFTEN DID HEADACHS LIMIT CONCENTRATION ON WORK OR DAILY ACTIVITY: VERY OFTEN

## 2024-05-22 NOTE — NURSING NOTE
Is the patient currently in the state of [place]? YES    Visit mode:VIDEO    If the visit is dropped, the patient can be reconnected by: TELEPHONE VISIT: Phone number:   Telephone Information:   Mobile [phone]       Will anyone else be joining the visit? NO  (If patient encounters technical issues they should call [phone] :[phone])    How would you like to obtain your AVS? MyChart    Are changes needed to the allergy or medication list? Pt stated no med changes    Are refills needed on medications prescribed by this physician? NO    Reason for visit: Video Visit (Follow-up )    Marina SHAIKH     Problem: Patient Care Overview  Goal: Plan of Care Review  Outcome: Ongoing (interventions implemented as appropriate)  Pt remained free of falls and injury. Bed in low locked position side rails up x2 with call light and belongings in reach. IVFs continued. Oxy IR given. All VS stable. No acute events thus far. Pt tachy [id number] . Will continue to monitor.

## 2024-05-22 NOTE — PROGRESS NOTES
[facility]    Headache Neurology Progress Note  May 22, 2024    Subjective:    Kamala Harris returns for follow up of chronic headaches.    Headaches are shorter in duration, less associated symptoms.     Today, she reports that migraine worsens during menstrual periods. She has Nexplanon, which is causing irregular periods.     She has used IV fluids about once monthly since our last visit. She uses this during her menstrual cycle flares.    Other triggers include dehydration.     She has noticed much less nausea, photophobia, phonophobia, which is good.     She has a eye massager mask now. She is planning to try this in the future.    OTC medications are not helpful. Naproxen is minimally helpful. Ubrelvy 100 mg lessens pain. Second dose is needed.    She continues Ajovy. She has more migraine attacks the week prior to the injection.    She is working on reducing stress.    She discussed MRI results. Brain looks good. There was a scalp lesion noted. She reports this is a known mole, being watched by dermatology, and is thought to be benign.    Objective:    Vitals: There were no vitals taken for this visit.  General: Cooperative, NAD  Neurologic:  Mental Status: Fully alert, attentive and oriented. Speech clear and fluent.   Cranial Nerves: Facial movements symmetric.   Motor: No abnormal movements.      Pertinent Investigations:    MRI brain (1/10/2024):  Enlarging cutaneous nodule arising from the left  frontotemporal scalp. Direct visualization and clinical correlation  recommended. Otherwise, normal brain MRI.        6/21/2023     8:15 AM 12/18/2023     6:51 AM 5/22/2024     9:26 AM   HIT-6   When you have headaches, how often is the pain severe 10 11 11   How often do headaches limit your ability to do usual daily activities including household work, work, school, or social activities? 10 11 11   When you have a headache, how often do you wish you could lie down? 13 13 13   In  the past 4 weeks, how often have you felt too tired to do work or daily activities because of your headaches 10 11 11   In the past 4 weeks, how often have you felt fed up or irritated because of your headaches 8 11 13   In the past 4 weeks, how often did headaches limit your ability to concentrate on work or daily activities 10 11 11   HIT-6 Total Score 61 68 70           6/15/2022    10:02 AM 12/18/2023     6:52 AM 5/22/2024     9:27 AM   MIDAS - in the past three months:   On how many days did you miss work or school because of your headaches? 0 0 0   How many days was your productivity at work or school reduced by half or more because of your headaches? 0 0 25   On how many days did you not do household work because of your headaches? 3 0 45   How many days was your productivity in household work reduced by half or more because of your headaches? 3 0 45   On how many days did you miss family, social, or leisure activities because of your headaches? 0 0 5   On how many days did you have a headache? 3 14 20   On a scale of 0-10, on average how painful were these headaches? 5 6 7   MIDAS Score 6 (II - Mild Disability) 0 (I - Little or No Disability) 120 (IV - Severe Disability)        Assessment/Plan:   Kamala Harris is a 24 year old woman returns for follow-up.  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  Ajovy is helpful, but with significant wearing off in the last week.      We discussed moving forward with a symptomatic treatment strategy for chronic post traumatic headache with a chronic migraine phenotype.  -With lightheadedness, I do not recommend an antihypertensive.  Her pulse is elevated, so tricyclic antidepressants will be avoided.  She is previously trialed topiramate without effect.  -I recommended trial of Emgality for 3-6  months, instead of Ajovy.     For acute treatment, we discussed the following:  -For acute treatment of mild headache, I recommend naproxen 500 mg twice a day as needed, not to exceed more than 14 days/month to avoid medication overuse.  - For acute treatment of moderate severe headache, I recommend Ubrelvy 100 mg at the onset of headache, with a repeat dose in 2 hours if needed.  - For headache related nausea, she has ondansetron available.  - As a rescue treatment for headache, I recommend prochlorperazine 10 mg with diphenhydramine 25 mg.  This can be repeated in 6 hours if needed.  This is not to be used with ondansetron, and limited to no more than 9 days/month to avoid medication side effects.  -As a last resort, as an alternative to the emergency room, will have an IV fluid plan in place at the infusion center.  We discussed how to contact the clinic for screening prior to scheduling at the infusion center.     I will plan to see her back in 3 to 6 months to monitor progress.    The longitudinal plan of care for Kamala was addressed during this visit. Due to the added complexity in care, I will continue to support Kamala in the subsequent management of this condition(s) and with the ongoing continuity of care of this condition(s).    Nina Fleming MD  Neurology

## 2024-05-22 NOTE — PROGRESS NOTES
Virtual Visit Details    Type of service:  Video Visit   Video Start Time:  9:33 am  Video End Time: 9:52 am    Originating Location (pt. Location): Home    Distant Location (provider location):  Off-site  Platform used for Video Visit: Well

## 2024-05-22 NOTE — LETTER
5/22/2024       RE: Kamala Harris  [address]  [place] [address]       Dear Colleague,    Thank you for referring your patient, Kamala Harris, to the [facility] [place] [facility]. Please see a copy of my visit note below.    [facility]    Headache Neurology Progress Note  May 22, 2024    Subjective:    Kamala Harris returns for follow up of chronic headaches.    Headaches are shorter in duration, less associated symptoms.     Today, she reports that migraine worsens during menstrual periods. She has Nexplanon, which is causing irregular periods.     She has used IV fluids about once monthly since our last visit. She uses this during her menstrual cycle flares.    Other triggers include dehydration.     She has noticed much less nausea, photophobia, phonophobia, which is good.     She has a eye massager mask now. She is planning to try this in the future.    OTC medications are not helpful. Naproxen is minimally helpful. Ubrelvy 100 mg lessens pain. Second dose is needed.    She continues Ajovy. She has more migraine attacks the week prior to the injection.    She is working on reducing stress.    She discussed MRI results. Brain looks good. There was a scalp lesion noted. She reports this is a known mole, being watched by dermatology, and is thought to be benign.    Objective:    Vitals: There were no vitals taken for this visit.  General: Cooperative, NAD  Neurologic:  Mental Status: Fully alert, attentive and oriented. Speech clear and fluent.   Cranial Nerves: Facial movements symmetric.   Motor: No abnormal movements.      Pertinent Investigations:    MRI brain (1/10/2024):  Enlarging cutaneous nodule arising from the left  frontotemporal scalp. Direct visualization and clinical correlation  recommended. Otherwise, normal brain MRI.        6/21/2023     8:15 AM 12/18/2023      6:51 AM 5/22/2024     9:26 AM   HIT-6   When you have headaches, how often is the pain severe 10 11 11   How often do headaches limit your ability to do usual daily activities including household work, work, school, or social activities? 10 11 11   When you have a headache, how often do you wish you could lie down? 13 13 13   In the past 4 weeks, how often have you felt too tired to do work or daily activities because of your headaches 10 11 11   In the past 4 weeks, how often have you felt fed up or irritated because of your headaches 8 11 13   In the past 4 weeks, how often did headaches limit your ability to concentrate on work or daily activities 10 11 11   HIT-6 Total Score 61 68 70           6/15/2022    10:02 AM 12/18/2023     6:52 AM 5/22/2024     9:27 AM   MIDAS - in the past three months:   On how many days did you miss work or school because of your headaches? 0 0 0   How many days was your productivity at work or school reduced by half or more because of your headaches? 0 0 25   On how many days did you not do household work because of your headaches? 3 0 45   How many days was your productivity in household work reduced by half or more because of your headaches? 3 0 45   On how many days did you miss family, social, or leisure activities because of your headaches? 0 0 5   On how many days did you have a headache? 3 14 20   On a scale of 0-10, on average how painful were these headaches? 5 6 7   MIDAS Score 6 (II - Mild Disability) 0 (I - Little or No Disability) 120 (IV - Severe Disability)        Assessment/Plan:   Kamala Harris is a 24 year old woman returns for follow-up.  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  Ajovy is helpful, but with significant wearing off in the last week.      We discussed moving forward with a  symptomatic treatment strategy for chronic post traumatic headache with a chronic migraine phenotype.  -With lightheadedness, I do not recommend an antihypertensive.  Her pulse is elevated, so tricyclic antidepressants will be avoided.  She is previously trialed topiramate without effect.  -I recommended trial of Emgality for 3-6 months, instead of Ajovy.     For acute treatment, we discussed the following:  -For acute treatment of mild headache, I recommend naproxen 500 mg twice a day as needed, not to exceed more than 14 days/month to avoid medication overuse.  - For acute treatment of moderate severe headache, I recommend Ubrelvy 100 mg at the onset of headache, with a repeat dose in 2 hours if needed.  - For headache related nausea, she has ondansetron available.  - As a rescue treatment for headache, I recommend prochlorperazine 10 mg with diphenhydramine 25 mg.  This can be repeated in 6 hours if needed.  This is not to be used with ondansetron, and limited to no more than 9 days/month to avoid medication side effects.  -As a last resort, as an alternative to the emergency room, will have an IV fluid plan in place at the infusion center.  We discussed how to contact the clinic for screening prior to scheduling at the infusion center.     I will plan to see her back in 3 to 6 months to monitor progress.    The longitudinal plan of care for Kamala was addressed during this visit. Due to the added complexity in care, I will continue to support Kamala in the subsequent management of this condition(s) and with the ongoing continuity of care of this condition(s).          Again, thank you for allowing me to participate in the care of your patient.      Sincerely,    Nina Fleming MD

## 2024-05-31 ENCOUNTER — TELEPHONE (OUTPATIENT)
Dept: NEUROLOGY | Facility: CLINIC | Age: 24
End: 2024-05-31
Payer: COMMERCIAL

## 2024-05-31 NOTE — TELEPHONE ENCOUNTER
Left Voicemail (1st Attempt) and Sent Mychart (1st Attempt) for the patient to call back and schedule the following:    Appointment type: Return Headache  Provider: Lonnie  Return date: around 10/22/2024   Specialty phone number: [phone]  Additional appointment(s) needed:   Additonal Notes:       Amanda Pedraza on 5/31/2024 at 12:26 PM

## 2024-06-02 ENCOUNTER — HEALTH MAINTENANCE LETTER (OUTPATIENT)
Age: 24
End: 2024-06-02

## 2024-06-04 ENCOUNTER — TELEPHONE (OUTPATIENT)
Dept: NEUROLOGY | Facility: CLINIC | Age: 24
End: 2024-06-04
Payer: COMMERCIAL

## 2024-06-04 NOTE — TELEPHONE ENCOUNTER
Patient confirmed scheduled appointment:  Date: 10/23/2024  Time: 8:30 am  Visit type: return headache  Provider: Lonnie  Location: virtual  Testing/imaging:   Additional notes:         Amanda Pedraza on 6/4/2024 at 5:33 PM

## 2024-06-05 ENCOUNTER — TELEPHONE (OUTPATIENT)
Dept: NEUROLOGY | Facility: CLINIC | Age: 24
End: 2024-06-05
Payer: COMMERCIAL

## 2024-06-05 NOTE — TELEPHONE ENCOUNTER
Prior Authorization Retail Medication Request    Medication/Dose: Emgality 120 mg every 30 days  Diagnosis and ICD code (if different than what is on RX):    New/renewal/insurance change PA/secondary ins. PA:  Previously Tried and Failed:   OTC medications are not helpful. Ibuprofen/naproxen and Tylenol or Excedrin take a little of the pain away.  She previously took sumatriptan oral and nasal which made her nauseous. Rizatriptan was not effective.  Topiramate   Baseline lightheadedness - anti-hypertensives contraindicated  Pulse is elevated - tricyclic antidepressants contraindicated  Topirimate  Fluoxetine  Sertraline  Verapamil  Ubrelvy     Rationale:  migraine prevention     Insurance   Primary: [employer]  Insurance ID:  [id number]  Rationale:      Insurance   Primary:   Insurance ID:

## 2024-06-07 ENCOUNTER — NURSE TRIAGE (OUTPATIENT)
Dept: FAMILY MEDICINE | Facility: CLINIC | Age: 24
End: 2024-06-07
Payer: COMMERCIAL

## 2024-06-07 NOTE — TELEPHONE ENCOUNTER
"Nurse Triage SBAR    Is this a 2nd Level Triage? NO    Situation: Pt called the clinic stating she has been having right sided pelvic pain for 2 days.     Background: Pt has had an ovarian cyst and kidney stone in the past and this pain currently feels like that.     Pt has tried heat and caffeine but it doesn't help.     Pt is a week late with menstrual period and unknown if pregnant.      No abd or vaginal injury.     Assessment: Right side pelvic pain is a 7/10 currently. Sitting down makes it worse. Pt stated it feels like her right ovary is cramping. Pt stated it does not feel like menstrual cramps. Pts urine is a light \"lemonade\" color. Pt reports a little bit of pain in vaginal area.      Pt denies fever, constipation, diarrhea, urine problems, vaginal bleeding, vaginal discharge, or vomiting.     Protocol Recommended Disposition:   Call ADS/Go to ED/UCC Now (Or To Office with PCP Approval)    Recommendation: Advised pt to go to ED now to be evaluated. Pt stated, \"I was hoping to avoid that but okay\".       Does the patient meet one of the following criteria for ADS visit consideration? 16+ years old, with an [facility] PCP     TIP  Providers, please consider if this condition is appropriate for management at one of our Acute and Diagnostic Services sites.     If patient is a good candidate, please use dotphrase <dot>triageresponse and select Refer to ADS to document.         1. LOCATION: \"Where does it hurt?\"       Right side, right ovary feels like its cramping    2. RADIATION: \"Does the pain shoot anywhere else?\" (e.g., lower back, groin, thighs)      No  3. ONSET: \"When did the pain begin?\" (e.g., minutes, hours or days ago)       2 days ago   4. SUDDEN: \"Gradual or sudden onset?\"      Sudden   5. PATTERN \"Does the pain come and go, or is it constant?\"     - If constant: \"Is it getting better, staying the same, or worsening?\"       (Note: Constant means the pain never goes away completely; most serious pain is " "constant and gets worse over time)      - If intermittent: \"How long does it last?\" \"Do you have pain now?\"      (Note: Intermittent means the pain goes away completely between bouts)      Constant but getting worse   6. SEVERITY: \"How bad is the pain?\"  (e.g., Scale 1-10; mild, moderate, or severe)    - MILD (1-3): doesn't interfere with normal activities, area soft and not tender to touch     - MODERATE (4-7): interferes with normal activities or awakens from sleep, abdomen tender to touch     - SEVERE (8-10): excruciating pain, doubled over, unable to do any normal activities       7/10  7. RECURRENT SYMPTOM: \"Have you ever had this type of pelvic pain before?\" If Yes, ask: \"When was the last time?\" and \"What happened that time?\"       Yes, feels like when she had a cyst and kidney stone in the past    8. CAUSE: \"What do you think is causing the pelvic pain?\"      Unknown   9. RELIEVING/AGGRAVATING FACTORS: \"What makes it better or worse?\" (e.g., activity/rest, sexual intercourse, voiding, passing stool)     Sitting down makes it worse   10. OTHER SYMPTOMS: \"Has there been any other symptoms?\" (e.g., fever, constipation, diarrhea, urine problems, vaginal bleeding, vaginal discharge, or vomiting?\"        Urine color is a lot lighter (light lemonade color)  11. PREGNANCY: \"Is there any chance you are pregnant?\" \"When was your last menstrual period?\"        1 week late   Reason for Disposition   Patient sounds very sick or weak to the triager    Additional Information   Negative: Shock suspected (e.g., cold/pale/clammy skin, too weak to stand, low BP, rapid pulse)   Negative: Passed out (i.e., lost consciousness, collapsed and was not responding)   Negative: Sounds like a life-threatening emergency to the triager   Negative: Menstrual cramps are main concern   Negative: Abdominal (stomach) pain is main concern   Negative: Vulvar (external genital area) pain or symptoms (e.g., dryness, sores, redness, blisters, " bumps) is main concern   Negative: Rectal pain is main concern   Negative: Hip pain is main concern   Negative: Urination pain is main concern (pain with passing urine)   Negative: Pelvic pain and pregnant < 20 weeks   Negative: Pelvic pain and pregnant 20 or more weeks   Negative: Followed an abdomen (stomach) injury   Negative: Followed a genital area injury (e.g., vagina, vulva)   Negative: SEVERE pelvic pain (e.g., excruciating) and vomiting   Negative: SEVERE pelvic pain and present > 1 hour   Negative: SEVERE vaginal bleeding (e.g., soaking 2 pads or tampons per hour and present 2 or more hours; 1 menstrual cup every 2 hours)   Negative: MODERATE vaginal bleeding (i.e., soaking pad or tampon per hour and present > 6 hours; 1 menstrual cup every 6 hours)    Protocols used: Pelvic Pain - Female-A-[place]

## 2024-06-10 ENCOUNTER — NURSE TRIAGE (OUTPATIENT)
Dept: FAMILY MEDICINE | Facility: CLINIC | Age: 24
End: 2024-06-10

## 2024-06-10 ENCOUNTER — VIRTUAL VISIT (OUTPATIENT)
Dept: FAMILY MEDICINE | Facility: CLINIC | Age: 24
End: 2024-06-10
Payer: COMMERCIAL

## 2024-06-10 DIAGNOSIS — R10.2 ACUTE PELVIC PAIN, FEMALE: Primary | ICD-10-CM

## 2024-06-10 DIAGNOSIS — N83.201 RIGHT OVARIAN CYST: ICD-10-CM

## 2024-06-10 PROCEDURE — 99214 OFFICE O/P EST MOD 30 MIN: CPT | Mod: 95 | Performed by: NURSE PRACTITIONER

## 2024-06-10 RX ORDER — KETOROLAC TROMETHAMINE 10 MG/1
10 TABLET, FILM COATED ORAL EVERY 6 HOURS PRN
Qty: 20 TABLET | Refills: 1 | Status: SHIPPED | OUTPATIENT
Start: 2024-06-10

## 2024-06-10 ASSESSMENT — ANXIETY QUESTIONNAIRES
8. IF YOU CHECKED OFF ANY PROBLEMS, HOW DIFFICULT HAVE THESE MADE IT FOR YOU TO DO YOUR WORK, TAKE CARE OF THINGS AT HOME, OR GET ALONG WITH OTHER PEOPLE?: SOMEWHAT DIFFICULT
1. FEELING NERVOUS, ANXIOUS, OR ON EDGE: MORE THAN HALF THE DAYS
GAD7 TOTAL SCORE: 12
5. BEING SO RESTLESS THAT IT IS HARD TO SIT STILL: MORE THAN HALF THE DAYS
2. NOT BEING ABLE TO STOP OR CONTROL WORRYING: MORE THAN HALF THE DAYS
GAD7 TOTAL SCORE: 12
4. TROUBLE RELAXING: MORE THAN HALF THE DAYS
IF YOU CHECKED OFF ANY PROBLEMS ON THIS QUESTIONNAIRE, HOW DIFFICULT HAVE THESE PROBLEMS MADE IT FOR YOU TO DO YOUR WORK, TAKE CARE OF THINGS AT HOME, OR GET ALONG WITH OTHER PEOPLE: SOMEWHAT DIFFICULT
6. BECOMING EASILY ANNOYED OR IRRITABLE: MORE THAN HALF THE DAYS
GAD7 TOTAL SCORE: 12
3. WORRYING TOO MUCH ABOUT DIFFERENT THINGS: MORE THAN HALF THE DAYS
7. FEELING AFRAID AS IF SOMETHING AWFUL MIGHT HAPPEN: NOT AT ALL
7. FEELING AFRAID AS IF SOMETHING AWFUL MIGHT HAPPEN: NOT AT ALL

## 2024-06-10 ASSESSMENT — ASTHMA QUESTIONNAIRES
ACT_TOTALSCORE: 25
QUESTION_2 LAST FOUR WEEKS HOW OFTEN HAVE YOU HAD SHORTNESS OF BREATH: NOT AT ALL
QUESTION_3 LAST FOUR WEEKS HOW OFTEN DID YOUR ASTHMA SYMPTOMS (WHEEZING, COUGHING, SHORTNESS OF BREATH, CHEST TIGHTNESS OR PAIN) WAKE YOU UP AT NIGHT OR EARLIER THAN USUAL IN THE MORNING: NOT AT ALL
QUESTION_4 LAST FOUR WEEKS HOW OFTEN HAVE YOU USED YOUR RESCUE INHALER OR NEBULIZER MEDICATION (SUCH AS ALBUTEROL): NOT AT ALL
ACT_TOTALSCORE: 25
QUESTION_5 LAST FOUR WEEKS HOW WOULD YOU RATE YOUR ASTHMA CONTROL: COMPLETELY CONTROLLED
QUESTION_1 LAST FOUR WEEKS HOW MUCH OF THE TIME DID YOUR ASTHMA KEEP YOU FROM GETTING AS MUCH DONE AT WORK, SCHOOL OR AT HOME: NONE OF THE TIME

## 2024-06-10 NOTE — TELEPHONE ENCOUNTER
Please schedule her on a virtual appt with me today (1pm spot preferably) and I can call her as soon as I'm done with my 11am patient.     Thanks,  Jazzmine Warren DNP, NP-C

## 2024-06-10 NOTE — PROGRESS NOTES
"aKmala is a 24 year old who is being evaluated via a billable video visit.    How would you like to obtain your AVS? MyChart  If the video visit is dropped, the invitation should be resent by: Text to cell phone: [phone]  Will anyone else be joining your video visit? No      Assessment & Plan     Acute pelvic pain, female  Worsening pelvic pain since ER visit 3 days ago. Over the counter medications are not working for pain. She is using heat. She works at a desk and wants to take something that won't impair her judgement, but would offer more relief. Toradol prescribed. Advised not to take Naproxen with it. Tylenol PRN. Heat as needed. She has follow-up appt with OBGYN next week.     - ketorolac (TORADOL) 10 MG tablet; Take 1 tablet (10 mg) by mouth every 6 hours as needed for moderate pain    Right ovarian cyst  See above.     - ketorolac (TORADOL) 10 MG tablet; Take 1 tablet (10 mg) by mouth every 6 hours as needed for moderate pain          BMI  Estimated body mass index is 34.16 kg/m  as calculated from the following:    Height as of 11/6/23: 1.67 m (5' 5.75\").    Weight as of 11/6/23: 95.3 kg (210 lb).         Patient Instructions   Toradol prescribed for pain. Do not take with Naproxen. Okay to take with Tylenol.     Use heat as often as you need to. Rest. Stay hydrated.     Follow-up with OBGYN as planned.     Subjective   Kamala is a 24 year old, presenting for the following health issues:  Pain (In Right side abdomen over her ovary, has a history of a cyst in the past )        6/10/2024    10:36 AM   Additional Questions   Roomed by Diane   Accompanied by none         6/10/2024    10:36 AM   Patient Reported Additional Medications   Patient reports taking the following new medications Vvyanse is now 30 mg     Video Start Time: 11:43 AM    History of Present Illness       Reason for visit:  Abdominal pain over Right  ovary    She eats 0-1 servings of fruits and vegetables daily.She consumes 3 " sweetened beverage(s) daily.She exercises with enough effort to increase her heart rate 9 or less minutes per day.  She exercises with enough effort to increase her heart rate 4 days per week.   She is taking medications regularly.       Concern - Right side ovarian pain,   Onset: 4-5   Description: stabbing pain  Intensity: moderate to severe  Progression of Symptoms:  constant, sometimes will get worse   Accompanying Signs & Symptoms: painful when has to go poop due to using those muscles, more gassy   Previous history of similar problem: has had a cyst before with pain like this  Precipitating factors:        Worsened by: sitting too long  Alleviating factors:        Improved by: as below   Therapies tried and outcome: Tylenol, Naproxen and using a heating pad    Additional provider notes: Patient presents virtually via video visit for right sided ovarian pain that started 5 days ago. States this is the same pain she had when she had a previous cyst, but pain is worse this time. She had a pelvic US on 6/7/24 in the ER which showed she had 3cm cyst in the right ovary. She was given Toradol while she was there. She has been taking naproxen 500mg BID and tylenol. Uses heat as well. States it helps for a couple hours then comes back. Pain is the same since she was seen in ER. She has appt with OBGYN on 06/17/2024.    LMP: 05/01/2024; has Implanon.    Allergies   Allergen Reactions    Iodine Rash and Itching     Rash itching         Current Outpatient Medications   Medication Sig Dispense Refill    albuterol (PROAIR HFA/PROVENTIL HFA/VENTOLIN HFA) 108 (90 Base) MCG/ACT inhaler Inhale 2 puffs into the lungs every 4 hours as needed for shortness of breath, wheezing or cough 18 g 3    Continuous Blood Gluc Sensor (FREESTYLE TATY 14 DAY SENSOR) MISC Change every 14 days.      Continuous Blood Gluc Sensor (FREESTYLE TATY 3 SENSOR) MISC 1 each every 14 days Use 1 sensor every 14 days. Use to read blood sugars per  "'s instructions. 6 each 5    etonogestrel (NEXPLANON) 68 MG IMPL Inject 68 mg Subcutaneous      fluticasone (FLOVENT HFA) 44 MCG/ACT inhaler Inhale 1 puff into the lungs 2 times daily      Fremanezumab-vfrm (AJOVY) 225 MG/1.5ML SOAJ Inject 225 mg Subcutaneous every 30 days 1.5 mL 11    galcanezumab-gnlm (EMGALITY) 120 MG/ML injection Inject 1 mL (120 mg) Subcutaneous every 28 days 1 mL 11    hydrOXYzine (VISTARIL) 25 MG capsule 25 mg 4 times daily as needed for anxiety      naproxen (NAPROSYN) 500 MG tablet Take 1 tablet (500 mg) by mouth 2 times daily as needed for moderate pain or headaches 28 tablet 11    ondansetron (ZOFRAN ODT) 4 MG ODT tab Take 1 tablet (4 mg) by mouth every 8 hours as needed for nausea 20 tablet 11    prochlorperazine (COMPAZINE) 10 MG tablet Take 1 tablet (10 mg) by mouth every 6 hours as needed for nausea or vomiting (migraine) 10 tablet 11    traZODone (DESYREL) 50 MG tablet Take 50 mg by mouth At Bedtime      ubrogepant (UBRELVY) 100 MG tablet Take 1 tablet (100 mg) by mouth at onset of headache (migraine) 10 tablet 11     No current facility-administered medications for this visit.       Past Medical History:   Diagnosis Date    ADHD     Depressive disorder     Heart disease     maternal congeital heart - cardiac catherization     Pap smear of cervix with ASCUS, cannot exclude HGSIL 1/19/2022    Sprain of right wrist     right side muliple times     Uncomplicated asthma     exercise induced            Review of Systems  Constitutional, HEENT, cardiovascular, pulmonary, gi and gu systems are negative, except as otherwise noted.      Objective    Vitals - Patient Reported  Weight (Patient Reported): 102.1 kg (225 lb)  Height (Patient Reported): 167 cm (5' 5.75\")  BMI (Based on Pt Reported Ht/Wt): 36.59  Pain Score: Moderate Pain (4)  Pain Loc: Abdomen        Physical Exam   GENERAL: alert and no distress  EYES: Eyes grossly normal to inspection.  No discharge or erythema, or " obvious scleral/conjunctival abnormalities.  RESP: No audible wheeze, cough, or visible cyanosis.    SKIN: Visible skin clear. No significant rash, abnormal pigmentation or lesions.  NEURO: Cranial nerves grossly intact.  Mentation and speech appropriate for age.  PSYCH: Appropriate affect, tone, and pace of words    US Pelvic Transabdominal and Transvaginal    Result Date: 6/7/2024  EXAM: US PELVIS WITH IVT-NON OB   DATE: 6/7/2024 10:39 AM CLINICAL DATA: History of cysts. Pain. Evaluate for cyst or torsion. COMPARISON: June 26, 2022. TECHNIQUE: Transabdominal images were obtained. Subsequent endovaginal imaging obtained to better evaluate the endometrium and adnexal structures. FINDINGS: Uterus: The uterus measures 8.2 X 4 X 2.7 cm. No myometrial mass. Endometrium: The endometrial stripe measures 1.0 cm in thickness. No discrete endometrial lesion. Right Ovary: 4.3 X 3.7 X 2.8 cm. Anechoic 3 cm maximum diameter cyst in the right ovary. Blood flow to the right ovary is maintained. Left Ovary: 2.4 X 1.9 X 1.3 cm. Normal blood flow. No free fluid is identified.    IMPRESSION: 1.  Benign 3 cm cyst associated with the right ovary. Maintained blood flow. 2.  Normal uterus and left ovary. REPORT SIGNED BY DR. Tal Carrion       Video-Visit Details    Type of service:  Video Visit   Video End Time:11:50 AM  Originating Location (pt. Location): Home    Distant Location (provider location):  On-site  Platform used for Video Visit: Eden  Signed Electronically by: Jazzmine Warren DNP

## 2024-06-10 NOTE — TELEPHONE ENCOUNTER
New Medication Request    Contacts         Type Contact Phone/Fax    06/10/2024 07:14 AM CDT Phone (Incoming) Kamala Harris (Self) [phone] (M)            What medication are you requesting?: Pain management for ovarian cyst      Reason for medication request: Patient is not able to see OBGYN for a week and the pain is not tolerable. OTC medications are not helping    Have you taken this medication before?: Yes    Controlled Substance Agreement on file:   CSA -- Patient Level:    CSA: None found at the patient level.         Patient offered an appointment? Yes: Patient declined.     Preferred Pharmacy:    [id number] - [place], [place] - [address] AT [facility] [address]  [address]  [place] [address]  Phone: [phone] Fax: [phone]      Could we send this information to you in [place] or would you prefer to receive a phone call?:   Patient would prefer a phone call   Okay to leave a detailed message?: Yes at Cell number on file:    Telephone Information:   Mobile [phone]

## 2024-06-10 NOTE — PATIENT INSTRUCTIONS
Toradol prescribed for pain. Do not take with Naproxen. Okay to take with Tylenol.     Use heat as often as you need to. Rest. Stay hydrated.     Follow-up with OBGYN as planned.

## 2024-06-10 NOTE — TELEPHONE ENCOUNTER
"Krystal Dover called regadring below comment: \"Patient is not able to see OBGYN for a week and the pain is not tolerable. OTC medications are not helping\", I called patient back to triage.     Symptom of severe pain started 3-4 days ago. Pain is located on the right side, over the right ovary and described as stabbing pain by the patient. Using Tylenol and naproxen OTC, minimal relief, within 2-4 hours of taking OTC meds pain returns. Has tried heat too. Pain worsens when sitting down for too long. Has had this pain in past, and was an ovarian cyst. Per pt she knows she has one now and, \"it's just never been this big before\". Per krystal when she was in ED on the 7th they told her the cyst is 3 cm in size. Additional notes in ED visit via care everywhere. She was given ketorolac IM in ED and per patient, \"it helped but it made me tired and I just fell asleep\".     Patient requesting other medication for pain management until able to see OB on 6/17.     Please advise.     Additional Information   Negative: Shock suspected (e.g., cold/pale/clammy skin, too weak to stand, low BP, rapid pulse)   Negative: Passed out (i.e., lost consciousness, collapsed and was not responding)   Negative: Sounds like a life-threatening emergency to the triager   Negative: SEVERE pelvic pain (e.g., excruciating) and vomiting   Negative: SEVERE pelvic pain and present > 1 hour   Negative: SEVERE vaginal bleeding (e.g., soaking 2 pads or tampons per hour and present 2 or more hours; 1 menstrual cup every 2 hours)   Negative: MODERATE vaginal bleeding (i.e., soaking pad or tampon per hour and present > 6 hours; 1 menstrual cup every 6 hours)   Negative: Patient sounds very sick or weak to the triager   Negative: Constant pelvic pain lasting > 2 hours   Negative: Fever > 103 F (39.4 C)   Negative: Fever > 101 F (38.3 C) and age > 60 years   Negative: Fever > 100.0 F (37.8 C) and bedridden (e.g., CVA, chronic illness, recovering from " surgery)   Negative: Fever > 100.0 F (37.8 C) and diabetes mellitus or weak immune system (e.g., HIV positive, cancer chemo, splenectomy, organ transplant, chronic steroids)   Negative: SEVERE pelvic pain and present < 1 hour   Negative: MILD to MODERATE pain that comes and goes (cramps) and present > 48 hours   Negative: Pregnancy suspected (e.g., missed last menstrual period)   Negative: Unusual vaginal discharge (e.g., bad smelling, yellow, green, or foamy-white)   Negative: Blood in urine (red, pink, or tea-colored)    Protocols used: Pelvic Pain - Female-A-[place]    Cristy Hall RN  [facility]

## 2024-06-13 NOTE — TELEPHONE ENCOUNTER
Central Prior Authorization Team   Phone: [phone]    PA Initiation    Medication: Emgality 120 mg every 30 days  Insurance Company: [employer] Part D - Phone [phone] Fax [phone]  Pharmacy Filling the Rx: [id number] - [place], [place] - [address] AT [facility] [address]  Filling Pharmacy Phone: [phone]  Filling Pharmacy Fax:    Start Date: 6/13/2024

## 2024-06-17 ENCOUNTER — TELEPHONE (OUTPATIENT)
Dept: NEUROLOGY | Facility: CLINIC | Age: 24
End: 2024-06-17

## 2024-06-17 ENCOUNTER — OFFICE VISIT (OUTPATIENT)
Dept: OBGYN | Facility: CLINIC | Age: 24
End: 2024-06-17
Payer: COMMERCIAL

## 2024-06-17 VITALS
DIASTOLIC BLOOD PRESSURE: 71 MMHG | SYSTOLIC BLOOD PRESSURE: 111 MMHG | BODY MASS INDEX: 36.59 KG/M2 | OXYGEN SATURATION: 98 % | HEART RATE: 112 BPM | WEIGHT: 225 LBS

## 2024-06-17 DIAGNOSIS — G43.709 CHRONIC MIGRAINE WITHOUT AURA WITHOUT STATUS MIGRAINOSUS, NOT INTRACTABLE: Primary | ICD-10-CM

## 2024-06-17 DIAGNOSIS — R10.2 PELVIC PAIN IN FEMALE: Primary | ICD-10-CM

## 2024-06-17 DIAGNOSIS — N83.201 CYST OF RIGHT OVARY: ICD-10-CM

## 2024-06-17 DIAGNOSIS — Z87.410 HISTORY OF CERVICAL DYSPLASIA: ICD-10-CM

## 2024-06-17 PROCEDURE — 99215 OFFICE O/P EST HI 40 MIN: CPT | Performed by: GENERAL PRACTICE

## 2024-06-17 PROCEDURE — 87624 HPV HI-RISK TYP POOLED RSLT: CPT | Performed by: GENERAL PRACTICE

## 2024-06-17 PROCEDURE — 99459 PELVIC EXAMINATION: CPT | Performed by: GENERAL PRACTICE

## 2024-06-17 PROCEDURE — G0124 SCREEN C/V THIN LAYER BY MD: HCPCS | Performed by: PATHOLOGY

## 2024-06-17 PROCEDURE — G2211 COMPLEX E/M VISIT ADD ON: HCPCS | Performed by: GENERAL PRACTICE

## 2024-06-17 PROCEDURE — G0145 SCR C/V CYTO,THINLAYER,RESCR: HCPCS | Performed by: GENERAL PRACTICE

## 2024-06-17 RX ORDER — IBUPROFEN 600 MG/1
600 TABLET, FILM COATED ORAL EVERY 6 HOURS PRN
Qty: 60 TABLET | Refills: 1 | Status: SHIPPED | OUTPATIENT
Start: 2024-06-17

## 2024-06-17 RX ORDER — LISDEXAMFETAMINE DIMESYLATE 30 MG/1
CAPSULE ORAL
COMMUNITY
Start: 2024-06-04

## 2024-06-17 NOTE — PROGRESS NOTES
"HPI:   Perla is a 24 year old  here for ovarian cyst. Has had a cyst prior with similar symptoms. Notes pain like \"my ovary is being stabbed\". Pain is different from her period pain. Currently, pain has been present for about 2 weeks. Pain is improving, but has some flares. Toradol rx helped improve pain.  Has tried taking naproxen for her pain which was not significantly improving.  Patient reports her pain today is a 4 out of 10.  She also uses a large heating pad which helps with this pain.      She is currently using nexplanon for birth control.        ROS:   Weight loss or gain: denies  Abdominal bloating / pain: as above  Appetite: decreased slightly  Energy: normal  Nausea / vomiting: denies  Fevers / chills / night sweats: denies  SOB / cough: denies  Chest pain / palpitations: denies  Diarrhea / constipation: denies      Daily bowel movements  Bloody / tar-colored stools: denies  Urinary frequency / urgency / blood: denies  Headaches / vision changes: denies  Mouth sores: denies  Skin changes / rashes: denies      GYNHx:   Patient's last menstrual period was 06/15/2024 (exact date).  Cycles: rare with nexplanon  Menorrhagia: +  Dysmenorrhea: ++ when she does have a cycle  Last paps:  - ASC-H / HPV-  Lab Results   Component Value Date    PAP NIL 2019     Prior abnormal pap: yes  History STI: denies  Gardasil Hx: recieved    OBHx:  OB History    Para Term  AB Living   1 1 1 0 0 1   SAB IAB Ectopic Multiple Live Births   0 0 0 0 1      # Outcome Date GA Lbr Wolf/2nd Weight Sex Type Anes PTL Lv   1 Term 19 41w0d 06:43 / 01:43 5 kg (11 lb 0.4 oz) F Vag-Spont EPI N CHRISTINE      Name: MITCHFEMALE-PERLA      Apgar1: 3  Apgar5: 5        PSH:   Past Surgical History:   Procedure Laterality Date    BIOPSY      CARDIAC CATHERIZATION  2 years    Told by cardiology that tiny defect would close on own, no FU    CARDIAC SURGERY      CREATE EARDRUM OPENING,LOCAL ANESTH  4 years    PETs "       PMH:  Past Medical History:   Diagnosis Date    ADHD     Depressive disorder     Heart disease     maternal congeital heart - cardiac catherization     Pap smear of cervix with ASCUS, cannot exclude HGSIL 1/19/2022    Sprain of right wrist     right side muliple times     Uncomplicated asthma     exercise induced        MEDs   Current Outpatient Medications   Medication Sig Dispense Refill    albuterol (PROAIR HFA/PROVENTIL HFA/VENTOLIN HFA) 108 (90 Base) MCG/ACT inhaler Inhale 2 puffs into the lungs every 4 hours as needed for shortness of breath, wheezing or cough 18 g 3    Continuous Blood Gluc Sensor (FREESTYLE TATY 14 DAY SENSOR) MISC Change every 14 days.      Continuous Blood Gluc Sensor (FREESTYLE TATY 3 SENSOR) MISC 1 each every 14 days Use 1 sensor every 14 days. Use to read blood sugars per 's instructions. 6 each 5    etonogestrel (NEXPLANON) 68 MG IMPL Inject 68 mg Subcutaneous      fluticasone (FLOVENT HFA) 44 MCG/ACT inhaler Inhale 1 puff into the lungs 2 times daily      Fremanezumab-vfrm (AJOVY) 225 MG/1.5ML SOAJ Inject 225 mg Subcutaneous every 30 days 1.5 mL 11    galcanezumab-gnlm (EMGALITY) 120 MG/ML injection Inject 2 mLs (240 mg) Subcutaneous once for 1 dose 2 mL 0    galcanezumab-gnlm (EMGALITY) 120 MG/ML injection Inject 1 mL (120 mg) Subcutaneous every 28 days 1 mL 11    hydrOXYzine (VISTARIL) 25 MG capsule 25 mg 4 times daily as needed for anxiety      ketorolac (TORADOL) 10 MG tablet Take 1 tablet (10 mg) by mouth every 6 hours as needed for moderate pain 20 tablet 1    naproxen (NAPROSYN) 500 MG tablet Take 1 tablet (500 mg) by mouth 2 times daily as needed for moderate pain or headaches 28 tablet 11    prochlorperazine (COMPAZINE) 10 MG tablet Take 1 tablet (10 mg) by mouth every 6 hours as needed for nausea or vomiting (migraine) 10 tablet 11    traZODone (DESYREL) 50 MG tablet Take 50 mg by mouth At Bedtime      ubrogepant (UBRELVY) 100 MG tablet Take 1 tablet (100  mg) by mouth at onset of headache (migraine) 10 tablet 11    VYVANSE 30 MG capsule       ondansetron (ZOFRAN ODT) 4 MG ODT tab Take 1 tablet (4 mg) by mouth every 8 hours as needed for nausea (Patient not taking: Reported on 6/17/2024) 20 tablet 11     No current facility-administered medications for this visit.       Allergies:  Allergies   Allergen Reactions    Iodine Rash and Itching     Rash itching         FamHx:  Family History   Problem Relation Age of Onset    Migraines Mother     Sleep Apnea Mother     Substance Abuse Father     Snoring Father     Depression Brother     Anxiety Disorder Brother     Diabetes Maternal Grandmother     Cerebrovascular Disease Maternal Grandfather     Myocardial Infarction Paternal Grandmother     Myocardial Infarction Paternal Grandfather        SocHx:  Social History     Socioeconomic History    Marital status:      Spouse name: Not on file    Number of children: Not on file    Years of education: Not on file    Highest education level: Not on file   Occupational History    Not on file   Tobacco Use    Smoking status: Never     Passive exposure: Never    Smokeless tobacco: Never   Vaping Use    Vaping status: Never Used   Substance and Sexual Activity    Alcohol use: Not Currently    Drug use: No    Sexual activity: Yes     Partners: Male     Birth control/protection: Implant     Comment: nexplanon    Other Topics Concern     Service Not Asked    Blood Transfusions Not Asked    Caffeine Concern Yes     Comment: occ    Occupational Exposure Not Asked    Hobby Hazards Not Asked    Sleep Concern Not Asked    Stress Concern Not Asked    Weight Concern Not Asked    Special Diet Not Asked    Back Care Not Asked    Exercise Not Asked    Bike Helmet Not Asked    Seat Belt Yes    Self-Exams Not Asked    Parent/sibling w/ CABG, MI or angioplasty before 65F 55M? No   Social History Narrative    Not on file     Social Determinants of Health     Financial Resource Strain:  Low Risk  (10/30/2023)    Financial Resource Strain     Within the past 12 months, have you or your family members you live with been unable to get utilities (heat, electricity) when it was really needed?: No   Food Insecurity: Low Risk  (10/30/2023)    Food Insecurity     Within the past 12 months, did you worry that your food would run out before you got money to buy more?: No     Within the past 12 months, did the food you bought just not last and you didn t have money to get more?: No   Transportation Needs: High Risk (10/30/2023)    Transportation Needs     Within the past 12 months, has lack of transportation kept you from medical appointments, getting your medicines, non-medical meetings or appointments, work, or from getting things that you need?: Yes   Physical Activity: Not on file   Stress: Not on file   Social Connections: Unknown (1/1/2022)    Received from [facility], [facility]    Social Connections     Frequency of Communication with Friends and Family: Not on file   Interpersonal Safety: Not At Risk (6/7/2024)    [facility] from [facility]     Humiliation, Afraid, Rape, and Kick questionnaire     Fear of Current or Ex-Partner: No     Emotionally Abused: No     Physically Abused: No     Sexually Abused: No   Housing Stability: Low Risk  (10/30/2023)    Housing Stability     Do you have housing? : Yes     Are you worried about losing your housing?: No             PE:   /71 (BP Location: Right arm, Patient Position: Sitting, Cuff Size: Adult Large)   Pulse 112   Wt 102.1 kg (225 lb)   LMP 06/15/2024 (Exact Date)   SpO2 98%   BMI 36.59 kg/m    Body mass index is 36.59 kg/m .    General Appearance:  healthy, alert, active, no distress  HEENT: NC/AT, supple, trachea midline, no goiter  CV: well perfused  Lungs: non-labored breathing  Breast: not performed  Neuro: normal gait, balance  Skin: no lesions, visible  rashes/bruising  Psych: appropriate mood/affect  Abdomen: Benign, No masses, organomegaly, Bowel sounds normoactive, and Soft. Mild tenderness to RLQ, No rebound/guarding.    Pelvic:       - Ext: Vulva and perineum are normal without lesion, mass or discharge        - Urethra: normal without discharge or scarring        - Vagina: Normal vaginal mucosa, no masses or lesions, small amount of menstrual blood in the vault.  Otherwise physiologic discharge noted.  Pap/HPV collected after menstrual blood removed with lópez swabs       - Cervix: normal       - Uterus:Normal shape, position and consistency      RADS  US Pelvic Transabdominal and Transvaginal  Order: [id number]  Impression    IMPRESSION:  1.  Benign 3 cm cyst associated with the right ovary. Maintained blood flow.  2.  Normal uterus and left ovary.    REPORT SIGNED BY DR. Tal Carrion  Narrative    EXAM: US PELVIS WITH IVT-NON OB      DATE: 6/7/2024 10:39 AM    CLINICAL DATA: History of cysts. Pain. Evaluate for cyst or torsion.    COMPARISON: June 26, 2022.    TECHNIQUE: Transabdominal images were obtained. Subsequent endovaginal imaging obtained to better evaluate the endometrium and adnexal structures.    FINDINGS:    Uterus: The uterus measures 8.2 X 4 X 2.7 cm. No myometrial mass.    Endometrium: The endometrial stripe measures 1.0 cm in thickness. No discrete endometrial lesion.    Right Ovary: 4.3 X 3.7 X 2.8 cm. Anechoic 3 cm maximum diameter cyst in the right ovary. Blood flow to the right ovary is maintained.    Left Ovary: 2.4 X 1.9 X 1.3 cm. Normal blood flow.    No free fluid is identified.  Exam End: 06/07/24 11:08 AM           Study Result    Narrative & Impression   Gynecological Ultrasound Report  Pelvic U/S - Transvaginal   [facility]  Referring Provider: Dr. Annabel Loza  Sonographer:  Sherley Boland RDMS  Indication: ovarian cyct right  LMP: No LMP recorded. (Menstrual status: Birth  Control).     Gynecological Ultrasonography:   Uterus: retroverted. Contour is smooth/regular.  Size: 5.4 x 4.6 x 3.0 cm  Endometrium: Thickness Total 4.1 mm     Right Ovary: 2.2 x 1.6 x 1.5 cm. Wnl  Left Ovary: 2.6 x 1.9 x 1.63 cm. Wnl  Cul de Sac Free Fluid: No free fluid     Impression:   The uterus and ovaries were visualized and no abnormalities were seen.         US PELVIC TRANSABDOMINAL AND TRANSVAGINAL  Order: [id number]  Impression    IMPRESSION:    1.  Physiologic follicle measuring up to 2.3 cm in the right ovary is unchanged from 6/5/2022.  2.  Normal sonographic evaluation of the uterus and ovaries.    REPORT SIGNED BY DR. Adonay Ron  Narrative    EXAM:  US PELVIS WITH IVT-NON OB    DATE:  6/26/2022 1:08 PM    CLINICAL DATA:  Pain, concern for ruptured cyst.    ADDITIONAL CLINICAL DATA:  Pain      COMPARISON:  None    TECHNIQUE:  Real-time transabdominal and transvaginal scanning was performed.  Transvaginal imaging was necessary to better visualize the endometrium and ovaries.    FINDINGS:      UTERUS:  Normal.    UTERUS SIZE: 7.5 X 4 X 3.3 cm.    ENDOMETRIUM:  Normal in echotexture.  Endometrium measures 0.3 cm in thickness.    RIGHT OVARY:  There is a right ovarian follicle measuring 2.0 x 1.8 x 2.3 cm which is predominantly anechoic but demonstrate some internal echoes. No internal blood flow within the cyst by color Doppler.  The right ovary measures 3.3 X 2.6 X 2.5 cm.   Right ovary demonstrates normal blood flow.      LEFT OVARY:  Normal in size and echotexture.  The left ovary measures 2.9 X 2.7 X 1.2 cm.   Left ovary demonstrates normal blood flow.      FLUID:   No free fluid.  Exam End: 06/26/22  1:29 PM           LABS  Ref Range & Units 1 d ago   PREGNANCY,SERUM            Negative Negative     UA: negative (6/7/24)          A/P: 24-year-old female with right lower quadrant pelvic pain possibly due to 3 cm right ovarian simple cyst.  Patient has a history of 2+ centimeter cyst noted several  years ago.  We discussed etiologies with different types ovarian cyst.  Review of her ultrasound findings note reviewed send current cysts are simple in appearance.  Discussed that these are most likely treatment of follicular cyst due to her menstrual cycle.  Patient does have a Nexplanon in place.  We discussed that this does not necessarily suppress ovulation therefore not preventative in terms of functional follicular cyst.  Fortunately patient does have a significant history of migraines with and without aura.  For this reason she is contraindicated to use combined forms of contraception.  We did discuss potential of trying to switch to Depo-Provera which may better control her menstrual bleeding and potentially suppress ovulation however clearly discussed with patient that this is not always happen and not the mechanism of action for Depo-Provera.  Given her persistent pain, recommend scheduled NSAIDs.  She has not been successful with naproxen.  Recommend trying ibuprofen 600 mg every 6 hours taken as scheduled.  She understands she should not take Toradol, naproxen or any other NSAIDs at the same time.  Patient agreed with plan of care.  Finally patient due for cervical screening.  Patient had ASC-H Pap smear last year and did not have follow-up colposcopy.  Will complete Pap and HPV today and follow-up with results.    ICD-10-CM    1. Pelvic pain in female  R10.2 ibuprofen (ADVIL/MOTRIN) 600 MG tablet      2. Cyst of right ovary  N83.201 ibuprofen (ADVIL/MOTRIN) 600 MG tablet      3. History of cervical dysplasia  Z87.410 Gynecologic Cytology (Pap) and HPV - Recommended Age 30-65 Years                40 min spent on the date of the encounter in chart review, patient visit, review of tests, documentation and/or discussion with other providers about the issues documented above.     Luis Kim DO, FACOG

## 2024-06-17 NOTE — PATIENT INSTRUCTIONS
If you have labs or imaging done, the results will automatically release in [employer] without an interpretation.  Your health care professional will review those results and send an interpretation with recommendations as soon as possible, but this may be 1-3 business days.    If you have any questions regarding your visit, please contact your care team.     [facility]: [phone]  [facility] CLINIC HOURS TELEPHONE NUMBER   Luis TAYLOR Julio .      Kari-LYN St-LYN Bernal-Surgery Scheduler  Deisi-         Monday-[place]  8:00 am-4:00 pm  [facility]00 [facility]  Wednesday-[place] 8:00 am-4:00 pm  Thursday-[place] 8:00 am-4:00 pm    Typical Surgery Day Friday [facility]  [address] [place] [address]  PH: [phone] Fax    Imaging Scheduling all locations  PH: [phone]     [facility]  [address]  [place], [place] [address] [phone]    [facility]  [address] [place] [address]  PH: [phone]     **Surgeries** Our Surgery Schedulers will contact you to schedule. If you do not receive a call within 3 business days, please call [phone].  Urgent Care locations:  [facility]       Monday-Friday   10 am - 8 pm  Saturday and Sunday   9 am - 5 pm   [phone]   If you need a medication refill, please contact your pharmacy. Please allow 3 business days for your refill to be completed.  As always, Thank you for trusting us with your healthcare needs!

## 2024-06-17 NOTE — TELEPHONE ENCOUNTER
Prior Authorization Retail Medication Request    Medication/Dose: Ajovy 225 mg every 30 mg  Diagnosis and ICD code (if different than what is on RX):    New/renewal/insurance change PA/secondary ins. PA:  Previously Tried and Failed:   OTC medications are not helpful. Ibuprofen/naproxen and Tylenol or Excedrin take a little of the pain away.  She previously took sumatriptan oral and nasal which made her nauseous. Rizatriptan was not effective.  Topiramate   Baseline lightheadedness - anti-hypertensives contraindicated  Pulse is elevated - tricyclic antidepressants contraindicated  Topirimate  Fluoxetine  Sertraline  Verapamil  Ubrelvy     Rationale:  migraine prevention     Insurance   Primary: [employer]  Insurance ID:  [id number]

## 2024-06-17 NOTE — TELEPHONE ENCOUNTER
Prior Authorization Retail Medication Request  LOADING DOSE    Medication/Dose: Emgality 240 mg loading dose  Diagnosis and ICD code (if different than what is on RX):    New/renewal/insurance change PA/secondary ins. PA:  Previously Tried and Failed:    OTC medications are not helpful. Ibuprofen/naproxen and Tylenol or Excedrin take a little of the pain away.  She previously took sumatriptan oral and nasal which made her nauseous. Rizatriptan was not effective.  Topiramate   Baseline lightheadedness - anti-hypertensives contraindicated  Pulse is elevated - tricyclic antidepressants contraindicated  Topirimate  Fluoxetine  Sertraline  Verapamil  Ubrelvy     Rationale:  migraine prevention     Insurance   Primary: [employer]  Insurance ID:  [id number]

## 2024-06-18 LAB
HPV HR 12 DNA CVX QL NAA+PROBE: NEGATIVE
HPV16 DNA CVX QL NAA+PROBE: NEGATIVE
HPV18 DNA CVX QL NAA+PROBE: NEGATIVE
HUMAN PAPILLOMA VIRUS FINAL DIAGNOSIS: NORMAL

## 2024-06-20 NOTE — TELEPHONE ENCOUNTER
Prior Authorization Approval    Authorization Effective Date: 6/13/2024  Authorization Expiration Date: 9/13/2024  Medication: Emgality 120 mg every 30 days-PA APPROVED   Approved Dose/Quantity:   Reference #:     Insurance Company: [email or web address] Part D - Phone [phone] Fax [phone]  Expected CoPay:       CoPay Card Available:      Foundation Assistance Needed:    Which Pharmacy is filling the prescription (Not needed for infusion/clinic administered): [id number] - [place], [place] - [address] [facility] AT [place] [address]  Pharmacy Notified:  Yes- **Instructed pharmacy to notify patient when script is ready to /ship.**  Patient Notified:  Yes

## 2024-06-21 LAB
BKR LAB AP GYN ADEQUACY: ABNORMAL
BKR LAB AP GYN INTERPRETATION: ABNORMAL
BKR LAB AP LMP: ABNORMAL
BKR LAB AP PREVIOUS ABNL DX: ABNORMAL
BKR LAB AP PREVIOUS ABNORMAL: ABNORMAL
PATH REPORT.COMMENTS IMP SPEC: ABNORMAL
PATH REPORT.COMMENTS IMP SPEC: ABNORMAL
PATH REPORT.RELEVANT HX SPEC: ABNORMAL

## 2024-06-22 NOTE — TELEPHONE ENCOUNTER
PA Initiation    Medication: AJOVY 225 MG/1.5ML SC SOAJ  Insurance Company: [employer] ([facility]) - Phone [phone] Fax [phone]  Pharmacy Filling the Rx: [id number] - [place], [place] - [address] AT [facility]  Filling Pharmacy Phone:    Filling Pharmacy Fax:    Start Date: 6/22/2024

## 2024-06-22 NOTE — TELEPHONE ENCOUNTER
Prior Authorization Approval    Medication: AJOVY 225 MG/1.5ML SC SOAJ  Authorization Effective Date: 9/22/2024  Authorization Expiration Date:    Approved Dose/Quantity: UD  Reference #:     Insurance Company: [employer] ([facility]) - Phone [phone] Fax [phone]  Expected CoPay: $0   CoPay Card Available: No    Financial Assistance Needed: N/A  Which Pharmacy is filling the prescription: [id number] - [place], [place] - [address] AT [place] [address]  Pharmacy Notified: YES  Patient Notified: NO

## 2024-06-23 NOTE — TELEPHONE ENCOUNTER
PA Initiation    Medication: EMGALITY 120 MG/ML SC [place]  Insurance Company: [employer] ([facility]) - Phone [phone] Fax [phone]  Pharmacy Filling the Rx: [id number] - [place], [place] - [address] AT [facility] [address]  Filling Pharmacy Phone: [phone]  Filling Pharmacy Fax:    Start Date: 6/23/2024

## 2024-06-24 ENCOUNTER — PATIENT OUTREACH (OUTPATIENT)
Dept: OBGYN | Facility: CLINIC | Age: 24
End: 2024-06-24
Payer: COMMERCIAL

## 2024-06-24 DIAGNOSIS — R87.611 PAP SMEAR OF CERVIX WITH ASCUS, CANNOT EXCLUDE HGSIL: Primary | ICD-10-CM

## 2024-06-24 NOTE — TELEPHONE ENCOUNTER
Prior Authorization Not Needed per Insurance    Medication: EMGALITY 120 MG/ML SC SOSY  Insurance Company: [employer] ([facility]) - Phone [phone] Fax [phone]  Expected CoPay: $    Pharmacy Filling the Rx: [facility][id number] - [place], [place] - [address] AT [facility]  Pharmacy Notified: yes  Patient Notified: Instructed pharmacy to notify patient once order is ready.     NO PA NEEDED - LOADING DOSE WAS SUCCESSFULLY FILLED.

## 2024-07-09 ENCOUNTER — NURSE TRIAGE (OUTPATIENT)
Dept: FAMILY MEDICINE | Facility: CLINIC | Age: 24
End: 2024-07-09
Payer: COMMERCIAL

## 2024-07-09 NOTE — TELEPHONE ENCOUNTER
Nurse Triage SBAR    Is this a 2nd Level Triage? NO    Situation: Kamala states that she has chest pain, wheezing and difficulty breathing.     Background: Kamala has been sick since 7/4/24 and now feels like she has bronchitis again.  She last had bronchitis in 8/2024.      Assessment: Chest pain is throughout front chest and radiates to the right side of her back.  She rates the pain a 5.  She states that she has no fever but has been coughing and wheezing.  Sputum she coughs up is yellow in color.  She states it is difficult to breath.  She has tried numerous OTC medications and inhalers but has no relief.      Protocol Recommended Disposition:   Go to ED Now    Recommendation: Patient is agreeable to disposition and will go to [facility] ED.       Routed to provider    Does the patient meet one of the following criteria for ADS visit consideration? 16+ years old, with an [facility] PCP     TIP  Providers, please consider if this condition is appropriate for management at one of our Acute and Diagnostic Services sites.     If patient is a good candidate, please use dotphrase <dot>triageresponse and select Refer to ADS to document.     Kamala states that she chest pain and difficulty breathing.  She has a cold since 7/4  Reason for Disposition   Difficulty breathing    Additional Information   Negative: Followed an injury to chest   Negative: SEVERE difficulty breathing (e.g., struggling for each breath, speaks in single words)   Negative: Difficult to awaken or acting confused (e.g., disoriented, slurred speech)   Negative: Shock suspected (e.g., cold/pale/clammy skin, too weak to stand, low BP, rapid pulse)   Negative: Passed out (i.e., lost consciousness, collapsed and was not responding)   Negative: Chest pain lasting longer than 5 minutes and ANY of the following:         Pain is crushing, pressure-like, or heavy         Over 44 years old          Over 30 years old and one cardiac risk factor (e.g diabetes,  "high blood pressure, high cholesterol, smoker, or family history of heart disease)         History of heart disease (e.g. angina, heart attack, heart failure, bypass surgery, takes nitroglycerin)   Negative: Heart beating < 50 beats per minute OR > 140 beats per minute   Negative: Visible sweat on face or sweat dripping down face   Negative: Sounds like a life-threatening emergency to the triager   Negative: SEVERE chest pain   Negative: Pain also in shoulder(s) or arm(s) or jaw    Answer Assessment - Initial Assessment Questions  1. LOCATION: \"Where does it hurt?\"        Hurts to breath, mainly in the front but also on the far right in her back  2. RADIATION: \"Does the pain go anywhere else?\" (e.g., into neck, jaw, arms, back)      Back, right side  3. ONSET: \"When did the chest pain begin?\" (Minutes, hours or days)       7/6/24   4. PATTERN: \"Does the pain come and go, or has it been constant since it started?\"  \"Does it get worse with exertion?\"       Comes and goes, worse with activity  5. DURATION: \"How long does it last\" (e.g., seconds, minutes, hours)      Few hours  6. SEVERITY: \"How bad is the pain?\"  (e.g., Scale 1-10; mild, moderate, or severe)     - MILD (1-3): doesn't interfere with normal activities      - MODERATE (4-7): interferes with normal activities or awakens from sleep     - SEVERE (8-10): excruciating pain, unable to do any normal activities        5   7. CARDIAC RISK FACTORS: \"Do you have any history of heart problems or risk factors for heart disease?\" (e.g., angina, prior heart attack; diabetes, high blood pressure, high cholesterol, smoker, or strong family history of heart disease)      Mahogany Parkinson White syndrome  8. PULMONARY RISK FACTORS: \"Do you have any history of lung disease?\"  (e.g., blood clots in lung, asthma, emphysema, birth control pills)      Asthma, bronchitis  9. CAUSE: \"What do you think is causing the chest pain?\"      Feels like she has bronchitis again  10. OTHER " "SYMPTOMS: \"Do you have any other symptoms?\" (e.g., dizziness, nausea, vomiting, sweating, fever, difficulty breathing, cough)        Difficulty breathing and productive cough that mucous is bright yellow.  11. PREGNANCY: \"Is there any chance you are pregnant?\" \"When was your last menstrual period?\"        no    Protocols used: Chest Pain-A-[place]    "

## 2024-08-08 NOTE — PROGRESS NOTES
Please reach out to patient and let her know that the following medication has a recall on it:     FreeStyle Cira    Please have patient reach out to the company via [email or web address] to see if their sensors are affected and to get a replacement at no charge.    Thanks,  Jazzmine Warren DNP, NP-C

## 2024-09-16 ENCOUNTER — TELEPHONE (OUTPATIENT)
Dept: NEUROLOGY | Facility: CLINIC | Age: 24
End: 2024-09-16
Payer: COMMERCIAL

## 2024-09-16 ENCOUNTER — INFUSION THERAPY VISIT (OUTPATIENT)
Dept: INFUSION THERAPY | Facility: CLINIC | Age: 24
End: 2024-09-16
Attending: PSYCHIATRY & NEUROLOGY
Payer: COMMERCIAL

## 2024-09-16 VITALS
OXYGEN SATURATION: 97 % | HEART RATE: 86 BPM | RESPIRATION RATE: 16 BRPM | TEMPERATURE: 97.6 F | SYSTOLIC BLOOD PRESSURE: 111 MMHG | DIASTOLIC BLOOD PRESSURE: 72 MMHG

## 2024-09-16 DIAGNOSIS — G43.719 INTRACTABLE CHRONIC MIGRAINE WITHOUT AURA AND WITHOUT STATUS MIGRAINOSUS: Primary | ICD-10-CM

## 2024-09-16 PROCEDURE — 96361 HYDRATE IV INFUSION ADD-ON: CPT

## 2024-09-16 PROCEDURE — 250N000011 HC RX IP 250 OP 636: Performed by: PSYCHIATRY & NEUROLOGY

## 2024-09-16 PROCEDURE — 96374 THER/PROPH/DIAG INJ IV PUSH: CPT

## 2024-09-16 PROCEDURE — 258N000003 HC RX IP 258 OP 636: Performed by: PSYCHIATRY & NEUROLOGY

## 2024-09-16 RX ORDER — ONDANSETRON 2 MG/ML
4 INJECTION INTRAMUSCULAR; INTRAVENOUS
OUTPATIENT
Start: 2024-09-16

## 2024-09-16 RX ORDER — ALBUTEROL SULFATE 0.83 MG/ML
2.5 SOLUTION RESPIRATORY (INHALATION)
OUTPATIENT
Start: 2024-09-16

## 2024-09-16 RX ORDER — MEPERIDINE HYDROCHLORIDE 25 MG/ML
25 INJECTION INTRAMUSCULAR; INTRAVENOUS; SUBCUTANEOUS EVERY 30 MIN PRN
OUTPATIENT
Start: 2024-09-16

## 2024-09-16 RX ORDER — EPINEPHRINE 1 MG/ML
0.3 INJECTION, SOLUTION INTRAMUSCULAR; SUBCUTANEOUS EVERY 5 MIN PRN
OUTPATIENT
Start: 2024-09-16

## 2024-09-16 RX ORDER — DIPHENHYDRAMINE HYDROCHLORIDE 50 MG/ML
50 INJECTION INTRAMUSCULAR; INTRAVENOUS
Start: 2024-09-16

## 2024-09-16 RX ORDER — ALBUTEROL SULFATE 90 UG/1
1-2 AEROSOL, METERED RESPIRATORY (INHALATION)
Start: 2024-09-16

## 2024-09-16 RX ORDER — METHYLPREDNISOLONE SODIUM SUCCINATE 125 MG/2ML
125 INJECTION, POWDER, LYOPHILIZED, FOR SOLUTION INTRAMUSCULAR; INTRAVENOUS
Start: 2024-09-16

## 2024-09-16 RX ORDER — ONDANSETRON 2 MG/ML
4 INJECTION INTRAMUSCULAR; INTRAVENOUS
Status: DISCONTINUED | OUTPATIENT
Start: 2024-09-16 | End: 2024-09-16 | Stop reason: HOSPADM

## 2024-09-16 RX ADMIN — ONDANSETRON 4 MG: 2 INJECTION INTRAMUSCULAR; INTRAVENOUS at 13:47

## 2024-09-16 RX ADMIN — SODIUM CHLORIDE 1000 ML: 9 INJECTION, SOLUTION INTRAVENOUS at 13:44

## 2024-09-16 ASSESSMENT — PAIN SCALES - GENERAL: PAINLEVEL: SEVERE PAIN (6)

## 2024-09-16 NOTE — TELEPHONE ENCOUNTER
Prior Authorization Approval    Medication: EMGALITY 120 MG/ML SC SOAJ  Authorization Effective Date: 9/16/2024  Authorization Expiration Date: 9/16/2025  Approved Dose/Quantity:   Reference #: [id number]   Insurance Company: [employer] ([facility]) - Phone [phone] Fax [phone]  Expected CoPay: $    CoPay Card Available:      Financial Assistance Needed:   Which Pharmacy is filling the prescription: [id number] - [place], [place] - [address] [facility] [address] [facility] AT [place] [address]  Pharmacy Notified: Yes  Patient Notified:

## 2024-09-16 NOTE — PROGRESS NOTES
Infusion Nursing Note:  Kamala Harris presents today for IVF and antiemetics.    Patient seen by provider today: No   present during visit today: Not Applicable.    Note:   1L NS infused over 1 hour.  Zofran given IVP.    Intravenous Access:  Peripheral IV placed.    Treatment Conditions:  Per note from Tejinder DOUGLASS RN, patient qualifies for migraine infusion today.    Administrations This Visit       ondansetron (ZOFRAN) injection 4 mg       Admin Date  09/16/2024 Action  $Given Dose  4 mg Route  Intravenous Documented By  Tonya Garcia RN              sodium chloride 0.9% BOLUS 1,000 mL       Admin Date  09/16/2024 Action  $New Bag Dose  1,000 mL Rate  1,000 mL/hr Route  Intravenous Documented By  Tonya Garcia RN                  /72 (BP Location: Left arm, Patient Position: Sitting, Cuff Size: Adult Regular)   Pulse 82   Temp 97.6  F (36.4  C) (Oral)   Resp 18   SpO2 97%     Post Infusion Assessment:  Patient tolerated infusion without incident.  Blood return noted pre and post infusion.  Site patent and intact, free from redness, edema or discomfort.  No evidence of extravasations.  Access discontinued per protocol.       Discharge Plan:   Discharge instructions reviewed with: Patient.  Patient and/or family verbalized understanding of discharge instructions and all questions answered.  AVS to patient via ResolverHART.  Patient will return PRN for next appointment.   Patient discharged in stable condition accompanied by: self.  Departure Mode: Ambulatory.      Tonya Garcia RN

## 2024-09-16 NOTE — PATIENT INSTRUCTIONS
Dear Kamala Harris    Thank you for choosing [facility] ([facility] for your infusion.  The following information is a summary of our appointment as well as important reminders.      Last dose of Zofran was 2pm    If you have any questions on your upcoming Specialty Infusion appointments, please call scheduling at [phone].  It was a pleasure taking care of you today.    Sincerely,    [facility]  [address] [place] [address]  [address]  Phone:  [phone]

## 2024-09-16 NOTE — TELEPHONE ENCOUNTER
Retail Pharmacy Prior Authorization Team   Phone: [phone]    PA Initiation    Medication: EMGALITY 120 MG/ML SC SOAJ  Insurance Company: [employer] Joy ([facility]) - Phone [phone] Fax [phone]  Pharmacy Filling the Rx: [id number] - [email or web address], [place] - [address] AT [facility]  Filling Pharmacy Phone: [phone]  Filling Pharmacy Fax: [phone]  Start Date: 9/16/2024

## 2024-09-16 NOTE — TELEPHONE ENCOUNTER
Health Call Center    Phone Message    May a detailed message be left on voicemail: yes     Reason for Call: Other: Kamala stated that she has been out of galcanezumab-gnlm (EMGALITY) 120 MG/ML injection for 2 days.    Kamala stated that she is needing a prior authorization so she can  from her pharmacy.     Kamala is also requesting an IV fluid appointment.    Please call Kamala to discuss at your earliest convenience.    Action Taken: Message routed to:  [facility]    Travel Screening: Not Applicable     Date of Service:

## 2024-09-16 NOTE — TELEPHONE ENCOUNTER
Prior Authorization Retail Medication Request     Medication/Dose: Emgality 120 mg every 30 days  Diagnosis and ICD code (if different than what is on RX):    New/renewal/insurance change PA/secondary ins. PA:  Previously Tried and Failed:   OTC medications are not helpful. Ibuprofen/naproxen and Tylenol or Excedrin take a little of the pain away.  She previously took sumatriptan oral and nasal which made her nauseous. Rizatriptan was not effective.  Topiramate   Baseline lightheadedness - anti-hypertensives contraindicated  Pulse is elevated - tricyclic antidepressants contraindicated  Topirimate  Fluoxetine  Sertraline  Verapamil  Ubrelvy     Rationale:  migraine prevention     Insurance   Primary: [employer]  Insurance ID:  [id number]  Rationale:       Insurance   Primary:   Insurance ID:

## 2024-09-16 NOTE — TELEPHONE ENCOUNTER
Headache Crisis 24  Onset: 5 am today   Description:                Type: Migraine headache                 Location: unilateral in the right frontal area                  Duration: 1 days                  Pain scale ratin/10                 Are headaches getting more intense or more frequent: No    Is this headache similar to previous headaches? Yes     Are you experiencing any new or different symptoms? No     Accompanying Signs & Symptoms:   Fever: No  Nausea or vomiting: Yes, typical with migraines.  Dizziness: No  Numbness: No  Weakness: No  Visual changes: No  Medications tried and outcome:  100 mg of Ubrelvy, Zofran and ibuprofen with minor relief.      Approved- Patient has a standing infusion order in place by Dr. Fleming. Patient is approved to have infusion complete today in order to alleviate current headache crisis.

## 2024-09-17 ENCOUNTER — TELEPHONE (OUTPATIENT)
Dept: FAMILY MEDICINE | Facility: CLINIC | Age: 24
End: 2024-09-17
Payer: COMMERCIAL

## 2024-09-17 DIAGNOSIS — E16.2 HYPOGLYCEMIA: Primary | ICD-10-CM

## 2024-09-17 NOTE — TELEPHONE ENCOUNTER
[facility] #[id number] faxed a Message to Prescriber re: Continuous Blood Gluc Sensor (FREESTYLE TATY 3 SENSOR) [facility]     Freestyle Taty 3 is on a current backorder, please send rx for Freestyle Taty 2 Sensors for in the meantime until they become available again.  Thank you!

## 2024-10-23 ENCOUNTER — VIRTUAL VISIT (OUTPATIENT)
Dept: NEUROLOGY | Facility: CLINIC | Age: 24
End: 2024-10-23
Payer: COMMERCIAL

## 2024-10-23 ENCOUNTER — MYC MEDICAL ADVICE (OUTPATIENT)
Dept: FAMILY MEDICINE | Facility: CLINIC | Age: 24
End: 2024-10-23

## 2024-10-23 DIAGNOSIS — G43.709 CHRONIC MIGRAINE WITHOUT AURA WITHOUT STATUS MIGRAINOSUS, NOT INTRACTABLE: Primary | ICD-10-CM

## 2024-10-23 DIAGNOSIS — E16.2 HYPOGLYCEMIA: Primary | ICD-10-CM

## 2024-10-23 PROCEDURE — 99214 OFFICE O/P EST MOD 30 MIN: CPT | Mod: 95 | Performed by: PSYCHIATRY & NEUROLOGY

## 2024-10-23 PROCEDURE — G2211 COMPLEX E/M VISIT ADD ON: HCPCS | Performed by: PSYCHIATRY & NEUROLOGY

## 2024-10-23 RX ORDER — HYDROCHLOROTHIAZIDE 12.5 MG/1
CAPSULE ORAL
Qty: 6 EACH | Refills: 3 | Status: SHIPPED | OUTPATIENT
Start: 2024-10-23

## 2024-10-23 RX ORDER — KETOROLAC TROMETHAMINE 30 MG/ML
1 INJECTION, SOLUTION INTRAMUSCULAR; INTRAVENOUS ONCE
Qty: 1 EACH | Refills: 0 | Status: SHIPPED | OUTPATIENT
Start: 2024-10-23 | End: 2024-10-23

## 2024-10-23 ASSESSMENT — MIGRAINE DISABILITY ASSESSMENT (MIDAS)
HOW MANY DAYS DID YOU NOT DO HOUSEWORK BECAUSE OF HEADACHES: 3
HOW MANY DAYS IN THE PAST 3 MONTHS HAVE YOU HAD A HEADACHE: 15
HOW MANY DAYS DID YOU MISS WORK OR SCHOOL BECAUSE OF HEADACHES: 3
HOW MANY DAYS WAS YOUR PRODUCTIVITY CUT IN HALF BECAUSE OF HEADACHES: 3
ON A SCALE FROM 0-10 ON AVERAGE HOW PAINFUL WERE HEADACHES: 6
HOW MANY DAYS WAS HOUSEWORK PRODUCTIVITY CUT IN HALF DUE TO HEADACHES: 5
TOTAL SCORE: 14
HOW OFTEN WERE SOCIAL ACTIVITIES MISSED DUE TO HEADACHES: 0

## 2024-10-23 ASSESSMENT — HEADACHE IMPACT TEST (HIT 6)
HOW OFTEN DID HEADACHS LIMIT CONCENTRATION ON WORK OR DAILY ACTIVITY: VERY OFTEN
HOW OFTEN HAVE YOU FELT FED UP OR IRRITATED BECAUSE OF YOUR HEADACHES: VERY OFTEN
HIT6 TOTAL SCORE: 67
HOW OFTEN DO HEADACHES LIMIT YOUR DAILY ACTIVITIES: VERY OFTEN
WHEN YOU HAVE A HEADACHE HOW OFTEN DO YOU WISH YOU COULD LIE DOWN: ALWAYS
HOW OFTEN HAVE YOU FELT TOO TIRED TO WORK BECAUSE OF YOUR HEADACHES: SOMETIMES
WHEN YOU HAVE HEADACHES HOW OFTEN IS THE PAIN SEVERE: VERY OFTEN

## 2024-10-23 NOTE — LETTER
10/23/2024       RE: Kamala Harris  [address]  [place] [address]     Dear Colleague,    Thank you for referring your patient, Kamala Harris, to the [facility] [place] [facility]. Please see a copy of my visit note below.    Virtual Visit Details    Type of service:  Video Visit   Video Start Time:  8:29 AM  Video End Time: 8:42 AM    Originating Location (pt. Location): Home    Distant Location (provider location):  Off-site  Platform used for Video Visit: [facility]    Headache Neurology Progress Note  October 23, 2024      Assessment/Plan:   Kamala Harris is a 24 year old woman returns for follow-up.  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  Ajovy is helpful, but had significant wearing off in the last week. Emgality is more effective. We will continue this.    We will switch Ubrelvy to Nurtec, to see if we can get better acute treatment to avoid missing activities/work during breakthrough attacks.     We discussed moving forward with a symptomatic treatment strategy for chronic post traumatic headache with a chronic migraine phenotype.  -With lightheadedness, I do not recommend an antihypertensive.  Her pulse is elevated, so tricyclic antidepressants will be avoided.  She previously trialed topiramate without effect.  -Continue Emgality, instead of Ajovy.      For acute treatment, we discussed the following:  -For acute treatment of mild headache, I recommend naproxen 500 mg twice a day as needed, not to exceed more than 14 days/month to avoid medication overuse.  - For acute treatment of moderate severe headache, I recommend Nurtec 75 mg ODT daily PRN migraine.  - For headache related nausea, she  has ondansetron available.  - As a rescue treatment for headache, I recommend prochlorperazine 10 mg with diphenhydramine 25 mg.  This can be repeated in 6 hours if needed.  This is not to be used with ondansetron, and limited to no more than 9 days/month to avoid medication side effects.  -As a last resort, as an alternative to the emergency room, will have an IV fluid plan in place at the infusion center.  We discussed how to contact the clinic for screening prior to scheduling at the infusion center.     I will plan to see her back in 6 months to monitor progress.    The longitudinal plan of care for Kamala was addressed during this visit. Due to the added complexity in care, I will continue to support Kamala in the subsequent management of this condition(s) and with the ongoing continuity of care of this condition(s).    Nina Fleming MD  Neurology     Subjective:    Kamala Harris returns for follow up of chronic migraine.    Headaches are less frequent and more consistent. Associated with photophobia, phonophobia, nausea. Pain and associated symptoms are worse.    Emgality was tried instead of Ajovy. She gets less migraine attacks, but they are occur a few days in a row and are severe. Getting them less than weekly now.    She takes Tylenol, naproxen, Ubrelvy 100 mg x 2, ondansetron, prochlorperazine.    She used her infusion plan about monthly, with benefit within an hour. This remains helpful, particularly during menstrual cycle.      Objective:    Vitals: There were no vitals taken for this visit.  General: Cooperative, NAD  Neurologic:  Mental Status: Fully alert, attentive and oriented. Speech clear and fluent.   Cranial Nerves: Facial movements symmetric.   Motor: No abnormal movements.      Pertinent Investigations:          12/18/2023     6:51 AM 5/22/2024     9:26 AM 10/23/2024     8:04 AM   HIT-6   When you have headaches, how often is the pain severe 11  11  11    How often do headaches limit  your ability to do usual daily activities including household work, work, school, or social activities? 11  11  11    When you have a headache, how often do you wish you could lie down? 13  13  13    In the past 4 weeks, how often have you felt too tired to do work or daily activities because of your headaches 11  11  10    In the past 4 weeks, how often have you felt fed up or irritated because of your headaches 11  13  11    In the past 4 weeks, how often did headaches limit your ability to concentrate on work or daily activities 11  11  11    HIT-6 Total Score 68 70 67        Patient-reported           12/18/2023     6:52 AM 5/22/2024     9:27 AM 10/23/2024     8:06 AM   MIDAS - in the past three months:   On how many days did you miss work or school because of your headaches? 0 0 3   How many days was your productivity at work or school reduced by half or more because of your headaches? 0 25 3   On how many days did you not do household work because of your headaches? 0 45 3   How many days was your productivity in household work reduced by half or more because of your headaches? 0 45 5   On how many days did you miss family, social, or leisure activities because of your headaches? 0 5 0   On how many days did you have a headache? 14 20 15   On a scale of 0-10, on average how painful were these headaches? 6 7 6   MIDAS Score 0 (I - Little or No Disability) 120 (IV - Severe Disability) 14 (III - Moderate Disability)          Again, thank you for allowing me to participate in the care of your patient.      Sincerely,    Nina Fleming MD

## 2024-10-23 NOTE — NURSING NOTE
Current patient location: [address] [place], [place] [address] [address]    Is the patient currently in the state of [place]? YES    Visit mode:VIDEO    If the visit is dropped, the patient can be reconnected by: TELEPHONE VISIT: Phone number:   Telephone Information:   Mobile [phone]       Will anyone else be joining the visit? NO  (If patient encounters technical issues they should call [phone])    Are changes needed to the allergy or medication list? Pt stated no med changes    Are refills needed on medications prescribed by this physician? NO    Rooming Documentation:  Questionnaire(s) completed    Reason for visit: Headache    Marina Rossi VVF

## 2024-10-23 NOTE — PROGRESS NOTES
Virtual Visit Details    Type of service:  Video Visit   Video Start Time:  8:29 AM  Video End Time: 8:42 AM    Originating Location (pt. Location): Home    Distant Location (provider location):  Off-site  Platform used for Video Visit: [facility]    Headache Neurology Progress Note  October 23, 2024      Assessment/Plan:   Kamala Harris is a 24 year old woman returns for follow-up.  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  Ajovy is helpful, but had significant wearing off in the last week. Emgality is more effective. We will continue this.    We will switch Ubrelvy to Nurtec, to see if we can get better acute treatment to avoid missing activities/work during breakthrough attacks.     We discussed moving forward with a symptomatic treatment strategy for chronic post traumatic headache with a chronic migraine phenotype.  -With lightheadedness, I do not recommend an antihypertensive.  Her pulse is elevated, so tricyclic antidepressants will be avoided.  She previously trialed topiramate without effect.  -Continue Emgality, instead of Ajovy.      For acute treatment, we discussed the following:  -For acute treatment of mild headache, I recommend naproxen 500 mg twice a day as needed, not to exceed more than 14 days/month to avoid medication overuse.  - For acute treatment of moderate severe headache, I recommend Nurtec 75 mg ODT daily PRN migraine.  - For headache related nausea, she has ondansetron available.  - As a rescue treatment for headache, I recommend prochlorperazine 10 mg with diphenhydramine 25 mg.  This can be repeated in 6 hours if needed.  This is not to be used with ondansetron, and limited to no more than 9 days/month to avoid medication side effects.  -As a last resort, as an alternative to  the emergency room, will have an IV fluid plan in place at the infusion center.  We discussed how to contact the clinic for screening prior to scheduling at the infusion center.     I will plan to see her back in 6 months to monitor progress.    The longitudinal plan of care for Kamala was addressed during this visit. Due to the added complexity in care, I will continue to support Kamala in the subsequent management of this condition(s) and with the ongoing continuity of care of this condition(s).    Nina Fleming MD  Neurology     Subjective:    Kamala Harris returns for follow up of chronic migraine.    Headaches are less frequent and more consistent. Associated with photophobia, phonophobia, nausea. Pain and associated symptoms are worse.    Emgality was tried instead of Ajovy. She gets less migraine attacks, but they are occur a few days in a row and are severe. Getting them less than weekly now.    She takes Tylenol, naproxen, Ubrelvy 100 mg x 2, ondansetron, prochlorperazine.    She used her infusion plan about monthly, with benefit within an hour. This remains helpful, particularly during menstrual cycle.      Objective:    Vitals: There were no vitals taken for this visit.  General: Cooperative, NAD  Neurologic:  Mental Status: Fully alert, attentive and oriented. Speech clear and fluent.   Cranial Nerves: Facial movements symmetric.   Motor: No abnormal movements.      Pertinent Investigations:          12/18/2023     6:51 AM 5/22/2024     9:26 AM 10/23/2024     8:04 AM   HIT-6   When you have headaches, how often is the pain severe 11  11  11    How often do headaches limit your ability to do usual daily activities including household work, work, school, or social activities? 11  11  11    When you have a headache, how often do you wish you could lie down? 13  13  13    In the past 4 weeks, how often have you felt too tired to do work or daily activities because of your headaches 11  11  10    In the  past 4 weeks, how often have you felt fed up or irritated because of your headaches 11  13  11    In the past 4 weeks, how often did headaches limit your ability to concentrate on work or daily activities 11  11  11    HIT-6 Total Score 68 70 67        Patient-reported           12/18/2023     6:52 AM 5/22/2024     9:27 AM 10/23/2024     8:06 AM   MIDAS - in the past three months:   On how many days did you miss work or school because of your headaches? 0 0 3   How many days was your productivity at work or school reduced by half or more because of your headaches? 0 25 3   On how many days did you not do household work because of your headaches? 0 45 3   How many days was your productivity in household work reduced by half or more because of your headaches? 0 45 5   On how many days did you miss family, social, or leisure activities because of your headaches? 0 5 0   On how many days did you have a headache? 14 20 15   On a scale of 0-10, on average how painful were these headaches? 6 7 6   MIDAS Score 0 (I - Little or No Disability) 120 (IV - Severe Disability) 14 (III - Moderate Disability)

## 2024-10-23 NOTE — TELEPHONE ENCOUNTER
Routing [employer] message to provider.    Patient asking for Freestyle Cira 3 Plus as the Cira 3 is on back order.    Pharmacy pended.    Christine M Klisch, RN

## 2024-10-29 ENCOUNTER — TELEPHONE (OUTPATIENT)
Dept: NEUROLOGY | Facility: CLINIC | Age: 24
End: 2024-10-29
Payer: COMMERCIAL

## 2024-10-29 NOTE — TELEPHONE ENCOUNTER
Health Call Center    Phone Message    May a detailed message be left on voicemail: yes     Reason for Call: Symptoms or Concerns     If patient has red-flag symptoms, warm transfer to triage line    Current symptom or concern: Headache/Nausea     Symptoms have been present for:  3 day(s)    Has patient previously been seen for this? Yes    By : Nina Fleming MD     Date: 10/23/2024    Are there any new or worsening symptoms?    Yes: Patient states that she has had a headache causing bad nausea for the past 3 days. She states she has been using the ondansetron (ZOFRAN ODT) 4 MG ODT tab and prochlorperazine (COMPAZINE) 10 MG tablet with no relief. She is requesting a call back to discuss any other options for relief from the nausea (states the pain is not bothering her as much as the nausea is)      She also would like to know if there has been a PA started for the rimegepant (NURTEC) 75 MG ODT tablet when the clinic calls back.     Call back #[phone]    Action Taken: Message routed to:  [facility] Neurology      Travel Screening: Not Applicable

## 2024-10-30 ENCOUNTER — TELEPHONE (OUTPATIENT)
Dept: NEUROLOGY | Facility: CLINIC | Age: 24
End: 2024-10-30
Payer: COMMERCIAL

## 2024-10-30 DIAGNOSIS — G43.709 CHRONIC MIGRAINE WITHOUT AURA WITHOUT STATUS MIGRAINOSUS, NOT INTRACTABLE: Primary | ICD-10-CM

## 2024-10-30 RX ORDER — METOCLOPRAMIDE 10 MG/1
10 TABLET ORAL 3 TIMES DAILY PRN
Qty: 30 TABLET | Refills: 11 | Status: SHIPPED | OUTPATIENT
Start: 2024-10-30

## 2024-10-30 NOTE — TELEPHONE ENCOUNTER
Relayed medication change. Nurtec currently pending with insurance. No further questions. She will update us if anything further is needed.     Reports no migraine. Continuous nausea, no vomiting. Has taken Zofran & Compazine around the clock. She will reach out if Reglan ineffective.

## 2024-10-30 NOTE — TELEPHONE ENCOUNTER
Prior Authorization Retail Medication Request    Medication/Dose: rimegepant (NURTEC) 75 MG ODT tablet  Diagnosis and ICD code (if different than what is on RX):    New/renewal/insurance change PA/secondary ins. PA:  Previously Tried and Failed:    Ubrelvy  Tylenol  Naproxen  Compazine  Zofran    Rationale:  acute migraine    Insurance   Primary: [employer]  Insurance ID:  [id number]    Secondary (if applicable):  Insurance ID:      Pharmacy Information (if different than what is on RX)  Name:    Phone:    Fax:    Clinic Information  Preferred routing pool for dept communication: P General Neurology Nurses -

## 2024-10-30 NOTE — TELEPHONE ENCOUNTER
Central Prior Authorization Team  Phone: [phone]    PA Initiation    Medication: NURTEC 75 MG PO TBDP  Insurance Company: [employer] ([facility]) - Phone [phone] Fax [phone]  Pharmacy Filling the Rx: [id number] - [place], [place] - [address] AT [facility] [address]  Filling Pharmacy Phone: [phone]  Filling Pharmacy Fax:    Start Date: 10/30/2024

## 2024-10-30 NOTE — TELEPHONE ENCOUNTER
Prior Authorization Approval    Medication: NURTEC 75 MG PO TBDP  Authorization Effective Date: 10/30/2024  Authorization Expiration Date: 10/30/2025  Approved Dose/Quantity: up to 18 tablets/ month  Reference #:     Insurance Company: [employer] ([facility]) - Phone [phone] Fax [phone]  Expected CoPay: $    CoPay Card Available:      Financial Assistance Needed:   Which Pharmacy is filling the prescription: [id number] - [place], [place] - [address] AT [place] [address]  Pharmacy Notified: Yes  Patient Notified: No

## 2024-11-05 ENCOUNTER — TELEPHONE (OUTPATIENT)
Dept: NEUROLOGY | Facility: CLINIC | Age: 24
End: 2024-11-05

## 2024-11-05 ENCOUNTER — TELEPHONE (OUTPATIENT)
Dept: NEUROLOGY | Facility: CLINIC | Age: 24
End: 2024-11-05
Payer: COMMERCIAL

## 2024-11-05 ENCOUNTER — INFUSION THERAPY VISIT (OUTPATIENT)
Dept: INFUSION THERAPY | Facility: CLINIC | Age: 24
End: 2024-11-05
Attending: PSYCHIATRY & NEUROLOGY
Payer: COMMERCIAL

## 2024-11-05 VITALS
HEART RATE: 75 BPM | TEMPERATURE: 98 F | SYSTOLIC BLOOD PRESSURE: 117 MMHG | DIASTOLIC BLOOD PRESSURE: 50 MMHG | RESPIRATION RATE: 16 BRPM | OXYGEN SATURATION: 96 %

## 2024-11-05 DIAGNOSIS — G43.719 INTRACTABLE CHRONIC MIGRAINE WITHOUT AURA AND WITHOUT STATUS MIGRAINOSUS: Primary | ICD-10-CM

## 2024-11-05 PROCEDURE — 96361 HYDRATE IV INFUSION ADD-ON: CPT

## 2024-11-05 PROCEDURE — 96374 THER/PROPH/DIAG INJ IV PUSH: CPT

## 2024-11-05 PROCEDURE — 258N000003 HC RX IP 258 OP 636: Performed by: PSYCHIATRY & NEUROLOGY

## 2024-11-05 PROCEDURE — 250N000011 HC RX IP 250 OP 636: Performed by: PSYCHIATRY & NEUROLOGY

## 2024-11-05 RX ORDER — METHYLPREDNISOLONE SODIUM SUCCINATE 125 MG/2ML
125 INJECTION INTRAMUSCULAR; INTRAVENOUS
Start: 2024-11-05

## 2024-11-05 RX ORDER — ALBUTEROL SULFATE 0.83 MG/ML
2.5 SOLUTION RESPIRATORY (INHALATION)
OUTPATIENT
Start: 2024-11-05

## 2024-11-05 RX ORDER — DIPHENHYDRAMINE HYDROCHLORIDE 50 MG/ML
50 INJECTION INTRAMUSCULAR; INTRAVENOUS
Start: 2024-11-05

## 2024-11-05 RX ORDER — ONDANSETRON 2 MG/ML
4 INJECTION INTRAMUSCULAR; INTRAVENOUS
OUTPATIENT
Start: 2024-11-05

## 2024-11-05 RX ORDER — ALBUTEROL SULFATE 90 UG/1
1-2 INHALANT RESPIRATORY (INHALATION)
Start: 2024-11-05

## 2024-11-05 RX ORDER — MEPERIDINE HYDROCHLORIDE 25 MG/ML
25 INJECTION INTRAMUSCULAR; INTRAVENOUS; SUBCUTANEOUS EVERY 30 MIN PRN
OUTPATIENT
Start: 2024-11-05

## 2024-11-05 RX ORDER — EPINEPHRINE 1 MG/ML
0.3 INJECTION, SOLUTION INTRAMUSCULAR; SUBCUTANEOUS EVERY 5 MIN PRN
OUTPATIENT
Start: 2024-11-05

## 2024-11-05 RX ORDER — ONDANSETRON 2 MG/ML
4 INJECTION INTRAMUSCULAR; INTRAVENOUS
Status: DISCONTINUED | OUTPATIENT
Start: 2024-11-05 | End: 2024-11-05 | Stop reason: HOSPADM

## 2024-11-05 RX ADMIN — SODIUM CHLORIDE 1000 ML: 9 INJECTION, SOLUTION INTRAVENOUS at 11:39

## 2024-11-05 RX ADMIN — ONDANSETRON 4 MG: 2 INJECTION INTRAMUSCULAR; INTRAVENOUS at 11:41

## 2024-11-05 ASSESSMENT — PAIN SCALES - GENERAL: PAINLEVEL_OUTOF10: SEVERE PAIN (7)

## 2024-11-05 NOTE — TELEPHONE ENCOUNTER
Patient confirmed scheduled appointment:  Date: 4/9/2025  Time: 10:00 am  Visit type: Return Headache  Provider: Lonnie  Location: virtual  Testing/imaging: NA  Additional notes: 6 month follow up    Amanda Pedraza on 11/5/2024 at 5:24 PM

## 2024-11-05 NOTE — PROGRESS NOTES
Infusion Nursing Note:  Kamala Harris presents today for IV fluids and anti-emetics.    Patient seen by provider today: No   present during visit today: Not Applicable.    Note: 1L NS infused over 1 hour. Zofran IV given x1.    Headache pain 7/10 on arrival, 2/10 after infusion.    Intravenous Access:  Peripheral IV placed by VAT.    Treatment Conditions:  Notes from Neurology team to approving pt for infusion today.    /50 (BP Location: Right arm, Patient Position: Semi-Monet's, Cuff Size: Adult Regular)   Pulse 75   Temp 98  F (36.7  C) (Oral)   Resp 16   SpO2 96%     Administrations This Visit       ondansetron (ZOFRAN) injection 4 mg       Admin Date  11/05/2024 Action  $Given Dose  4 mg Route  Intravenous Documented By  Jocelyn Tariq RN              sodium chloride 0.9% BOLUS 1,000 mL       Admin Date  11/05/2024 Action  $New Bag Dose  1,000 mL Rate  1,000 mL/hr Route  Intravenous Documented By  Jocelyn Tariq RN                  Post Infusion Assessment:  Patient tolerated infusion without incident.  Site patent and intact, free from redness, edema or discomfort.  No evidence of extravasations.  Access discontinued per protocol.     Discharge Plan:   AVS to patient via MYCHART.    Patient discharged in stable condition accompanied by: self.  Departure Mode: Ambulatory.    Jocelyn Tariq RN

## 2024-11-05 NOTE — TELEPHONE ENCOUNTER
Headache Crisis 2024  Onset:  around 3AM, on & off all week  Description:                Type: Migraine headache                 Location: behind right eye, right sided                 Duration:  on/off all week, consistent since 3AM today.                  Pain scale ratin/10                 Are headaches getting more intense or more frequent: No    Is this headache similar to previous headaches? Yes    Are you experiencing any new or different symptoms? No     Accompanying Signs & Symptoms:   Fever: No  Nausea or vomiting: YES, alternating Zofran & Compazine. Has not picked up Reglan yet. No vomiting. Has not taken Zofran today in hopes of getting infusion scheduled.   Dizziness: No  Numbness: No  Weakness: No  Visual changes: No  Medications tried and outcome:  Lidocaine patch, repeated doses of Ibuprofen, Ubrelvy, and compazine. Moderate relief, but migraine comes back to 7/10.   has not picked up Nurtec or Reglan yet. Prior auth approved and active Rx for Nurtec - patient will reach out if there are issues with filling.     Approved- Patient has a standing infusion order in place by Dr. Fleming. Patient is approved to have infusion complete today in order to alleviate current headache crisis.

## 2024-11-05 NOTE — PATIENT INSTRUCTIONS
Dear Kamala Harris    Thank you for choosing [facility] ([facility] for your infusion.  The following information is a summary of our appointment as well as important reminders.      If you have any questions on your upcoming Specialty Infusion appointments, please call scheduling at [phone].  It was a pleasure taking care of you today.    Sincerely,    [facility]  [facility]  [address] [place] [address]  [address]  Phone:  [phone]

## 2024-11-05 NOTE — TELEPHONE ENCOUNTER
[facility] Call Center    Phone Message    May a detailed message be left on voicemail: yes     Reason for Call: Pt is requesting a call back from the care team as soon as possible. Pt states that she is needing to answer some questions from the care karina or Neurologist prior to scheduling her infusion.     Please contact Pt at [phone]    Action Taken: Message routed to:  [facility] ([facility] Neurology     Travel Screening: Not Applicable

## 2024-11-08 ENCOUNTER — OFFICE VISIT (OUTPATIENT)
Dept: FAMILY MEDICINE | Facility: CLINIC | Age: 24
End: 2024-11-08
Payer: COMMERCIAL

## 2024-11-08 ENCOUNTER — NURSE TRIAGE (OUTPATIENT)
Dept: FAMILY MEDICINE | Facility: CLINIC | Age: 24
End: 2024-11-08

## 2024-11-08 VITALS
BODY MASS INDEX: 38.59 KG/M2 | RESPIRATION RATE: 16 BRPM | SYSTOLIC BLOOD PRESSURE: 110 MMHG | OXYGEN SATURATION: 98 % | HEART RATE: 96 BPM | TEMPERATURE: 97 F | WEIGHT: 231.6 LBS | DIASTOLIC BLOOD PRESSURE: 62 MMHG | HEIGHT: 65 IN

## 2024-11-08 DIAGNOSIS — R10.31 RIGHT LOWER QUADRANT PAIN: Primary | ICD-10-CM

## 2024-11-08 DIAGNOSIS — R42 DIZZINESS: ICD-10-CM

## 2024-11-08 LAB
ALBUMIN UR-MCNC: NEGATIVE MG/DL
APPEARANCE UR: CLEAR
BACTERIA #/AREA URNS HPF: ABNORMAL /HPF
BASOPHILS # BLD AUTO: 0.1 10E3/UL (ref 0–0.2)
BASOPHILS NFR BLD AUTO: 1 %
BILIRUB UR QL STRIP: NEGATIVE
COLOR UR AUTO: YELLOW
EOSINOPHIL # BLD AUTO: 0.5 10E3/UL (ref 0–0.7)
EOSINOPHIL NFR BLD AUTO: 6 %
ERYTHROCYTE [DISTWIDTH] IN BLOOD BY AUTOMATED COUNT: 14.2 % (ref 10–15)
GLUCOSE UR STRIP-MCNC: NEGATIVE MG/DL
HCG UR QL: NEGATIVE
HCT VFR BLD AUTO: 37.5 % (ref 35–47)
HGB BLD-MCNC: 12.3 G/DL (ref 11.7–15.7)
HGB UR QL STRIP: ABNORMAL
IMM GRANULOCYTES # BLD: 0 10E3/UL
IMM GRANULOCYTES NFR BLD: 0 %
KETONES UR STRIP-MCNC: NEGATIVE MG/DL
LEUKOCYTE ESTERASE UR QL STRIP: NEGATIVE
LYMPHOCYTES # BLD AUTO: 3.5 10E3/UL (ref 0.8–5.3)
LYMPHOCYTES NFR BLD AUTO: 42 %
MCH RBC QN AUTO: 25 PG (ref 26.5–33)
MCHC RBC AUTO-ENTMCNC: 32.8 G/DL (ref 31.5–36.5)
MCV RBC AUTO: 76 FL (ref 78–100)
MONOCYTES # BLD AUTO: 0.6 10E3/UL (ref 0–1.3)
MONOCYTES NFR BLD AUTO: 7 %
NEUTROPHILS # BLD AUTO: 3.7 10E3/UL (ref 1.6–8.3)
NEUTROPHILS NFR BLD AUTO: 44 %
NITRATE UR QL: NEGATIVE
PH UR STRIP: 6 [PH] (ref 5–7)
PLATELET # BLD AUTO: 233 10E3/UL (ref 150–450)
RBC # BLD AUTO: 4.92 10E6/UL (ref 3.8–5.2)
RBC #/AREA URNS AUTO: ABNORMAL /HPF
SP GR UR STRIP: >=1.03 (ref 1–1.03)
SQUAMOUS #/AREA URNS AUTO: ABNORMAL /LPF
UROBILINOGEN UR STRIP-ACNC: 0.2 E.U./DL
WBC # BLD AUTO: 8.4 10E3/UL (ref 4–11)
WBC #/AREA URNS AUTO: ABNORMAL /HPF

## 2024-11-08 PROCEDURE — 85025 COMPLETE CBC W/AUTO DIFF WBC: CPT | Performed by: PHYSICIAN ASSISTANT

## 2024-11-08 PROCEDURE — G2211 COMPLEX E/M VISIT ADD ON: HCPCS | Performed by: PHYSICIAN ASSISTANT

## 2024-11-08 PROCEDURE — 93000 ELECTROCARDIOGRAM COMPLETE: CPT | Performed by: PHYSICIAN ASSISTANT

## 2024-11-08 PROCEDURE — 81025 URINE PREGNANCY TEST: CPT | Performed by: PHYSICIAN ASSISTANT

## 2024-11-08 PROCEDURE — 80048 BASIC METABOLIC PNL TOTAL CA: CPT | Performed by: PHYSICIAN ASSISTANT

## 2024-11-08 PROCEDURE — 82728 ASSAY OF FERRITIN: CPT | Performed by: PHYSICIAN ASSISTANT

## 2024-11-08 PROCEDURE — 36415 COLL VENOUS BLD VENIPUNCTURE: CPT | Performed by: PHYSICIAN ASSISTANT

## 2024-11-08 PROCEDURE — 83540 ASSAY OF IRON: CPT | Performed by: PHYSICIAN ASSISTANT

## 2024-11-08 PROCEDURE — 83550 IRON BINDING TEST: CPT | Performed by: PHYSICIAN ASSISTANT

## 2024-11-08 PROCEDURE — 81001 URINALYSIS AUTO W/SCOPE: CPT | Performed by: PHYSICIAN ASSISTANT

## 2024-11-08 PROCEDURE — 99214 OFFICE O/P EST MOD 30 MIN: CPT | Performed by: PHYSICIAN ASSISTANT

## 2024-11-08 ASSESSMENT — PAIN SCALES - GENERAL: PAINLEVEL_OUTOF10: SEVERE PAIN (7)

## 2024-11-08 ASSESSMENT — PATIENT HEALTH QUESTIONNAIRE - PHQ9
SUM OF ALL RESPONSES TO PHQ QUESTIONS 1-9: 7
10. IF YOU CHECKED OFF ANY PROBLEMS, HOW DIFFICULT HAVE THESE PROBLEMS MADE IT FOR YOU TO DO YOUR WORK, TAKE CARE OF THINGS AT HOME, OR GET ALONG WITH OTHER PEOPLE: SOMEWHAT DIFFICULT
SUM OF ALL RESPONSES TO PHQ QUESTIONS 1-9: 7

## 2024-11-08 ASSESSMENT — ENCOUNTER SYMPTOMS: DIZZINESS: 1

## 2024-11-08 NOTE — PATIENT INSTRUCTIONS
Coni Wray,    Thank you for allowing [facility] to manage your care.    I am unsure of the cause of your symptoms, but your exam is reassuring. We will see what our workup shows.     If you develop worsening/changing symptoms at any time, please be seen in clinic/urgent care or call 911/go to the emergency department for evaluation as we discussed.    I ordered some lab work. Please go to the laboratory to get your studies.    Please allow 1-2 business days for our office to contact you in regards to your laboratory/radiological studies.  If not done so, I encourage you to login into [employer] ([email or web address] to review your results as well.     Call your OBGYN to discuss your ultrasound and ongoing pelvic symptoms.    If you have any questions or concerns, please feel free to call us at [phone]    Sincerely,    Jason Ignacio PA-C    Did you know?      You can schedule a video visit for follow-up appointments as well as future appointments for certain conditions.  Please see the below link.     [email or web address]    If you have not already done so,  I encourage you to sign up for [employer] ([email or web address]).  This will allow you to review your results, securely communicate with a provider, and schedule virtual visits as well.

## 2024-11-08 NOTE — PROGRESS NOTES
Assessment & Plan   Problem List Items Addressed This Visit          Behavioral    Dizziness    Relevant Orders    EKG 12-lead complete w/read - Clinics (Completed)    Iron and iron binding capacity (Completed)    Ferritin (Completed)    Basic metabolic panel  (Ca, Cl, CO2, Creat, Gluc, K, Na, BUN) (Completed)     Other Visit Diagnoses       Right lower quadrant pain    -  Primary    Relevant Orders    CBC with platelets and differential (Completed)    UA Macroscopic with reflex to Microscopic and Culture - Lab Collect (Completed)    HCG qualitative urine (Completed)    UA Microscopic with Reflex to Culture (Completed)    US Pelvic Complete with Transvaginal           Unclear cause of symptoms above, but exam benign. EKG nonischemic and without worrisome dysrhythmia aside from already diagnosed WPW. Has upcoming cardiology appt in near future. No signs of v tach on EKG. CBC, bmp and UA not concerning aside from mild hematuria which is likely due to contamination with vaginal blood. Could be ongoing ovarian cyst pain. Appears well and non-toxic and I have low suspicion for systemic illness or worrisome abdominopelvic process such as ovarian torsion, appendicitis, or other at this time. Will proceed with pelvic US. Declined Zio patch. Follow up with primary or myself in the next 3-5 days if not improving.     Complete history and physical exam as below. Afebrile with normal vital signs.    DDx and Dx discussed with and explained to the pt to their satisfaction.  All questions were answered at this time. Pt expressed understanding of and agreement with this dx, tx, and plan. No further workup warranted and standard medication warnings given. I have given the patient a list of pertinent indications for re-evaluation. Will go to the Emergency Department if symptoms worsen or new concerning symptoms arise. Patient left in no apparent distress.      BMI  Estimated body mass index is 38.92 kg/m  as calculated from the  "following:    Height as of this encounter: 1.643 m (5' 4.69\").    Weight as of this encounter: 105.1 kg (231 lb 9.6 oz).     See Patient Instructions      Hayden Wray is a 24 year old, presenting for the following health issues:  Dizziness      11/8/2024    10:21 AM   Additional Questions   Roomed by Chandler Hood CMA   Accompanied by          11/8/2024    10:21 AM   Patient Reported Additional Medications   Patient reports taking the following new medications No new medications     History of Present Illness       Reason for visit:  Long period  Symptom onset:  1-3 days ago  Symptoms include:  Period lasting 9 days,dizzyness,cramping  Symptom intensity:  Moderate  Symptom progression:  Staying the same  Had these symptoms before:  No  What makes it worse:  Heat  What makes it better:  Pain meds   She is taking medications regularly.     Patient is coming today due to dizziness that started 3 days ago.  She also is on day 9 of her period (started on 11-).  Periods are usually irregular but are shorter spans (5 days). Having light spotting requiring two tampons daily.  There has also been intense cramping (right side to the middle of the lower abdomen near ovary area), similar to dysmenorrhea.  She does have a history of ovarian cysts.    Review of Systems  Constitutional, HEENT, cardiovascular, pulmonary, gi and gu systems are negative, except as otherwise noted.      Objective    LMP 10/30/2024 (Exact Date)   There is no height or weight on file to calculate BMI.  Physical Exam  Vitals and nursing note reviewed.   Constitutional:       General: She is not in acute distress.     Appearance: She is not ill-appearing or diaphoretic.   HENT:      Head: Normocephalic and atraumatic.      Mouth/Throat:      Mouth: Mucous membranes are moist.   Eyes:      Conjunctiva/sclera: Conjunctivae normal.   Cardiovascular:      Rate and Rhythm: Normal rate and regular rhythm.      Heart sounds: Normal heart " sounds. No murmur heard.     No friction rub. No gallop.      Comments: 2+ symmetric radial/PT pulses. No LE edema or tenderness.  Pulmonary:      Effort: Pulmonary effort is normal. No respiratory distress.      Breath sounds: Normal breath sounds. No stridor. No wheezing, rhonchi or rales.   Abdominal:      General: Bowel sounds are normal. There is no distension.      Palpations: Abdomen is soft. There is no mass.      Tenderness: There is no abdominal tenderness. There is no right CVA tenderness, left CVA tenderness, guarding or rebound.      Hernia: No hernia is present.   Skin:     General: Skin is warm and dry.   Neurological:      General: No focal deficit present.      Mental Status: She is alert. Mental status is at baseline.   Psychiatric:         Mood and Affect: Mood normal.         Behavior: Behavior normal.          EKG - Reviewed and interpreted by me appears normal, NSR, WPW type B patter, normal axis, normal intervals aside from short IA, no acute ST/T changes c/w ischemia, no LVH by voltage criteria, unchanged from previous tracings  Results for orders placed or performed in visit on 11/08/24   Iron and iron binding capacity     Status: Abnormal   Result Value Ref Range    Iron 27 (L) 37 - 145 ug/dL    Iron Binding Capacity 335 240 - 430 ug/dL    Iron Sat Index 8 (L) 15 - 46 %   Ferritin     Status: Normal   Result Value Ref Range    Ferritin 43 6 - 175 ng/mL   Basic metabolic panel  (Ca, Cl, CO2, Creat, Gluc, K, Na, BUN)     Status: Normal   Result Value Ref Range    Sodium 141 135 - 145 mmol/L    Potassium 4.0 3.4 - 5.3 mmol/L    Chloride 105 98 - 107 mmol/L    Carbon Dioxide (CO2) 25 22 - 29 mmol/L    Anion Gap 11 7 - 15 mmol/L    Urea Nitrogen 16.8 6.0 - 20.0 mg/dL    Creatinine 0.78 0.51 - 0.95 mg/dL    GFR Estimate >90 >60 mL/min/1.73m2    Calcium 9.5 8.8 - 10.4 mg/dL    Glucose 91 70 - 99 mg/dL   UA Macroscopic with reflex to Microscopic and Culture - Lab Collect     Status: Abnormal     Specimen: Urine, Clean Catch   Result Value Ref Range    Color Urine Yellow Colorless, Straw, Light Yellow, Yellow    Appearance Urine Clear Clear    Glucose Urine Negative Negative mg/dL    Bilirubin Urine Negative Negative    Ketones Urine Negative Negative mg/dL    Specific Gravity Urine >=1.030 1.003 - 1.035    Blood Urine Small (A) Negative    pH Urine 6.0 5.0 - 7.0    Protein Albumin Urine Negative Negative mg/dL    Urobilinogen Urine 0.2 0.2, 1.0 E.U./dL    Nitrite Urine Negative Negative    Leukocyte Esterase Urine Negative Negative   HCG qualitative urine     Status: Normal   Result Value Ref Range    hCG Urine Qualitative Negative Negative   CBC with platelets and differential     Status: Abnormal   Result Value Ref Range    WBC Count 8.4 4.0 - 11.0 10e3/uL    RBC Count 4.92 3.80 - 5.20 10e6/uL    Hemoglobin 12.3 11.7 - 15.7 g/dL    Hematocrit 37.5 35.0 - 47.0 %    MCV 76 (L) 78 - 100 fL    MCH 25.0 (L) 26.5 - 33.0 pg    MCHC 32.8 31.5 - 36.5 g/dL    RDW 14.2 10.0 - 15.0 %    Platelet Count 233 150 - 450 10e3/uL    % Neutrophils 44 %    % Lymphocytes 42 %    % Monocytes 7 %    % Eosinophils 6 %    % Basophils 1 %    % Immature Granulocytes 0 %    Absolute Neutrophils 3.7 1.6 - 8.3 10e3/uL    Absolute Lymphocytes 3.5 0.8 - 5.3 10e3/uL    Absolute Monocytes 0.6 0.0 - 1.3 10e3/uL    Absolute Eosinophils 0.5 0.0 - 0.7 10e3/uL    Absolute Basophils 0.1 0.0 - 0.2 10e3/uL    Absolute Immature Granulocytes 0.0 <=0.4 10e3/uL   UA Microscopic with Reflex to Culture     Status: Abnormal   Result Value Ref Range    Bacteria Urine Few (A) None Seen /HPF    RBC Urine 2-5 (A) 0-2 /HPF /HPF    WBC Urine 0-5 0-5 /HPF /HPF    Squamous Epithelials Urine Few (A) None Seen /LPF    Narrative    Urine Culture not indicated   CBC with platelets and differential     Status: Abnormal    Narrative    The following orders were created for panel order CBC with platelets and differential.  Procedure                                Abnormality         Status                     ---------                               -----------         ------                     CBC with platelets and d...[[id number]]  Abnormal            Final result                 Please view results for these tests on the individual orders.           Signed Electronically by: HEVER Bass

## 2024-11-08 NOTE — TELEPHONE ENCOUNTER
S-(situation): Patient complaining of dizziness for the past 3 days.   Has had her period for 9 days. She suspects her Hgb may be low.     B-(background): History of Migraines and Anemia. Reports that she was given IV fluids 2 days ago for a Migraine, however no labs done at that point.     A-(assessment): Patient with dizziness needing evaluation    R-(recommendations): See in office today. Scheduled with same day provider. Patient has a  to bring her.    Alberta Armenta RN BSCHELO  [facility]           Reason for Disposition   Patient wants to be seen    Additional Information   Negative: SEVERE difficulty breathing (e.g., struggling for each breath, speaks in single words)   Negative: Shock suspected (e.g., cold/pale/clammy skin, too weak to stand, low BP, rapid pulse)   Negative: Difficult to awaken or acting confused (e.g., disoriented, slurred speech)   Negative: Fainted, and still feels dizzy afterwards   Negative: Overdose (accidental or intentional) of medications   Negative: New neurologic deficit that is present now: * Weakness of the face, arm, or leg on one side of the body * Numbness of the face, arm, or leg on one side of the body * Loss of speech or garbled speech   Negative: Heart beating < 50 beats per minute OR > 140 beats per minute   Negative: Sounds like a life-threatening emergency to the triager   Negative: Chest pain   Negative: Rectal bleeding, bloody stool, or tarry-black stool   Negative: Vomiting is main symptom   Negative: Diarrhea is main symptom   Negative: Headache is main symptom   Negative: Heat exhaustion suspected (i.e., dehydration from heat exposure)   Negative: Patient states that they are having an anxiety or panic attack   Negative: Dizziness from low blood sugar (i.e., < 60 mg/dl or 3.5 mmol/l)   Negative: Lightheadedness (dizziness) present now, after 2 hours of rest and fluids   Negative: Spinning or tilting sensation (vertigo) present now   Negative: Fever > 103  "F (39.4 C)   Negative: Fever > 100.0 F (37.8 C) and has diabetes mellitus or a weak immune system (e.g., HIV positive, cancer chemotherapy, organ transplant, splenectomy, chronic steroids)   Negative: MODERATE dizziness (e.g., interferes with normal activities)  (Exception: Dizziness caused by heat exposure, sudden standing, or poor fluid intake.)   Negative: Vomiting occurs with dizziness   Negative: Taking a medicine that could cause dizziness (e.g., blood pressure medications, diuretics)    Answer Assessment - Initial Assessment Questions  1. DESCRIPTION: \"Describe your dizziness.\"      Dizziness has been coming in waves for the past 3 days (even while sitting)     2. LIGHTHEADED: \"Do you feel lightheaded?\" (e.g., somewhat faint, woozy, weak upon standing)      Feels almost like she could pass out & weak (no nausea)    3. VERTIGO: \"Do you feel like either you or the room is spinning or tilting?\" (i.e. vertigo)      No    4. SEVERITY: \"How bad is it?\"  \"Do you feel like you are going to faint?\" \"Can you stand and walk?\"    - MILD: Feels slightly dizzy, but walking normally.    - MODERATE: Feels unsteady when walking, but not falling; interferes with normal activities (e.g., school, work).    - SEVERE: Unable to walk without falling, or requires assistance to walk without falling; feels like passing out now.         Mild waves, but sometimes feels that she needs to stay sitting  She feels worse when she's warm    5. ONSET:  \"When did the dizziness begin?\"      Started on Wednesday (early AM)    6. AGGRAVATING FACTORS: \"Does anything make it worse?\" (e.g., standing, change in head position)      If more active or warmer (working near a machine that is hot)    7. HEART RATE: \"Can you tell me your heart rate?\" \"How many beats in 15 seconds?\"  (Note: not all patients can do this)        85 on her Smart Watch  8. CAUSE: \"What do you think is causing the dizziness?\"      Menstrual period started on 10/30 & has continued " "(today is day 9)   Medium flow and lighter today. Typically, her cycle lasts 3-5 days about every 3-4 months.    9. RECURRENT SYMPTOM: \"Have you had dizziness before?\" If Yes, ask: \"When was the last time?\" \"What happened that time?\"      Yes, history of low iron in the past (about 5 years ago while pregnancy)     10. OTHER SYMPTOMS: \"Do you have any other symptoms?\" (e.g., fever, chest pain, vomiting, diarrhea, bleeding)        None (except to the period)     11. PREGNANCY: \"Is there any chance you are pregnant?\" \"When was your last menstrual period?\"        No    Protocols used: Dizziness-A-OH    "

## 2024-11-09 LAB
ANION GAP SERPL CALCULATED.3IONS-SCNC: 11 MMOL/L (ref 7–15)
BUN SERPL-MCNC: 16.8 MG/DL (ref 6–20)
CALCIUM SERPL-MCNC: 9.5 MG/DL (ref 8.8–10.4)
CHLORIDE SERPL-SCNC: 105 MMOL/L (ref 98–107)
CREAT SERPL-MCNC: 0.78 MG/DL (ref 0.51–0.95)
EGFRCR SERPLBLD CKD-EPI 2021: >90 ML/MIN/1.73M2
FERRITIN SERPL-MCNC: 43 NG/ML (ref 6–175)
GLUCOSE SERPL-MCNC: 91 MG/DL (ref 70–99)
HCO3 SERPL-SCNC: 25 MMOL/L (ref 22–29)
IRON BINDING CAPACITY (ROCHE): 335 UG/DL (ref 240–430)
IRON SATN MFR SERPL: 8 % (ref 15–46)
IRON SERPL-MCNC: 27 UG/DL (ref 37–145)
POTASSIUM SERPL-SCNC: 4 MMOL/L (ref 3.4–5.3)
SODIUM SERPL-SCNC: 141 MMOL/L (ref 135–145)

## 2024-11-12 ENCOUNTER — ANCILLARY PROCEDURE (OUTPATIENT)
Dept: ULTRASOUND IMAGING | Facility: CLINIC | Age: 24
End: 2024-11-12
Attending: PHYSICIAN ASSISTANT
Payer: COMMERCIAL

## 2024-11-12 DIAGNOSIS — R10.31 RIGHT LOWER QUADRANT PAIN: ICD-10-CM

## 2024-11-12 PROCEDURE — 76856 US EXAM PELVIC COMPLETE: CPT | Mod: TC | Performed by: RADIOLOGY

## 2024-11-12 PROCEDURE — 76830 TRANSVAGINAL US NON-OB: CPT | Mod: TC | Performed by: RADIOLOGY

## 2024-11-13 ENCOUNTER — ANCILLARY PROCEDURE (OUTPATIENT)
Dept: CT IMAGING | Facility: CLINIC | Age: 24
End: 2024-11-13
Attending: PHYSICIAN ASSISTANT
Payer: COMMERCIAL

## 2024-11-13 DIAGNOSIS — R10.31 RIGHT LOWER QUADRANT PAIN: ICD-10-CM

## 2024-11-13 DIAGNOSIS — R10.31 RIGHT LOWER QUADRANT PAIN: Primary | ICD-10-CM

## 2024-11-13 PROCEDURE — 74177 CT ABD & PELVIS W/CONTRAST: CPT | Performed by: RADIOLOGY

## 2024-11-13 RX ORDER — IOPAMIDOL 755 MG/ML
128 INJECTION, SOLUTION INTRAVASCULAR ONCE
Status: COMPLETED | OUTPATIENT
Start: 2024-11-13 | End: 2024-11-13

## 2024-11-13 RX ADMIN — IOPAMIDOL 128 ML: 755 INJECTION, SOLUTION INTRAVASCULAR at 11:19

## 2024-11-13 NOTE — RESULT ENCOUNTER NOTE
Team - please call patient with results. US showed no cause of her right lower quadrant pain. If she is still having pain, I'd like to do a stat CT of her abdomen to rule out appendicitis. I have ordered this. Please facilitate scheduling. If she is worse, UC/ER. Thanks.

## 2024-12-27 ENCOUNTER — TELEPHONE (OUTPATIENT)
Dept: FAMILY MEDICINE | Facility: CLINIC | Age: 24
End: 2024-12-27
Payer: COMMERCIAL

## 2024-12-27 NOTE — TELEPHONE ENCOUNTER
Routing to RD triage    Patients nexplanon has moved.    Nexplanon placed by Dr. Loza 3/10/2022      Office visit 3/10/2022    NEXPLANON REMOVAL/REINSERTION:     Is a pregnancy test required: No.  Was a consent obtained?  Yes     Subjective: Kamala Harris is a 22 year old  presents for Nexplanon.     Patient has been given the opportunity to ask questions about all forms of birth control, including all options appropriate for Kamala Harris. Discussed that no method of birth control, except abstinence is 100% effective against pregnancy or sexually transmitted infection.      Kamala Harris understands planning removal and reinsertion today     The entire removal and insertion procedure was reviewed with the patient, including care after placement.    Sandro Jimenez, RN, BSN, PHN  [facility]

## 2024-12-27 NOTE — TELEPHONE ENCOUNTER
Patient is calling because her nexplanon has shifted in her arm.  Patient is unsure if she should come in and have it looked at or what she should do.    LOVE Samuel  [facility]

## 2024-12-30 NOTE — TELEPHONE ENCOUNTER
Calling patient back.  Offered appointment for today with Dr. Mercado.   Patient declines, she would like to schedule with Dr. Loza her OB/GYN at [facility].   Advised to call [phone] unfortunately, this writer is only able to schedule with our  OB/GYN group.   Patient agrees.     Ely OCHOA RN -  OB/GYN group

## 2025-01-30 ENCOUNTER — TELEPHONE (OUTPATIENT)
Dept: NEUROLOGY | Facility: CLINIC | Age: 25
End: 2025-01-30

## 2025-01-30 NOTE — TELEPHONE ENCOUNTER
Prior Authorization Retail Medication Request  NEW INSURANCE     Medication/Dose: rimegepant (NURTEC) 75 MG ODT tablet  Diagnosis and ICD code (if different than what is on RX):    New/renewal/insurance change PA/secondary ins. PA:  Previously Tried and Failed:    Ubrelvy  Tylenol  Naproxen  Compazine  Zofran     Rationale:  acute migraine     Insurance   Primary: [employer]  Insurance ID:  [id number]      Secondary (if applicable):  Insurance ID:       Pharmacy Information (if different than what is on RX)  Name:    Phone:    Fax:     Clinic Information  Preferred routing pool for dept communication: P General Neurology Nurses -

## 2025-02-10 ENCOUNTER — E-VISIT (OUTPATIENT)
Dept: URGENT CARE | Facility: CLINIC | Age: 25
End: 2025-02-10

## 2025-02-10 DIAGNOSIS — J11.1 INFLUENZA: Primary | ICD-10-CM

## 2025-02-10 RX ORDER — OSELTAMIVIR PHOSPHATE 75 MG/1
75 CAPSULE ORAL 2 TIMES DAILY
Qty: 10 CAPSULE | Refills: 0 | Status: SHIPPED | OUTPATIENT
Start: 2025-02-10 | End: 2025-02-15

## 2025-02-10 NOTE — LETTER
February 10, 2025      Kamala Harris  [address]  [place] [address]        To Whom It May Concern:    Kamala Harris  was seen on 2/10/25 .  Please excuse her on 2/10/25 and 2/11/25  due to illness.        Sincerely,        Raul Euceda MD    Electronically signed

## 2025-02-10 NOTE — PATIENT INSTRUCTIONS
Thank you for choosing us for your care. I have placed an order for a prescription so that you can start treatment. View your full visit summary for details by clicking on the link below. Your pharmacist will able to address any questions you may have about the medication.     If you're not feeling better within 5-7 days, please schedule an appointment.  You can schedule an appointment right here in [facility], or call [phone]  If the visit is for the same symptoms as your eVisit, we'll refund the cost of your eVisit if seen within seven days.

## 2025-03-04 ENCOUNTER — OFFICE VISIT (OUTPATIENT)
Dept: FAMILY MEDICINE | Facility: CLINIC | Age: 25
End: 2025-03-04
Payer: COMMERCIAL

## 2025-03-04 VITALS
RESPIRATION RATE: 25 BRPM | BODY MASS INDEX: 38.22 KG/M2 | HEIGHT: 65 IN | OXYGEN SATURATION: 97 % | SYSTOLIC BLOOD PRESSURE: 112 MMHG | DIASTOLIC BLOOD PRESSURE: 78 MMHG | WEIGHT: 229.4 LBS | HEART RATE: 103 BPM | TEMPERATURE: 98.1 F

## 2025-03-04 DIAGNOSIS — H65.191 OTHER NON-RECURRENT ACUTE NONSUPPURATIVE OTITIS MEDIA OF RIGHT EAR: Primary | ICD-10-CM

## 2025-03-04 PROCEDURE — 99213 OFFICE O/P EST LOW 20 MIN: CPT | Performed by: NURSE PRACTITIONER

## 2025-03-04 PROCEDURE — 1125F AMNT PAIN NOTED PAIN PRSNT: CPT | Performed by: NURSE PRACTITIONER

## 2025-03-04 PROCEDURE — 3078F DIAST BP <80 MM HG: CPT | Performed by: NURSE PRACTITIONER

## 2025-03-04 PROCEDURE — 3074F SYST BP LT 130 MM HG: CPT | Performed by: NURSE PRACTITIONER

## 2025-03-04 RX ORDER — CEFDINIR 300 MG/1
300 CAPSULE ORAL 2 TIMES DAILY
Qty: 14 CAPSULE | Refills: 0 | Status: SHIPPED | OUTPATIENT
Start: 2025-03-04

## 2025-03-04 ASSESSMENT — PATIENT HEALTH QUESTIONNAIRE - PHQ9
SUM OF ALL RESPONSES TO PHQ QUESTIONS 1-9: 7
SUM OF ALL RESPONSES TO PHQ QUESTIONS 1-9: 7
10. IF YOU CHECKED OFF ANY PROBLEMS, HOW DIFFICULT HAVE THESE PROBLEMS MADE IT FOR YOU TO DO YOUR WORK, TAKE CARE OF THINGS AT HOME, OR GET ALONG WITH OTHER PEOPLE: SOMEWHAT DIFFICULT

## 2025-03-04 ASSESSMENT — ASTHMA QUESTIONNAIRES
QUESTION_3 LAST FOUR WEEKS HOW OFTEN DID YOUR ASTHMA SYMPTOMS (WHEEZING, COUGHING, SHORTNESS OF BREATH, CHEST TIGHTNESS OR PAIN) WAKE YOU UP AT NIGHT OR EARLIER THAN USUAL IN THE MORNING: NOT AT ALL
ACT_TOTALSCORE: 25
QUESTION_1 LAST FOUR WEEKS HOW MUCH OF THE TIME DID YOUR ASTHMA KEEP YOU FROM GETTING AS MUCH DONE AT WORK, SCHOOL OR AT HOME: NONE OF THE TIME
EMERGENCY_ROOM_LAST_YEAR_TOTAL: ONE
QUESTION_2 LAST FOUR WEEKS HOW OFTEN HAVE YOU HAD SHORTNESS OF BREATH: NOT AT ALL
QUESTION_4 LAST FOUR WEEKS HOW OFTEN HAVE YOU USED YOUR RESCUE INHALER OR NEBULIZER MEDICATION (SUCH AS ALBUTEROL): NOT AT ALL
ACT_TOTALSCORE: 25
QUESTION_5 LAST FOUR WEEKS HOW WOULD YOU RATE YOUR ASTHMA CONTROL: COMPLETELY CONTROLLED

## 2025-03-04 ASSESSMENT — PAIN SCALES - GENERAL: PAINLEVEL_OUTOF10: MILD PAIN (2)

## 2025-03-04 NOTE — PROGRESS NOTES
"  Assessment & Plan     Other non-recurrent acute nonsuppurative otitis media of right ear  Discussed with patient the indication and use of medication(s), risks/benefits, and potential adverse side effects.  Patient/guardian verbalized understanding and agreement with the plan.   - cefdinir (OMNICEF) 300 MG capsule; Take 1 capsule (300 mg) by mouth 2 times daily.      BMI  Estimated body mass index is 38.65 kg/m  as calculated from the following:    Height as of this encounter: 1.641 m (5' 4.6\").    Weight as of this encounter: 104.1 kg (229 lb 6.4 oz).             Hayden Wray is a 25 year old, presenting for the following health issues:  Ear Problem (Pain and diminished hearing - right ear )      3/4/2025     9:54 AM   Additional Questions   Roomed by Dolores CASTILLO     Ear Problem    History of Present Illness       Reason for visit:  Ear pain and discomfort plus altered hearing  Symptom onset:  1-3 days ago  Symptom intensity:  Moderate  Symptom progression:  Worsening  Had these symptoms before:  No   She is taking medications regularly.      Hx of tubes in ear. Fell out as a teenager.    She says that if an antibiotic is needed she would like to avoid amoxicillin, as she has used in the past for different issue and says it did not treat a thing.                 Objective    /78 (BP Location: Right arm, Patient Position: Sitting, Cuff Size: Adult Large)   Pulse 103   Temp 98.1  F (36.7  C) (Oral)   Resp 25   Ht 1.641 m (5' 4.6\")   Wt 104.1 kg (229 lb 6.4 oz)   SpO2 97%   BMI 38.65 kg/m    Body mass index is 38.65 kg/m .  Physical Exam   GENERAL: alert and no distress  HENT: right ear: erythematous ear drum and scarring on eardrum and left ear: scarring on eardrum but TM pearly grey  NECK: no adenopathy, no asymmetry, masses, or scars  RESP: lungs clear to auscultation - no rales, rhonchi or wheezes  CV: regular rate and rhythm, normal S1 S2, no S3 or S4, no murmur, click or rub, no peripheral " magan Joseph FNP-S    I, Rachel Candelaria, DNP, APRN, CNP, reviewed and verified the nurse practitioner (NP) student's documentation of the patient's history.  I performed the exam and medical decision making activities with the NP student, who was present for learning purposes.     Signed Electronically by: Rachel Candelaria, NP

## 2025-03-06 ENCOUNTER — PATIENT OUTREACH (OUTPATIENT)
Dept: FAMILY MEDICINE | Facility: CLINIC | Age: 25
End: 2025-03-06
Payer: COMMERCIAL

## 2025-03-06 NOTE — TELEPHONE ENCOUNTER
Patient Quality Outreach    Patient is due for the following:       Topic Date Due    Pneumococcal Vaccine (1 of 2 - PCV) Never done    Flu Vaccine (1) 09/01/2024    COVID-19 Vaccine (2 - 2024-25 season) 09/01/2024       Action(s) Taken:   Schedule a nurse only visit for IMMUNIZATIONS    Type of outreach:    Sent [employer] message.    Questions for provider review:    None           Nivia Jimenez MA  Chart routed to Care Team.

## 2025-03-10 ENCOUNTER — OFFICE VISIT (OUTPATIENT)
Dept: FAMILY MEDICINE | Facility: CLINIC | Age: 25
End: 2025-03-10
Payer: COMMERCIAL

## 2025-03-10 VITALS
WEIGHT: 231 LBS | OXYGEN SATURATION: 97 % | HEART RATE: 107 BPM | HEIGHT: 65 IN | TEMPERATURE: 98.1 F | BODY MASS INDEX: 38.49 KG/M2 | DIASTOLIC BLOOD PRESSURE: 70 MMHG | RESPIRATION RATE: 18 BRPM | SYSTOLIC BLOOD PRESSURE: 90 MMHG

## 2025-03-10 DIAGNOSIS — Z30.46 SURVEILLANCE OF PREVIOUSLY PRESCRIBED IMPLANTABLE SUBDERMAL CONTRACEPTIVE: Primary | ICD-10-CM

## 2025-03-10 PROCEDURE — 1126F AMNT PAIN NOTED NONE PRSNT: CPT | Performed by: FAMILY MEDICINE

## 2025-03-10 PROCEDURE — 3074F SYST BP LT 130 MM HG: CPT | Performed by: FAMILY MEDICINE

## 2025-03-10 PROCEDURE — 3078F DIAST BP <80 MM HG: CPT | Performed by: FAMILY MEDICINE

## 2025-03-10 PROCEDURE — 11983 REMOVE/INSERT DRUG IMPLANT: CPT | Performed by: FAMILY MEDICINE

## 2025-03-10 RX ORDER — LIDOCAINE HYDROCHLORIDE AND EPINEPHRINE 10; 10 MG/ML; UG/ML
4 INJECTION, SOLUTION INFILTRATION; PERINEURAL ONCE
Status: COMPLETED | OUTPATIENT
Start: 2025-03-10 | End: 2025-03-10

## 2025-03-10 RX ADMIN — LIDOCAINE HYDROCHLORIDE AND EPINEPHRINE 4 ML: 10; 10 INJECTION, SOLUTION INFILTRATION; PERINEURAL at 10:32

## 2025-03-10 ASSESSMENT — PAIN SCALES - GENERAL: PAINLEVEL_OUTOF10: NO PAIN (0)

## 2025-03-10 NOTE — PROGRESS NOTES
"  Assessment & Plan     Surveillance of previously prescribed implantable subdermal contraceptive  Nexplanon removed and replaced without issues. Of note placement was about 5 cm inferior to prior placement to avoid the sulcus.   - etonogestrel (NEXPLANON) subdermal implant 68 mg  - REMOVAL AND REINSERTION NEXPLANON      The risks, benefits and treatment options of prescribed medications or other treatments have been discussed with the patient. The patient verbalized their understanding and should call or follow up if no improvement or if they develop further problems.      BMI  Estimated body mass index is 38.44 kg/m  as calculated from the following:    Height as of this encounter: 1.651 m (5' 5\").    Weight as of this encounter: 104.8 kg (231 lb).   Weight management plan: Discussed healthy diet and exercise guidelines        Hayden Wray is a 25 year old, presenting for the following health issues:  Contraception      3/10/2025     9:39 AM   Additional Questions   Roomed by Josefina OCHOA     Contraception          25 year old female who presents for nexplanon removal and replacement. See other note for full details.           Objective    BP 90/70 (BP Location: Right arm, Patient Position: Chair, Cuff Size: Adult Large)   Pulse 107   Temp 98.1  F (36.7  C) (Oral)   Resp 18   Ht 1.651 m (5' 5\")   Wt 104.8 kg (231 lb)   LMP  (LMP Unknown)   SpO2 97%   BMI 38.44 kg/m    Body mass index is 38.44 kg/m .  Physical Exam   General: alert, cooperative, no acute distress   Left arm: nexplanon palpable, close to the sulcus. Hand sensation and strength intact and appropriate before and after procedure.       Signed Electronically by: Elliott Gutierres DO    "

## 2025-03-10 NOTE — PROGRESS NOTES
"NEXPLANON REMOVAL/REINSERTION:    Is a pregnancy test required: No.  Was a consent obtained?  Yes    Subjective: Kamala Harris is a 25 year old  presents for Nexplanon.    Patient has been given the opportunity to ask questions about all forms of birth control, including all options appropriate for Kamala Harris. Discussed that no method of birth control, except abstinence is 100% effective against pregnancy or sexually transmitted infection.     Kamala Harris understands planning removal and reinsertion today    The entire removal and insertion procedure was reviewed with the patient, including care after placement.      BP 90/70 (BP Location: Right arm, Patient Position: Chair, Cuff Size: Adult Large)   Pulse 107   Temp 98.1  F (36.7  C) (Oral)   Resp 18   Ht 1.651 m (5' 5\")   Wt 104.8 kg (231 lb)   LMP  (LMP Unknown)   SpO2 97%   BMI 38.44 kg/m      PROCEDURE NOTE: -- Nexplanon Removal/Insertion    Technique: On the left arm  Skin prep alcohol  Anesthesia 1% lidocaine, with epi  Procedure: Patient was placed supine with left arm exposed.  Implant located by palpitation. Jonh was made 8-10 cm above medial epicondyle and a guiding jonh 4 cm above the first.  Arm was prepped with alcohol. Incision point was anesthetized with 2 mL 1% lidocaine.     Small incision (<5mm) was made at distal end of palpable implant, curved hemostat or mosquito forceps was used to isolate the implant and bring it to the incision, the fibrous capsule containing the implant  was incised and the implant was removed intact.    New jonh was made 8-10 cm from medial epicondyle and guiding jonh 4 cm proximal to this. Insertion site was about 5 cm lower then removal area to avoid the sulcus.     After stretching the skin with thumb and index finger around the insertion site, skin punctured with the tip of the needle inserted at 30 degrees and then lowered to horizontal position. While lifting the skin with the tip of the " needle, inserted the needle to its full length. Applicator was then stabilized and the slider was unlocked by pushing it slightly down. Slider moved back completely until it stopped. Applicator was then removed.    Correct placement of the implant was confirmed by palpation in the patient's arm and visualizing the purple top of the obturator.   Bandage and pressure dressing applied to insertion site.    EBL: minimal    Complications: none    ASSESSMENT:     ICD-10-CM    1. Surveillance of previously prescribed implantable subdermal contraceptive  Z30.46 etonogestrel (NEXPLANON) subdermal implant 68 mg     REMOVAL AND REINSERTION NEXPLANON           PLAN:    Given 's handouts, including when to have Nexplanon removed, list of danger s/sx, side effects and follow up recommended. Encouraged condom use for prevention of STD. Back up contraception advised for 7 days. Advised to call for any fever, for prolonged or severe pain or bleeding, abnormal vaginal dischage. She was advised to use pain medications (ibuprofen) as needed for mild to moderate pain.     Elliott Gutierres, DO

## 2025-04-09 ENCOUNTER — VIRTUAL VISIT (OUTPATIENT)
Dept: NEUROLOGY | Facility: CLINIC | Age: 25
End: 2025-04-09
Payer: COMMERCIAL

## 2025-04-09 VITALS — HEIGHT: 65 IN | WEIGHT: 234 LBS | BODY MASS INDEX: 38.99 KG/M2

## 2025-04-09 DIAGNOSIS — G43.709 CHRONIC MIGRAINE WITHOUT AURA WITHOUT STATUS MIGRAINOSUS, NOT INTRACTABLE: ICD-10-CM

## 2025-04-09 PROCEDURE — G2211 COMPLEX E/M VISIT ADD ON: HCPCS | Performed by: PSYCHIATRY & NEUROLOGY

## 2025-04-09 PROCEDURE — 1126F AMNT PAIN NOTED NONE PRSNT: CPT | Performed by: PSYCHIATRY & NEUROLOGY

## 2025-04-09 PROCEDURE — 98005 SYNCH AUDIO-VIDEO EST LOW 20: CPT | Performed by: PSYCHIATRY & NEUROLOGY

## 2025-04-09 ASSESSMENT — HEADACHE IMPACT TEST (HIT 6)
HOW OFTEN HAVE YOU FELT TOO TIRED TO WORK BECAUSE OF YOUR HEADACHES: RARELY
HOW OFTEN HAVE YOU FELT FED UP OR IRRITATED BECAUSE OF YOUR HEADACHES: RARELY
WHEN YOU HAVE HEADACHES HOW OFTEN IS THE PAIN SEVERE: VERY OFTEN
HOW OFTEN DO HEADACHES LIMIT YOUR DAILY ACTIVITIES: SOMETIMES
WHEN YOU HAVE A HEADACHE HOW OFTEN DO YOU WISH YOU COULD LIE DOWN: ALWAYS
HOW OFTEN DID HEADACHS LIMIT CONCENTRATION ON WORK OR DAILY ACTIVITY: RARELY
HIT6 TOTAL SCORE: 58

## 2025-04-09 ASSESSMENT — MIGRAINE DISABILITY ASSESSMENT (MIDAS)
HOW MANY DAYS DID YOU NOT DO HOUSEWORK BECAUSE OF HEADACHES: 2
HOW MANY DAYS WAS YOUR PRODUCTIVITY CUT IN HALF BECAUSE OF HEADACHES: 0
TOTAL SCORE: 4
HOW MANY DAYS DID YOU MISS WORK OR SCHOOL BECAUSE OF HEADACHES: 0
HOW MANY DAYS WAS HOUSEWORK PRODUCTIVITY CUT IN HALF DUE TO HEADACHES: 1
HOW MANY DAYS IN THE PAST 3 MONTHS HAVE YOU HAD A HEADACHE: 5
ON A SCALE FROM 0-10 ON AVERAGE HOW PAINFUL WERE HEADACHES: 6
HOW OFTEN WERE SOCIAL ACTIVITIES MISSED DUE TO HEADACHES: 1

## 2025-04-09 ASSESSMENT — PAIN SCALES - GENERAL: PAINLEVEL_OUTOF10: NO PAIN (0)

## 2025-04-09 NOTE — PROGRESS NOTES
Virtual Visit Details    Type of service:  Video Visit     Originating Location (pt. Location): Home    Distant Location (provider location):  Off-site  Platform used for Video Visit: [facility]    Headache Neurology Progress Note  April 9, 2025      Assessment/Plan:   Kamala Harris is a 25 year old woman returns for follow-up.  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  Emgality remains effective.     Nurtec is effective as needed. After job change, now less light-triggered attacks, so needing Nurtec rarely.     We discussed moving forward with a symptomatic treatment strategy for chronic post traumatic headache with a chronic migraine phenotype.  -With lightheadedness, I do not recommend an antihypertensive.  Her pulse is elevated, so tricyclic antidepressants will be avoided.  She previously trialed topiramate without effect.  -Continue Emgality, instead of Ajovy, which wore off early. Switched Emgality to [employer], as [employer] was not providing on time.     For acute treatment, we discussed the following:  -For acute treatment of mild headache, I recommend naproxen 500 mg twice a day as needed, not to exceed more than 14 days/month to avoid medication overuse.  - For acute treatment of moderate severe headache, I recommend Nurtec 75 mg ODT daily PRN migraine.  - For headache related nausea, she has ondansetron available.  - As a rescue treatment for headache, I recommend metoclopramide 10 mg as needed, with or without diphenhydramine.  -As a last resort, as an alternative to the emergency room, will have an IV fluid plan in place at the infusion center.  Previously discussed this - not currently needing.     I will plan to see her back in 12 months to monitor progress. Ok to follow up with Lindsey  "Kevin, headache PA.    The longitudinal plan of care for Kamala was addressed during this visit. Due to the added complexity in care, I will continue to support Kamala in the subsequent management of this condition(s) and with the ongoing continuity of care of this condition(s).    Nina Fleming MD  Neurology     Subjective:    Kamala Harris returns for follow up of chronic migraine.    Lighting at previous job was triggering, with significant nausea.    Now, migraine attacks are less frequent. Better since stopping job. They go away within 12 hours.    Now triggered by daughter's new light.  Not able to get new injection yet, since switching insurance plans.    Emgality is still very helpful. [place] duration, less frequent attacks.    Tylenol and naproxen are first line. Nurtec is second line, rarely using.  Zofran and Reglan are helpful for nausea.    Objective:    Vitals: Ht 1.651 m (5' 5\")   Wt 106.1 kg (234 lb)   LMP  (LMP Unknown)   BMI 38.94 kg/m    General: Cooperative, NAD  Neurologic:  Mental Status: Fully alert, attentive and oriented. Speech clear and fluent.   Cranial Nerves: Facial movements symmetric.   Motor: No abnormal movements.      Pertinent Investigations:          5/22/2024     9:26 AM 10/23/2024     8:04 AM 4/9/2025     8:19 AM   HIT-6   When you have headaches, how often is the pain severe 11  11 11   How often do headaches limit your ability to do usual daily activities including household work, work, school, or social activities? 11  11 10   When you have a headache, how often do you wish you could lie down? 13  13 13   In the past 4 weeks, how often have you felt too tired to do work or daily activities because of your headaches 11  10 8   In the past 4 weeks, how often have you felt fed up or irritated because of your headaches 13  11 8   In the past 4 weeks, how often did headaches limit your ability to concentrate on work or daily activities 11  11 8   HIT-6 Total Score 70 " 67  58        Patient-reported    Proxy-reported           5/22/2024     9:27 AM 10/23/2024     8:06 AM 4/9/2025     8:20 AM   MIDAS - in the past three months:   On how many days did you miss work or school because of your headaches? 0 3 0   How many days was your productivity at work or school reduced by half or more because of your headaches? 25 3 0   On how many days did you not do household work because of your headaches? 45 3 2   How many days was your productivity in household work reduced by half or more because of your headaches? 45 5 1   On how many days did you miss family, social, or leisure activities because of your headaches? 5 0 1   On how many days did you have a headache? 20 15 5   On a scale of 0-10, on average how painful were these headaches? 7 6 6   MIDAS Score 120 (IV - Severe Disability) 14 (III - Moderate Disability) 4 (I - Little or No Disability)

## 2025-04-09 NOTE — NURSING NOTE
Current patient location: [address] [place] [address] [address]   [facility] [address]    Is the patient currently in the state of [place]? YES    Visit mode: VIDEO    If the visit is dropped, the patient can be reconnected by:VIDEO VISIT: Text to cell phone:   Telephone Information:   Mobile [phone]       Will anyone else be joining the visit? NO  (If patient encounters technical issues they should call [phone])    Are changes needed to the allergy or medication list? Pt stated no changes to allergies and Pt stated no med changes    Are refills needed on medications prescribed by this physician? NO    Rooming Documentation:  Questionnaire(s) completed    Reason for visit: KARIN Dominguez F      
room air

## 2025-04-09 NOTE — LETTER
4/9/2025       RE: Kamala Harris  [address]  [place] [address]     Dear Colleague,    Thank you for referring your patient, Kamala Harris, to the [facility] [place] at . Please see a copy of my visit note below.    Virtual Visit Details    Type of service:  Video Visit     Originating Location (pt. Location): Home    Distant Location (provider location):  Off-site  Platform used for Video Visit: [facility]    Headache Neurology Progress Note  April 9, 2025      Assessment/Plan:   Kamala Harris is a 25 year old woman returns for follow-up.  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  Emgality remains effective.     Nurtec is effective as needed. After job change, now less light-triggered attacks, so needing Nurtec rarely.     We discussed moving forward with a symptomatic treatment strategy for chronic post traumatic headache with a chronic migraine phenotype.  -With lightheadedness, I do not recommend an antihypertensive.  Her pulse is elevated, so tricyclic antidepressants will be avoided.  She previously trialed topiramate without effect.  -Continue Emgality, instead of Ajovy, which wore off early. Switched Emgality to [employer], as [employer] was not providing on time.     For acute treatment, we discussed the following:  -For acute treatment of mild headache, I recommend naproxen 500 mg twice a day as needed, not to exceed more than 14 days/month to avoid medication overuse.  - For acute treatment of moderate severe headache, I recommend Nurtec 75 mg ODT daily PRN migraine.  - For headache related nausea, she has ondansetron available.  - As a rescue treatment for headache, I recommend  "metoclopramide 10 mg as needed, with or without diphenhydramine.  -As a last resort, as an alternative to the emergency room, will have an IV fluid plan in place at the infusion center.  Previously discussed this - not currently needing.     I will plan to see her back in 12 months to monitor progress. Ok to follow up with Lindsey Brown, headache PA.    The longitudinal plan of care for Kamala was addressed during this visit. Due to the added complexity in care, I will continue to support Kamala in the subsequent management of this condition(s) and with the ongoing continuity of care of this condition(s).    Nina Fleming MD  Neurology     Subjective:    Kamala Harris returns for follow up of chronic migraine.    Lighting at previous job was triggering, with significant nausea.    Now, migraine attacks are less frequent. Better since stopping job. They go away within 12 hours.    Now triggered by daughter's new light.  Not able to get new injection yet, since switching insurance plans.    Emgality is still very helpful. [place] duration, less frequent attacks.    Tylenol and naproxen are first line. Nurtec is second line, rarely using.  Zofran and Reglan are helpful for nausea.    Objective:    Vitals: Ht 1.651 m (5' 5\")   Wt 106.1 kg (234 lb)   LMP  (LMP Unknown)   BMI 38.94 kg/m    General: Cooperative, NAD  Neurologic:  Mental Status: Fully alert, attentive and oriented. Speech clear and fluent.   Cranial Nerves: Facial movements symmetric.   Motor: No abnormal movements.      Pertinent Investigations:          5/22/2024     9:26 AM 10/23/2024     8:04 AM 4/9/2025     8:19 AM   HIT-6   When you have headaches, how often is the pain severe 11  11 11   How often do headaches limit your ability to do usual daily activities including household work, work, school, or social activities? 11  11 10   When you have a headache, how often do you wish you could lie down? 13  13 13   In the past 4 weeks, how often have " you felt too tired to do work or daily activities because of your headaches 11  10 8   In the past 4 weeks, how often have you felt fed up or irritated because of your headaches 13  11 8   In the past 4 weeks, how often did headaches limit your ability to concentrate on work or daily activities 11  11 8   HIT-6 Total Score 70 67  58        Patient-reported    Proxy-reported           5/22/2024     9:27 AM 10/23/2024     8:06 AM 4/9/2025     8:20 AM   MIDAS - in the past three months:   On how many days did you miss work or school because of your headaches? 0 3 0   How many days was your productivity at work or school reduced by half or more because of your headaches? 25 3 0   On how many days did you not do household work because of your headaches? 45 3 2   How many days was your productivity in household work reduced by half or more because of your headaches? 45 5 1   On how many days did you miss family, social, or leisure activities because of your headaches? 5 0 1   On how many days did you have a headache? 20 15 5   On a scale of 0-10, on average how painful were these headaches? 7 6 6   MIDAS Score 120 (IV - Severe Disability) 14 (III - Moderate Disability) 4 (I - Little or No Disability)          Again, thank you for allowing me to participate in the care of your patient.      Sincerely,    Nina Fleming MD

## 2025-04-10 ENCOUNTER — TELEPHONE (OUTPATIENT)
Dept: FAMILY MEDICINE | Facility: CLINIC | Age: 25
End: 2025-04-10

## 2025-04-10 DIAGNOSIS — E16.2 HYPOGLYCEMIA: Primary | ICD-10-CM

## 2025-04-10 RX ORDER — HYDROCHLOROTHIAZIDE 12.5 MG/1
CAPSULE ORAL
Qty: 6 EACH | Refills: 3 | Status: SHIPPED | OUTPATIENT
Start: 2025-04-10

## 2025-04-17 ENCOUNTER — TELEPHONE (OUTPATIENT)
Dept: FAMILY MEDICINE | Facility: CLINIC | Age: 25
End: 2025-04-17
Payer: COMMERCIAL

## 2025-04-17 RX ORDER — PROCHLORPERAZINE 25 MG/1
SUPPOSITORY RECTAL
Qty: 1 EACH | Refills: 1 | Status: SHIPPED | OUTPATIENT
Start: 2025-04-17

## 2025-04-17 RX ORDER — PROCHLORPERAZINE 25 MG/1
SUPPOSITORY RECTAL
Qty: 1 EACH | Refills: 0 | Status: SHIPPED | OUTPATIENT
Start: 2025-04-17

## 2025-04-17 RX ORDER — PROCHLORPERAZINE 25 MG/1
SUPPOSITORY RECTAL
Qty: 3 EACH | Refills: 5 | Status: SHIPPED | OUTPATIENT
Start: 2025-04-17

## 2025-04-22 NOTE — TELEPHONE ENCOUNTER
PRIOR AUTHORIZATION DENIED    Medication: DEXCOM G6 SENSOR MISC  Insurance Company: [employer] - Phone [phone] Fax [phone]  Denial Date: 4/22/2025  Denial Reason(s): Diagnosis not approved      Appeal Information:   Patient Notified: No

## 2025-04-22 NOTE — TELEPHONE ENCOUNTER
PA Initiation    Medication: DEXCOM G6 SENSOR MISC  Insurance Company: [employer] - Phone [phone] Fax [phone]  Pharmacy Filling the Rx:    Filling Pharmacy Phone:    Filling Pharmacy Fax:    Start Date: 4/22/2025

## 2025-05-08 NOTE — TELEPHONE ENCOUNTER
Please let patient know that the Dexcom isn't covered by her insurance anymore.     Thanks,  Jazzmine Warren DNP, NP-C

## 2025-05-08 NOTE — TELEPHONE ENCOUNTER
Routing to Jazzmine Warren    Next steps for patient for monitoring blood glucose?      RN called and spoke with patient.    RN advised dexacom G6 no longer covered.    Sandro Jimenez, RN, BSN, PHN  [facility]

## 2025-05-13 NOTE — TELEPHONE ENCOUNTER
Called patient     She already has glucometer at home and will check on her own    No further questions      Annabel RN    Triage Nurse  [facility]

## 2025-06-14 ENCOUNTER — HEALTH MAINTENANCE LETTER (OUTPATIENT)
Age: 25
End: 2025-06-14

## 2025-08-05 ENCOUNTER — TELEPHONE (OUTPATIENT)
Dept: FAMILY MEDICINE | Facility: CLINIC | Age: 25
End: 2025-08-05
Payer: COMMERCIAL